# Patient Record
Sex: FEMALE | Race: WHITE | NOT HISPANIC OR LATINO | Employment: OTHER | URBAN - METROPOLITAN AREA
[De-identification: names, ages, dates, MRNs, and addresses within clinical notes are randomized per-mention and may not be internally consistent; named-entity substitution may affect disease eponyms.]

---

## 2023-08-07 ENCOUNTER — APPOINTMENT (OUTPATIENT)
Dept: GENERAL RADIOLOGY | Facility: CLINIC | Age: 83
DRG: 481 | End: 2023-08-07
Attending: EMERGENCY MEDICINE
Payer: MEDICARE

## 2023-08-07 ENCOUNTER — HOSPITAL ENCOUNTER (INPATIENT)
Facility: CLINIC | Age: 83
LOS: 9 days | Discharge: NURSING, REHAB OR LONG-TERM CARE | DRG: 481 | End: 2023-08-16
Attending: EMERGENCY MEDICINE | Admitting: INTERNAL MEDICINE
Payer: MEDICARE

## 2023-08-07 ENCOUNTER — APPOINTMENT (OUTPATIENT)
Dept: CT IMAGING | Facility: CLINIC | Age: 83
DRG: 481 | End: 2023-08-07
Attending: STUDENT IN AN ORGANIZED HEALTH CARE EDUCATION/TRAINING PROGRAM
Payer: MEDICARE

## 2023-08-07 ENCOUNTER — APPOINTMENT (OUTPATIENT)
Dept: CT IMAGING | Facility: CLINIC | Age: 83
DRG: 481 | End: 2023-08-07
Attending: EMERGENCY MEDICINE
Payer: MEDICARE

## 2023-08-07 DIAGNOSIS — G47.09 OTHER INSOMNIA: Primary | ICD-10-CM

## 2023-08-07 DIAGNOSIS — S72.001A CLOSED FRACTURE OF NECK OF RIGHT FEMUR, INITIAL ENCOUNTER (H): ICD-10-CM

## 2023-08-07 DIAGNOSIS — E87.1 HYPONATREMIA: ICD-10-CM

## 2023-08-07 LAB
ALBUMIN SERPL BCG-MCNC: 4.4 G/DL (ref 3.5–5.2)
ALP SERPL-CCNC: 101 U/L (ref 35–104)
ALT SERPL W P-5'-P-CCNC: 37 U/L (ref 0–50)
ANION GAP SERPL CALCULATED.3IONS-SCNC: 16 MMOL/L (ref 7–15)
AST SERPL W P-5'-P-CCNC: 33 U/L (ref 0–45)
BASOPHILS # BLD AUTO: 0 10E3/UL (ref 0–0.2)
BASOPHILS NFR BLD AUTO: 0 %
BILIRUB SERPL-MCNC: 0.9 MG/DL
BUN SERPL-MCNC: 9.8 MG/DL (ref 8–23)
CALCIUM SERPL-MCNC: 9.9 MG/DL (ref 8.8–10.2)
CHLORIDE SERPL-SCNC: 90 MMOL/L (ref 98–107)
CREAT SERPL-MCNC: 0.88 MG/DL (ref 0.51–0.95)
DEPRECATED HCO3 PLAS-SCNC: 21 MMOL/L (ref 22–29)
EOSINOPHIL # BLD AUTO: 0 10E3/UL (ref 0–0.7)
EOSINOPHIL NFR BLD AUTO: 0 %
ERYTHROCYTE [DISTWIDTH] IN BLOOD BY AUTOMATED COUNT: 12.7 % (ref 10–15)
GFR SERPL CREATININE-BSD FRML MDRD: 64 ML/MIN/1.73M2
GLUCOSE SERPL-MCNC: 159 MG/DL (ref 70–99)
HCT VFR BLD AUTO: 37.5 % (ref 35–47)
HGB BLD-MCNC: 12.9 G/DL (ref 11.7–15.7)
HOLD SPECIMEN: NORMAL
IMM GRANULOCYTES # BLD: 0 10E3/UL
IMM GRANULOCYTES NFR BLD: 0 %
LYMPHOCYTES # BLD AUTO: 0.6 10E3/UL (ref 0.8–5.3)
LYMPHOCYTES NFR BLD AUTO: 6 %
MCH RBC QN AUTO: 35.1 PG (ref 26.5–33)
MCHC RBC AUTO-ENTMCNC: 34.4 G/DL (ref 31.5–36.5)
MCV RBC AUTO: 102 FL (ref 78–100)
MONOCYTES # BLD AUTO: 0.6 10E3/UL (ref 0–1.3)
MONOCYTES NFR BLD AUTO: 6 %
NEUTROPHILS # BLD AUTO: 8.8 10E3/UL (ref 1.6–8.3)
NEUTROPHILS NFR BLD AUTO: 88 %
NRBC # BLD AUTO: 0 10E3/UL
NRBC BLD AUTO-RTO: 0 /100
PLATELET # BLD AUTO: 265 10E3/UL (ref 150–450)
POTASSIUM SERPL-SCNC: 4.1 MMOL/L (ref 3.4–5.3)
PROT SERPL-MCNC: 7.8 G/DL (ref 6.4–8.3)
RBC # BLD AUTO: 3.67 10E6/UL (ref 3.8–5.2)
SODIUM SERPL-SCNC: 127 MMOL/L (ref 136–145)
WBC # BLD AUTO: 10.1 10E3/UL (ref 4–11)

## 2023-08-07 PROCEDURE — G1010 CDSM STANSON: HCPCS

## 2023-08-07 PROCEDURE — 36415 COLL VENOUS BLD VENIPUNCTURE: CPT | Performed by: EMERGENCY MEDICINE

## 2023-08-07 PROCEDURE — 72125 CT NECK SPINE W/O DYE: CPT | Mod: MA

## 2023-08-07 PROCEDURE — 72170 X-RAY EXAM OF PELVIS: CPT

## 2023-08-07 PROCEDURE — 80053 COMPREHEN METABOLIC PANEL: CPT | Performed by: EMERGENCY MEDICINE

## 2023-08-07 PROCEDURE — 73552 X-RAY EXAM OF FEMUR 2/>: CPT | Mod: RT

## 2023-08-07 PROCEDURE — 258N000003 HC RX IP 258 OP 636: Performed by: EMERGENCY MEDICINE

## 2023-08-07 PROCEDURE — 250N000013 HC RX MED GY IP 250 OP 250 PS 637: Performed by: EMERGENCY MEDICINE

## 2023-08-07 PROCEDURE — 99222 1ST HOSP IP/OBS MODERATE 55: CPT | Performed by: INTERNAL MEDICINE

## 2023-08-07 PROCEDURE — 85025 COMPLETE CBC W/AUTO DIFF WBC: CPT | Performed by: EMERGENCY MEDICINE

## 2023-08-07 PROCEDURE — 120N000001 HC R&B MED SURG/OB

## 2023-08-07 PROCEDURE — 70450 CT HEAD/BRAIN W/O DYE: CPT | Mod: MA

## 2023-08-07 PROCEDURE — 99285 EMERGENCY DEPT VISIT HI MDM: CPT | Mod: 25

## 2023-08-07 RX ORDER — ACETAMINOPHEN 325 MG/1
650 TABLET ORAL ONCE
Status: COMPLETED | OUTPATIENT
Start: 2023-08-07 | End: 2023-08-07

## 2023-08-07 RX ORDER — LISINOPRIL 20 MG/1
20 TABLET ORAL EVERY EVENING
COMMUNITY
Start: 2023-04-14 | End: 2023-09-07

## 2023-08-07 RX ORDER — AMLODIPINE BESYLATE 5 MG/1
5 TABLET ORAL EVERY EVENING
COMMUNITY
Start: 2023-04-12 | End: 2023-09-07

## 2023-08-07 RX ADMIN — OXYCODONE HYDROCHLORIDE 2.5 MG: 5 TABLET ORAL at 22:55

## 2023-08-07 RX ADMIN — ACETAMINOPHEN 650 MG: 325 TABLET, FILM COATED ORAL at 22:55

## 2023-08-07 RX ADMIN — SODIUM CHLORIDE 1000 ML: 9 INJECTION, SOLUTION INTRAVENOUS at 22:58

## 2023-08-07 ASSESSMENT — ACTIVITIES OF DAILY LIVING (ADL)
ADLS_ACUITY_SCORE: 35
ADLS_ACUITY_SCORE: 35

## 2023-08-08 ENCOUNTER — APPOINTMENT (OUTPATIENT)
Dept: GENERAL RADIOLOGY | Facility: CLINIC | Age: 83
DRG: 481 | End: 2023-08-08
Payer: MEDICARE

## 2023-08-08 ENCOUNTER — ANESTHESIA (OUTPATIENT)
Dept: SURGERY | Facility: CLINIC | Age: 83
DRG: 481 | End: 2023-08-08
Payer: MEDICARE

## 2023-08-08 ENCOUNTER — APPOINTMENT (OUTPATIENT)
Dept: GENERAL RADIOLOGY | Facility: CLINIC | Age: 83
DRG: 481 | End: 2023-08-08
Attending: INTERNAL MEDICINE
Payer: MEDICARE

## 2023-08-08 ENCOUNTER — ANESTHESIA EVENT (OUTPATIENT)
Dept: SURGERY | Facility: CLINIC | Age: 83
DRG: 481 | End: 2023-08-08
Payer: MEDICARE

## 2023-08-08 LAB
ABO/RH(D): NORMAL
ALBUMIN UR-MCNC: NEGATIVE MG/DL
ANION GAP SERPL CALCULATED.3IONS-SCNC: 9 MMOL/L (ref 7–15)
ANTIBODY SCREEN: NEGATIVE
APPEARANCE UR: CLEAR
BACTERIA #/AREA URNS HPF: ABNORMAL /HPF
BILIRUB UR QL STRIP: NEGATIVE
BUN SERPL-MCNC: 8.6 MG/DL (ref 8–23)
CALCIUM SERPL-MCNC: 9.2 MG/DL (ref 8.8–10.2)
CHLORIDE SERPL-SCNC: 95 MMOL/L (ref 98–107)
CK SERPL-CCNC: 225 U/L (ref 26–192)
COLOR UR AUTO: ABNORMAL
CREAT SERPL-MCNC: 0.6 MG/DL (ref 0.51–0.95)
CREAT SERPL-MCNC: 0.7 MG/DL (ref 0.51–0.95)
DEPRECATED HCO3 PLAS-SCNC: 25 MMOL/L (ref 22–29)
GFR SERPL CREATININE-BSD FRML MDRD: 85 ML/MIN/1.73M2
GFR SERPL CREATININE-BSD FRML MDRD: 88 ML/MIN/1.73M2
GLUCOSE SERPL-MCNC: 140 MG/DL (ref 70–99)
GLUCOSE UR STRIP-MCNC: 50 MG/DL
HGB UR QL STRIP: NEGATIVE
KETONES UR STRIP-MCNC: NEGATIVE MG/DL
LEUKOCYTE ESTERASE UR QL STRIP: ABNORMAL
MAGNESIUM SERPL-MCNC: 1.6 MG/DL (ref 1.7–2.3)
NITRATE UR QL: NEGATIVE
PH UR STRIP: 7 [PH] (ref 5–7)
POTASSIUM SERPL-SCNC: 3.9 MMOL/L (ref 3.4–5.3)
RBC URINE: 0 /HPF
SODIUM SERPL-SCNC: 129 MMOL/L (ref 136–145)
SODIUM UR-SCNC: 55 MMOL/L
SP GR UR STRIP: 1.01 (ref 1–1.03)
SPECIMEN EXPIRATION DATE: NORMAL
SQUAMOUS EPITHELIAL: 3 /HPF
UROBILINOGEN UR STRIP-MCNC: NORMAL MG/DL
WBC URINE: 4 /HPF

## 2023-08-08 PROCEDURE — 81001 URINALYSIS AUTO W/SCOPE: CPT | Performed by: INTERNAL MEDICINE

## 2023-08-08 PROCEDURE — 82550 ASSAY OF CK (CPK): CPT | Performed by: PHYSICIAN ASSISTANT

## 2023-08-08 PROCEDURE — 999N000179 XR SURGERY CARM FLUORO LESS THAN 5 MIN W STILLS

## 2023-08-08 PROCEDURE — 250N000011 HC RX IP 250 OP 636: Performed by: STUDENT IN AN ORGANIZED HEALTH CARE EDUCATION/TRAINING PROGRAM

## 2023-08-08 PROCEDURE — 0QH634Z INSERTION OF INTERNAL FIXATION DEVICE INTO RIGHT UPPER FEMUR, PERCUTANEOUS APPROACH: ICD-10-PCS | Performed by: STUDENT IN AN ORGANIZED HEALTH CARE EDUCATION/TRAINING PROGRAM

## 2023-08-08 PROCEDURE — 258N000003 HC RX IP 258 OP 636: Performed by: INTERNAL MEDICINE

## 2023-08-08 PROCEDURE — 360N000084 HC SURGERY LEVEL 4 W/ FLUORO, PER MIN: Performed by: STUDENT IN AN ORGANIZED HEALTH CARE EDUCATION/TRAINING PROGRAM

## 2023-08-08 PROCEDURE — 99232 SBSQ HOSP IP/OBS MODERATE 35: CPT | Performed by: STUDENT IN AN ORGANIZED HEALTH CARE EDUCATION/TRAINING PROGRAM

## 2023-08-08 PROCEDURE — 250N000009 HC RX 250: Performed by: STUDENT IN AN ORGANIZED HEALTH CARE EDUCATION/TRAINING PROGRAM

## 2023-08-08 PROCEDURE — 999N000128 HC STATISTIC PERIPHERAL IV START W/O US GUIDANCE

## 2023-08-08 PROCEDURE — 250N000009 HC RX 250: Performed by: NURSE ANESTHETIST, CERTIFIED REGISTERED

## 2023-08-08 PROCEDURE — 710N000009 HC RECOVERY PHASE 1, LEVEL 1, PER MIN: Performed by: STUDENT IN AN ORGANIZED HEALTH CARE EDUCATION/TRAINING PROGRAM

## 2023-08-08 PROCEDURE — 83735 ASSAY OF MAGNESIUM: CPT | Performed by: STUDENT IN AN ORGANIZED HEALTH CARE EDUCATION/TRAINING PROGRAM

## 2023-08-08 PROCEDURE — 370N000017 HC ANESTHESIA TECHNICAL FEE, PER MIN: Performed by: STUDENT IN AN ORGANIZED HEALTH CARE EDUCATION/TRAINING PROGRAM

## 2023-08-08 PROCEDURE — 82306 VITAMIN D 25 HYDROXY: CPT | Performed by: PHYSICIAN ASSISTANT

## 2023-08-08 PROCEDURE — C1713 ANCHOR/SCREW BN/BN,TIS/BN: HCPCS | Performed by: STUDENT IN AN ORGANIZED HEALTH CARE EDUCATION/TRAINING PROGRAM

## 2023-08-08 PROCEDURE — 250N000011 HC RX IP 250 OP 636: Performed by: NURSE ANESTHETIST, CERTIFIED REGISTERED

## 2023-08-08 PROCEDURE — 250N000013 HC RX MED GY IP 250 OP 250 PS 637

## 2023-08-08 PROCEDURE — 36415 COLL VENOUS BLD VENIPUNCTURE: CPT | Performed by: INTERNAL MEDICINE

## 2023-08-08 PROCEDURE — 272N000001 HC OR GENERAL SUPPLY STERILE: Performed by: STUDENT IN AN ORGANIZED HEALTH CARE EDUCATION/TRAINING PROGRAM

## 2023-08-08 PROCEDURE — 82565 ASSAY OF CREATININE: CPT

## 2023-08-08 PROCEDURE — 258N000003 HC RX IP 258 OP 636: Performed by: NURSE ANESTHETIST, CERTIFIED REGISTERED

## 2023-08-08 PROCEDURE — 258N000003 HC RX IP 258 OP 636

## 2023-08-08 PROCEDURE — 250N000013 HC RX MED GY IP 250 OP 250 PS 637: Performed by: INTERNAL MEDICINE

## 2023-08-08 PROCEDURE — 84300 ASSAY OF URINE SODIUM: CPT | Performed by: INTERNAL MEDICINE

## 2023-08-08 PROCEDURE — 250N000009 HC RX 250: Performed by: ANESTHESIOLOGY

## 2023-08-08 PROCEDURE — 82310 ASSAY OF CALCIUM: CPT | Performed by: INTERNAL MEDICINE

## 2023-08-08 PROCEDURE — 999N000063 XR PELVIS AND HIP PORTABLE RIGHT 1 VIEW

## 2023-08-08 PROCEDURE — 36415 COLL VENOUS BLD VENIPUNCTURE: CPT

## 2023-08-08 PROCEDURE — 250N000011 HC RX IP 250 OP 636

## 2023-08-08 PROCEDURE — 999N000141 HC STATISTIC PRE-PROCEDURE NURSING ASSESSMENT: Performed by: STUDENT IN AN ORGANIZED HEALTH CARE EDUCATION/TRAINING PROGRAM

## 2023-08-08 PROCEDURE — 86901 BLOOD TYPING SEROLOGIC RH(D): CPT | Performed by: PHYSICIAN ASSISTANT

## 2023-08-08 PROCEDURE — 120N000001 HC R&B MED SURG/OB

## 2023-08-08 PROCEDURE — 36415 COLL VENOUS BLD VENIPUNCTURE: CPT | Performed by: PHYSICIAN ASSISTANT

## 2023-08-08 DEVICE — IMPLANTABLE DEVICE: Type: IMPLANTABLE DEVICE | Site: HIP | Status: FUNCTIONAL

## 2023-08-08 DEVICE — IMP WASHER SYN CAN 13MM 219.99: Type: IMPLANTABLE DEVICE | Site: HIP | Status: FUNCTIONAL

## 2023-08-08 RX ORDER — LIDOCAINE HYDROCHLORIDE 20 MG/ML
INJECTION, SOLUTION INFILTRATION; PERINEURAL PRN
Status: DISCONTINUED | OUTPATIENT
Start: 2023-08-08 | End: 2023-08-08

## 2023-08-08 RX ORDER — MAGNESIUM HYDROXIDE 1200 MG/15ML
LIQUID ORAL PRN
Status: DISCONTINUED | OUTPATIENT
Start: 2023-08-08 | End: 2023-08-08 | Stop reason: HOSPADM

## 2023-08-08 RX ORDER — NALOXONE HYDROCHLORIDE 0.4 MG/ML
0.4 INJECTION, SOLUTION INTRAMUSCULAR; INTRAVENOUS; SUBCUTANEOUS
Status: DISCONTINUED | OUTPATIENT
Start: 2023-08-08 | End: 2023-08-16 | Stop reason: HOSPADM

## 2023-08-08 RX ORDER — LISINOPRIL 20 MG/1
20 TABLET ORAL EVERY EVENING
Status: DISCONTINUED | OUTPATIENT
Start: 2023-08-08 | End: 2023-08-16 | Stop reason: HOSPADM

## 2023-08-08 RX ORDER — DEXAMETHASONE SODIUM PHOSPHATE 4 MG/ML
INJECTION, SOLUTION INTRA-ARTICULAR; INTRALESIONAL; INTRAMUSCULAR; INTRAVENOUS; SOFT TISSUE PRN
Status: DISCONTINUED | OUTPATIENT
Start: 2023-08-08 | End: 2023-08-08

## 2023-08-08 RX ORDER — ACETAMINOPHEN 325 MG/1
650 TABLET ORAL EVERY 4 HOURS PRN
Status: DISCONTINUED | OUTPATIENT
Start: 2023-08-11 | End: 2023-08-16 | Stop reason: HOSPADM

## 2023-08-08 RX ORDER — HYDROMORPHONE HCL IN WATER/PF 6 MG/30 ML
0.2 PATIENT CONTROLLED ANALGESIA SYRINGE INTRAVENOUS
Status: DISCONTINUED | OUTPATIENT
Start: 2023-08-08 | End: 2023-08-16 | Stop reason: HOSPADM

## 2023-08-08 RX ORDER — ACETAMINOPHEN 325 MG/1
325-650 TABLET ORAL EVERY 6 HOURS PRN
Status: DISCONTINUED | OUTPATIENT
Start: 2023-08-08 | End: 2023-08-08

## 2023-08-08 RX ORDER — PROCHLORPERAZINE MALEATE 5 MG
5 TABLET ORAL EVERY 6 HOURS PRN
Status: DISCONTINUED | OUTPATIENT
Start: 2023-08-08 | End: 2023-08-16 | Stop reason: HOSPADM

## 2023-08-08 RX ORDER — IBUPROFEN 600 MG/1
600 TABLET, FILM COATED ORAL EVERY 6 HOURS PRN
Status: DISCONTINUED | OUTPATIENT
Start: 2023-08-08 | End: 2023-08-16 | Stop reason: HOSPADM

## 2023-08-08 RX ORDER — PROPOFOL 10 MG/ML
INJECTION, EMULSION INTRAVENOUS CONTINUOUS PRN
Status: DISCONTINUED | OUTPATIENT
Start: 2023-08-08 | End: 2023-08-08

## 2023-08-08 RX ORDER — NALOXONE HYDROCHLORIDE 0.4 MG/ML
0.2 INJECTION, SOLUTION INTRAMUSCULAR; INTRAVENOUS; SUBCUTANEOUS
Status: DISCONTINUED | OUTPATIENT
Start: 2023-08-08 | End: 2023-08-16 | Stop reason: HOSPADM

## 2023-08-08 RX ORDER — OXYCODONE HYDROCHLORIDE 5 MG/1
5 TABLET ORAL EVERY 4 HOURS PRN
Status: DISCONTINUED | OUTPATIENT
Start: 2023-08-08 | End: 2023-08-16 | Stop reason: HOSPADM

## 2023-08-08 RX ORDER — ONDANSETRON 4 MG/1
4 TABLET, ORALLY DISINTEGRATING ORAL EVERY 6 HOURS PRN
Status: DISCONTINUED | OUTPATIENT
Start: 2023-08-08 | End: 2023-08-16 | Stop reason: HOSPADM

## 2023-08-08 RX ORDER — AMOXICILLIN 250 MG
1 CAPSULE ORAL 2 TIMES DAILY
Status: DISCONTINUED | OUTPATIENT
Start: 2023-08-08 | End: 2023-08-12

## 2023-08-08 RX ORDER — TRANEXAMIC ACID 10 MG/ML
1 INJECTION, SOLUTION INTRAVENOUS ONCE
Status: COMPLETED | OUTPATIENT
Start: 2023-08-08 | End: 2023-08-08

## 2023-08-08 RX ORDER — ONDANSETRON 2 MG/ML
INJECTION INTRAMUSCULAR; INTRAVENOUS PRN
Status: DISCONTINUED | OUTPATIENT
Start: 2023-08-08 | End: 2023-08-08

## 2023-08-08 RX ORDER — ACETAMINOPHEN 325 MG/1
975 TABLET ORAL EVERY 8 HOURS
Status: DISCONTINUED | OUTPATIENT
Start: 2023-08-08 | End: 2023-08-08

## 2023-08-08 RX ORDER — POLYETHYLENE GLYCOL 3350 17 G/17G
17 POWDER, FOR SOLUTION ORAL DAILY PRN
Status: DISCONTINUED | OUTPATIENT
Start: 2023-08-08 | End: 2023-08-16 | Stop reason: HOSPADM

## 2023-08-08 RX ORDER — ONDANSETRON 4 MG/1
4 TABLET, ORALLY DISINTEGRATING ORAL EVERY 6 HOURS PRN
Status: DISCONTINUED | OUTPATIENT
Start: 2023-08-08 | End: 2023-08-08

## 2023-08-08 RX ORDER — OXYCODONE HYDROCHLORIDE 5 MG/1
10 TABLET ORAL EVERY 4 HOURS PRN
Status: DISCONTINUED | OUTPATIENT
Start: 2023-08-08 | End: 2023-08-08

## 2023-08-08 RX ORDER — ONDANSETRON 2 MG/ML
4 INJECTION INTRAMUSCULAR; INTRAVENOUS EVERY 6 HOURS PRN
Status: DISCONTINUED | OUTPATIENT
Start: 2023-08-08 | End: 2023-08-08

## 2023-08-08 RX ORDER — ENOXAPARIN SODIUM 100 MG/ML
40 INJECTION SUBCUTANEOUS EVERY 24 HOURS
Status: DISCONTINUED | OUTPATIENT
Start: 2023-08-09 | End: 2023-08-16 | Stop reason: HOSPADM

## 2023-08-08 RX ORDER — BISACODYL 10 MG
10 SUPPOSITORY, RECTAL RECTAL DAILY PRN
Status: DISCONTINUED | OUTPATIENT
Start: 2023-08-08 | End: 2023-08-08

## 2023-08-08 RX ORDER — LIDOCAINE 40 MG/G
CREAM TOPICAL
Status: DISCONTINUED | OUTPATIENT
Start: 2023-08-08 | End: 2023-08-08

## 2023-08-08 RX ORDER — PROCHLORPERAZINE 25 MG
12.5 SUPPOSITORY, RECTAL RECTAL EVERY 12 HOURS PRN
Status: DISCONTINUED | OUTPATIENT
Start: 2023-08-08 | End: 2023-08-08

## 2023-08-08 RX ORDER — BISACODYL 10 MG
10 SUPPOSITORY, RECTAL RECTAL DAILY PRN
Status: DISCONTINUED | OUTPATIENT
Start: 2023-08-08 | End: 2023-08-16 | Stop reason: HOSPADM

## 2023-08-08 RX ORDER — HYDROXYZINE HYDROCHLORIDE 10 MG/1
10 TABLET, FILM COATED ORAL 3 TIMES DAILY PRN
Status: DISCONTINUED | OUTPATIENT
Start: 2023-08-08 | End: 2023-08-16 | Stop reason: HOSPADM

## 2023-08-08 RX ORDER — POLYETHYLENE GLYCOL 3350 17 G/17G
17 POWDER, FOR SOLUTION ORAL DAILY
Status: DISCONTINUED | OUTPATIENT
Start: 2023-08-09 | End: 2023-08-12

## 2023-08-08 RX ORDER — AMOXICILLIN 250 MG
1 CAPSULE ORAL 2 TIMES DAILY PRN
Status: DISCONTINUED | OUTPATIENT
Start: 2023-08-08 | End: 2023-08-08

## 2023-08-08 RX ORDER — CEFAZOLIN SODIUM 1 G/3ML
1 INJECTION, POWDER, FOR SOLUTION INTRAMUSCULAR; INTRAVENOUS EVERY 8 HOURS
Status: COMPLETED | OUTPATIENT
Start: 2023-08-08 | End: 2023-08-09

## 2023-08-08 RX ORDER — HYDROMORPHONE HCL IN WATER/PF 6 MG/30 ML
0.4 PATIENT CONTROLLED ANALGESIA SYRINGE INTRAVENOUS
Status: DISCONTINUED | OUTPATIENT
Start: 2023-08-08 | End: 2023-08-16 | Stop reason: HOSPADM

## 2023-08-08 RX ORDER — PROPOFOL 10 MG/ML
INJECTION, EMULSION INTRAVENOUS PRN
Status: DISCONTINUED | OUTPATIENT
Start: 2023-08-08 | End: 2023-08-08

## 2023-08-08 RX ORDER — AMOXICILLIN 250 MG
2 CAPSULE ORAL 2 TIMES DAILY PRN
Status: DISCONTINUED | OUTPATIENT
Start: 2023-08-08 | End: 2023-08-08

## 2023-08-08 RX ORDER — METHOCARBAMOL 500 MG/1
500 TABLET, FILM COATED ORAL EVERY 6 HOURS PRN
Status: DISCONTINUED | OUTPATIENT
Start: 2023-08-08 | End: 2023-08-16 | Stop reason: HOSPADM

## 2023-08-08 RX ORDER — HYDROMORPHONE HCL IN WATER/PF 6 MG/30 ML
0.4 PATIENT CONTROLLED ANALGESIA SYRINGE INTRAVENOUS
Status: DISCONTINUED | OUTPATIENT
Start: 2023-08-08 | End: 2023-08-08

## 2023-08-08 RX ORDER — SODIUM CHLORIDE, SODIUM LACTATE, POTASSIUM CHLORIDE, CALCIUM CHLORIDE 600; 310; 30; 20 MG/100ML; MG/100ML; MG/100ML; MG/100ML
INJECTION, SOLUTION INTRAVENOUS CONTINUOUS PRN
Status: DISCONTINUED | OUTPATIENT
Start: 2023-08-08 | End: 2023-08-08

## 2023-08-08 RX ORDER — ONDANSETRON 2 MG/ML
4 INJECTION INTRAMUSCULAR; INTRAVENOUS EVERY 6 HOURS PRN
Status: DISCONTINUED | OUTPATIENT
Start: 2023-08-08 | End: 2023-08-16 | Stop reason: HOSPADM

## 2023-08-08 RX ORDER — SODIUM CHLORIDE, SODIUM LACTATE, POTASSIUM CHLORIDE, CALCIUM CHLORIDE 600; 310; 30; 20 MG/100ML; MG/100ML; MG/100ML; MG/100ML
INJECTION, SOLUTION INTRAVENOUS CONTINUOUS
Status: DISCONTINUED | OUTPATIENT
Start: 2023-08-08 | End: 2023-08-11

## 2023-08-08 RX ORDER — CEFAZOLIN SODIUM/WATER 2 G/20 ML
2 SYRINGE (ML) INTRAVENOUS
Status: DISCONTINUED | OUTPATIENT
Start: 2023-08-08 | End: 2023-08-08 | Stop reason: HOSPADM

## 2023-08-08 RX ORDER — LIDOCAINE 40 MG/G
CREAM TOPICAL
Status: DISCONTINUED | OUTPATIENT
Start: 2023-08-08 | End: 2023-08-16 | Stop reason: HOSPADM

## 2023-08-08 RX ORDER — ACETAMINOPHEN 325 MG/1
975 TABLET ORAL EVERY 8 HOURS
Status: COMPLETED | OUTPATIENT
Start: 2023-08-08 | End: 2023-08-11

## 2023-08-08 RX ORDER — AMLODIPINE BESYLATE 5 MG/1
5 TABLET ORAL EVERY EVENING
Status: DISCONTINUED | OUTPATIENT
Start: 2023-08-08 | End: 2023-08-16 | Stop reason: HOSPADM

## 2023-08-08 RX ORDER — PROCHLORPERAZINE MALEATE 5 MG
5 TABLET ORAL EVERY 6 HOURS PRN
Status: DISCONTINUED | OUTPATIENT
Start: 2023-08-08 | End: 2023-08-08

## 2023-08-08 RX ORDER — CEFAZOLIN SODIUM/WATER 2 G/20 ML
2 SYRINGE (ML) INTRAVENOUS SEE ADMIN INSTRUCTIONS
Status: DISCONTINUED | OUTPATIENT
Start: 2023-08-08 | End: 2023-08-08 | Stop reason: HOSPADM

## 2023-08-08 RX ORDER — CHOLECALCIFEROL (VITAMIN D3) 50 MCG
50 TABLET ORAL DAILY
Status: DISCONTINUED | OUTPATIENT
Start: 2023-08-09 | End: 2023-08-16 | Stop reason: HOSPADM

## 2023-08-08 RX ORDER — HYDROMORPHONE HCL IN WATER/PF 6 MG/30 ML
0.2 PATIENT CONTROLLED ANALGESIA SYRINGE INTRAVENOUS
Status: DISCONTINUED | OUTPATIENT
Start: 2023-08-08 | End: 2023-08-08

## 2023-08-08 RX ORDER — FENTANYL CITRATE 50 UG/ML
INJECTION, SOLUTION INTRAMUSCULAR; INTRAVENOUS PRN
Status: DISCONTINUED | OUTPATIENT
Start: 2023-08-08 | End: 2023-08-08

## 2023-08-08 RX ORDER — OXYCODONE HYDROCHLORIDE 5 MG/1
5 TABLET ORAL EVERY 4 HOURS PRN
Status: DISCONTINUED | OUTPATIENT
Start: 2023-08-08 | End: 2023-08-08

## 2023-08-08 RX ORDER — TRANEXAMIC ACID 10 MG/ML
1 INJECTION, SOLUTION INTRAVENOUS ONCE
Status: DISCONTINUED | OUTPATIENT
Start: 2023-08-08 | End: 2023-08-08 | Stop reason: HOSPADM

## 2023-08-08 RX ADMIN — DEXAMETHASONE SODIUM PHOSPHATE 4 MG: 4 INJECTION, SOLUTION INTRA-ARTICULAR; INTRALESIONAL; INTRAMUSCULAR; INTRAVENOUS; SOFT TISSUE at 14:19

## 2023-08-08 RX ADMIN — TRANEXAMIC ACID 1 G: 10 INJECTION, SOLUTION INTRAVENOUS at 14:17

## 2023-08-08 RX ADMIN — PHENYLEPHRINE HYDROCHLORIDE 200 MCG: 10 INJECTION INTRAVENOUS at 14:44

## 2023-08-08 RX ADMIN — PROPOFOL 50 MG: 10 INJECTION, EMULSION INTRAVENOUS at 14:15

## 2023-08-08 RX ADMIN — AMLODIPINE BESYLATE 5 MG: 5 TABLET ORAL at 20:04

## 2023-08-08 RX ADMIN — DEXTROSE AND SODIUM CHLORIDE: 5; 900 INJECTION, SOLUTION INTRAVENOUS at 12:09

## 2023-08-08 RX ADMIN — SODIUM CHLORIDE, POTASSIUM CHLORIDE, SODIUM LACTATE AND CALCIUM CHLORIDE: 600; 310; 30; 20 INJECTION, SOLUTION INTRAVENOUS at 17:49

## 2023-08-08 RX ADMIN — PROPOFOL 100 MG: 10 INJECTION, EMULSION INTRAVENOUS at 14:13

## 2023-08-08 RX ADMIN — SODIUM CHLORIDE, POTASSIUM CHLORIDE, SODIUM LACTATE AND CALCIUM CHLORIDE: 600; 310; 30; 20 INJECTION, SOLUTION INTRAVENOUS at 14:09

## 2023-08-08 RX ADMIN — PHENYLEPHRINE HYDROCHLORIDE 200 MCG: 10 INJECTION INTRAVENOUS at 14:38

## 2023-08-08 RX ADMIN — FENTANYL CITRATE 50 MCG: 50 INJECTION, SOLUTION INTRAMUSCULAR; INTRAVENOUS at 14:13

## 2023-08-08 RX ADMIN — ACETAMINOPHEN 975 MG: 325 TABLET, FILM COATED ORAL at 06:22

## 2023-08-08 RX ADMIN — Medication 20 ML: at 14:00

## 2023-08-08 RX ADMIN — LIDOCAINE HYDROCHLORIDE 60 MG: 20 INJECTION, SOLUTION INFILTRATION; PERINEURAL at 14:13

## 2023-08-08 RX ADMIN — FENTANYL CITRATE 50 MCG: 50 INJECTION, SOLUTION INTRAMUSCULAR; INTRAVENOUS at 14:33

## 2023-08-08 RX ADMIN — PROPOFOL 150 MCG/KG/MIN: 10 INJECTION, EMULSION INTRAVENOUS at 14:17

## 2023-08-08 RX ADMIN — PROPOFOL 100 MG: 10 INJECTION, EMULSION INTRAVENOUS at 14:38

## 2023-08-08 RX ADMIN — PHENYLEPHRINE HYDROCHLORIDE 100 MCG: 10 INJECTION INTRAVENOUS at 14:13

## 2023-08-08 RX ADMIN — CEFAZOLIN 1 G: 1 INJECTION, POWDER, FOR SOLUTION INTRAMUSCULAR; INTRAVENOUS at 22:03

## 2023-08-08 RX ADMIN — PHENYLEPHRINE HYDROCHLORIDE 200 MCG: 10 INJECTION INTRAVENOUS at 14:55

## 2023-08-08 RX ADMIN — ONDANSETRON 4 MG: 2 INJECTION INTRAMUSCULAR; INTRAVENOUS at 14:19

## 2023-08-08 RX ADMIN — ACETAMINOPHEN 975 MG: 325 TABLET, FILM COATED ORAL at 22:02

## 2023-08-08 RX ADMIN — Medication 2 G: at 14:09

## 2023-08-08 RX ADMIN — PHENYLEPHRINE HYDROCHLORIDE 150 MCG: 10 INJECTION INTRAVENOUS at 14:25

## 2023-08-08 RX ADMIN — SENNOSIDES AND DOCUSATE SODIUM 1 TABLET: 50; 8.6 TABLET ORAL at 20:04

## 2023-08-08 RX ADMIN — DEXTROSE AND SODIUM CHLORIDE: 5; 900 INJECTION, SOLUTION INTRAVENOUS at 01:29

## 2023-08-08 ASSESSMENT — ACTIVITIES OF DAILY LIVING (ADL)
ADLS_ACUITY_SCORE: 18
ADLS_ACUITY_SCORE: 32
NUMBER_OF_TIMES_PATIENT_HAS_FALLEN_WITHIN_LAST_SIX_MONTHS: 0
DIFFICULTY_EATING/SWALLOWING: NO
WALKING_OR_CLIMBING_STAIRS_DIFFICULTY: NO
ADLS_ACUITY_SCORE: 24
ADLS_ACUITY_SCORE: 32
ADLS_ACUITY_SCORE: 32
ADLS_ACUITY_SCORE: 24
ADLS_ACUITY_SCORE: 35
ADLS_ACUITY_SCORE: 32
ADLS_ACUITY_SCORE: 32
CHANGE_IN_FUNCTIONAL_STATUS_SINCE_ONSET_OF_CURRENT_ILLNESS/INJURY: NO
ADLS_ACUITY_SCORE: 32
ADLS_ACUITY_SCORE: 32
TOILETING_ISSUES: NO
DRESSING/BATHING_DIFFICULTY: NO
FALL_HISTORY_WITHIN_LAST_SIX_MONTHS: YES
WEAR_GLASSES_OR_BLIND: NO
ADLS_ACUITY_SCORE: 18
DOING_ERRANDS_INDEPENDENTLY_DIFFICULTY: NO
CONCENTRATING,_REMEMBERING_OR_MAKING_DECISIONS_DIFFICULTY: NO

## 2023-08-08 NOTE — PLAN OF CARE
Goal Outcome Evaluation:  Pt has Right femur fracture after fall, Alert and oriented X4, VSS on RA. Strict bedrest. NPO after 2AM. Dextrose 5 with ns infusing 100 ml/hr. CHG baths given. Pain managed with scheduled tylenol.

## 2023-08-08 NOTE — OP NOTE
[facility]  Orthopedic Operative Note    Percutaneous Screw Fixation Femoral Neck Fracture    Anna Marie Babb MRN# [id number]   YOB: 1940  Procedure Date:  8/8/2023  Age: 83 year old     PREOPERATIVE DIAGNOSIS:  right valgus impacted femoral neck fracture.    POSTOPERATIVE DIAGNOSIS:  right valgus impacted femoral neck fracture.    PROCEDURE PERFORMED:  In-situ percutaneous screw fixation, right valgus impacted femoral neck fracture.    SURGEON:  OLEG RAMOS MD    FIRST ASSISTANT:  Korina Jenkins PA-C; A skilled first assistant was necessary for this procedure for assistance with patient positioning, prepping, draping, surgical visualization, wound closure, and application of the dressing.     ANESTHESIA:  General + FI block     EBL: 100cc    COMPLICATIONS: None    DISPOSITION: Post Anesthesia Care Unit     CONDITION: Stable     INDICATIONS:  Anna Marie Babb is a 83 year old female with a history of hypertension, celiac disease who presents with a right valgus impacted femoral neck fracture after a fall.  Patient reports she was walking on a wooden floor when her shoe, she fell onto her right hip.  She presented to [facility] emergency department where radiographs were obtained and revealed a femoral neck fracture.  CT scan was further obtained in the ED. She lives in her own home.  She does not ambulate with any assistive devices.  She takes aspirin 81 mg. She is neurointact on exam    We discussed potential treatment options including nonoperative and operative intervention along with the risks and benefits and expected recovery.  Radiographs and CT scan reveal a valgus impacted femoral neck fracture.  No significant displacement on CT scan.  I recommended proceeding with a right hip percutaneous pinning.  We discussed alternatives including nonoperative intervention along with arthroplasty but were not recommended at this time.  After thorough  discussion, they are in agreement elected proceed with a right hip percutaneous pinning.  All questions were answered.    Risks of surgery discussed included but not limited to bleeding, infection, damage to surrounding neurovascular structures, nonunion, malunion, avascular necrosis, leg length inequality, need for revision surgery including partial versus total hip arthroplasty, blood clots, pulmonary embolus, and the medical risks of anesthesia including MI, stroke, death.      We discussed the benefits of surgery including improved chance of fracture union and improve function as well as reduction in the medical risks of hip fracture.      We discussed the alternatives including nonoperative management, which I did not recommend.  The informed consent was signed and documented.  I met with the patient preoperatively to beltran the operative extremity.     IMPLANTS:  Implant Name Type Inv. Item Serial No.  Lot No. LRB No. Used Action   IMP WASHER SYN CAN 13MM 219.99 - [id number] Metallic Hardware/[employer]el[employer] IMP WASHER SYN CAN 13MM 219.99  SYNTHES-STRATEC 42 02 [id number] Right 3 Implanted   IMP SCR SYN CAN 6.5X32 FULL THRD 75MM SS - [id number] Metallic Hardware/[employer] IMP SCR SYN CAN 6.5X32 FULL THRD 75MM SS  [employer] 41 09 [id number] Right 1 Implanted   IMP SCR SYN CAN 6.5X32 FULL THRD 85MM SS - [id number] Metallic Hardware/[employer]el[employer] IMP SCR SYN CAN 6.5X32 FULL THRD 85MM SS  SY[employer]ATEC 41 09 [id number] Right 1 Implanted   IMP SCR SYN CAN 6.5X32 FULL THRD 90MM SS - [id number] Metallic Hardware/[employer]el[employer] IMP SCR SYN CAN 6.5X32 FULL THRD 90MM SS  SYN[employer]ATEC 41 09 [id number] Right 1 Implanted           PROCEDURE: The patient was brought in the operating room and placed supine on the fracture table.  After induction of general anesthesia their feet were placed in boots and the perineal post was placed.  All bony prominences were well-padded.  Preoperative fluoroscopic imaging demonstrated maintained  alignment of the fracture.  The hip was prepped and draped in normal sterile fashion with Chloraprep after being cleansed with a Hibclens scrub.  A procedural pause was conducted to verify correct patient, correct extremity, presence of the surgeon's initials on the operative extremity, and administration of antibiotics, in this case Ancef.       A direct lateral incision was made centered over the lesser trochanter. Fluoroscopic guidance was used to localize the small incision.   Percutaneous incisions were made through the IT band.  A guidewire was placed along the lateral femur just proximal to the lesser trochanter.  Fluoroscopic imaging was utilized to confirm the trajectory.  An inferior centrally placed guidewire was placed.  This was followed by an anterior superior and posterior superior guidewire.  This was done in an inverted triangle configuration.  Multiple fluoroscopic views were obtained including AP and lateral to ensure none of the guidewires crossed into the hip joint.  The wires were then measured.  Three 6.5 mm cannulated screws with washers were then placed.  The guidewires were then removed and again fluoroscopic guidance was utilized to confirm good trajectory of all screws and that they remained out of the hip joint on multiple images.       The wound was copiously irrigated with sterile saline and closed in layers with 0 Vicryl along the IT band, 2-0 Monocryl in the subcutaneous tissues and 3-0 monocryl with glue for the skin.       A xeroform, gauze, and Tegaderm dressing was applied.     PLAN:  1.  Weightbearing as tolerated with a walker.  2.  Physical therapy  3.  Ancef x24 hours  4.  DVT prophylaxis: Lovenox beginning postop day 1, may transition to aspirin 325 mg upon discharge.  5.  Keep dressings clean dry and intact, change if saturated.  6.  Case management for dispo planning  7.  Discussed following up with PCP regarding bone health and osteoporosis screening     FOLLOWUP:     1.   Plan 2-week wound check with x-rays (AP and Lateral Xrays).  This could also be done at patients rehab facility with x-rays sent to me at [facility].   2.  Eight-week followup with X-rays.     Dr. Duke's care coordinator is Lynette Garcia. Please contact her at [phone] to schedule an appointment.       Dr. Duke sees patient's at 2 clinic locations:  [facility] [address] [address]  [facility]   [address] , [address], [place] [address]       OLEG DUKE MD   [facility]

## 2023-08-08 NOTE — H&P
[facility]    History and Physical - Hospitalist Service       Date of Admission:  8/7/2023    Assessment & Plan      Anna Marie Babb is a 83 year old female admitted on 8/7/2023. She presents to the emergency department for evaluation of right hip pain after fall found to have right femur fracture.    Right femoral neck fracture: Following mechanical fall.  Reports no prior anesthesia reaction.  Last sedation would have been with 2019 colonoscopy.  -Orthopedic surgery consulted  -N.p.o. at 2 AM  -Physical therapy consulted tomorrow afternoon following surgical intervention  -Fall precautions    Hypertension:  -Holding prior to admission lisinopril perioperatively, resume following surgical intervention  -Continue prior to admission amlodipine    Hypochloremic hyponatremia: Suspect secondary to intravascular volume depletion.  Patient appears clinically dry with tacky oral mucosa, evidence of recent lower extremity edema contraction.  -Recheck BMP in a.m.  -Note urine sodium pending  -Received 1 L normal saline in the emergency department; will have on low rate maintenance fluids while n.p.o.    Anion gap metabolic acidosis: Awaiting urinalysis.  Anticipate ketosis as patient reports she has not eaten yet today  -UA pending  -Repeat BMP in a.m.    Collagenous colitis: Note history.  Monitor for symptoms  Celiac disease: Note history, gluten added to allergy list           Diet:  N.p.o. 2 AM per anesthesia guidelines  DVT Prophylaxis: Pneumatic Compression Devices  Lombardi Catheter: Not present  Lines: None     Cardiac Monitoring: None  Code Status:  Full code.  Confirmed with patient on admission    Clinically Significant Risk Factors Present on Admission         # Hyponatremia: Lowest Na = 127 mmol/L in last 2 days, will monitor as appropriate          # Hypertension: Noted on problem list               Disposition Plan           Trevor Damian MD  Hospitalist Service  [facility]  [facility]  Securely message with [employer] (more info)  Text page via [facility] Paging/Directory     ______________________________________________________________________    Chief Complaint   Right hip pain following fall    History is obtained from the patient, chart review, discussion with Dr. Martinez in the emergency department, review of outside records including prior follow-up through [facility] system, [facility] system noting patient's history of frequent falls including need for sutures in 2021.  I also see she had a colonoscopy in 2019 and she reports no difficulty with sedation at that time    History of Present Illness   Anna Marie Babb is a 83 year old female who presents to the emergency department after a fall at home.  She tells me that she lives in a single level home with her son, who is a nutritionist here at [employer].  Describes recently moving from [facility] to the local area.  Cannot tell me exactly when she moved, but tells me it was recent, she is still unpacking, believes it has been a few months.  She does appear to have some mild cognitive impairment.  Has difficulty describing her medications.  She is able to tell me that she has a history of collagenous colitis and takes a medication for this, but it appears that her only medications are lisinopril and amlodipine.  Even in review of outside records, I do not see a medication specifically for collagenous colitis, such as budesonide.    Patient tells me that she was walking on hardwood floors in her tennis shoes when her toe caught and she fell forward.  This occurred somewhere around 10 to 11 AM.  Son found her at home at 2:30 PM.  She does not believe she hit her head or lost consciousness.  Had right hip pain following this.  She cannot recall when her last fall was, believes it was several years ago.    Patient reports no other pain other than her lateral right hip.    No fevers or chills, no nausea or  vomiting.  Had been feeling in her usual state of health prior to fall        Past Medical History    Past Medical History:   Diagnosis Date    Atrophic vaginitis     Celiac disease     Cystocele     Disorder of bone and cartilage, unspecified     osteopenia    Endometriosis, site unspecified     Essential hypertension, benign     Gastro-oesophageal reflux disease     Hiatal hernia     3 cm sliding hiatal hernia    Hypercalcemia     Hyperlipidemia     HZV (herpes zoster virus) post herpetic neuralgia     Microscopic colitis     Nonspecific abnormal results of liver function study     Mildly elevated transaminases, liver biopsy WNL    Peripheral neuropathy     Pulmonary nodule     Zinc deficiency        Past Surgical History   Past Surgical History:   Procedure Laterality Date    C TOTAL ABDOM HYSTERECTOMY  1982    endometriosis with adhesions    COLONOSCOPY  2011    HC DILATION/CURETTAGE DIAG/THER NON OB      miscarriages , 6 x    HC REMOVAL OF OVARIAN CYST(S)  1977    partial oopherectomy    PHACOEMULSIFICATION CLEAR CORNEA WITH STANDARD INTRAOCULAR LENS IMPLANT Left 8/13/2015    Procedure: PHACOEMULSIFICATION CLEAR CORNEA WITH STANDARD INTRAOCULAR LENS IMPLANT;  Surgeon: Tyler Isaacs MD;  Location: [facility]    PHACOEMULSIFICATION CLEAR CORNEA WITH STANDARD INTRAOCULAR LENS IMPLANT Right 8/25/2015    Procedure: PHACOEMULSIFICATION CLEAR CORNEA WITH STANDARD INTRAOCULAR LENS IMPLANT;  Surgeon: Tyler Isaacs MD;  Location:  [facility]    ZZC NONSPECIFIC PROCEDURE       rectocoele and enterocoele repair    Z NONSPECIFIC PROCEDURE      cystocoele,repair       Prior to Admission Medications   Prior to Admission Medications   Prescriptions Last Dose Informant Patient Reported? Taking?   amLODIPine (NORVASC) 5 MG tablet 8/6/2023 at PM  Yes Yes   Sig: Take 5 mg by mouth every evening   lisinopril (ZESTRIL) 20 MG tablet 8/6/2023 at PM  Yes Yes   Sig: Take 20 mg by mouth every evening      Facility-Administered  Medications: None           Physical Exam   Vital Signs: Temp: 98.6  F (37  C) Temp src: Oral BP: (!) 154/86 Pulse: 82   Resp: 12 SpO2: 99 % O2 Device: None (Room air)      General Appearance: Somewhat frail-appearing 83-year-old female resting comfortably on [facility].  She does not appear in any acute distress  Eyes: No scleral icterus or injection  HEENT: Dry oral mucosa.  Atraumatic  Respiratory: Breath sounds are clear bilateral to auscultation with no wheezes or crackles.  Cardiovascular: Regular rate and rhythm with heart rate currently in the 80s range.  Occasional PVC noted.  No appreciable murmur  Lymph/Hematologic: 1-2+ pitting edema bilateral lower extremities.  Recent evidence of volume contraction  Musculoskeletal: Lateral right hip pain with slightly foreshortened right leg, no rotation identified.  Neurologic: Intention tremor noted involving bilateral upper extremities, right greater than left.  Suspect some mild cognitive impairment.  Has difficulty recalling her medications, how long she has been living in the [place] with her son.  Alert and conversant  Psychiatric: Very pleasant, normal affect    Medical Decision Making       55 MINUTES SPENT BY ME on the date of service doing chart review, history, exam, documentation & further activities per the note.      Data     I have personally reviewed the following data over the past 24 hrs:    10.1  \   12.9   / 265     127 (L) 90 (L) 9.8 /  159 (H)   4.1 21 (L) 0.88 \     ALT: 37 AST: 33 AP: 101 TBILI: 0.9   ALB: 4.4 TOT PROTEIN: 7.8 LIPASE: N/A       Imaging results reviewed over the past 24 hrs:   Recent Results (from the past 24 hour(s))   XR Pelvis 1/2 Views    Narrative    EXAM: XR PELVIS 1/2 VIEWS  LOCATION: [facility]  DATE: 8/7/2023    INDICATION: Fall, pain.  COMPARISON: None.      Impression    IMPRESSION: There is an acute fracture of the right femoral neck. The degree of displacement is difficult to  assess on this single AP view.     No hip joint space narrowing. There is are degenerative changes in the lower lumbar spine.   XR Femur Right 2 Views    Narrative    EXAM: XR FEMUR RIGHT 2 VIEWS  LOCATION: [facility]  DATE: 8/7/2023    INDICATION: Right proximal femur fracture.  COMPARISON: 08/07/2023 right hip.      Impression    IMPRESSION:   1.  Acute mildly impacted fracture of the right femoral neck.  2.  Normal right hip and knee alignment.  3.  Atherosclerotic calcification.    Head CT w/o contrast    Narrative    EXAM: CT HEAD W/O CONTRAST, CT CERVICAL SPINE W/O CONTRAST  LOCATION: [facility]  DATE: 8/7/2023    INDICATION: fall  COMPARISON: None.  TECHNIQUE:   1) Routine CT Head without IV contrast. Multiplanar reformats. Dose reduction techniques were used.  2) Routine CT Cervical Spine without IV contrast. Multiplanar reformats. Dose reduction techniques were used.    FINDINGS:   HEAD CT:   INTRACRANIAL CONTENTS: No intracranial hemorrhage, extraaxial collection, or mass effect.  No CT evidence of acute infarct. Mild to moderate presumed chronic small vessel ischemic changes. Mild to moderate generalized volume loss. No hydrocephalus.     VISUALIZED ORBITS/SINUSES/MASTOIDS: Prior bilateral cataract surgery. Visualized portions of the orbits are otherwise unremarkable. No paranasal sinus mucosal disease. No middle ear or mastoid effusion.    BONES/SOFT TISSUES: No acute abnormality. Osseous remodeling of the calvarium the vertex likely relates to underlying arachnoid granulation tissue.    CERVICAL SPINE CT:   VERTEBRA: Normal vertebral body heights and alignment. No fracture or posttraumatic subluxation.     CANAL/FORAMINA: Overall mild cervical disc degenerative change moderate at C5-C6. Advanced cervical facet arthrosis. No severe spinal canal stenosis.    PARASPINAL: No extraspinal abnormality. Visualized lung fields are clear.      Impression     IMPRESSION:  HEAD CT:  1.  No CT evidence for acute intracranial process.  2.  Brain atrophy and presumed chronic microvascular ischemic changes as above.    CERVICAL SPINE CT:  1.  No CT evidence for acute fracture or post traumatic subluxation.   Cervical spine CT w/o contrast    Narrative    EXAM: CT HEAD W/O CONTRAST, CT CERVICAL SPINE W/O CONTRAST  LOCATION: [facility]  DATE: 8/7/2023    INDICATION: fall  COMPARISON: None.  TECHNIQUE:   1) Routine CT Head without IV contrast. Multiplanar reformats. Dose reduction techniques were used.  2) Routine CT Cervical Spine without IV contrast. Multiplanar reformats. Dose reduction techniques were used.    FINDINGS:   HEAD CT:   INTRACRANIAL CONTENTS: No intracranial hemorrhage, extraaxial collection, or mass effect.  No CT evidence of acute infarct. Mild to moderate presumed chronic small vessel ischemic changes. Mild to moderate generalized volume loss. No hydrocephalus.     VISUALIZED ORBITS/SINUSES/MASTOIDS: Prior bilateral cataract surgery. Visualized portions of the orbits are otherwise unremarkable. No paranasal sinus mucosal disease. No middle ear or mastoid effusion.    BONES/SOFT TISSUES: No acute abnormality. Osseous remodeling of the calvarium the vertex likely relates to underlying arachnoid granulation tissue.    CERVICAL SPINE CT:   VERTEBRA: Normal vertebral body heights and alignment. No fracture or posttraumatic subluxation.     CANAL/FORAMINA: Overall mild cervical disc degenerative change moderate at C5-C6. Advanced cervical facet arthrosis. No severe spinal canal stenosis.    PARASPINAL: No extraspinal abnormality. Visualized lung fields are clear.      Impression    IMPRESSION:  HEAD CT:  1.  No CT evidence for acute intracranial process.  2.  Brain atrophy and presumed chronic microvascular ischemic changes as above.    CERVICAL SPINE CT:  1.  No CT evidence for acute fracture or post traumatic subluxation.

## 2023-08-08 NOTE — ED TRIAGE NOTES
Pt BIBA from home after an unwitnessed fall around 1030, son came home at 1430 and found her. CO right hip pain. Pt is a very poor historian, but currently no hx of memory problems.

## 2023-08-08 NOTE — OR NURSING
Paged Dr Duke regarding patient bladder scan status or 1450 ml. Dr Duke order do not keep reagan in placed.Nurse will removed reagan before patient transfer to her room

## 2023-08-08 NOTE — ANESTHESIA PROCEDURE NOTES
Fascia iliaca Procedure Note    Pre-Procedure   Staff -        Anesthesiologist:  Hillary De La Cruz MD       Performed By: anesthesiologist       Location: pre-op       Pre-Anesthestic Checklist: patient identified, IV checked, site marked, risks and benefits discussed, informed consent, monitors and equipment checked, pre-op evaluation, at physician/surgeon's request and post-op pain management  Timeout:       Correct Patient: Yes        Correct Procedure: Yes        Correct Site: Yes        Correct Position: Yes        Correct Laterality: Yes        Site Marked: Yes  Procedure Documentation  Procedure: Fascia iliaca       Laterality: right       Patient Position: supine       Skin prep: Chloraprep       Local skin infiltrated with 1 mL of 1% lidocaine.        Needle Type: insulated       Needle Gauge: 21.        Needle Length (millimeters): 100        Ultrasound guided       1. Ultrasound was used to identify targeted nerve, plexus, vascular marker, or fascial plane and place a needle adjacent to it in real-time.       2. Ultrasound was used to visualize the spread of anesthetic in close proximity to the above referenced structure.       3. A permanent image is entered into the patient's record.       4. The visualized anatomic structures appeared normal.       5. There were no apparent abnormal pathologic findings.    Assessment/Narrative         The placement was negative for: blood aspirated, painful injection and site bleeding       Paresthesias: No.       Bolus given via needle..        Secured via.        Insertion/Infusion Method: Single Shot       Complications: none    Medication(s) Administered   Bupivacaine 0.5% w/ 1:400K Epi (Injection) - Injection   20 mL - 8/8/2023 2:00:00 PM   Comments:  Bolus via needle, 20 ml of 0.5% bupivacaine with 1:400,000 epinephrine.  Patient tolerated well, was mildly sedated but communicative throughout the procedure.   The surgeon has given a verbal order transferring  "care of this patient to me for the performance of regional analgesia block for post op pain control. It is requested of me because I am uniquely trained and qualified to perform this block and the surgeon is neither trained nor qualified to perform this procedure.         FOR [facility] ([facility]) ONLY:   Pain Team Contact information: please page the Pain Team Via [employer]. Search \"Pain\". During daytime hours, please page the attending first. At night please page the resident first.      "

## 2023-08-08 NOTE — ANESTHESIA PROCEDURE NOTES
Airway       Patient location during procedure: OR  Staff -        Anesthesiologist:  Hillary De La Cruz MD       CRNA: Payton Ivory APRN CRNA       Performed By: CRNA  Consent for Airway        Urgency: elective  Indications and Patient Condition       Indications for airway management: janell-procedural       Induction type:intravenous       Mask difficulty assessment: 1 - vent by mask    Final Airway Details       Final airway type: supraglottic airway    Supraglottic Airway Details        Type: LMA       Brand: Ambu AuraGain       LMA size: 4    Post intubation assessment        Placement verified by: capnometry, equal breath sounds and chest rise        Number of attempts at approach: 1       Number of other approaches attempted: 0       Secured with: silk tape       Ease of procedure: easy       Dentition: Intact and Unchanged

## 2023-08-08 NOTE — PROGRESS NOTES
Patient taken off the floor for surgery, called Pre-op and spoke to Radha SEN and writer gave brief pre-op report. Patient voiding well with purewick but unable to complete ordered pre-op PVR before patient left floor. Chg bath x2 completed. Daughter Liv present at bedside this AM, writer able to update on current plan of care.VSS on RA.

## 2023-08-08 NOTE — ANESTHESIA CARE TRANSFER NOTE
Patient: Anna Marie Babb    Procedure: Procedure(s):  CLOSED REDUCTION, HIP, WITH PERCUTANEOUS PINNING       Diagnosis: Closed fracture of neck of right femur, initial encounter (H) [S72.001A]  Diagnosis Additional Information: No value filed.    Anesthesia Type:   General     Note:    Oropharynx: oropharynx clear of all foreign objects and spontaneously breathing  Level of Consciousness: awake and drowsy  Oxygen Supplementation: room air    Independent Airway: airway patency satisfactory and stable  Dentition: dentition unchanged  Vital Signs Stable: post-procedure vital signs reviewed and stable    Patient transferred to: PACU    Handoff Report: Identifed the Patient, Identified the Reponsible Provider, Reviewed the pertinent medical history, Discussed the surgical course, Reviewed Intra-OP anesthesia mangement and issues during anesthesia, Set expectations for post-procedure period and Allowed opportunity for questions and acknowledgement of understanding      Vitals:  Vitals Value Taken Time   /81 08/08/23 1518   Temp     Pulse 75 08/08/23 1520   Resp 15 08/08/23 1520   SpO2 99 % 08/08/23 1520   Vitals shown include unvalidated device data.    Electronically Signed By: CHINMAY Cordon CRNA  August 8, 2023  3:21 PM

## 2023-08-08 NOTE — ANESTHESIA PREPROCEDURE EVALUATION
Anesthesia Pre-Procedure Evaluation    Patient: Anna Marie Babb   MRN: [id number] : 1940        Procedure : Procedure(s):  CLOSED REDUCTION, HIP, WITH PERCUTANEOUS PINNING          Past Medical History:   Diagnosis Date    Atrophic vaginitis     Celiac disease     Cystocele     Disorder of bone and cartilage, unspecified     osteopenia    Endometriosis, site unspecified     Essential hypertension, benign     Gastro-oesophageal reflux disease     Hiatal hernia     3 cm sliding hiatal hernia    Hypercalcemia     Hyperlipidemia     HZV (herpes zoster virus) post herpetic neuralgia     Microscopic colitis     Nonspecific abnormal results of liver function study     Mildly elevated transaminases, liver biopsy WNL    Peripheral neuropathy     Pulmonary nodule     Zinc deficiency       Past Surgical History:   Procedure Laterality Date    C TOTAL ABDOM HYSTERECTOMY      endometriosis with adhesions    COLONOSCOPY      HC DILATION/CURETTAGE DIAG/THER NON OB      miscarriages , 6 x    HC REMOVAL OF OVARIAN CYST(S)      partial oopherectomy    PHACOEMULSIFICATION CLEAR CORNEA WITH STANDARD INTRAOCULAR LENS IMPLANT Left 2015    Procedure: PHACOEMULSIFICATION CLEAR CORNEA WITH STANDARD INTRAOCULAR LENS IMPLANT;  Surgeon: Tyler Isaacs MD;  Location:  [facility]    PHACOEMULSIFICATION CLEAR CORNEA WITH STANDARD INTRAOCULAR LENS IMPLANT Right 2015    Procedure: PHACOEMULSIFICATION CLEAR CORNEA WITH STANDARD INTRAOCULAR LENS IMPLANT;  Surgeon: Tyler Isaacs MD;  Location:  [facility]    ZZC NONSPECIFIC PROCEDURE       rectocoele and enterocoele repair    ZZC NONSPECIFIC PROCEDURE      cystocoele,repair      Allergies   Allergen Reactions    Amoxicillin-Pot Clavulanate     Ciprofloxacin     Gluten Meal      celiac    Lactose       Social History     Tobacco Use    Smoking status: Passive Smoke Exposure - Never Smoker    Smokeless tobacco: Not on file    Tobacco comments:      smokes    Substance Use Topics    Alcohol use: Yes     Comment: 3-4 glasses per week      Wt Readings from Last 1 Encounters:   08/08/23 56.4 kg (124 lb 5.4 oz)        Prior to Admission medications    Medication Sig Start Date End Date Taking? Authorizing Provider   amLODIPine (NORVASC) 5 MG tablet Take 5 mg by mouth every evening 4/12/23  Yes Unknown, Entered By History   lisinopril (ZESTRIL) 20 MG tablet Take 20 mg by mouth every evening 4/14/23  Yes Unknown, Entered By History     ECG 2019: Normal sinus rhythm   Normal ECG      Anesthesia Evaluation            ROS/MED HX  ENT/Pulmonary:       Neurologic:     (+)    peripheral neuropathy,                            Cardiovascular:     (+) Dyslipidemia hypertension- -   -  - -                                      METS/Exercise Tolerance:     Hematologic:       Musculoskeletal: Comment: osteopenia  (+)  arthritis,   fracture, Fracture location: RLE,         GI/Hepatic:     (+) GERD,     hiatal hernia,              Renal/Genitourinary:       Endo:       Psychiatric/Substance Use:       Infectious Disease:       Malignancy:       Other:               OUTSIDE LABS:  CBC:   Lab Results   Component Value Date    WBC 10.1 08/07/2023    WBC 6.5 08/25/2015    HGB 12.9 08/07/2023    HGB 13.0 08/25/2015    HCT 37.5 08/07/2023    HCT 37.6 08/25/2015     08/07/2023     08/25/2015     BMP:   Lab Results   Component Value Date     (L) 08/08/2023     (L) 08/07/2023    POTASSIUM 3.9 08/08/2023    POTASSIUM 4.1 08/07/2023    CHLORIDE 95 (L) 08/08/2023    CHLORIDE 90 (L) 08/07/2023    CO2 25 08/08/2023    CO2 21 (L) 08/07/2023    BUN 8.6 08/08/2023    BUN 9.8 08/07/2023    CR 0.70 08/08/2023    CR 0.88 08/07/2023     (H) 08/08/2023     (H) 08/07/2023     COAGS: No results found for: PTT, INR, FIBR  POC: No results found for: BGM, HCG, HCGS  HEPATIC:   Lab Results   Component Value Date    ALBUMIN 4.4 08/07/2023    PROTTOTAL 7.8 08/07/2023    ALT 37  08/07/2023    AST 33 08/07/2023     (H) 12/19/2003    ALKPHOS 101 08/07/2023    BILITOTAL 0.9 08/07/2023     OTHER:   Lab Results   Component Value Date    KARTHIKEYAN 9.2 08/08/2023    MAG 1.6 (L) 08/08/2023    TSH 1.86 11/02/2004    SED 39 (H) 12/19/2003       Anesthesia Plan    ASA Status:  2    NPO Status:  NPO Appropriate    Anesthesia Type: General.     - Airway: LMA   Induction: Propofol.   Maintenance: Balanced.        Consents    Anesthesia Plan(s) and associated risks, benefits, and realistic alternatives discussed. Questions answered and patient/representative(s) expressed understanding.     - Discussed:     - Discussed with:  Patient            Postoperative Care    Pain management: Multi-modal analgesia, Peripheral nerve block (Single Shot).   PONV prophylaxis: Ondansetron (or other 5HT-3), Dexamethasone or Solumedrol, Background Propofol Infusion     Comments:                Hillary De La Cruz MD

## 2023-08-08 NOTE — PHARMACY-ADMISSION MEDICATION HISTORY
"Pharmacy Intern Admission Medication History    Admission medication history is complete. The information provided in this note is only as accurate as the sources available at the time of the update.    Medication reconciliation/reorder completed by provider prior to medication history? No    Information Source(s): Patient, Family member, and CareEverywhere/SureScripts via in-person    Pertinent Information:   - Patient was confident about the 2 prescription medications she takes (which was corroborated by information from [facility] in Care Everywhere), but was unsure about the vitamins she takes. She says she doesn't have a list or a way to get the information. She confirmed she takes no over the counter or as needed medications  - When asked about her allergies, she was confused and stated \"no I don't take those\" and couldn't answer questions about her allergies or the reactions that occurred.     Changes made to PTA medication list:  Added: Amlodipine  Deleted: Aspirin, multivitamin, omega-3, omeprazole, spironolactone, terbinafine cream (List last reviewed in 2015, unclear how recently these were stopped)  Changed: None       Allergies reviewed with patient and updates made in EHR: unable to assess    Medication History Completed By: Megan Rosas RP 8/7/2023 10:14 PM    Prior to Admission medications    Medication Sig Last Dose Taking? Auth Provider Long Term End Date   amLODIPine (NORVASC) 5 MG tablet Take 5 mg by mouth every evening 8/6/2023 at PM Yes Unknown, Entered By History Yes    lisinopril (ZESTRIL) 20 MG tablet Take 20 mg by mouth every evening 8/6/2023 at PM Yes Unknown, Entered By History         "

## 2023-08-08 NOTE — ED PROVIDER NOTES
"  History     Chief Complaint:  Fall       The history is provided by the patient.      Anna Marie Babb is a 83 year old female who presents after a fall. The patient states that she was walking on her hard wood floor and her tennis shoes stuck and she fell down and hit her right side. She does note that she is unsure if she hit her head. Denies any chest pain and shortness of breath. She denies any use of blood thinners.     Independent Historian:   None - Patient Only    Review of External Notes:   None       Medications:    Aspirin   Lisinopril   Spironolactone   Terbinafine     Past Medical History:    Atrophic vaginitis   Celiac disease   Cystocele   Disorder of bone   Endometriosis   Hypertension   GERD  Hiatal hernia   Hyperclacemia   Hyperlipidemia   HZV  Microscopic colitis   Peripheral neuropathy   Pulmonary nodule   Zinc deficiency     Past Surgical History:    Hysterectomy x2  Repair rectocele   Salpingo oophorectomy   Colonoscopy  Cataracts x2   Cystocoele       Physical Exam   Patient Vitals for the past 24 hrs:   BP Temp Temp src Pulse Resp SpO2 Height   08/07/23 1947 (!) 154/86 98.6  F (37  C) Oral 82 12 99 % 1.676 m (5' 6\")        Physical Exam  Constitutional: Alert, attentive, GCS 15   HENT:    Head: no scalp lacerations or contusions, no periorbital or posterior auricular ecchymosis.    Nose: Nose normal.    Mouth/Throat: Oropharynx is clear, mucous membranes are moist, no blood in oral cavity, no dental subluxation  Neck: no midline tenderness, ROM full  Eyes: EOM are normal, conjugate gaze, pupils symmetric reactive.   CV: regular rate and rhythm; no murmurs  Chest: Effort normal and breath sounds normal, symmetric bilaterally. No crepitus  GI:  Non-tender without guarding or rebound  Back: No T or L spine tenderness, no step offs  MSK:  Right leg slightly flexed at the hip and right lateral mid upper leg tenderness.  No other tenderness  Neurological: Alert, attentive.  and plantar " strength 5/5.  Sensation intact to light touch in in distal BLE and BUE.    Skin: Skin is warm and dry.     Emergency Department Course       Imaging:  Cervical spine CT w/o contrast   Final Result   IMPRESSION:   HEAD CT:   1.  No CT evidence for acute intracranial process.   2.  Brain atrophy and presumed chronic microvascular ischemic changes as above.      CERVICAL SPINE CT:   1.  No CT evidence for acute fracture or post traumatic subluxation.      Head CT w/o contrast   Final Result   IMPRESSION:   HEAD CT:   1.  No CT evidence for acute intracranial process.   2.  Brain atrophy and presumed chronic microvascular ischemic changes as above.      CERVICAL SPINE CT:   1.  No CT evidence for acute fracture or post traumatic subluxation.      XR Femur Right 2 Views   Final Result   IMPRESSION:    1.  Acute mildly impacted fracture of the right femoral neck.   2.  Normal right hip and knee alignment.   3.  Atherosclerotic calcification.       XR Pelvis 1/2 Views   Final Result   IMPRESSION: There is an acute fracture of the right femoral neck. The degree of displacement is difficult to assess on this single AP view.       No hip joint space narrowing. There is are degenerative changes in the lower lumbar spine.      CT Hip Right w/o Contrast    (Results Pending)      Report per radiology    Laboratory:  Labs Ordered and Resulted from Time of ED Arrival to Time of ED Departure   COMPREHENSIVE METABOLIC PANEL - Abnormal       Result Value    Sodium 127 (*)     Potassium 4.1      Chloride 90 (*)     Carbon Dioxide (CO2) 21 (*)     Anion Gap 16 (*)     Urea Nitrogen 9.8      Creatinine 0.88      Calcium 9.9      Glucose 159 (*)     Alkaline Phosphatase 101      AST 33      ALT 37      Protein Total 7.8      Albumin 4.4      Bilirubin Total 0.9      GFR Estimate 64     CBC WITH PLATELETS AND DIFFERENTIAL - Abnormal    WBC Count 10.1      RBC Count 3.67 (*)     Hemoglobin 12.9      Hematocrit 37.5       (*)     MCH  35.1 (*)     MCHC 34.4      RDW 12.7      Platelet Count 265      % Neutrophils 88      % Lymphocytes 6      % Monocytes 6      % Eosinophils 0      % Basophils 0      % Immature Granulocytes 0      NRBCs per 100 WBC 0      Absolute Neutrophils 8.8 (*)     Absolute Lymphocytes 0.6 (*)     Absolute Monocytes 0.6      Absolute Eosinophils 0.0      Absolute Basophils 0.0      Absolute Immature Granulocytes 0.0      Absolute NRBCs 0.0     ROUTINE UA WITH MICROSCOPIC   SODIUM RANDOM URINE        Emergency Department Course & Assessments:       Interventions:  Medications   0.9% sodium chloride BOLUS (has no administration in time range)   acetaminophen (TYLENOL) tablet 650 mg (has no administration in time range)   oxyCODONE IR (ROXICODONE) half-tab 2.5 mg (has no administration in time range)      Assessments:   I obtained history and examined the patient as noted above.   I reevaluated and explained the findings.     Independent Interpretation (X-rays, CTs, rhythm strip):  I personally reviewed her head CT, seen with intracranial hemorrhage, I reviewed her hip x-ray, femoral neck fracture noted    Consultations/Discussion of Management or Tests:  2203 I spoke with Ortho, regarding the patient's history and presentation in the emergency department today.   2227 I spoke with Dr. Damian of the hospitalist team regarding the patient, who accepted the patient for admission.        Social Determinants of Health affecting care:   None    Disposition:  The patient was admitted to the hospital under the care of Dr. Damian.     Impression & Plan    CMS Diagnoses: None    Medical Decision Makin-year-old woman presents after she reports tripping on her wood floors in her home, denies chest pain, chest pressure or shortness of breath.  She is complaining of right lateral leg pain, is not sure if she hit her head as well as some mild neck pain.  CT imaging of her head and neck are negative.  X-ray of her right hip is  consistent with a femoral neck fracture.  I did touch base with orthopedics, they are planning for CT, hospitalist will plan for medicine admission.    Diagnosis:    ICD-10-CM    1. Closed fracture of neck of right femur, initial encounter (H)  S72.001A       2. Hyponatremia  E87.1          Fabiano Martinez MD  [employer]  10:39 PM 08/07/23     Scribe Disclosure:  I, Vahid Oneilnarinder, am serving as a scribe at 8:20 PM on 8/7/2023 to document services personally performed by Fabiano Martinez MD based on my observations and the provider's statements to me.   8/7/2023   Fabiano Martinez MD Dunbar, John Forrest, MD  08/07/23 2321

## 2023-08-08 NOTE — CONSULTS
[facility]    Orthopedic Consultation    Anna Marie Babb MRN# [id number]   Age: 83 year old YOB: 1940     Date of Admission: 8/7/2023    Reason for consult: Right femoral neck fracture       Requesting physician: Trevor Damian MD       Level of consult: Consult, follow and place orders           Assessment and Plan:   Assessment:   Acute, closed, impacted right femoral neck fracture      Plan:   The patient's history and clinical/diagnostic findings were reviewed with the on-call orthopedic trauma surgeon, Dr. Bo Duke. The patient sustained a mechanical slip and fall in her home resulting in a right femoral neck fracture. There is mild impaction and angulation of the fracture, but it is otherwise in reasonable alignment. Surgical intervention is recommended for the goals of fracture stabilization, pain control, and to maximize mobility/functionality postoperatively. Brief discussion held over potential risks and benefits of nonoperative and operative management. The patient/the patient's family wishes to proceed with surgery as recommended later today. The patient will be scheduled for a right femoral neck fracture in-situ screw fixation today (8/8/23) pending medical optimization by the hospital team. Dr. Duke will further discuss the risks, benefits, and outcomes of surgery while obtaining consent.     -Remain NPO until postop.  -NWB/bedrest until postop.  -Continue pain regimen.  -Hydroxyzine available for PRN use as a pain adjunct/muscle relaxant.  -Hold any PTA anticoagulation (none per patient and chart review).   -Vitamin D deficiency lab and supplementation ordered.  -Type and screen ordered.  -CK ordered.  -Maintenance IVF while NPO.    Please contact orthopedic trauma team if any questions or concerns arise.           Chief Complaint:   Right hip fracture         History of Present Illness:   Medical history obtained via chart review and discussion  "with the patient. Anna Marie Edelmira \"BJ\" Holley is a very pleasant 83 year old female with past medical history of HTN, Celiac disease, osteopenia, and GERD who was admitted on 8/7/23 for a right femoral neck fracture. On 8/7/23, the patient was wearing her tennis shoes while walking on the wooden floor in her home when her shoe caught and she fell onto her right hip. Denies head trauma or LOC. She was unable to get up under her own power and did not have a phone by her. She estimates that she laid on the floor for 4 hours until her son, Darius, got home from work. EMS was contacted and she was taken to [facility] ED. Here, she underwent x-rays and a CT scan that demonstrated a right femoral neck fracture. Currently, the patient states that her right hip pain is reasonably controlled. She reports mild to moderate pain of the right lateral hip at rest. Any movement increases her pain. Denies muscle spasms or cramping. Denies numbness and tingling. No prior injuries or surgeries to her right hip or right knee. The patient lives with her son in a duplex that has no stairs. Her son, Darius, works at [employer] in the nutrition department. Patient denies the use of a walker or cane at baseline. She typically ambulates well, but does not drive or shop for herself. Denies anticoagulant use, but does take ASA 81 mg PRN for pain. Denies known bleeding or clotting disorders. Denies ever having issues with anesthesia in the past. Patient has been NPO since at least midnight.          Past Medical History:     Past Medical History:   Diagnosis Date    Atrophic vaginitis     Celiac disease     Cystocele     Disorder of bone and cartilage, unspecified     osteopenia    Endometriosis, site unspecified     Essential hypertension, benign     Gastro-oesophageal reflux disease     Hiatal hernia     3 cm sliding hiatal hernia    Hypercalcemia     Hyperlipidemia     HZV (herpes zoster virus) post herpetic neuralgia     Microscopic colitis     Nonspecific " abnormal results of liver function study     Mildly elevated transaminases, liver biopsy WNL    Peripheral neuropathy     Pulmonary nodule     Zinc deficiency              Past Surgical History:     Past Surgical History:   Procedure Laterality Date    C TOTAL ABDOM HYSTERECTOMY      endometriosis with adhesions    COLONOSCOPY      HC DILATION/CURETTAGE DIAG/THER NON OB      miscarriages , 6 x    HC REMOVAL OF OVARIAN CYST(S)      partial oopherectomy    PHACOEMULSIFICATION CLEAR CORNEA WITH STANDARD INTRAOCULAR LENS IMPLANT Left 2015    Procedure: PHACOEMULSIFICATION CLEAR CORNEA WITH STANDARD INTRAOCULAR LENS IMPLANT;  Surgeon: Tyler Isaacs MD;  Location:  [facility]    PHACOEMULSIFICATION CLEAR CORNEA WITH STANDARD INTRAOCULAR LENS IMPLANT Right 2015    Procedure: PHACOEMULSIFICATION CLEAR CORNEA WITH STANDARD INTRAOCULAR LENS IMPLANT;  Surgeon: Tyler Isaacs MD;  Location:  [facility]    ZZC NONSPECIFIC PROCEDURE       rectocoele and enterocoele repair    ZZC NONSPECIFIC PROCEDURE      cystocoele,repair             Social History:     Social History     Tobacco Use    Smoking status: Passive Smoke Exposure - Never Smoker    Smokeless tobacco: Not on file    Tobacco comments:      smokes   Substance Use Topics    Alcohol use: Yes     Comment: 3-4 glasses per week             Family History:     Family History   Problem Relation Age of Onset    Cerebrovascular Disease Father          age 42    Cancer Mother         breast cancer,  age 63    Cancer Maternal Grandmother         breast cancer             Immunizations:     VACCINE/DOSE   Diptheria   DPT   DTAP   HBIG   Hepatitis A   Hepatitis B   HIB   Influenza   Measles   Meningococcal   MMR   Mumps   Pneumococcal   Polio   Rubella   Small Pox   TDAP   Varicella   Zoster             Allergies:     Allergies   Allergen Reactions    Amoxicillin-Pot Clavulanate     Ciprofloxacin     Gluten Meal      celiac     Lactose              Medications:     Current Facility-Administered Medications   Medication    acetaminophen (TYLENOL) tablet 325-650 mg    acetaminophen (TYLENOL) tablet 975 mg    amLODIPine (NORVASC) tablet 5 mg    bisacodyl (DULCOLAX) suppository 10 mg    ceFAZolin (ANCEF) 2 g in 100 mL D5W intermittent infusion    ceFAZolin (ANCEF) 2 g in 100 mL D5W intermittent infusion    dextrose 5% and 0.9% NaCl infusion    HYDROmorphone (DILAUDID) injection 0.2 mg    HYDROmorphone (DILAUDID) injection 0.4 mg    lidocaine (LMX4) cream    lidocaine 1 % 0.1-1 mL    [Held by provider] lisinopril (ZESTRIL) tablet 20 mg    melatonin tablet 1 mg    mineral oil-white petrolatum (EUCERIN/MINERIN) cream    naloxone (NARCAN) injection 0.2 mg    Or    naloxone (NARCAN) injection 0.4 mg    Or    naloxone (NARCAN) injection 0.2 mg    Or    naloxone (NARCAN) injection 0.4 mg    ondansetron (ZOFRAN ODT) ODT tab 4 mg    Or    ondansetron (ZOFRAN) injection 4 mg    oxyCODONE (ROXICODONE) tablet 5 mg    oxyCODONE IR (ROXICODONE) half-tab 2.5 mg    polyethylene glycol (MIRALAX) Packet 17 g    prochlorperazine (COMPAZINE) injection 5 mg    Or    prochlorperazine (COMPAZINE) tablet 5 mg    Or    prochlorperazine (COMPAZINE) suppository 12.5 mg    senna-docusate (SENOKOT-S/PERICOLACE) 8.6-50 MG per tablet 1 tablet    Or    senna-docusate (SENOKOT-S/PERICOLACE) 8.6-50 MG per tablet 2 tablet    sodium chloride (PF) 0.9% PF flush 3 mL    sodium chloride (PF) 0.9% PF flush 3 mL    tranexamic acid 1 g in 100 mL NS IV bag (premix)    tranexamic acid 1 g in 100 mL NS IV bag (premix)    [START ON 8/9/2023] vitamin D3 (CHOLECALCIFEROL) tablet 50 mcg             Review of Systems:   CV: NEGATIVE for chest pain, palpitations or peripheral edema  C: NEGATIVE for fever, chills, change in weight  E/M: NEGATIVE for ear, mouth and throat problems  R: NEGATIVE for significant cough or SOB          Physical Exam:   All vitals have been reviewed  Patient Vitals  "for the past 24 hrs:   BP Temp Temp src Pulse Resp SpO2 Height   08/08/23 0827 (!) 158/81 98  F (36.7  C) Oral 78 16 95 % --   08/08/23 0100 (!) 141/69 98.6  F (37  C) Oral 86 16 96 % --   08/07/23 1959 (!) 159/89 -- -- 78 17 100 % --   08/07/23 1947 (!) 154/86 98.6  F (37  C) Oral 82 12 99 % 1.676 m (5' 6\")     No intake or output data in the 24 hours ending 08/08/23 0953    Constitutional: Pleasant, alert, appropriate, following commands. NAD.  HEENT: Head atraumatic normocephalic. Pupils equal round and reactive.  Respiratory: Unlabored breathing no audible wheeze.  Cardiovascular: Regular rate and rhythm per pulses.  GI: Abdomen is non-distended.  Lymph/Hematologic: No lymphadenopathy in areas examined.  Genitourinary: Purewick in place.  Skin: No rashes, no cyanosis, no edema.  Musculoskeletal: Right lower extremity: Shortened and with minimal external rotation. Scattered excoriations/sores to the right anterior lower leg. Skin intact to the examined areas of the right hip and thigh. No significant swelling. No erythema or ecchymosis. No knee effusion. Nontender over the distal thigh, medial and lateral joint lines of the knee, patella, calf, and ankle/foot diffusely. Thigh and lower leg compartments are soft and compressible. No attempts made to range the hip or knee. 5/5 ankle DF and PF, EHL, and FHL. DP and PT pulses palpable. Capillary refill <2 seconds. SILT.  Neurologic: GCS 15, A&OX4          Data:   All laboratory data reviewed  Results for orders placed or performed during the hospital encounter of 08/07/23   Head CT w/o contrast     Status: None    Narrative    EXAM: CT HEAD W/O CONTRAST, CT CERVICAL SPINE W/O CONTRAST  LOCATION: [facility]  DATE: 8/7/2023    INDICATION: fall  COMPARISON: None.  TECHNIQUE:   1) Routine CT Head without IV contrast. Multiplanar reformats. Dose reduction techniques were used.  2) Routine CT Cervical Spine without IV contrast. Multiplanar " reformats. Dose reduction techniques were used.    FINDINGS:   HEAD CT:   INTRACRANIAL CONTENTS: No intracranial hemorrhage, extraaxial collection, or mass effect.  No CT evidence of acute infarct. Mild to moderate presumed chronic small vessel ischemic changes. Mild to moderate generalized volume loss. No hydrocephalus.     VISUALIZED ORBITS/SINUSES/MASTOIDS: Prior bilateral cataract surgery. Visualized portions of the orbits are otherwise unremarkable. No paranasal sinus mucosal disease. No middle ear or mastoid effusion.    BONES/SOFT TISSUES: No acute abnormality. Osseous remodeling of the calvarium the vertex likely relates to underlying arachnoid granulation tissue.    CERVICAL SPINE CT:   VERTEBRA: Normal vertebral body heights and alignment. No fracture or posttraumatic subluxation.     CANAL/FORAMINA: Overall mild cervical disc degenerative change moderate at C5-C6. Advanced cervical facet arthrosis. No severe spinal canal stenosis.    PARASPINAL: No extraspinal abnormality. Visualized lung fields are clear.      Impression    IMPRESSION:  HEAD CT:  1.  No CT evidence for acute intracranial process.  2.  Brain atrophy and presumed chronic microvascular ischemic changes as above.    CERVICAL SPINE CT:  1.  No CT evidence for acute fracture or post traumatic subluxation.   Cervical spine CT w/o contrast     Status: None    Narrative    EXAM: CT HEAD W/O CONTRAST, CT CERVICAL SPINE W/O CONTRAST  LOCATION: [facility]  DATE: 8/7/2023    INDICATION: fall  COMPARISON: None.  TECHNIQUE:   1) Routine CT Head without IV contrast. Multiplanar reformats. Dose reduction techniques were used.  2) Routine CT Cervical Spine without IV contrast. Multiplanar reformats. Dose reduction techniques were used.    FINDINGS:   HEAD CT:   INTRACRANIAL CONTENTS: No intracranial hemorrhage, extraaxial collection, or mass effect.  No CT evidence of acute infarct. Mild to moderate presumed chronic small vessel  ischemic changes. Mild to moderate generalized volume loss. No hydrocephalus.     VISUALIZED ORBITS/SINUSES/MASTOIDS: Prior bilateral cataract surgery. Visualized portions of the orbits are otherwise unremarkable. No paranasal sinus mucosal disease. No middle ear or mastoid effusion.    BONES/SOFT TISSUES: No acute abnormality. Osseous remodeling of the calvarium the vertex likely relates to underlying arachnoid granulation tissue.    CERVICAL SPINE CT:   VERTEBRA: Normal vertebral body heights and alignment. No fracture or posttraumatic subluxation.     CANAL/FORAMINA: Overall mild cervical disc degenerative change moderate at C5-C6. Advanced cervical facet arthrosis. No severe spinal canal stenosis.    PARASPINAL: No extraspinal abnormality. Visualized lung fields are clear.      Impression    IMPRESSION:  HEAD CT:  1.  No CT evidence for acute intracranial process.  2.  Brain atrophy and presumed chronic microvascular ischemic changes as above.    CERVICAL SPINE CT:  1.  No CT evidence for acute fracture or post traumatic subluxation.   XR Femur Right 2 Views     Status: None    Narrative    EXAM: XR FEMUR RIGHT 2 VIEWS  LOCATION: [facility]  DATE: 8/7/2023    INDICATION: Right proximal femur fracture.  COMPARISON: 08/07/2023 right hip.      Impression    IMPRESSION:   1.  Acute mildly impacted fracture of the right femoral neck.  2.  Normal right hip and knee alignment.  3.  Atherosclerotic calcification.    XR Pelvis 1/2 Views     Status: None    Narrative    EXAM: XR PELVIS 1/2 VIEWS  LOCATION: [facility]  DATE: 8/7/2023    INDICATION: Fall, pain.  COMPARISON: None.      Impression    IMPRESSION: There is an acute fracture of the right femoral neck. The degree of displacement is difficult to assess on this single AP view.     No hip joint space narrowing. There is are degenerative changes in the lower lumbar spine.   CT Hip Right w/o Contrast     Status: None     Narrative    EXAM: CT HIP RIGHT W/O CONTRAST  LOCATION: [facility]  DATE: 8/7/2023    INDICATION: Right femoral neck fracture. Preoperative planning.  COMPARISON: None.  TECHNIQUE: Noncontrast. Axial, sagittal and coronal thin-section reconstruction. Dose reduction techniques were used.     FINDINGS:     BONES:  -Acute impacted fracture of the right femoral neck. There is slight valgus angulation, with impaction greatest laterally. The fracture extends from the subcapital femoral neck at the lateral cortex to the transcervical femoral neck at the medial cortex.  - No evidence of additional acute fracture. No dislocation.  - Moderate degenerative changes of the right hip, with more pronounced joint space narrowing medially.  - Degenerative changes of the pubic symphysis and right sacroiliac joint.    SOFT TISSUES:  -Visualized musculature appears unremarkable.  - Minimal subcutaneous fat stranding lateral to the proximal right femur.    OTHER:  - Distended urinary bladder.  - Status post hysterectomy.  - Vascular calcifications.      Impression    IMPRESSION:  1.  Acute impacted right femoral neck fracture with valgus angulation.     Comprehensive metabolic panel     Status: Abnormal   Result Value Ref Range    Sodium 127 (L) 136 - 145 mmol/L    Potassium 4.1 3.4 - 5.3 mmol/L    Chloride 90 (L) 98 - 107 mmol/L    Carbon Dioxide (CO2) 21 (L) 22 - 29 mmol/L    Anion Gap 16 (H) 7 - 15 mmol/L    Urea Nitrogen 9.8 8.0 - 23.0 mg/dL    Creatinine 0.88 0.51 - 0.95 mg/dL    Calcium 9.9 8.8 - 10.2 mg/dL    Glucose 159 (H) 70 - 99 mg/dL    Alkaline Phosphatase 101 35 - 104 U/L    AST 33 0 - 45 U/L    ALT 37 0 - 50 U/L    Protein Total 7.8 6.4 - 8.3 g/dL    Albumin 4.4 3.5 - 5.2 g/dL    Bilirubin Total 0.9 <=1.2 mg/dL    GFR Estimate 64 >60 mL/min/1.73m2   CBC with platelets and differential     Status: Abnormal   Result Value Ref Range    WBC Count 10.1 4.0 - 11.0 10e3/uL    RBC Count 3.67 (L) 3.80  - 5.20 10e6/uL    Hemoglobin 12.9 11.7 - 15.7 g/dL    Hematocrit 37.5 35.0 - 47.0 %     (H) 78 - 100 fL    MCH 35.1 (H) 26.5 - 33.0 pg    MCHC 34.4 31.5 - 36.5 g/dL    RDW 12.7 10.0 - 15.0 %    Platelet Count 265 150 - 450 10e3/uL    % Neutrophils 88 %    % Lymphocytes 6 %    % Monocytes 6 %    % Eosinophils 0 %    % Basophils 0 %    % Immature Granulocytes 0 %    NRBCs per 100 WBC 0 <1 /100    Absolute Neutrophils 8.8 (H) 1.6 - 8.3 10e3/uL    Absolute Lymphocytes 0.6 (L) 0.8 - 5.3 10e3/uL    Absolute Monocytes 0.6 0.0 - 1.3 10e3/uL    Absolute Eosinophils 0.0 0.0 - 0.7 10e3/uL    Absolute Basophils 0.0 0.0 - 0.2 10e3/uL    Absolute Immature Granulocytes 0.0 <=0.4 10e3/uL    Absolute NRBCs 0.0 10e3/uL   [place] Draw     Status: None    Narrative    The following orders were created for panel order [place] Draw.  Procedure                               Abnormality         Status                     ---------                               -----------         ------                     Extra Blue Top Tube[[id number]]                              Final result               Extra Red Top Tube[[id number]]                               Final result               Extra Blood Bank Purple ...[[id number]]                      Final result                 Please view results for these tests on the individual orders.   Extra Blue Top Tube     Status: None   Result Value Ref Range    Hold Specimen [facility]    Extra Red Top Tube     Status: None   Result Value Ref Range    Hold Specimen [facility]    Extra Blood Bank Purple Top Tube     Status: None   Result Value Ref Range    Hold Specimen C    UA with Microscopic     Status: Abnormal   Result Value Ref Range    Color Urine Light Yellow Colorless, Straw, Light Yellow, Yellow    Appearance Urine Clear Clear    Glucose Urine 50 (A) Negative mg/dL    Bilirubin Urine Negative Negative    Ketones Urine Negative Negative mg/dL    Specific Gravity Urine 1.008 1.003 - 1.035    Blood Urine  Negative Negative    pH Urine 7.0 5.0 - 7.0    Protein Albumin Urine Negative Negative mg/dL    Urobilinogen Urine Normal Normal, 2.0 mg/dL    Nitrite Urine Negative Negative    Leukocyte Esterase Urine Moderate (A) Negative    Bacteria Urine Few (A) None Seen /HPF    RBC Urine 0 <=2 /HPF    WBC Urine 4 <=5 /HPF    Squamous Epithelials Urine 3 (H) <=1 /HPF   Basic metabolic panel     Status: Abnormal   Result Value Ref Range    Sodium 129 (L) 136 - 145 mmol/L    Potassium 3.9 3.4 - 5.3 mmol/L    Chloride 95 (L) 98 - 107 mmol/L    Carbon Dioxide (CO2) 25 22 - 29 mmol/L    Anion Gap 9 7 - 15 mmol/L    Urea Nitrogen 8.6 8.0 - 23.0 mg/dL    Creatinine 0.70 0.51 - 0.95 mg/dL    Calcium 9.2 8.8 - 10.2 mg/dL    Glucose 140 (H) 70 - 99 mg/dL    GFR Estimate 85 >60 mL/min/1.73m2   Magnesium     Status: Abnormal   Result Value Ref Range    Magnesium 1.6 (L) 1.7 - 2.3 mg/dL   Adult Type and Screen     Status: None   Result Value Ref Range    ABO/RH(D) B POS     Antibody Screen Negative Negative    SPECIMEN EXPIRATION DATE [id number]    CBC with platelets + differential     Status: Abnormal    Narrative    The following orders were created for panel order CBC with platelets + differential.  Procedure                               Abnormality         Status                     ---------                               -----------         ------                     CBC with platelets and d...[[id number]]  Abnormal            Final result                 Please view results for these tests on the individual orders.   ABO/Rh type and screen     Status: None    Narrative    The following orders were created for panel order ABO/Rh type and screen.  Procedure                               Abnormality         Status                     ---------                               -----------         ------                     Adult Type and Screen[[id number]]                            Final result                 Please view results for  these tests on the individual orders.          Attestation:  I have reviewed today's vital signs, notes, medications, labs and imaging with Dr. Bo Duke.  Amount of time performed on this consult: 50 minutes.    Negin Boudreaux PA-C  [facility]

## 2023-08-08 NOTE — PROGRESS NOTES
[facility]    Medicine Progress Note - Hospitalist Service    Date of Admission:  8/7/2023    Assessment & Plan   Expand All Collapse All    [facility]     History and Physical - Hospitalist Service       Date of Admission:  8/7/2023        Assessment & Plan  Anna Marie Babb is a 83 year old female admitted on 8/7/2023. She presents to the emergency department for evaluation of right hip pain after fall found to have right femur fracture.     Right femoral neck fracture: Following mechanical fall.  Reports no prior anesthesia reaction.  Last sedation would have been with 2019 colonoscopy.  -Orthopedic surgery consulted and underwent surgery closed hip reduction with pinning  --Physical therapy consulted tomorrow afternoon following surgical intervention  -Fall precautions  -DVT and pain management as per ortho     Hypertension:  -Holding prior to admission lisinopril perioperatively, resume following surgical intervention  -Continue prior to admission amlodipine     Hypochloremic hyponatremia: Suspect secondary to intravascular volume depletion.  Patient appears clinically dry with tacky oral mucosa, evidence of recent lower extremity edema contraction.  -Recheck BMP Na 129  -Received 1 L normal saline in the emergency department; will have on low rate maintenance fluids while n.p.o.     Anion gap metabolic acidosis: Resolved   Collagenous colitis: Note history.  Monitor for symptoms               Diet: Advance Diet as Tolerated: Regular Diet Adult    DVT Prophylaxis: Defer to primary service  Lombardi Catheter: Not present  Lines: None     Cardiac Monitoring: None  Code Status: Full Code      Clinically Significant Risk Factors Present on Admission         # Hyponatremia: Lowest Na = 127 mmol/L in last 2 days, will monitor as appropriate    # Hypomagnesemia: Lowest Mg = 1.6 mg/dL in last 2 days, will replace as needed       # Hypertension: Noted on problem list                Disposition Plan      Expected Discharge Date: 08/10/2023      Destination: inpatient rehabilitation facility            Kimberlee Corral MD  Hospitalist Service  [facility]  Securely message with [employer] (more info)  Text page via [employer] Paging/Directory   ______________________________________________________________________    Interval History   Patient seen and examined in the PACU.  She is not having any chest pain or shortness of breath.  She is having some pain at the hip site.   Physical Exam   Vital Signs: Temp: 97.8  F (36.6  C) Temp src: Oral BP: (!) 161/90 Pulse: 79   Resp: 13 SpO2: 97 % O2 Device: None (Room air)    Weight: 124 lbs 5.43 oz    Physical Exam  Pulmonary:      Effort: Pulmonary effort is normal. No respiratory distress.   Abdominal:      Palpations: Abdomen is soft.      Tenderness: There is no abdominal tenderness.   Neurological:      Mental Status: She is alert.          Medical Decision Making         Data     I have personally reviewed the following data over the past 24 hrs:    10.1  \   12.9   / 265     129 (L) 95 (L) 8.6 /  140 (H)   3.9 25 0.70 \     ALT: 37 AST: 33 AP: 101 TBILI: 0.9   ALB: 4.4 TOT PROTEIN: 7.8 LIPASE: N/A       Imaging results reviewed over the past 24 hrs:   Recent Results (from the past 24 hour(s))   XR Pelvis 1/2 Views    Narrative    EXAM: XR PELVIS 1/2 VIEWS  LOCATION: [facility]  DATE: 8/7/2023    INDICATION: Fall, pain.  COMPARISON: None.      Impression    IMPRESSION: There is an acute fracture of the right femoral neck. The degree of displacement is difficult to assess on this single AP view.     No hip joint space narrowing. There is are degenerative changes in the lower lumbar spine.   XR Femur Right 2 Views    Narrative    EXAM: XR FEMUR RIGHT 2 VIEWS  LOCATION: [facility]  DATE: 8/7/2023    INDICATION: Right proximal femur fracture.  COMPARISON:  08/07/2023 right hip.      Impression    IMPRESSION:   1.  Acute mildly impacted fracture of the right femoral neck.  2.  Normal right hip and knee alignment.  3.  Atherosclerotic calcification.    Head CT w/o contrast    Narrative    EXAM: CT HEAD W/O CONTRAST, CT CERVICAL SPINE W/O CONTRAST  LOCATION: [facility]  DATE: 8/7/2023    INDICATION: fall  COMPARISON: None.  TECHNIQUE:   1) Routine CT Head without IV contrast. Multiplanar reformats. Dose reduction techniques were used.  2) Routine CT Cervical Spine without IV contrast. Multiplanar reformats. Dose reduction techniques were used.    FINDINGS:   HEAD CT:   INTRACRANIAL CONTENTS: No intracranial hemorrhage, extraaxial collection, or mass effect.  No CT evidence of acute infarct. Mild to moderate presumed chronic small vessel ischemic changes. Mild to moderate generalized volume loss. No hydrocephalus.     VISUALIZED ORBITS/SINUSES/MASTOIDS: Prior bilateral cataract surgery. Visualized portions of the orbits are otherwise unremarkable. No paranasal sinus mucosal disease. No middle ear or mastoid effusion.    BONES/SOFT TISSUES: No acute abnormality. Osseous remodeling of the calvarium the vertex likely relates to underlying arachnoid granulation tissue.    CERVICAL SPINE CT:   VERTEBRA: Normal vertebral body heights and alignment. No fracture or posttraumatic subluxation.     CANAL/FORAMINA: Overall mild cervical disc degenerative change moderate at C5-C6. Advanced cervical facet arthrosis. No severe spinal canal stenosis.    PARASPINAL: No extraspinal abnormality. Visualized lung fields are clear.      Impression    IMPRESSION:  HEAD CT:  1.  No CT evidence for acute intracranial process.  2.  Brain atrophy and presumed chronic microvascular ischemic changes as above.    CERVICAL SPINE CT:  1.  No CT evidence for acute fracture or post traumatic subluxation.   Cervical spine CT w/o contrast    Narrative    EXAM: CT HEAD W/O CONTRAST,  CT CERVICAL SPINE W/O CONTRAST  LOCATION: [facility]  DATE: 8/7/2023    INDICATION: fall  COMPARISON: None.  TECHNIQUE:   1) Routine CT Head without IV contrast. Multiplanar reformats. Dose reduction techniques were used.  2) Routine CT Cervical Spine without IV contrast. Multiplanar reformats. Dose reduction techniques were used.    FINDINGS:   HEAD CT:   INTRACRANIAL CONTENTS: No intracranial hemorrhage, extraaxial collection, or mass effect.  No CT evidence of acute infarct. Mild to moderate presumed chronic small vessel ischemic changes. Mild to moderate generalized volume loss. No hydrocephalus.     VISUALIZED ORBITS/SINUSES/MASTOIDS: Prior bilateral cataract surgery. Visualized portions of the orbits are otherwise unremarkable. No paranasal sinus mucosal disease. No middle ear or mastoid effusion.    BONES/SOFT TISSUES: No acute abnormality. Osseous remodeling of the calvarium the vertex likely relates to underlying arachnoid granulation tissue.    CERVICAL SPINE CT:   VERTEBRA: Normal vertebral body heights and alignment. No fracture or posttraumatic subluxation.     CANAL/FORAMINA: Overall mild cervical disc degenerative change moderate at C5-C6. Advanced cervical facet arthrosis. No severe spinal canal stenosis.    PARASPINAL: No extraspinal abnormality. Visualized lung fields are clear.      Impression    IMPRESSION:  HEAD CT:  1.  No CT evidence for acute intracranial process.  2.  Brain atrophy and presumed chronic microvascular ischemic changes as above.    CERVICAL SPINE CT:  1.  No CT evidence for acute fracture or post traumatic subluxation.   CT Hip Right w/o Contrast    Narrative    EXAM: CT HIP RIGHT W/O CONTRAST  LOCATION: [facility]  DATE: 8/7/2023    INDICATION: Right femoral neck fracture. Preoperative planning.  COMPARISON: None.  TECHNIQUE: Noncontrast. Axial, sagittal and coronal thin-section reconstruction. Dose reduction techniques were  used.     FINDINGS:     BONES:  -Acute impacted fracture of the right femoral neck. There is slight valgus angulation, with impaction greatest laterally. The fracture extends from the subcapital femoral neck at the lateral cortex to the transcervical femoral neck at the medial cortex.  - No evidence of additional acute fracture. No dislocation.  - Moderate degenerative changes of the right hip, with more pronounced joint space narrowing medially.  - Degenerative changes of the pubic symphysis and right sacroiliac joint.    SOFT TISSUES:  -Visualized musculature appears unremarkable.  - Minimal subcutaneous fat stranding lateral to the proximal right femur.    OTHER:  - Distended urinary bladder.  - Status post hysterectomy.  - Vascular calcifications.      Impression    IMPRESSION:  1.  Acute impacted right femoral neck fracture with valgus angulation.     XR Pelvis w Hip Port Right 1 View    Narrative    XR PELVIS AND HIP PORTABLE RIGHT 1 VIEW 8/8/2023 4:15 PM    HISTORY: Status post Hip surgery    COMPARISON: None.      Impression    IMPRESSION: Cannulated screw fixation across a mildly displaced  fracture of the right femoral neck. Osteopenia. Mild degenerative  changes in the right hip.    KEN BEVERLY MD         SYSTEM ID:  [id number]

## 2023-08-08 NOTE — ED NOTES
Bed: ED11  Expected date:   Expected time:   Means of arrival:   Comments:  Sveta 2 83F R hip pain after a fall

## 2023-08-08 NOTE — ED NOTES
"[facility]  ED Nurse Handoff Report    ED Chief complaint: Fall      ED Diagnosis:   Final diagnoses:   Closed fracture of neck of right femur, initial encounter (H)   Hyponatremia       Code Status: MD to discuss    Allergies:   Allergies   Allergen Reactions    Amoxicillin-Pot Clavulanate     Ciprofloxacin     Gluten Meal      celiac    Lactose        Patient Story: Pt comes in for a mechanical fall at home. She was found to have a right femoral head fracture.   Focused Assessment: Pain on right hip, pain meds given. See MAR.    Treatments and/or interventions provided: Liter of NS, tylenol, oxycodone  Patient's response to treatments and/or interventions: Pt resting comfortably    To be done/followed up on inpatient unit:  Pain control, NPO @ midnight    Does this patient have any cognitive concerns?: Forgetful    Activity level - Baseline/Home:  Independent  Activity Level - Current:    Bedrest    Patient's Preferred language: English   Needed?: No    Isolation: None  Infection: Not Applicable  Patient tested for COVID 19 prior to admission: NO  Bariatric?: No    Vital Signs:   Vitals:    08/07/23 1947 08/07/23 1959   BP: (!) 154/86 (!) 159/89   Pulse: 82 78   Resp: 12 17   Temp: 98.6  F (37  C)    TempSrc: Oral    SpO2: 99% 100%   Height: 1.676 m (5' 6\")        Cardiac Rhythm:     Was the PSS-3 completed:   Yes  What interventions are required if any?               Family Comments: Son went home, works in dietary here at the hospital. Lives with patient.  OBS brochure/video discussed/provided to patient/family: N/A              Name of person given brochure if not patient:               Relationship to patient:     For the majority of the shift this patient's behavior was Green.   Behavioral interventions performed were None, slightly forgetful at times.    ED NURSE PHONE NUMBER: *[phone]         "

## 2023-08-08 NOTE — PROGRESS NOTES
RECEIVING UNIT ED HANDOFF REVIEW    ED Nurse Handoff Report was reviewed by: Aaliyah Bell RN on August 8, 2023 at 12:21 AM

## 2023-08-09 ENCOUNTER — APPOINTMENT (OUTPATIENT)
Dept: PHYSICAL THERAPY | Facility: CLINIC | Age: 83
DRG: 481 | End: 2023-08-09
Payer: MEDICARE

## 2023-08-09 LAB
ANION GAP SERPL CALCULATED.3IONS-SCNC: 14 MMOL/L (ref 7–15)
BUN SERPL-MCNC: 7.4 MG/DL (ref 8–23)
CALCIUM SERPL-MCNC: 9.2 MG/DL (ref 8.8–10.2)
CHLORIDE SERPL-SCNC: 96 MMOL/L (ref 98–107)
CREAT SERPL-MCNC: 0.53 MG/DL (ref 0.51–0.95)
DEPRECATED HCO3 PLAS-SCNC: 21 MMOL/L (ref 22–29)
GFR SERPL CREATININE-BSD FRML MDRD: >90 ML/MIN/1.73M2
GLUCOSE SERPL-MCNC: 126 MG/DL (ref 70–99)
GLUCOSE SERPL-MCNC: 126 MG/DL (ref 70–99)
HGB BLD-MCNC: 12.8 G/DL (ref 11.7–15.7)
MAGNESIUM SERPL-MCNC: 1.5 MG/DL (ref 1.7–2.3)
MAGNESIUM SERPL-MCNC: 2.6 MG/DL (ref 1.7–2.3)
POTASSIUM SERPL-SCNC: 3.8 MMOL/L (ref 3.4–5.3)
POTASSIUM SERPL-SCNC: 3.8 MMOL/L (ref 3.4–5.3)
SODIUM SERPL-SCNC: 131 MMOL/L (ref 136–145)

## 2023-08-09 PROCEDURE — 97116 GAIT TRAINING THERAPY: CPT | Mod: GP

## 2023-08-09 PROCEDURE — 250N000013 HC RX MED GY IP 250 OP 250 PS 637

## 2023-08-09 PROCEDURE — 80048 BASIC METABOLIC PNL TOTAL CA: CPT | Performed by: STUDENT IN AN ORGANIZED HEALTH CARE EDUCATION/TRAINING PROGRAM

## 2023-08-09 PROCEDURE — 83735 ASSAY OF MAGNESIUM: CPT | Performed by: STUDENT IN AN ORGANIZED HEALTH CARE EDUCATION/TRAINING PROGRAM

## 2023-08-09 PROCEDURE — 36415 COLL VENOUS BLD VENIPUNCTURE: CPT | Performed by: STUDENT IN AN ORGANIZED HEALTH CARE EDUCATION/TRAINING PROGRAM

## 2023-08-09 PROCEDURE — 250N000011 HC RX IP 250 OP 636: Mod: JZ | Performed by: STUDENT IN AN ORGANIZED HEALTH CARE EDUCATION/TRAINING PROGRAM

## 2023-08-09 PROCEDURE — 250N000013 HC RX MED GY IP 250 OP 250 PS 637: Performed by: PHYSICIAN ASSISTANT

## 2023-08-09 PROCEDURE — 85018 HEMOGLOBIN: CPT

## 2023-08-09 PROCEDURE — 250N000013 HC RX MED GY IP 250 OP 250 PS 637: Performed by: INTERNAL MEDICINE

## 2023-08-09 PROCEDURE — 120N000001 HC R&B MED SURG/OB

## 2023-08-09 PROCEDURE — 99232 SBSQ HOSP IP/OBS MODERATE 35: CPT | Performed by: STUDENT IN AN ORGANIZED HEALTH CARE EDUCATION/TRAINING PROGRAM

## 2023-08-09 PROCEDURE — 97530 THERAPEUTIC ACTIVITIES: CPT | Mod: GP

## 2023-08-09 PROCEDURE — 84132 ASSAY OF SERUM POTASSIUM: CPT | Performed by: STUDENT IN AN ORGANIZED HEALTH CARE EDUCATION/TRAINING PROGRAM

## 2023-08-09 PROCEDURE — 250N000011 HC RX IP 250 OP 636: Mod: JZ

## 2023-08-09 PROCEDURE — 97161 PT EVAL LOW COMPLEX 20 MIN: CPT | Mod: GP

## 2023-08-09 PROCEDURE — 82947 ASSAY GLUCOSE BLOOD QUANT: CPT | Performed by: STUDENT IN AN ORGANIZED HEALTH CARE EDUCATION/TRAINING PROGRAM

## 2023-08-09 RX ORDER — OXYCODONE HYDROCHLORIDE 5 MG/1
2.5-5 TABLET ORAL EVERY 4 HOURS PRN
Qty: 25 TABLET | Refills: 0 | Status: SHIPPED | OUTPATIENT
Start: 2023-08-09 | End: 2023-08-22

## 2023-08-09 RX ORDER — ASPIRIN 325 MG
325 TABLET, DELAYED RELEASE (ENTERIC COATED) ORAL DAILY
Qty: 30 TABLET | Refills: 0 | DISCHARGE
Start: 2023-08-09 | End: 2023-09-08

## 2023-08-09 RX ORDER — MAGNESIUM SULFATE HEPTAHYDRATE 40 MG/ML
4 INJECTION, SOLUTION INTRAVENOUS ONCE
Status: COMPLETED | OUTPATIENT
Start: 2023-08-09 | End: 2023-08-09

## 2023-08-09 RX ORDER — AMOXICILLIN 250 MG
1 CAPSULE ORAL 2 TIMES DAILY PRN
Qty: 20 TABLET | DISCHARGE
Start: 2023-08-09 | End: 2023-11-29

## 2023-08-09 RX ORDER — CHOLECALCIFEROL (VITAMIN D3) 50 MCG
50 TABLET ORAL DAILY
Qty: 30 TABLET | DISCHARGE
Start: 2023-08-10 | End: 2024-08-11

## 2023-08-09 RX ORDER — ACETAMINOPHEN 325 MG/1
650 TABLET ORAL EVERY 4 HOURS PRN
Qty: 30 TABLET | DISCHARGE
Start: 2023-08-11 | End: 2023-08-15

## 2023-08-09 RX ADMIN — LISINOPRIL 20 MG: 20 TABLET ORAL at 21:25

## 2023-08-09 RX ADMIN — ACETAMINOPHEN 975 MG: 325 TABLET, FILM COATED ORAL at 21:25

## 2023-08-09 RX ADMIN — AMLODIPINE BESYLATE 5 MG: 5 TABLET ORAL at 21:25

## 2023-08-09 RX ADMIN — ENOXAPARIN SODIUM 40 MG: 40 INJECTION SUBCUTANEOUS at 08:58

## 2023-08-09 RX ADMIN — MAGNESIUM SULFATE HEPTAHYDRATE 4 G: 40 INJECTION, SOLUTION INTRAVENOUS at 09:43

## 2023-08-09 RX ADMIN — Medication 50 MCG: at 08:57

## 2023-08-09 RX ADMIN — CEFAZOLIN 1 G: 1 INJECTION, POWDER, FOR SOLUTION INTRAMUSCULAR; INTRAVENOUS at 05:41

## 2023-08-09 RX ADMIN — ACETAMINOPHEN 975 MG: 325 TABLET, FILM COATED ORAL at 13:17

## 2023-08-09 RX ADMIN — POLYETHYLENE GLYCOL 3350 17 G: 17 POWDER, FOR SOLUTION ORAL at 08:57

## 2023-08-09 RX ADMIN — SENNOSIDES AND DOCUSATE SODIUM 1 TABLET: 50; 8.6 TABLET ORAL at 08:57

## 2023-08-09 RX ADMIN — ACETAMINOPHEN 975 MG: 325 TABLET, FILM COATED ORAL at 05:34

## 2023-08-09 ASSESSMENT — ACTIVITIES OF DAILY LIVING (ADL)
ADLS_ACUITY_SCORE: 32
ADLS_ACUITY_SCORE: 23
ADLS_ACUITY_SCORE: 32
ADLS_ACUITY_SCORE: 32
ADLS_ACUITY_SCORE: 31
ADLS_ACUITY_SCORE: 35
ADLS_ACUITY_SCORE: 32
ADLS_ACUITY_SCORE: 35
ADLS_ACUITY_SCORE: 23
ADLS_ACUITY_SCORE: 35
ADLS_ACUITY_SCORE: 31
ADLS_ACUITY_SCORE: 32

## 2023-08-09 NOTE — PLAN OF CARE
Goal Outcome Evaluation:      Pod1 CLOSED REDUCTION, HIP, WITH PERCUTANEOUS PINNING  Orientation: A&Ox4 but forgetful  Vitals/Tele: VSS on Ra- pt is hypertensive   IV Access/drains: PIV infusing LR @ 75 mL/hr  Diet: reg   Mobility: a2 gb/w   GI/: incontinent of bowel. Pt removed purewick after they urinated and refused a new one.   Wound/Skin: Rhip incision, R elbow/ arm bruises and scratches  Consults:  Discharge Plan: possible discharge 8/10/23 to inpatient rehab facility.     See Flow sheets for assessment

## 2023-08-09 NOTE — PROGRESS NOTES
Pt arrived on the floor from PACU around 1710. Pt was drowsy. VSS except HTN. On RA. Capno in place. PCDs on. R hip dressing CDI. Pt denies pain on arrival. LR infusing @ 75ml/hr. Bladder scan 154ml. Pure wick in place. Bruises, abrasion and scab on R elbow. +1 edema on bilateral ankle. Pain managed with schedule tylenol. Up with 2/wlk/gb to BSC. Unable to void. At 2137, Bladder scan 899ml. Straight cath for 725ml. Mepilex on coccyx.

## 2023-08-09 NOTE — PROGRESS NOTES
[facility]    Medicine Progress Note - Hospitalist Service    Date of Admission:  8/7/2023    Assessment & Plan   Expand All Collapse All    [facility]     History and Physical - Hospitalist Service       Date of Admission:  8/7/2023        Assessment & Plan  Anna Marie Babb is a 83 year old female admitted on 8/7/2023. She presents to the emergency department for evaluation of right hip pain after fall found to have right femur fracture.     Right femoral neck fracture: Post op Day 1 Following mechanical fall.  Reports no prior anesthesia reaction.  Last sedation would have been with 2019 colonoscopy.  -Orthopedic surgery consulted and underwent surgery closed hip reduction with pinning  --Physical therapy consulted tomorrow afternoon following surgical intervention  -Fall precautions  -DVT and pain management as per ortho  Hb stable at 12.8     Hypertension:  -Continue Lisinopril   -Continue prior to admission amlodipine     Hypochloremic hyponatremia: Suspect secondary to intravascular volume depletion.  Patient appears clinically dry with tacky oral mucosa, evidence of recent lower extremity edema contraction.  -Recheck BMP Na 131  -Received 1 L normal saline in the emergency department; will have on low rate maintenance fluids while n.p.o.     Anion gap metabolic acidosis: Resolved   Collagenous colitis: Note history.  Monitor for symptoms               Diet: Gluten Free Diet    DVT Prophylaxis: Defer to primary service  Lombardi Catheter: Not present  Lines: None     Cardiac Monitoring: None  Code Status: Full Code      Clinically Significant Risk Factors         # Hyponatremia: Lowest Na = 127 mmol/L in last 2 days, will monitor as appropriate    # Hypomagnesemia: Lowest Mg = 1.5 mg/dL in last 2 days, will replace as needed         # Hypertension: Noted on problem list                 Disposition Plan     Expected Discharge Date: 08/10/2023       Destination: inpatient rehabilitation facility            Kimberlee Corral MD  Hospitalist Service  [facility]  Securely message with [employer] (more info)  Text page via [employer] Paging/Directory   ______________________________________________________________________    Interval History   Patient seen and examined .  She is not having any chest pain or shortness of breath.    Physical Exam   Vital Signs: Temp: 98.7  F (37.1  C) Temp src: Oral BP: 138/78 Pulse: 71   Resp: 20 SpO2: 96 % O2 Device: None (Room air)    Weight: 124 lbs 5.43 oz    Physical Exam  Cardiovascular:      Rate and Rhythm: Normal rate and regular rhythm.      Heart sounds: Normal heart sounds.   Pulmonary:      Effort: Pulmonary effort is normal. No respiratory distress.   Abdominal:      Palpations: Abdomen is soft.      Tenderness: There is no abdominal tenderness.   Neurological:      Mental Status: She is alert.          Medical Decision Making         Data     I have personally reviewed the following data over the past 24 hrs:    N/A  \   12.8   / N/A     131 (L) 96 (L) 7.4 (L) /  126 (H); 126 (H)   3.8; 3.8 21 (L) 0.53 \       Imaging results reviewed over the past 24 hrs:   Recent Results (from the past 24 hour(s))   XR Surgery RAVINDRA Fluoro Less Than 5 Min w Stills    Narrative    EXAM: XR SURGERY RAVINDRA FLUORO LESS THAN 5 MIN W STILLS  LOCATION: [facility]  DATE: 8/8/2023    INDICATION: Right hip.  COMPARISON: None.    TECHNIQUE: Intraoperative fluoroscopy performed during the patient's procedure.    FLUOROSCOPIC TIME: 0.8  NUMBER OF IMAGES: 5    FINDINGS: 5 C-arm images performed intraoperatively for planning and assessment of cannulated screw fixation of right femoral neck fracture. Please see operative report. Moderate degenerative narrowing medially. Films otherwise negative for   postoperative/intraoperative purposes.   XR Pelvis w Hip Port Right 1 View    Narrative    XR  PELVIS AND HIP PORTABLE RIGHT 1 VIEW 8/8/2023 4:15 PM    HISTORY: Status post Hip surgery    COMPARISON: None.      Impression    IMPRESSION: Cannulated screw fixation across a mildly displaced  fracture of the right femoral neck. Osteopenia. Mild degenerative  changes in the right hip.    KEN BEVERLY MD         SYSTEM ID:  [id number]

## 2023-08-09 NOTE — PROGRESS NOTES
08/09/23 1410   Appointment Info   Signing Clinician's Name / Credentials (PT) Sugar Cerna, PT, DPT   Living Environment   People in Home child(domi), adult  (renaldo Mccoy)   Current Living Arrangements   (duplex)   Home Accessibility stairs to enter home;stairs within home   Number of Stairs, Main Entrance 3   Stair Railings, Main Entrance railings on both sides of stairs   Transportation Anticipated health plan transportation   Living Environment Comments all needs on the main level, laundry and cat litter box in basement   Self-Care   Usual Activity Tolerance moderate   Current Activity Tolerance poor   Equipment Currently Used at Home none   Fall history within last six months yes   Number of times patient has fallen within last six months 1   Activity/Exercise/Self-Care Comment pt reports IND with all mobility   General Information   Onset of Illness/Injury or Date of Surgery 08/07/23   Referring Physician Korina Jenkins, PAOmariC   Patient/Family Therapy Goals Statement (PT) wants to go home   Pertinent History of Current Problem (include personal factors and/or comorbidities that impact the POC) 84 y/o F s/p fall with right femur fx, POD #1 CLOSED REDUCTION, HIP, WITH PERCUTANEOUS PINNING. WBAT with FWW, no hip precautions. See chart for further PMHx.   Existing Precautions/Restrictions fall   Weight-Bearing Status - RLE weight-bearing as tolerated   General Observations resting in bed, Renaldo Mccoy present at bedside   Cognition   Affect/Mental Status (Cognition) WFL   Orientation Status (Cognition) oriented x 3   Pain Assessment   Patient Currently in Pain Yes, see Vital Sign flowsheet  (right hip consistent with surgery)   Integumentary/Edema   Integumentary/Edema Comments dressing over right hip   Range of Motion (ROM)   ROM Comment limited right hip AROM consistent with recent surgery   Strength (Manual Muscle Testing)   Strength Comments Right LE weakness consistent with recent surgery-pt was able to  perform partial AROM, no right knee buckling, able to perform SAQ, required assist with lifting for bed mobility (R LE)   Bed Mobility   Comment, (Bed Mobility) min A with right LE, elevated HOB and railing   Transfers   Comment, (Transfers) min A with sit to stand with FWW   Gait/Stairs (Locomotion)   Wallace Level (Gait) minimum assist (75% patient effort)   Assistive Device (Gait) walker, front-wheeled   Distance in Feet 2   Distance in Feet (Gait) 10ft   Comment, (Gait/Stairs) antalgic, step to   Balance   Balance Comments min A with standing balance with FWW   Sensory Examination   Sensory Perception patient reports no sensory changes   Clinical Impression   Criteria for Skilled Therapeutic Intervention Yes, treatment indicated   PT Diagnosis (PT) impaired gait   Influenced by the following impairments impaired activity tolerance, right hip pain, decreased right hip ROM and strength, impaired balance   Functional limitations due to impairments impaired IND with functional mobility   Clinical Presentation (PT Evaluation Complexity) Stable/Uncomplicated   Clinical Presentation Rationale clinical judgement   Clinical Decision Making (Complexity) low complexity   Planned Therapy Interventions (PT) balance training;bed mobility training;cryotherapy;gait training;home exercise program;home program guidelines;risk factor education;progressive activity/exercise;transfer training;strengthening;stair training;stretching;patient/family education   Anticipated Equipment Needs at Discharge (PT) walker, rolling   Risk & Benefits of therapy have been explained evaluation/treatment results reviewed;risks/benefits reviewed;care plan/treatment goals reviewed;current/potential barriers reviewed;participants voiced agreement with care plan;participants included;patient   PT Total Evaluation Time   PT Eval, Low Complexity Minutes (38437) 10   Plan of Care Review   Plan of Care Reviewed With patient;fernando   Physical Therapy Goals    PT Frequency Daily   PT Predicted Duration/Target Date for Goal Attainment 08/16/23   PT Goals Bed Mobility;Transfers;Gait;Stairs   PT: Bed Mobility Independent;Supine to/from sit   PT: Transfers Modified independent;Sit to/from stand;Bed to/from chair   PT: Gait Modified independent;Rolling walker;150 feet   PT: Stairs Supervision/stand-by assist;3 stairs;Rail on both sides   Interventions   Interventions Quick Adds Therapeutic Activity;Gait Training   Therapeutic Activity   Therapeutic Activities: dynamic activities to improve functional performance Minutes (64575) 9   Symptoms Noted During/After Treatment Fatigue;Increased pain   Treatment Detail/Skilled Intervention worked on safety with sit to stand transfers x 3 reps with min A and FWW, cues for safety with hand and foot placement. pt with c/o lightheadedness, VSS throughout. Educated pt on WBAT, no precautions orders in chart. discussed with pt and son current recs for TCU at d/c prior to return home, pt reports wants to take it day by day, hoping for direct return home.   Gait Training   Gait Training Minutes (98686) 10   Symptoms Noted During/After Treatment (Gait Training) fatigue;dizziness;increased pain   Treatment Detail/Skilled Intervention visual demo and cues with verbal instruction for step to pattern with FWW, amb 10ft with cues, min A, no knee buckling, close recliner follow due to c/o lightheadedness.   PT Discharge Planning   PT Plan progress safety with transfers and amb with FWW, WC follow as needed-did c/o dizziness today VSS, stairs as able.   PT Discharge Recommendation (DC Rec) Transitional Care Facility   PT Rationale for DC Rec Pt typicallly IND with all mobility, currently requires A x 1 for all mobility with FWW, some dizziness, VSS. Lives with son,  however he works during the day. Recommend TCU to maximize safety with IND mobility prior to return home. Pt hoping to d/c directly home-if pt declines TCU, recommend home PT/OT services  and assist with all mobility, issue FWW as well-updated care coordinator   PT Brief overview of current status recommend up to chair for all meals, min A x 1 with FWW   Total Session Time   Timed Code Treatment Minutes 19   Total Session Time (sum of timed and untimed services) 29

## 2023-08-09 NOTE — ANESTHESIA POSTPROCEDURE EVALUATION
Patient: Anna Marie Babb    Procedure: Procedure(s):  CLOSED REDUCTION, HIP, WITH PERCUTANEOUS PINNING       Anesthesia Type:  General    Note:  Disposition: Admission   Postop Pain Control: Uneventful            Sign Out: Well controlled pain   PONV: No   Neuro/Psych: Uneventful            Sign Out: Acceptable/Baseline neuro status   Airway/Respiratory: Uneventful            Sign Out: Acceptable/Baseline resp. status   CV/Hemodynamics: Uneventful            Sign Out: Acceptable CV status; No obvious hypovolemia; No obvious fluid overload   Other NRE: NONE   DID A NON-ROUTINE EVENT OCCUR? No           Last vitals:  Vitals Value Taken Time   /83 08/08/23 1645   Temp 36.1  C (97  F) 08/08/23 1518   Pulse 80 08/08/23 1656   Resp 37 08/08/23 1656   SpO2 96 % 08/08/23 1656   Vitals shown include unvalidated device data.    Electronically Signed By: Hillary De La Cruz MD  August 8, 2023  7:47 PM

## 2023-08-10 ENCOUNTER — APPOINTMENT (OUTPATIENT)
Dept: OCCUPATIONAL THERAPY | Facility: CLINIC | Age: 83
DRG: 481 | End: 2023-08-10
Payer: MEDICARE

## 2023-08-10 LAB
ANION GAP SERPL CALCULATED.3IONS-SCNC: 12 MMOL/L (ref 7–15)
BUN SERPL-MCNC: 10.1 MG/DL (ref 8–23)
CALCIUM SERPL-MCNC: 8.9 MG/DL (ref 8.8–10.2)
CHLORIDE SERPL-SCNC: 95 MMOL/L (ref 98–107)
CREAT SERPL-MCNC: 0.68 MG/DL (ref 0.51–0.95)
DEPRECATED CALCIDIOL+CALCIFEROL SERPL-MC: 55 UG/L (ref 20–75)
DEPRECATED HCO3 PLAS-SCNC: 22 MMOL/L (ref 22–29)
GFR SERPL CREATININE-BSD FRML MDRD: 85 ML/MIN/1.73M2
GLUCOSE SERPL-MCNC: 81 MG/DL (ref 70–99)
GLUCOSE SERPL-MCNC: 83 MG/DL (ref 70–99)
GLUCOSE SERPL-MCNC: 84 MG/DL (ref 70–99)
HGB BLD-MCNC: 11.7 G/DL (ref 11.7–15.7)
MAGNESIUM SERPL-MCNC: 1.9 MG/DL (ref 1.7–2.3)
POTASSIUM SERPL-SCNC: 3.5 MMOL/L (ref 3.4–5.3)
POTASSIUM SERPL-SCNC: 3.6 MMOL/L (ref 3.4–5.3)
SODIUM SERPL-SCNC: 129 MMOL/L (ref 136–145)

## 2023-08-10 PROCEDURE — 250N000013 HC RX MED GY IP 250 OP 250 PS 637: Performed by: INTERNAL MEDICINE

## 2023-08-10 PROCEDURE — 84132 ASSAY OF SERUM POTASSIUM: CPT | Performed by: STUDENT IN AN ORGANIZED HEALTH CARE EDUCATION/TRAINING PROGRAM

## 2023-08-10 PROCEDURE — 250N000013 HC RX MED GY IP 250 OP 250 PS 637: Performed by: PHYSICIAN ASSISTANT

## 2023-08-10 PROCEDURE — 250N000011 HC RX IP 250 OP 636: Mod: JZ

## 2023-08-10 PROCEDURE — 83735 ASSAY OF MAGNESIUM: CPT | Performed by: STUDENT IN AN ORGANIZED HEALTH CARE EDUCATION/TRAINING PROGRAM

## 2023-08-10 PROCEDURE — 82947 ASSAY GLUCOSE BLOOD QUANT: CPT | Performed by: STUDENT IN AN ORGANIZED HEALTH CARE EDUCATION/TRAINING PROGRAM

## 2023-08-10 PROCEDURE — 97165 OT EVAL LOW COMPLEX 30 MIN: CPT | Mod: GO

## 2023-08-10 PROCEDURE — 250N000013 HC RX MED GY IP 250 OP 250 PS 637

## 2023-08-10 PROCEDURE — 85018 HEMOGLOBIN: CPT

## 2023-08-10 PROCEDURE — 36415 COLL VENOUS BLD VENIPUNCTURE: CPT | Performed by: STUDENT IN AN ORGANIZED HEALTH CARE EDUCATION/TRAINING PROGRAM

## 2023-08-10 PROCEDURE — 36415 COLL VENOUS BLD VENIPUNCTURE: CPT

## 2023-08-10 PROCEDURE — 97530 THERAPEUTIC ACTIVITIES: CPT | Mod: GO

## 2023-08-10 PROCEDURE — 120N000001 HC R&B MED SURG/OB

## 2023-08-10 PROCEDURE — 99232 SBSQ HOSP IP/OBS MODERATE 35: CPT | Performed by: STUDENT IN AN ORGANIZED HEALTH CARE EDUCATION/TRAINING PROGRAM

## 2023-08-10 PROCEDURE — 250N000013 HC RX MED GY IP 250 OP 250 PS 637: Performed by: STUDENT IN AN ORGANIZED HEALTH CARE EDUCATION/TRAINING PROGRAM

## 2023-08-10 RX ADMIN — ENOXAPARIN SODIUM 40 MG: 40 INJECTION SUBCUTANEOUS at 08:37

## 2023-08-10 RX ADMIN — SENNOSIDES AND DOCUSATE SODIUM 1 TABLET: 50; 8.6 TABLET ORAL at 21:54

## 2023-08-10 RX ADMIN — ACETAMINOPHEN 975 MG: 325 TABLET, FILM COATED ORAL at 13:08

## 2023-08-10 RX ADMIN — LISINOPRIL 20 MG: 20 TABLET ORAL at 21:54

## 2023-08-10 RX ADMIN — METHOCARBAMOL 500 MG: 500 TABLET ORAL at 21:54

## 2023-08-10 RX ADMIN — MELATONIN 5 MG TABLET 5 MG: at 21:54

## 2023-08-10 RX ADMIN — AMLODIPINE BESYLATE 5 MG: 5 TABLET ORAL at 21:54

## 2023-08-10 RX ADMIN — ACETAMINOPHEN 975 MG: 325 TABLET, FILM COATED ORAL at 21:53

## 2023-08-10 RX ADMIN — Medication 50 MCG: at 08:37

## 2023-08-10 RX ADMIN — ACETAMINOPHEN 975 MG: 325 TABLET, FILM COATED ORAL at 06:14

## 2023-08-10 ASSESSMENT — ACTIVITIES OF DAILY LIVING (ADL)
ADLS_ACUITY_SCORE: 28
ADLS_ACUITY_SCORE: 32
ADLS_ACUITY_SCORE: 28
PREVIOUS_RESPONSIBILITIES: HOUSEKEEPING;LAUNDRY;MEDICATION MANAGEMENT
ADLS_ACUITY_SCORE: 37
ADLS_ACUITY_SCORE: 32
ADLS_ACUITY_SCORE: 37
ADLS_ACUITY_SCORE: 37
ADLS_ACUITY_SCORE: 35
ADLS_ACUITY_SCORE: 37
ADLS_ACUITY_SCORE: 32

## 2023-08-10 NOTE — PROGRESS NOTES
Orthopedic Surgery  Anna Marie Babb  08/10/2023     Admit Date:  8/7/2023    POD: 2 Days Post-Op   Procedure(s):  CLOSED REDUCTION, HIP, WITH PERCUTANEOUS PINNING     Patient resting comfortably in bed.    Pain reported in right hip this morning, increased compared to yesterday.   Tolerating oral intake.    Denies nausea or vomiting  Denies chest pain or shortness of breath    Temp:  [97.8  F (36.6  C)-98.2  F (36.8  C)] 98.2  F (36.8  C)  Pulse:  [72-79] 73  Resp:  [17-20] 17  BP: (110-146)/(67-79) 146/73  SpO2:  [96 %-99 %] 97 %    Alert and oriented  Dressing is clean, dry, and intact.   Minimal erythema of the surrounding skin.   Bilateral calves are soft, non-tender.  Right lower extremity is NVI.  Sensation intact bilateral lower extremities  Patient able to resist dorsi and plantar flexion bilaterally  +Dp pulse    Labs:  Recent Labs   Lab Test 08/10/23  0625 08/09/23  0627 08/07/23  2017 08/25/15  1850   WBC  --   --  10.1 6.5   HGB 11.7 12.8 12.9 13.0   PLT  --   --  265 265       No lab results found.  No lab results found.      1. PLAN:   Lovenox for DVT prophylaxis.  mg daily upon discharge    Mobilize with PT/OT    WBAT with walker.     Continue current pain regimen   Dressings: Keep intact.  Change if >60% saturated or peeling off.    Follow-up: 2 weeks post-op with Dr. ALFREDA [facility] team    2. Disposition   Anticipate d/c to TCU when medically cleared and progressing in PT.    Kinga Watkins PA-C

## 2023-08-10 NOTE — PROGRESS NOTES
Alert and oriented 3. VSS on RA. Not OOB this shift. Pain manage with scheduled tylenol. PIV SL. Incision site CDI. CMS intact. Patent reagan with good out. Pending TCU placement.

## 2023-08-10 NOTE — PROGRESS NOTES
Patient vital signs are at baseline: Yes  Patient able to ambulate as they were prior to admission or with assist devices provided by therapies during their stay:  Yes  Patient MUST void prior to discharge:  Yes  Patient able to tolerate oral intake:  Yes  Pain has adequate pain control using Oral analgesics:  Yes  Does patient have an identified :  Yes  Has goal D/C date and time been discussed with patient:  Not yet    Pt A&O x4, but also confused and forgetful Up x1 with walker and GB. VSS ex BP slightly high. Pain managed with Tyl. Lombardi removed, voided in BR x1, bladder scan 190 ml. R hip dressing intact, CMS intact. Pt frustrated about not going home today and couldn't sleep at night. Refused PT, but ambulated in the hallway with staff. Melatonin ordered.

## 2023-08-10 NOTE — PROGRESS NOTES
08/10/23 1000   Appointment Info   Signing Clinician's Name / Credentials (OT) Lillian Merino, OTR/L   Living Environment   People in Home child(domi), adult  (son Darius)   Current Living Arrangements other (see comments)  (duplex)   Home Accessibility stairs to enter home;stairs within home   Number of Stairs, Main Entrance 3   Stair Railings, Main Entrance railings on both sides of stairs   Transportation Anticipated health plan transportation   Living Environment Comments All needs on the main level, laundry and cat litter box in basement. Tub/shower combo, has shower chair, grab bars tub, standad height toilet. Reports son has R BKA at baseline - prosthetic.   Self-Care   Usual Activity Tolerance moderate   Current Activity Tolerance poor   Equipment Currently Used at Home shower chair   Fall history within last six months yes   Number of times patient has fallen within last six months 1   Activity/Exercise/Self-Care Comment IND ADLs baseline.   Instrumental Activities of Daily Living (IADL)   Previous Responsibilities housekeeping;laundry;medication management   IADL Comments Pt reports son does all the cooking/groceries,  daughter (Liv) A with finances. Pt does not drive.   General Information   Onset of Illness/Injury or Date of Surgery 08/07/23   Referring Physician Korina Jenkins PA-C   Patient/Family Therapy Goal Statement (OT) To go home   Additional Occupational Profile Info/Pertinent History of Current Problem Per chart: Anna Marie Babb is a 83 year old female admitted on 8/7/2023. She presents to the emergency department for evaluation of right hip pain after fall found to have right femur fracture. Pt is POD#2 CLOSED REDUCTION, HIP, WITH PERCUTANEOUS PINNING on 8/8/23.   Existing Precautions/Restrictions fall   Limitations/Impairments safety/cognitive   Cognitive Status Examination   Orientation Status orientation to person, place and time   Cognitive Status Comments Mild forgetfulness noted -  answers 1 of 3 higher level thinking questions appropriately.   Visual Perception   Visual Impairment/Limitations WFL   Sensory   Sensory Comments Pt does not report numbness/tingling in BUEs   Pain Assessment   Patient Currently in Pain   (10/10 with mobility in room in R hip)   Range of Motion Comprehensive   Comment, General Range of Motion BUEs WFL   Strength Comprehensive (MMT)   Comment, General Manual Muscle Testing (MMT) Assessment BUEs WFL   Coordination   Upper Extremity Coordination No deficits were identified   Bed Mobility   Comment (Bed Mobility) NT   Transfers   Transfers sit-stand transfer;toilet transfer;shower transfer   Sit-Stand Transfer   Sit/Stand Transfer Comments CGA   Shower Transfer   Shower Transfer Comments Min A per clinical judgement   Toilet Transfer   Toilet Transfer Comments CGA per clinical judgement   Balance   Balance Comments No overt LOB noted, mild unsteadiness with FWW   Activities of Daily Living   BADL Assessment/Intervention lower body dressing;toileting;grooming;upper body dressing   Upper Body Dressing Assessment/Training   Comment, (Upper Body Dressing) SBA per clinical judgement   Lower Body Dressing Assessment/Training   Comment, (Lower Body Dressing) Min A   Grooming Assessment/Training   Comment, (Grooming) CGA per clinical judgement   Toileting   Comment, (Toileting) SBA per clinical judgement   Clinical Impression   Criteria for Skilled Therapeutic Interventions Met (OT) Yes, treatment indicated   OT Diagnosis Decreased ind with I/ADLs   OT Problem List-Impairments impacting ADL problems related to;activity tolerance impaired;balance;mobility;strength;pain   Assessment of Occupational Performance 3-5 Performance Deficits   Identified Performance Deficits dressing, bathing/tub transfers, toileting/toilet transfers, heavy IADLs, higher level cog tasks   Planned Therapy Interventions (OT) ADL retraining;IADL retraining;transfer training;cognition   Clinical Decision  Making Complexity (OT) low complexity   Anticipated Equipment Needs Upon Discharge (OT) tub bench   Risk & Benefits of therapy have been explained patient   OT Total Evaluation Time   OT Eval, Low Complexity Minutes (70563) 10   OT Goals   Therapy Frequency (OT) Daily   OT Predicted Duration/Target Date for Goal Attainment 08/17/23   OT Goals Hygiene/Grooming;Upper Body Dressing;Lower Body Dressing;Transfers;Toilet Transfer/Toileting;Cognition;OT Goal 1   OT: Hygiene/Grooming modified independent;while standing  (FWW)   OT: Upper Body Dressing Modified independent   OT: Lower Body Dressing Modified independent;using adaptive equipment  (FWW, AE as needed)   OT: Transfer Modified independent  (FWW, tub transfer)   OT: Toilet Transfer/Toileting Modified independent;toilet transfer;cleaning and garment management  (FWW)   OT: Cognitive Patient/caregiver will verbalize understanding of cognitive assessment results/recommendations as needed for safe discharge planning   OT: Goal 1 Pt will independently sort 3 of 3 medications appropriately into pill organizer for increased ind and safety within I/ADLs.   Interventions   Interventions Quick Adds Therapeutic Activity   Therapeutic Activities   Therapeutic Activity Minutes (63471) 18   Symptoms noted during/after treatment fatigue;increased pain   Treatment Detail/Skilled Intervention Pt greeted in chair, agreeable to OT. Pt O x 3, mild forgetfullness noted. Pt reports mild pain in sitting, 10/10 pain in R hip with mobility. Pt educated on WBAT RLE, no hip precautions per chart. Pt completes sit > stand with CGA, FWW, slow transition into standing, VCs for technique. Pt completes room level mobility to toilet with CGA, FWW, VCs for walker, surgical leg, non-surgical leg for offloading, mild unsteadiness noted, pt reports increased pain - declines further mobility for toilet transfer. Pt mobilizes back to chair with CGA, FWW. Pt completes stand > sit in chair, CGA, FWW. Pt  up in chair, breakfast arriving, RN updated, alarm set, items in reach.   OT Discharge Planning   OT Plan cog screen, toilet transfer, tub transfer   OT Discharge Recommendation (DC Rec) home with assist;home with home care occupational therapy;Transitional Care Facility   OT Rationale for DC Rec Pt functioning below baseline level with decreased strength, impaired mobility, decreased activity tolerance and noted mild forgetfulness, impacting safety/ind within I/ADLs. Pt resides with son in a duplex, son works full-time at hospital in nutrition. Rec home with 24/7 supervision/assist with functional transfers and I/ADLs, and [facility] OT, likely needs a tub bench, once pt is medically stable. If unable to recieve this level of assist, rec skilled TCU for increased ind with I/ADLs, prior to return home.   Total Session Time   Timed Code Treatment Minutes 18   Total Session Time (sum of timed and untimed services) 28

## 2023-08-10 NOTE — PROGRESS NOTES
Patient vital signs are at baseline: Yes  Patient able to ambulate as they were prior to admission or with assist devices provided by therapies during their stay:  Yes  Patient MUST void prior to discharge:  no  Patient able to tolerate oral intake:  Yes  Pain has adequate pain control using Oral analgesics:  Yes  Does patient have an identified :  Yes  Has goal D/C date and time been discussed with patient:  Not yet    Pt A&O x4, forgetful. VSS.RA Up x1 with walker and GB. Pain managed with Tyl. R hip incision site dry and intact. Diarrhea x2. Pt voided in bedside commode x2 with small amount of urine, bladder scan 190 ml and 564 ml. Already straight cath x3 yesterday, Lombardi cath in with 525 ml output. Mg placement x1. Discharge pending.

## 2023-08-10 NOTE — PROGRESS NOTES
[facility]    Medicine Progress Note - Hospitalist Service    Date of Admission:  8/7/2023    Assessment & Plan   Expand All Collapse All    [facility]     History and Physical - Hospitalist Service       Date of Admission:  8/7/2023        Assessment & Plan  Anna Marie Babb is a 83 year old female admitted on 8/7/2023. She presents to the emergency department for evaluation of right hip pain after fall found to have right femur fracture.     Right femoral neck fracture: Post op Day 2 Following mechanical fall.  Reports no prior anesthesia reaction.  Last sedation would have been with 2019 colonoscopy.  -Orthopedic surgery consulted and underwent surgery closed hip reduction with pinning  --Physical therapy consulted tomorrow afternoon following surgical intervention  -Fall precautions  -DVT and pain management as per ortho  Hb stable at 11.7     Hypertension:  -Continue Lisinopril   -Continue prior to admission amlodipine      # Mild confusion  Risk for delirium  -Promote sleep hygiene  -Melatonin scheduled at night     Hypochloremic hyponatremia: Suspect secondary to intravascular volume depletion.  Patient appears clinically dry with tacky oral mucosa, evidence of recent lower extremity edema contraction.  -Recheck BMP Na 131 on 8/9/23  -Recheck Na ordered           Anion gap metabolic acidosis: Resolved   Collagenous colitis: Note history.  Monitor for symptoms               Diet: Gluten Free Diet    DVT Prophylaxis: Defer to primary service  Lombardi Catheter: Not present  Lines: None     Cardiac Monitoring: None  Code Status: Full Code      Clinically Significant Risk Factors            # Hypomagnesemia: Lowest Mg = 1.5 mg/dL in last 2 days, will replace as needed         # Hypertension: Noted on problem list                 Disposition Plan     Expected Discharge Date: 08/10/2023      Destination: inpatient rehabilitation facility          Patient has been  seen by physical therapy and Occupational Therapy recommending at the TCU or home with 24-hour supervision.   will reach out to the family if they can provide 24-hour supervision otherwise patient will discharge to TCU   Kimberlee Corral MD  Hospitalist Service  [facility]  Securely message with [employer] (more info)  Text page via [employer] Paging/Directory   ______________________________________________________________________    Interval History     Patient seen and examined at bedside.  She has some mild confusion.  She did not sleep well last night.  She is eager to go home.  Denies any chest pain or shortness of breath       Physical Exam   Vital Signs: Temp: 98.2  F (36.8  C) Temp src: Oral BP: (!) 146/73 Pulse: 73   Resp: 17 SpO2: 97 % O2 Device: None (Room air)    Weight: 124 lbs 5.43 oz    Physical Exam  Cardiovascular:      Rate and Rhythm: Normal rate and regular rhythm.      Heart sounds: Normal heart sounds.   Pulmonary:      Effort: Pulmonary effort is normal. No respiratory distress.   Abdominal:      Palpations: Abdomen is soft.      Tenderness: There is no abdominal tenderness.   Neurological:      Mental Status: She is alert.          Medical Decision Making         Data     I have personally reviewed the following data over the past 24 hrs:    N/A  \   11.7   / N/A     N/A N/A N/A /  83   3.5 N/A N/A \       Imaging results reviewed over the past 24 hrs:   No results found for this or any previous visit (from the past 24 hour(s)).

## 2023-08-11 ENCOUNTER — APPOINTMENT (OUTPATIENT)
Dept: OCCUPATIONAL THERAPY | Facility: CLINIC | Age: 83
DRG: 481 | End: 2023-08-11
Payer: MEDICARE

## 2023-08-11 ENCOUNTER — APPOINTMENT (OUTPATIENT)
Dept: PHYSICAL THERAPY | Facility: CLINIC | Age: 83
DRG: 481 | End: 2023-08-11
Payer: MEDICARE

## 2023-08-11 LAB
ANION GAP SERPL CALCULATED.3IONS-SCNC: 13 MMOL/L (ref 7–15)
BUN SERPL-MCNC: 10.6 MG/DL (ref 8–23)
CALCIUM SERPL-MCNC: 9.4 MG/DL (ref 8.8–10.2)
CHLORIDE SERPL-SCNC: 95 MMOL/L (ref 98–107)
CREAT SERPL-MCNC: 0.65 MG/DL (ref 0.51–0.95)
DEPRECATED HCO3 PLAS-SCNC: 22 MMOL/L (ref 22–29)
GFR SERPL CREATININE-BSD FRML MDRD: 86 ML/MIN/1.73M2
GLUCOSE SERPL-MCNC: 84 MG/DL (ref 70–99)
HGB BLD-MCNC: 12.6 G/DL (ref 11.7–15.7)
MAGNESIUM SERPL-MCNC: 1.7 MG/DL (ref 1.7–2.3)
PLATELET # BLD AUTO: 253 10E3/UL (ref 150–450)
POTASSIUM SERPL-SCNC: 3.9 MMOL/L (ref 3.4–5.3)
POTASSIUM SERPL-SCNC: 4 MMOL/L (ref 3.4–5.3)
SODIUM SERPL-SCNC: 130 MMOL/L (ref 136–145)

## 2023-08-11 PROCEDURE — 97116 GAIT TRAINING THERAPY: CPT | Mod: GP

## 2023-08-11 PROCEDURE — 99232 SBSQ HOSP IP/OBS MODERATE 35: CPT | Performed by: STUDENT IN AN ORGANIZED HEALTH CARE EDUCATION/TRAINING PROGRAM

## 2023-08-11 PROCEDURE — 83735 ASSAY OF MAGNESIUM: CPT | Performed by: STUDENT IN AN ORGANIZED HEALTH CARE EDUCATION/TRAINING PROGRAM

## 2023-08-11 PROCEDURE — 120N000001 HC R&B MED SURG/OB

## 2023-08-11 PROCEDURE — 97535 SELF CARE MNGMENT TRAINING: CPT | Mod: GO

## 2023-08-11 PROCEDURE — 85018 HEMOGLOBIN: CPT | Performed by: STUDENT IN AN ORGANIZED HEALTH CARE EDUCATION/TRAINING PROGRAM

## 2023-08-11 PROCEDURE — 84132 ASSAY OF SERUM POTASSIUM: CPT | Performed by: STUDENT IN AN ORGANIZED HEALTH CARE EDUCATION/TRAINING PROGRAM

## 2023-08-11 PROCEDURE — 250N000011 HC RX IP 250 OP 636: Mod: JZ

## 2023-08-11 PROCEDURE — 80048 BASIC METABOLIC PNL TOTAL CA: CPT | Performed by: STUDENT IN AN ORGANIZED HEALTH CARE EDUCATION/TRAINING PROGRAM

## 2023-08-11 PROCEDURE — 250N000013 HC RX MED GY IP 250 OP 250 PS 637: Performed by: STUDENT IN AN ORGANIZED HEALTH CARE EDUCATION/TRAINING PROGRAM

## 2023-08-11 PROCEDURE — 85049 AUTOMATED PLATELET COUNT: CPT

## 2023-08-11 PROCEDURE — 250N000013 HC RX MED GY IP 250 OP 250 PS 637

## 2023-08-11 PROCEDURE — 250N000013 HC RX MED GY IP 250 OP 250 PS 637: Performed by: PHYSICIAN ASSISTANT

## 2023-08-11 PROCEDURE — 36415 COLL VENOUS BLD VENIPUNCTURE: CPT

## 2023-08-11 PROCEDURE — 97530 THERAPEUTIC ACTIVITIES: CPT | Mod: GP

## 2023-08-11 RX ADMIN — ACETAMINOPHEN 975 MG: 325 TABLET, FILM COATED ORAL at 15:23

## 2023-08-11 RX ADMIN — MELATONIN 5 MG TABLET 5 MG: at 22:33

## 2023-08-11 RX ADMIN — ACETAMINOPHEN 650 MG: 325 TABLET, FILM COATED ORAL at 22:30

## 2023-08-11 RX ADMIN — ENOXAPARIN SODIUM 40 MG: 40 INJECTION SUBCUTANEOUS at 09:01

## 2023-08-11 RX ADMIN — Medication 50 MCG: at 09:01

## 2023-08-11 RX ADMIN — ACETAMINOPHEN 975 MG: 325 TABLET, FILM COATED ORAL at 06:39

## 2023-08-11 ASSESSMENT — ACTIVITIES OF DAILY LIVING (ADL)
ADLS_ACUITY_SCORE: 28
DEPENDENT_IADLS:: INDEPENDENT
ADLS_ACUITY_SCORE: 28

## 2023-08-11 NOTE — PROGRESS NOTES
Orthopedic Surgery  Anna Marie Babb  08/11/2023     Admit Date:  8/7/2023    POD: 3 Days Post-Op   Procedure(s):  CLOSED REDUCTION, HIP, WITH PERCUTANEOUS PINNING     Patient up in chair  Pain controlled at rest   Tolerating oral intake.    Denies nausea or vomiting  Denies chest pain or shortness of breath  No acute events or new ortho complaints     Temp:  [97.9  F (36.6  C)-98.4  F (36.9  C)] 97.9  F (36.6  C)  Pulse:  [70-85] 70  Resp:  [18] 18  BP: (136-172)/(78-91) 136/78  SpO2:  [97 %-98 %] 97 %    Alert and oriented  Dressing is clean, dry, and intact.   Minimal erythema of the surrounding skin.   Bilateral calves are soft, non-tender.  Right lower extremity is NVI.  Sensation intact bilateral lower extremities  Patient able to resist dorsi and plantar flexion bilaterally  +Dp pulse    Labs:  Recent Labs   Lab Test 08/11/23  0702 08/10/23  0625 08/09/23  0627 08/07/23  2017 08/07/23  2017 08/25/15  1850   WBC  --   --   --   --  10.1 6.5   HGB 12.6 11.7 12.8   < > 12.9 13.0     --   --   --  265 265    < > = values in this interval not displayed.       No lab results found.  No lab results found.      1. PLAN:   Lovenox for DVT prophylaxis.  mg daily upon discharge    Mobilize with PT/OT    WBAT with walker.     Continue current pain regimen   Dressings: Keep intact.  Change if >60% saturated or peeling off.    Follow-up: 2 weeks post-op with Dr. ALFREDA [facility] team    2. Disposition   Anticipate d/c to TCU when medically cleared and progressing in PT.    Darlene Horne PA-C

## 2023-08-11 NOTE — PLAN OF CARE
Goal Outcome Evaluation:       .Patient vital signs are at baseline: Yes  Patient able to ambulate as they were prior to admission or with assist devices provided by therapies during their stay:  Yes  Patient MUST void prior to discharge:  Yes  Patient able to tolerate oral intake:  Yes  Pain has adequate pain control using Oral analgesics:  Yes  Does patient have an identified :  Yes  Has goal D/C date and time been discussed with patient:  No,  Reason:  TBD pending TCU placement.     A&Ox4, some forgetfulness noted. Up 1gb and walker. Pain managed with scheduled tylenol. Dressing CDI. Patient needed to be encouraged to use bathroom to void. Bladder scanned around 1600, 222ml.

## 2023-08-11 NOTE — PROGRESS NOTES
[facility]    Medicine Progress Note - Hospitalist Service    Date of Admission:  8/7/2023    Assessment & Plan   Expand All Collapse All    [facility]     History and Physical - Hospitalist Service       Date of Admission:  8/7/2023        Assessment & Plan  Anna Marie Babb is a 83 year old female admitted on 8/7/2023. She presents to the emergency department for evaluation of right hip pain after fall found to have right femur fracture.     Right femoral neck fracture: Post op Day 3 Following mechanical fall.  Reports no prior anesthesia reaction.  Last sedation would have been with 2019 colonoscopy.  -Orthopedic surgery consulted and underwent surgery closed hip reduction with pinning  --Physical therapy consulted tomorrow afternoon following surgical intervention  -Fall precautions  -DVT and pain management as per ortho  Hb stable at 11.7  -Repeat hb ordered     Hypertension:  -Continue Lisinopril   -Continue prior to admission amlodipine      # Mild confusion Improving   Risk for delirium  -Promote sleep hygiene  -Melatonin scheduled at night     Hypochloremic hyponatremia: Suspect secondary to intravascular volume depletion.  Patient appears clinically dry with tacky oral mucosa, evidence of recent lower extremity edema contraction.  -Recheck BMP Na 131 on 8/9/23  -Recheck Na 130            Anion gap metabolic acidosis: Resolved   Collagenous colitis: Note history.  Monitor for symptoms               Diet: Gluten Free Diet    DVT Prophylaxis: Defer to primary service  Lombardi Catheter: Not present  Lines: None     Cardiac Monitoring: None  Code Status: Full Code      Clinically Significant Risk Factors         # Hyponatremia: Lowest Na = 129 mmol/L in last 2 days, will monitor as appropriate            # Hypertension: Noted on problem list                 Disposition Plan      Expected Discharge Date: 08/11/2023      Destination: nursing home        Medically stable to be discharged  Kimberlee Corral MD  Hospitalist Service  [facility]  Securely message with [facility] (more info)  Text page via [employer] Paging/Directory   ______________________________________________________________________    Interval History     Patient seen and examined at bedside. Slept well last night  No chest pain  No shortness of breath   Pain well controlled    Physical Exam   Vital Signs: Temp: 97.9  F (36.6  C) Temp src: Oral BP: 136/78 Pulse: 70   Resp: 18 SpO2: 97 % O2 Device: None (Room air)    Weight: 124 lbs 5.43 oz    Physical Exam  Cardiovascular:      Rate and Rhythm: Normal rate and regular rhythm.      Heart sounds: Normal heart sounds.   Pulmonary:      Effort: Pulmonary effort is normal. No respiratory distress.   Abdominal:      Palpations: Abdomen is soft.      Tenderness: There is no abdominal tenderness.   Neurological:      Mental Status: She is alert.          Medical Decision Making         Data     I have personally reviewed the following data over the past 24 hrs:    N/A  \   N/A   / 253     130 (L) 95 (L) 10.6 /  84   3.9; 4.0 22 0.65 \       Imaging results reviewed over the past 24 hrs:   No results found for this or any previous visit (from the past 24 hour(s)).

## 2023-08-11 NOTE — CONSULTS
Care Management Initial Consult    General Information  Assessment completed with: Patient, PAtient  Type of CM/SW Visit: Initial Assessment    Primary Care Provider verified and updated as needed: Yes   Readmission within the last 30 days: no previous admission in last 30 days      Reason for Consult: discharge planning  Advance Care Planning:            Communication Assessment  Patient's communication style: spoken language (English or Bilingual)    Hearing Difficulty or Deaf: no   Wear Glasses or Blind: no    Cognitive  Cognitive/Neuro/Behavioral: WDL  Level of Consciousness: alert  Arousal Level: opens eyes spontaneously  Orientation: oriented x 4  Mood/Behavior: calm, cooperative  Best Language: 0 - No aphasia  Speech: clear    Living Environment:   People in home: child(domi), adult  Darius  Current living Arrangements: house      Able to return to prior arrangements:  ([facility] rec)       Family/Social Support:  Care provided by: self  Provides care for: no one  Marital Status:   Children          Description of Support System: Supportive, Involved    Support Assessment: Adequate family and caregiver support    Current Resources:   Patient receiving home care services: No     Community Resources: None  Equipment currently used at home: none  Supplies currently used at home: None    Employment/Financial:  Employment Status: retired        Financial Concerns:  (Unable to assess daughter is manages finances)           Does the patient's insurance plan have a 3 day qualifying hospital stay waiver?  No    Lifestyle & Psychosocial Needs:  Social Determinants of Health     Tobacco Use: Medium Risk (8/9/2023)    Patient History     Smoking Tobacco Use: Passive Smoke Exposure - Never Smoker     Smokeless Tobacco Use: Unknown     Passive Exposure: Yes   Alcohol Use: Not on file   Financial Resource Strain: Not on file   Food Insecurity: Not on file   Transportation Needs: Not on file   Physical Activity: Not on file  "  Stress: Not on file   Social Connections: Not on file   Intimate Partner Violence: Not on file   Depression: Not on file   Housing Stability: Not on file       Functional Status:  Prior to admission patient needed assistance:   Dependent ADLs:: Independent  Dependent IADLs:: Independent       Mental Health Status:  Mental Health Status: No Current Concerns       Chemical Dependency Status:  Chemical Dependency Status: No Current Concerns             Values/Beliefs:  Spiritual, Cultural Beliefs, Tenriism Practices, Values that affect care: yes       Cultural/Tenriism Practices Patient Routinely Participates In: ceremony, prayer       Additional Information:  CM initial Assessment    Anna Marie Babb is a 83 year old female admitted on 8/7/2023. She presents to the emergency department for evaluation of right hip pain after fall found to have right femur fracture.     Writer met with the patient to complete discharge planning consult. Patient stated consent to discuss.    Patient confirmed PCP listed in the chart, however, patient recently moved in the past couple of months. Patient stated that their PCP is located in [place] and they used to live in [place]. Patient stated since they moved to live with their son, Darius, they were needing a new PCP.    Patient confirmed their new home address that is located in the chart. Patient stated they live their with their son, Darius, who works with nutrition here at the hospital. Patient stated they \"absolutely\" did not use any assistive equipment at home. Patient stated she was independent. Patient stated she was able to do everything on her own, except cook, she stated her son was the cook. Patient stated her daughter, Liv, was the one that managed her finances.     Patient denied receiving any [facility] or Community resources.     Patient stated she is retired and is currently making income from social security. Patient asked writer to call daughter for financial questions " and for TCU choices after 1000.    Patient stated agreement for TCU. Writer informed her on general information for TCU and insurance coverage.     Writer will call daughter, Liv, after 1000    Addednum 1055:  Writer called the patient's daughter Liv [phone]. Liv stated that she does manage the patient's finances but that the son, and the patient share expense. Liv stated there are no financial concerns.     Liv stated for the writer to send referrals to locations in [place] and specifically asked to [facility]. Writer stated he will send referral to [facility] and [place]. Liv agreed to it.    Writer will send referrals    RAYSA Almendarez  [facility]  Social Work

## 2023-08-11 NOTE — PROGRESS NOTES
Alert and oriented X4, can have some confusion other times. VSS on RA. Up A1 gb/w. Voiding well per bathroom, no BM. Incision site dressings CDI, CMS intact. Pain managed with scheduled tylenol. Awaiting TCU placement.

## 2023-08-12 LAB
ANION GAP SERPL CALCULATED.3IONS-SCNC: 12 MMOL/L (ref 7–15)
BUN SERPL-MCNC: 10.7 MG/DL (ref 8–23)
CALCIUM SERPL-MCNC: 9.1 MG/DL (ref 8.8–10.2)
CHLORIDE SERPL-SCNC: 92 MMOL/L (ref 98–107)
CREAT SERPL-MCNC: 0.61 MG/DL (ref 0.51–0.95)
DEPRECATED HCO3 PLAS-SCNC: 22 MMOL/L (ref 22–29)
GFR SERPL CREATININE-BSD FRML MDRD: 88 ML/MIN/1.73M2
GLUCOSE SERPL-MCNC: 90 MG/DL (ref 70–99)
MAGNESIUM SERPL-MCNC: 1.7 MG/DL (ref 1.7–2.3)
OSMOLALITY UR: 150 MMOL/KG (ref 100–1200)
POTASSIUM SERPL-SCNC: 3.6 MMOL/L (ref 3.4–5.3)
POTASSIUM SERPL-SCNC: 3.6 MMOL/L (ref 3.4–5.3)
SODIUM SERPL-SCNC: 123 MMOL/L (ref 136–145)
SODIUM SERPL-SCNC: 125 MMOL/L (ref 136–145)
SODIUM SERPL-SCNC: 126 MMOL/L (ref 136–145)
SODIUM UR-SCNC: <20 MMOL/L
TSH SERPL DL<=0.005 MIU/L-ACNC: 2.83 UIU/ML (ref 0.3–4.2)

## 2023-08-12 PROCEDURE — 250N000013 HC RX MED GY IP 250 OP 250 PS 637: Performed by: INTERNAL MEDICINE

## 2023-08-12 PROCEDURE — 36415 COLL VENOUS BLD VENIPUNCTURE: CPT | Performed by: STUDENT IN AN ORGANIZED HEALTH CARE EDUCATION/TRAINING PROGRAM

## 2023-08-12 PROCEDURE — 83735 ASSAY OF MAGNESIUM: CPT | Performed by: STUDENT IN AN ORGANIZED HEALTH CARE EDUCATION/TRAINING PROGRAM

## 2023-08-12 PROCEDURE — 99232 SBSQ HOSP IP/OBS MODERATE 35: CPT | Performed by: STUDENT IN AN ORGANIZED HEALTH CARE EDUCATION/TRAINING PROGRAM

## 2023-08-12 PROCEDURE — 80048 BASIC METABOLIC PNL TOTAL CA: CPT | Performed by: STUDENT IN AN ORGANIZED HEALTH CARE EDUCATION/TRAINING PROGRAM

## 2023-08-12 PROCEDURE — 258N000003 HC RX IP 258 OP 636: Performed by: STUDENT IN AN ORGANIZED HEALTH CARE EDUCATION/TRAINING PROGRAM

## 2023-08-12 PROCEDURE — 84300 ASSAY OF URINE SODIUM: CPT | Performed by: STUDENT IN AN ORGANIZED HEALTH CARE EDUCATION/TRAINING PROGRAM

## 2023-08-12 PROCEDURE — 84443 ASSAY THYROID STIM HORMONE: CPT | Performed by: STUDENT IN AN ORGANIZED HEALTH CARE EDUCATION/TRAINING PROGRAM

## 2023-08-12 PROCEDURE — 250N000013 HC RX MED GY IP 250 OP 250 PS 637: Performed by: STUDENT IN AN ORGANIZED HEALTH CARE EDUCATION/TRAINING PROGRAM

## 2023-08-12 PROCEDURE — 84132 ASSAY OF SERUM POTASSIUM: CPT | Performed by: STUDENT IN AN ORGANIZED HEALTH CARE EDUCATION/TRAINING PROGRAM

## 2023-08-12 PROCEDURE — 250N000013 HC RX MED GY IP 250 OP 250 PS 637: Performed by: PHYSICIAN ASSISTANT

## 2023-08-12 PROCEDURE — 84295 ASSAY OF SERUM SODIUM: CPT | Performed by: STUDENT IN AN ORGANIZED HEALTH CARE EDUCATION/TRAINING PROGRAM

## 2023-08-12 PROCEDURE — 250N000011 HC RX IP 250 OP 636: Mod: JZ

## 2023-08-12 PROCEDURE — 83935 ASSAY OF URINE OSMOLALITY: CPT | Performed by: STUDENT IN AN ORGANIZED HEALTH CARE EDUCATION/TRAINING PROGRAM

## 2023-08-12 PROCEDURE — 250N000013 HC RX MED GY IP 250 OP 250 PS 637

## 2023-08-12 PROCEDURE — 120N000001 HC R&B MED SURG/OB

## 2023-08-12 RX ORDER — SODIUM CHLORIDE 9 MG/ML
INJECTION, SOLUTION INTRAVENOUS CONTINUOUS
Status: DISCONTINUED | OUTPATIENT
Start: 2023-08-12 | End: 2023-08-13

## 2023-08-12 RX ADMIN — MELATONIN 5 MG TABLET 5 MG: at 21:58

## 2023-08-12 RX ADMIN — Medication 50 MCG: at 10:09

## 2023-08-12 RX ADMIN — ENOXAPARIN SODIUM 40 MG: 40 INJECTION SUBCUTANEOUS at 10:09

## 2023-08-12 RX ADMIN — ACETAMINOPHEN 650 MG: 325 TABLET, FILM COATED ORAL at 21:58

## 2023-08-12 RX ADMIN — AMLODIPINE BESYLATE 5 MG: 5 TABLET ORAL at 19:49

## 2023-08-12 RX ADMIN — LISINOPRIL 20 MG: 20 TABLET ORAL at 19:49

## 2023-08-12 RX ADMIN — ACETAMINOPHEN 650 MG: 325 TABLET, FILM COATED ORAL at 04:42

## 2023-08-12 RX ADMIN — OXYCODONE HYDROCHLORIDE 2.5 MG: 5 TABLET ORAL at 04:55

## 2023-08-12 RX ADMIN — ACETAMINOPHEN 650 MG: 325 TABLET, FILM COATED ORAL at 15:28

## 2023-08-12 RX ADMIN — IBUPROFEN 600 MG: 600 TABLET ORAL at 23:59

## 2023-08-12 RX ADMIN — IBUPROFEN 600 MG: 600 TABLET ORAL at 17:25

## 2023-08-12 RX ADMIN — SODIUM CHLORIDE: 9 INJECTION, SOLUTION INTRAVENOUS at 23:58

## 2023-08-12 ASSESSMENT — ACTIVITIES OF DAILY LIVING (ADL)
ADLS_ACUITY_SCORE: 28
ADLS_ACUITY_SCORE: 28
ADLS_ACUITY_SCORE: 30
ADLS_ACUITY_SCORE: 28
ADLS_ACUITY_SCORE: 28
ADLS_ACUITY_SCORE: 30
ADLS_ACUITY_SCORE: 30
ADLS_ACUITY_SCORE: 28
ADLS_ACUITY_SCORE: 30
ADLS_ACUITY_SCORE: 30
ADLS_ACUITY_SCORE: 28
ADLS_ACUITY_SCORE: 28

## 2023-08-12 NOTE — PROGRESS NOTES
Care Management Follow Up    Length of Stay (days): 5    Expected Discharge Date: 08/12/2023     Concerns to be Addressed: Discharge planning     Patient plan of care discussed at interdisciplinary rounds: Yes    Anticipated Discharge Disposition: Transitional Care     Anticipated Discharge Services: Therapy  Anticipated Discharge DME: None    Patient/family educated on Medicare website which has current facility and service quality ratings: yes  Education Provided on the Discharge Plan: Yes  Patient/Family in Agreement with the Plan: yes    Referrals Placed by CM/SW: Senior Linkage Line, Post Acute Facilities  Private pay costs discussed: Not applicable    Additional Information:  Spoke with patient's nurse who states that patient's labs came back and patient's MD is canceling the discharge.  Call placed and message left for [facility] to update them.      Will continue to follow.      VERONICA Serrano, [facility]    [phone]  [facility]

## 2023-08-12 NOTE — PROGRESS NOTES
BMP results Verbal orders received from Dr. Corral to draw stat Sodium,as patients sodium has been trending down. Writer called lab now regarding collection, they will send tech now.    1315- Writer called daughter Liv to update on current status. Will continue to follow and update accordingly.    Writer LM for Janette Roy to update on current plan.

## 2023-08-12 NOTE — PROGRESS NOTES
[facility]    Medicine Progress Note - Hospitalist Service    Date of Admission:  8/7/2023    Assessment & Plan   Expand All Collapse All    [facility]     History and Physical - Hospitalist Service       Date of Admission:  8/7/2023        Assessment & Plan  Anna Marie Babb is a 83 year old female admitted on 8/7/2023. She presents to the emergency department for evaluation of right hip pain after fall found to have right femur fracture.     Right femoral neck fracture: Post op Day 4 Following mechanical fall.  Reports no prior anesthesia reaction.  Last sedation would have been with 2019 colonoscopy.  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  Her sodium dropped to 126 this morning.  She is asymptomatic.  Repeat sodium was down to 125.  Will repeat urine studies.  We will start her on fluid restriction of 1500 mL.  Sodium checks every 8 hours.  Low risk of overcorrection.          Anion gap metabolic acidosis: Resolved   Collagenous colitis: Note history.  Monitor for symptoms               Diet: Gluten Free Diet  Diet  Fluid restriction 1500 ML FLUID    DVT Prophylaxis: Enoxaparin (Lovenox) SQ  Lombardi Catheter: Not present  Lines: None     Cardiac Monitoring: None  Code Status: Full Code      Clinically Significant Risk Factors         # Hyponatremia: Lowest Na = 125 mmol/L in last 2 days, will monitor as appropriate          # Hypertension: Noted on problem list                 Disposition Plan       Expected Discharge Date: 08/13/2023,  3:00 PM    Destination: nursing home  Discharge Comments: Referrals pending,          Kimberlee Corral MD  Hospitalist Service  [facility]  Securely message with [employer] (more info)  Text page via [employer] Paging/Directory   _____________________________________________________________________    Interval History   Patient seen and examined at bedside.  She feels well.  Pain is well controlled.  Denies any chest pain or shortness of breath.  Patient's discharge had to be canceled because of sodium dropped to 126 this morning.  And her repeat sodium was 125.   Physical Exam   Vital Signs: Temp: 97.8  F (36.6  C) Temp src: Oral BP: 125/75 Pulse: 68   Resp: 16 SpO2: 96 % O2 Device: None (Room air)    Weight: 124 lbs 5.43 oz         Medical Decision Making             Data     I have personally reviewed the following data over the past 24 hrs:    N/A  \   N/A   / N/A     125 (L) 92 (L) 10.7 /  90   3.6; 3.6 22 0.61 \       Imaging results reviewed over the past 24 hrs:   No results found for this or any previous visit (from the past 24 hour(s)).

## 2023-08-12 NOTE — PROGRESS NOTES
Ortho Daily Progress Note  Anna Marie Babb is doing well postoperatively.  She has been out of bed.  Working with physical therapy.  She reports continued pain with weightbearing but overall improved.  No numbness.    Temp:  [97.5  F (36.4  C)-98  F (36.7  C)] 97.8  F (36.6  C)  Pulse:  [67-94] 68  Resp:  [16-18] 16  BP: ()/(63-85) 125/75  SpO2:  [96 %-100 %] 96 %  Hemoglobin   Date Value Ref Range Status   08/11/2023 12.6 11.7 - 15.7 g/dL Final   08/25/2015 13.0 11.7 - 15.7 g/dL Final   ]    Alert, appropriate, and following commands  Breathing easily without wheeze    Dressing/wound c/d/i, 5/5 df/pf/ehl, SILT, palpable dp pulse    A/P - 83 year old female who is postoperative day 2 status post a right hip percutaneous pinning.  She is doing well postoperatively.  Radiographs were reviewed and reveal good alignment of her femoral neck fracture.  Hardware is intact.    1.  Weightbearing as tolerated with a walker.  2.  Physical therapy  3.  Ancef x24 hours  4.  DVT prophylaxis: Lovenox beginning postop day 1, may transition to aspirin 325 mg upon discharge.  5.  Keep dressings clean dry and intact, change if saturated.  6.  Case management for dispo planning  7.  Discussed following up with PCP regarding bone health and osteoporosis screening       Bo Duke MD

## 2023-08-12 NOTE — PROGRESS NOTES
Orthopedic Surgery  Anna Marie Babb  08/12/2023     Admit Date:  8/7/2023    POD: 4 Days Post-Op   Procedure(s):  CLOSED REDUCTION, HIP, WITH PERCUTANEOUS PINNING     Patient resting comfortably in bed  Pain controlled at rest   Tolerating oral intake.    Denies nausea or vomiting  Denies chest pain or shortness of breath  No acute events or new ortho complaints. Anticipating discharge soon     Temp:  [97.5  F (36.4  C)-98  F (36.7  C)] 97.8  F (36.6  C)  Pulse:  [67-94] 68  Resp:  [16-18] 16  BP: ()/(63-85) 125/75  SpO2:  [96 %-100 %] 96 %    Alert and oriented  Dressing is clean, dry, and intact.   Minimal erythema of the surrounding skin.   Bilateral calves are soft, non-tender.  Right lower extremity is NVI.  Sensation intact bilateral lower extremities  Patient able to resist dorsi and plantar flexion bilaterally  +Dp pulse    Labs:  Recent Labs   Lab Test 08/11/23  0702 08/10/23  0625 08/09/23  0627 08/07/23  2017 08/07/23  2017 08/25/15  1850   WBC  --   --   --   --  10.1 6.5   HGB 12.6 11.7 12.8   < > 12.9 13.0     --   --   --  265 265    < > = values in this interval not displayed.       No lab results found.  No lab results found.      1. PLAN:   Lovenox for DVT prophylaxis.  mg daily upon discharge    Mobilize with PT/OT    WBAT with walker.     Continue current pain regimen   Dressings: Keep intact.  Change if >60% saturated or peeling off.    Follow-up: 2 weeks post-op with Dr. ALFREDA Duke team    2. Disposition   Anticipate d/c to TCU when medically cleared and progressing in PT.    Ortho stable and OK to discharge from our standpoint.     Darlene Horne PA-C

## 2023-08-12 NOTE — PROGRESS NOTES
Patient MUST void prior to discharge:  Yes,Bladder scanned 194 ml noted; Pt voided in bathroom (had hesitancy).  Patient able to tolerate oral intake:  Yes, Pt on Gluten free diet.  Pain has adequate pain control using Oral analgesics:  Yes, pain controlled w/ PRN tylenol  Does patient have an identified :  Yes  Has goal D/C date and time been discussed with patient:  No TBD..    Pt A&Ox4 W/ intermittent forgetfulness.CMS Inatct.PIV SL. Dressing CDI.

## 2023-08-12 NOTE — PROVIDER NOTIFICATION
Writer maged messaged Dr. Corral with update on repeat Na of 125. We will hold off on discharge to TCU today. Start on FR 1.5L and Sodium checks. Writer updated Casie HAWKINS and Daughter Liv who was planning to transport patient.

## 2023-08-12 NOTE — PLAN OF CARE
.Patient vital signs are at baseline: Yes  Patient able to ambulate as they were prior to admission or with assist devices provided by therapies during their stay:  Yes, up with 1 gb and walker.   Patient MUST void prior to discharge:  Yes  Patient able to tolerate oral intake:  Yes  Pain has adequate pain control using Oral analgesics:  Yes, managed tylenol & ibuprofen.   Does patient have an identified :  Yes, [facility] accepted.   Has goal D/C date and time been discussed with patient:  Yes- pending medical clearance.      Discharge to TCU post-poned as sodium trending down. 1.5L FL added, urine sodium osmolality collected. Dressing CDI.

## 2023-08-12 NOTE — PROGRESS NOTES
Care Management Discharge Note    Discharge Date: 08/12/2023       Discharge Disposition: Transitional Care, Skilled Nursing Facility    Discharge Services: None    Discharge DME: None    Discharge Transportation:     Private pay costs discussed: Not applicable    Does the patient's insurance plan have a 3 day qualifying hospital stay waiver?  No    PAS Confirmation Code: [id number]  Patient/family educated on Medicare website which has current facility and service quality ratings: yes    Education Provided on the Discharge Plan: Yes  Persons Notified of Discharge Plans: Pt, dtr, RN, MD, facility  Patient/Family in Agreement with the Plan: yes    Handoff Referral Completed: No    Additional Information:  [facility]. Orders faxed and facility updated. Pt updated and in agreement. Daughter, Liv, updated and in agreement. Nursing aware.     VERONICA Colón, [facility]  [phone] Desk phone  [phone] Cell/text (Preferred)  [facility]    PAS-RR    D: Per DHS regulation, SW completed and submitted PAS-RR to [place] [employer] Direct Connect via the Senior LinkAge Line.  PAS-RR confirmation # is : [id number]    P: Further questions may be directed to [facility] LinkAge Line at #[phone], option #4 for PAS-RR staff.

## 2023-08-13 LAB
MAGNESIUM SERPL-MCNC: 1.8 MG/DL (ref 1.7–2.3)
OSMOLALITY SERPL: 263 MMOL/KG (ref 280–301)
POTASSIUM SERPL-SCNC: 3.6 MMOL/L (ref 3.4–5.3)
SODIUM SERPL-SCNC: 126 MMOL/L (ref 136–145)
SODIUM SERPL-SCNC: 126 MMOL/L (ref 136–145)
SODIUM SERPL-SCNC: 129 MMOL/L (ref 136–145)

## 2023-08-13 PROCEDURE — 36415 COLL VENOUS BLD VENIPUNCTURE: CPT | Performed by: STUDENT IN AN ORGANIZED HEALTH CARE EDUCATION/TRAINING PROGRAM

## 2023-08-13 PROCEDURE — 84132 ASSAY OF SERUM POTASSIUM: CPT | Performed by: STUDENT IN AN ORGANIZED HEALTH CARE EDUCATION/TRAINING PROGRAM

## 2023-08-13 PROCEDURE — 250N000013 HC RX MED GY IP 250 OP 250 PS 637: Performed by: INTERNAL MEDICINE

## 2023-08-13 PROCEDURE — 84295 ASSAY OF SERUM SODIUM: CPT | Performed by: STUDENT IN AN ORGANIZED HEALTH CARE EDUCATION/TRAINING PROGRAM

## 2023-08-13 PROCEDURE — 120N000001 HC R&B MED SURG/OB

## 2023-08-13 PROCEDURE — 99232 SBSQ HOSP IP/OBS MODERATE 35: CPT | Performed by: STUDENT IN AN ORGANIZED HEALTH CARE EDUCATION/TRAINING PROGRAM

## 2023-08-13 PROCEDURE — 250N000013 HC RX MED GY IP 250 OP 250 PS 637: Performed by: PHYSICIAN ASSISTANT

## 2023-08-13 PROCEDURE — 83930 ASSAY OF BLOOD OSMOLALITY: CPT | Performed by: STUDENT IN AN ORGANIZED HEALTH CARE EDUCATION/TRAINING PROGRAM

## 2023-08-13 PROCEDURE — 250N000013 HC RX MED GY IP 250 OP 250 PS 637: Performed by: STUDENT IN AN ORGANIZED HEALTH CARE EDUCATION/TRAINING PROGRAM

## 2023-08-13 PROCEDURE — 258N000003 HC RX IP 258 OP 636: Performed by: STUDENT IN AN ORGANIZED HEALTH CARE EDUCATION/TRAINING PROGRAM

## 2023-08-13 PROCEDURE — 83735 ASSAY OF MAGNESIUM: CPT | Performed by: STUDENT IN AN ORGANIZED HEALTH CARE EDUCATION/TRAINING PROGRAM

## 2023-08-13 RX ORDER — SODIUM CHLORIDE 9 MG/ML
INJECTION, SOLUTION INTRAVENOUS CONTINUOUS
Status: ACTIVE | OUTPATIENT
Start: 2023-08-13 | End: 2023-08-14

## 2023-08-13 RX ADMIN — SODIUM CHLORIDE: 9 INJECTION, SOLUTION INTRAVENOUS at 19:52

## 2023-08-13 RX ADMIN — Medication 1 MG: at 00:49

## 2023-08-13 RX ADMIN — LISINOPRIL 20 MG: 20 TABLET ORAL at 19:52

## 2023-08-13 RX ADMIN — Medication 50 MCG: at 09:35

## 2023-08-13 RX ADMIN — AMLODIPINE BESYLATE 5 MG: 5 TABLET ORAL at 19:52

## 2023-08-13 RX ADMIN — MELATONIN 5 MG TABLET 5 MG: at 22:13

## 2023-08-13 ASSESSMENT — ACTIVITIES OF DAILY LIVING (ADL)
ADLS_ACUITY_SCORE: 28

## 2023-08-13 NOTE — PROGRESS NOTES
Patient vital signs are at baseline: Yes  Patient able to ambulate as they were prior to admission or with assist devices provided by therapies during their stay:  Yes  Patient MUST void prior to discharge:  Yes  Patient able to tolerate oral intake:  Yes  Pain has adequate pain control using Oral analgesics:  Yes  Does patient have an identified :  Yes  Has goal D/C date and time been discussed with patient:  Yes     A&Ox4. VSS/RA. Fluid restriction- 1500ml. 60mls this shift. Pain managed with tylenol and ibuprofen, pt refuses other pain meds. A of 1 gb/w. Sodium checks Q6hrs, Sodium @0400- 126. Voiding adequately.

## 2023-08-13 NOTE — PROGRESS NOTES
Pt is Aox4, Up SBA, Regular diet. Denies pain, VSS on RA. Pt is on K+ and Mg protocols both wnl labs scheduled for am labs. Sodium checks most recent was down to 126 from 129 provider aware. Discharge pending.

## 2023-08-13 NOTE — PROGRESS NOTES
[facility]    Medicine Progress Note - Hospitalist Service    Date of Admission:  8/7/2023    Assessment & Plan   Expand All Collapse All    [facility]     History and Physical - Hospitalist Service       Date of Admission:  8/7/2023        Assessment & Plan  Anna Marie Babb is a 83 year old female admitted on 8/7/2023. She presents to the emergency department for evaluation of right hip pain after fall found to have right femur fracture.     Right femoral neck fracture: Post op Day 5 Following mechanical fall.  Reports no prior anesthesia reaction.  Last sedation would have been with 2019 colonoscopy.  -Orthopedic surgery consulted and underwent surgery closed hip reduction with pinning  --Physical therapy consulted tomorrow afternoon following surgical intervention  -Fall precautions  -DVT and pain management as per ortho  Hb stable at 12.6 on 8/11/23  -Repeat hb ordered     Hypertension:  -Continue Lisinopril   -Continue prior to admission amlodipine      # Mild confusion Resolved  Risk for delirium  -Promote sleep hygiene  -Melatonin scheduled at night     hyponatremia:   Patient's repeat urine sodium is less than 20 this is likely hypovolemic hyponatremia.  Patient was started on 50 mL normal saline.  After which her sodium improved   Goal correction 6 to 8 mEq per day.  Haslow risk of overcorrection   Sodium 129 at 11 AM today and I stopped the fluids  -Continue to monitor sodium levels.        Anion gap metabolic acidosis: Resolved   Collagenous colitis: Note history.  Monitor for symptoms               Diet: Gluten Free Diet  Diet    DVT Prophylaxis: Enoxaparin (Lovenox) SQ  Lombardi Catheter: Not present  Lines: None     Cardiac Monitoring: None  Code Status: Full Code      Clinically Significant Risk Factors         # Hyponatremia: Lowest Na = 123 mmol/L in last 2 days, will monitor as appropriate            # Hypertension: Noted on problem list                  Disposition Plan      Expected Discharge Date: 08/13/2023      Destination: nursing home  Discharge Comments: hyponatremic, [facility] pending medical stability          Kimberlee Corral MD  Hospitalist Service  [facility]  Securely message with [employer] (more info)  Text page via [employer] Paging/Directory   _____________________________________________________________________    Interval History   Patient seen and examined at bedside.  She feels well.    No chest pain  No shortness of breath      Physical Exam   Vital Signs: Temp: 98.3  F (36.8  C) Temp src: Oral BP: 130/71 Pulse: 66   Resp: 17 SpO2: 98 % O2 Device: None (Room air)    Weight: 124 lbs 5.43 oz     Physical Exam  Cardiovascular:      Rate and Rhythm: Normal rate and regular rhythm.   Pulmonary:      Effort: Pulmonary effort is normal. No respiratory distress.   Abdominal:      General: There is no distension.      Palpations: Abdomen is soft.      Tenderness: There is no abdominal tenderness.          Medical Decision Making             Data     I have personally reviewed the following data over the past 24 hrs:    N/A  \   N/A   / N/A     129 (L) N/A N/A /  N/A   3.6 N/A N/A \     TSH: N/A T4: N/A A1C: N/A     Imaging results reviewed over the past 24 hrs:   No results found for this or any previous visit (from the past 24 hour(s)).

## 2023-08-14 ENCOUNTER — APPOINTMENT (OUTPATIENT)
Dept: PHYSICAL THERAPY | Facility: CLINIC | Age: 83
DRG: 481 | End: 2023-08-14
Payer: MEDICARE

## 2023-08-14 ENCOUNTER — APPOINTMENT (OUTPATIENT)
Dept: OCCUPATIONAL THERAPY | Facility: CLINIC | Age: 83
DRG: 481 | End: 2023-08-14
Payer: MEDICARE

## 2023-08-14 LAB
ANION GAP SERPL CALCULATED.3IONS-SCNC: 12 MMOL/L (ref 7–15)
BUN SERPL-MCNC: 7.7 MG/DL (ref 8–23)
CALCIUM SERPL-MCNC: 9.1 MG/DL (ref 8.8–10.2)
CHLORIDE SERPL-SCNC: 99 MMOL/L (ref 98–107)
CREAT SERPL-MCNC: 0.57 MG/DL (ref 0.51–0.95)
DEPRECATED HCO3 PLAS-SCNC: 20 MMOL/L (ref 22–29)
GFR SERPL CREATININE-BSD FRML MDRD: 89 ML/MIN/1.73M2
GLUCOSE SERPL-MCNC: 98 MG/DL (ref 70–99)
HGB BLD-MCNC: 11.4 G/DL (ref 11.7–15.7)
MAGNESIUM SERPL-MCNC: 1.7 MG/DL (ref 1.7–2.3)
PLATELET # BLD AUTO: 283 10E3/UL (ref 150–450)
POTASSIUM SERPL-SCNC: 3.5 MMOL/L (ref 3.4–5.3)
POTASSIUM SERPL-SCNC: 3.5 MMOL/L (ref 3.4–5.3)
SODIUM SERPL-SCNC: 127 MMOL/L (ref 136–145)
SODIUM SERPL-SCNC: 127 MMOL/L (ref 136–145)
SODIUM SERPL-SCNC: 129 MMOL/L (ref 136–145)
SODIUM SERPL-SCNC: 131 MMOL/L (ref 136–145)

## 2023-08-14 PROCEDURE — 120N000001 HC R&B MED SURG/OB

## 2023-08-14 PROCEDURE — 250N000013 HC RX MED GY IP 250 OP 250 PS 637

## 2023-08-14 PROCEDURE — 99232 SBSQ HOSP IP/OBS MODERATE 35: CPT | Performed by: INTERNAL MEDICINE

## 2023-08-14 PROCEDURE — 83735 ASSAY OF MAGNESIUM: CPT | Performed by: STUDENT IN AN ORGANIZED HEALTH CARE EDUCATION/TRAINING PROGRAM

## 2023-08-14 PROCEDURE — 250N000013 HC RX MED GY IP 250 OP 250 PS 637: Performed by: STUDENT IN AN ORGANIZED HEALTH CARE EDUCATION/TRAINING PROGRAM

## 2023-08-14 PROCEDURE — 250N000011 HC RX IP 250 OP 636: Mod: JZ

## 2023-08-14 PROCEDURE — 85049 AUTOMATED PLATELET COUNT: CPT

## 2023-08-14 PROCEDURE — 80048 BASIC METABOLIC PNL TOTAL CA: CPT | Performed by: INTERNAL MEDICINE

## 2023-08-14 PROCEDURE — 250N000013 HC RX MED GY IP 250 OP 250 PS 637: Performed by: INTERNAL MEDICINE

## 2023-08-14 PROCEDURE — 36415 COLL VENOUS BLD VENIPUNCTURE: CPT | Performed by: STUDENT IN AN ORGANIZED HEALTH CARE EDUCATION/TRAINING PROGRAM

## 2023-08-14 PROCEDURE — 85018 HEMOGLOBIN: CPT | Performed by: INTERNAL MEDICINE

## 2023-08-14 PROCEDURE — 36415 COLL VENOUS BLD VENIPUNCTURE: CPT | Performed by: INTERNAL MEDICINE

## 2023-08-14 PROCEDURE — 97530 THERAPEUTIC ACTIVITIES: CPT | Mod: GP

## 2023-08-14 PROCEDURE — 84132 ASSAY OF SERUM POTASSIUM: CPT | Performed by: STUDENT IN AN ORGANIZED HEALTH CARE EDUCATION/TRAINING PROGRAM

## 2023-08-14 PROCEDURE — 258N000003 HC RX IP 258 OP 636: Performed by: INTERNAL MEDICINE

## 2023-08-14 PROCEDURE — 250N000013 HC RX MED GY IP 250 OP 250 PS 637: Performed by: PHYSICIAN ASSISTANT

## 2023-08-14 PROCEDURE — 84295 ASSAY OF SERUM SODIUM: CPT | Performed by: INTERNAL MEDICINE

## 2023-08-14 PROCEDURE — 97535 SELF CARE MNGMENT TRAINING: CPT | Mod: GO | Performed by: OCCUPATIONAL THERAPIST

## 2023-08-14 PROCEDURE — 84295 ASSAY OF SERUM SODIUM: CPT | Performed by: STUDENT IN AN ORGANIZED HEALTH CARE EDUCATION/TRAINING PROGRAM

## 2023-08-14 PROCEDURE — 97116 GAIT TRAINING THERAPY: CPT | Mod: GP

## 2023-08-14 RX ORDER — SODIUM CHLORIDE 9 MG/ML
INJECTION, SOLUTION INTRAVENOUS CONTINUOUS
Status: DISCONTINUED | OUTPATIENT
Start: 2023-08-14 | End: 2023-08-14

## 2023-08-14 RX ADMIN — LISINOPRIL 20 MG: 20 TABLET ORAL at 20:39

## 2023-08-14 RX ADMIN — ENOXAPARIN SODIUM 40 MG: 40 INJECTION SUBCUTANEOUS at 08:48

## 2023-08-14 RX ADMIN — ACETAMINOPHEN 650 MG: 325 TABLET, FILM COATED ORAL at 04:38

## 2023-08-14 RX ADMIN — SODIUM CHLORIDE: 9 INJECTION, SOLUTION INTRAVENOUS at 12:39

## 2023-08-14 RX ADMIN — ACETAMINOPHEN 650 MG: 325 TABLET, FILM COATED ORAL at 08:48

## 2023-08-14 RX ADMIN — Medication 50 MCG: at 08:49

## 2023-08-14 RX ADMIN — MELATONIN 5 MG TABLET 5 MG: at 22:05

## 2023-08-14 RX ADMIN — AMLODIPINE BESYLATE 5 MG: 5 TABLET ORAL at 20:39

## 2023-08-14 ASSESSMENT — ACTIVITIES OF DAILY LIVING (ADL)
ADLS_ACUITY_SCORE: 28
ADLS_ACUITY_SCORE: 29
ADLS_ACUITY_SCORE: 28
ADLS_ACUITY_SCORE: 29
ADLS_ACUITY_SCORE: 28
ADLS_ACUITY_SCORE: 29

## 2023-08-14 NOTE — PROGRESS NOTES
[facility]    Medicine Progress Note - Hospitalist Service    Interval History   Patient is comfortable in the chair. Likely able to go tomorrow if sodium relatively stable         Date of Admission:  8/7/2023  Interval History   Patient has no acute complaints.  She is sitting up in the chair.  Wondering which care facility she will be discharged to after discharge  Awaiting sodium changes and if remains stable she will likely leave tomorrow on 8/15.  Patient has no acute complaints or self    Assessment & Plan   Anna Marie Babb is a 83 year old female admitted on 8/7/2023. She presents to the emergency department for evaluation of right hip pain after fall found to have right femur fracture.     Right femoral neck fracture: Post op Day 7   Following mechanical fall.  Reports no prior anesthesia reaction.  Last sedation would have been with 2019 colonoscopy.  Orthopedic surgery consulted and 8/8 underwent surgery closed hip reduction with pinning  Fall precautions  DVT and pain management as per ortho  8/9 patient on day lovenox 40 mg subcutaneous daily   Repeat hgb level 11.4      Hypertension:  Continue Lisinopril 20 mg daily  Continue prior to admission amlodipine 5 mg at evening     Mild confusion Resolved  Risk for delirium  -Promote sleep hygiene  -Melatonin scheduled at night     Hyponatremia:   Patient's repeat urine sodium is less than 20 this is likely hypovolemic hyponatremia.  Patient was started on 50 mL normal saline.  After which her sodium improved  -Goal correction 6 to 8 mEq per day.  Has low risk of overcorrection  -Sodium 129 at 11 AM yesterday   Received IVF at 50/hour.   8/14 sodium level of 129 and 131  Given 4 hours of NS with sodium 129 and repeat of 127.   Hold any further fluid  Recheck sodium urine and osmolality        Anion gap metabolic acidosis: Resolved   Collagenous colitis: Note history.  Monitor for symptoms            Diet: Gluten Free Diet  Diet     DVT Prophylaxis: Enoxaparin (Lovenox) SQ  Lombardi Catheter: Not present  Lines: None     Cardiac Monitoring: None  Code Status: Full Code          Clinically Significant Risk Factors          # Hyponatremia: Lowest Na = 123 mmol/L in last 2 days, will monitor as appropriate             # Hypertension: Noted on problem list                        Disposition Plan     Expected Discharge Date: 08/13/2023      Destination: nursing home  Discharge Comments: hyponatreEunice mayorga pending medical stability                Diet: Gluten Free Diet  Diet    DVT Prophylaxis: lovenox daily   Lombardi Catheter: Not present  Lines: None     Cardiac Monitoring: None  Code Status: Full Code      Clinically Significant Risk Factors         # Hyponatremia: Lowest Na = 123 mmol/L in last 2 days, will monitor as appropriate          # Hypertension: Noted on problem list                   Disposition Plan      Expected Discharge Date: 08/15/2023      Destination: nursing home  Discharge Comments: hyponatremicEuince pending medical stability          MIRACLE CARDENAS MD  Hospitalist Service  [facility]  Securely message with [employer] (more info)  Text page via [employer] Paging/Directory   ______________________________________________________________________  Physical Exam   Vital Signs: Temp: (P) 97.5  F (36.4  C) Temp src: (P) Oral BP: (P) 139/75 Pulse: (P) 77   Resp: (P) 18 SpO2: (P) 98 % O2 Device: (P) None (Room air)    Weight: 124 lbs 5.43 oz    General Appearance: Awake and alert   Respiratory: Clear to auscultation bilaterally  Cardiovascular: Regular rate and rhythm without gallop rub  GI: Positive bowel sounds soft  Skin: No edema  O    Medical Decision Making       30 MINUTES SPENT BY ME on the date of service doing chart review, history, exam, documentation & further activities per the note.      Data     I have personally reviewed the following data over the past 24 hrs:    N/A  \   11.4 (L)   / 283     127 (L)  99 7.7 (L) /  98   3.5; 3.5 20 (L) 0.57 \       Imaging results reviewed over the past 24 hrs:   No results found for this or any previous visit (from the past 24 hour(s)).

## 2023-08-14 NOTE — PROGRESS NOTES
Patient vital signs are at baseline: Yes  Patient able to ambulate as they were prior to admission or with assist devices provided by therapies during their stay:  Yes  Patient MUST void prior to discharge:  Yes  Patient able to tolerate oral intake:  Yes  Pain has adequate pain control using Oral analgesics:  Yes  Does patient have an identified :  Yes  Has goal D/C date and time been discussed with patient:  Yes    A&Ox4. VSS/RA. A of 1 gb/w. Sodium checks, @0000 127. Pain managed with tylenol x1. A of 1 gb/w. Discharge to TCU pending.

## 2023-08-14 NOTE — PLAN OF CARE
Goal Outcome Evaluation:      Plan of Care Reviewed With: patient           Pt alert and oriented X4 but can be forgetful at times. POD 6 for R hip fracture. Denied SOB, CP, N/V. Up with A1 GB and walker. No need to replace magnesium or potassium this morning and labs ordered for tomorrow. Dressing to R hip CDI. Pt ambulated in [place]. Encouraged to order food with increased salt like soup. Or use table salt. Educated on high volume of fluids can dilute her sodium.   MD gave verbal order to stop continuous fluids. Per MD will discharge tomorrow if electrolytes are stable.

## 2023-08-15 ENCOUNTER — APPOINTMENT (OUTPATIENT)
Dept: PHYSICAL THERAPY | Facility: CLINIC | Age: 83
DRG: 481 | End: 2023-08-15
Payer: MEDICARE

## 2023-08-15 LAB
ANION GAP SERPL CALCULATED.3IONS-SCNC: 10 MMOL/L (ref 7–15)
BUN SERPL-MCNC: 8.2 MG/DL (ref 8–23)
CALCIUM SERPL-MCNC: 9.2 MG/DL (ref 8.8–10.2)
CHLORIDE SERPL-SCNC: 99 MMOL/L (ref 98–107)
CREAT SERPL-MCNC: 0.57 MG/DL (ref 0.51–0.95)
DEPRECATED HCO3 PLAS-SCNC: 22 MMOL/L (ref 22–29)
GFR SERPL CREATININE-BSD FRML MDRD: 89 ML/MIN/1.73M2
GLUCOSE SERPL-MCNC: 96 MG/DL (ref 70–99)
MAGNESIUM SERPL-MCNC: 1.8 MG/DL (ref 1.7–2.3)
POTASSIUM SERPL-SCNC: 3.7 MMOL/L (ref 3.4–5.3)
SODIUM SERPL-SCNC: 131 MMOL/L (ref 136–145)
SODIUM SERPL-SCNC: 131 MMOL/L (ref 136–145)

## 2023-08-15 PROCEDURE — 84295 ASSAY OF SERUM SODIUM: CPT | Performed by: INTERNAL MEDICINE

## 2023-08-15 PROCEDURE — 97530 THERAPEUTIC ACTIVITIES: CPT | Mod: GP

## 2023-08-15 PROCEDURE — 250N000011 HC RX IP 250 OP 636: Mod: JZ

## 2023-08-15 PROCEDURE — 120N000001 HC R&B MED SURG/OB

## 2023-08-15 PROCEDURE — 99232 SBSQ HOSP IP/OBS MODERATE 35: CPT | Performed by: INTERNAL MEDICINE

## 2023-08-15 PROCEDURE — 83735 ASSAY OF MAGNESIUM: CPT | Performed by: INTERNAL MEDICINE

## 2023-08-15 PROCEDURE — 250N000013 HC RX MED GY IP 250 OP 250 PS 637: Performed by: PHYSICIAN ASSISTANT

## 2023-08-15 PROCEDURE — 97116 GAIT TRAINING THERAPY: CPT | Mod: GP

## 2023-08-15 PROCEDURE — 36415 COLL VENOUS BLD VENIPUNCTURE: CPT | Performed by: INTERNAL MEDICINE

## 2023-08-15 PROCEDURE — 250N000013 HC RX MED GY IP 250 OP 250 PS 637: Performed by: STUDENT IN AN ORGANIZED HEALTH CARE EDUCATION/TRAINING PROGRAM

## 2023-08-15 PROCEDURE — 250N000013 HC RX MED GY IP 250 OP 250 PS 637

## 2023-08-15 PROCEDURE — 250N000013 HC RX MED GY IP 250 OP 250 PS 637: Performed by: INTERNAL MEDICINE

## 2023-08-15 RX ORDER — ACETAMINOPHEN 325 MG/1
650 TABLET ORAL EVERY 6 HOURS PRN
Qty: 30 TABLET
Start: 2023-08-15 | End: 2023-08-17

## 2023-08-15 RX ADMIN — LISINOPRIL 20 MG: 20 TABLET ORAL at 19:34

## 2023-08-15 RX ADMIN — AMLODIPINE BESYLATE 5 MG: 5 TABLET ORAL at 19:33

## 2023-08-15 RX ADMIN — MELATONIN 5 MG TABLET 5 MG: at 21:15

## 2023-08-15 RX ADMIN — ENOXAPARIN SODIUM 40 MG: 40 INJECTION SUBCUTANEOUS at 08:53

## 2023-08-15 RX ADMIN — Medication 50 MCG: at 08:53

## 2023-08-15 RX ADMIN — ACETAMINOPHEN 650 MG: 325 TABLET, FILM COATED ORAL at 21:15

## 2023-08-15 ASSESSMENT — ACTIVITIES OF DAILY LIVING (ADL)
ADLS_ACUITY_SCORE: 29

## 2023-08-15 NOTE — PLAN OF CARE
Goal Outcome Evaluation:      Plan of Care Reviewed With: patient       Pt alert and oriented X4 but can be forgetful at times. POD 7 for R hip fracture. Denied SOB, CP, N/V. Up with A1 GB and walker. No need to replace magnesium or potassium this morning and labs ordered for tomorrow. Dressing to R hip CDI. Encouraged to order food with increased salt like soup, or use table salt. Pt aware and repeated fluid restrictions. Discharge order placed but no bed available at [facility] until tomorrow. Son in law [place] updated [phone]

## 2023-08-15 NOTE — PROGRESS NOTES
Care Management Follow Up    Length of Stay (days): 8    Expected Discharge Date: 08/16/2023     Concerns to be Addressed: all concerns addressed in this encounter     Patient plan of care discussed at interdisciplinary rounds: Yes    Anticipated Discharge Disposition: Transitional Care, Skilled Nursing Facility     Anticipated Discharge Services: None  Anticipated Discharge DME: None    Patient/family educated on Medicare website which has current facility and service quality ratings: yes  Education Provided on the Discharge Plan: Yes  Patient/Family in Agreement with the Plan: yes    Referrals Placed by CM/SW: Senior Linkage Line, Post Acute Facilities  Private pay costs discussed: Not applicable    Additional Information:  Writer was updated by bedside nurse who spoke with Dr. Berg that the patient was medically ready and if [facility] had a bed the patient.     Writer called [facility] [phone] and was informed they can take the patient tomorrow into a private room.     Writer updated both bedside nurses due to being shift change and new shift rn stated they will update the son.     Writer will follow up tomorrow.     RAYSA Almendarez  [facility]  Social Work

## 2023-08-15 NOTE — PLAN OF CARE
Goal Outcome Evaluation:       Summary:    Patient vital signs are at baseline: Yes  Patient able to ambulate as they were prior to admission or with assist devices provided by therapies during their stay:  Yes  Patient MUST void prior to discharge:  Yes  Patient able to tolerate oral intake:  Yes  Pain has adequate pain control using Oral analgesics:  Yes  Does patient have an identified :  Yes  Has goal D/C date and time been discussed with patient:  Yes  Patient is alert and oriented X4 with intermittent forgetful.  Up with assist of 1 with gait belt and walker. On gluten free diet.  On fluids restriction 1500ml. PIV saline locked. Patient denies pain. Sodium random urine and Osmolality urine lab order incoming RN updated to obtain urine  sample.CMS intact. Dressing dry and intact.  Continent of B&B, void in bathroom. Possible discharge to TCU  tomorrow if electrolyte stable.

## 2023-08-15 NOTE — PROGRESS NOTES
Alert and oriented X4, some confusion. VSS on RA. Up A/1 gaitbelt walker. Voiding well per bathroom, no BM. Incision site dressings CDI. CMS intact. Denies pain. 1500 mL fluid restriction, no fluids taken this shift. PIV SL. Will cont to monitor

## 2023-08-16 ENCOUNTER — LAB REQUISITION (OUTPATIENT)
Dept: LAB | Facility: CLINIC | Age: 83
End: 2023-08-16

## 2023-08-16 VITALS
HEART RATE: 69 BPM | DIASTOLIC BLOOD PRESSURE: 63 MMHG | RESPIRATION RATE: 18 BRPM | SYSTOLIC BLOOD PRESSURE: 103 MMHG | TEMPERATURE: 97 F | OXYGEN SATURATION: 97 % | BODY MASS INDEX: 19.05 KG/M2 | HEIGHT: 66 IN

## 2023-08-16 VITALS
SYSTOLIC BLOOD PRESSURE: 145 MMHG | HEART RATE: 63 BPM | RESPIRATION RATE: 16 BRPM | BODY MASS INDEX: 18.96 KG/M2 | WEIGHT: 118 LBS | HEIGHT: 66 IN | DIASTOLIC BLOOD PRESSURE: 72 MMHG | TEMPERATURE: 97.6 F | OXYGEN SATURATION: 96 %

## 2023-08-16 DIAGNOSIS — Z00.01 ENCOUNTER FOR GENERAL ADULT MEDICAL EXAMINATION WITH ABNORMAL FINDINGS: ICD-10-CM

## 2023-08-16 LAB
ANION GAP SERPL CALCULATED.3IONS-SCNC: 10 MMOL/L (ref 7–15)
BUN SERPL-MCNC: 9.6 MG/DL (ref 8–23)
CALCIUM SERPL-MCNC: 9.4 MG/DL (ref 8.8–10.2)
CHLORIDE SERPL-SCNC: 97 MMOL/L (ref 98–107)
CREAT SERPL-MCNC: 0.62 MG/DL (ref 0.51–0.95)
DEPRECATED HCO3 PLAS-SCNC: 24 MMOL/L (ref 22–29)
GFR SERPL CREATININE-BSD FRML MDRD: 87 ML/MIN/1.73M2
GLUCOSE SERPL-MCNC: 94 MG/DL (ref 70–99)
MAGNESIUM SERPL-MCNC: 1.8 MG/DL (ref 1.7–2.3)
OSMOLALITY UR: 178 MMOL/KG (ref 100–1200)
POTASSIUM SERPL-SCNC: 3.8 MMOL/L (ref 3.4–5.3)
SODIUM SERPL-SCNC: 131 MMOL/L (ref 136–145)
SODIUM UR-SCNC: 22 MMOL/L

## 2023-08-16 PROCEDURE — 250N000011 HC RX IP 250 OP 636: Mod: JZ

## 2023-08-16 PROCEDURE — 250N000013 HC RX MED GY IP 250 OP 250 PS 637: Performed by: PHYSICIAN ASSISTANT

## 2023-08-16 PROCEDURE — 83935 ASSAY OF URINE OSMOLALITY: CPT | Performed by: INTERNAL MEDICINE

## 2023-08-16 PROCEDURE — 80048 BASIC METABOLIC PNL TOTAL CA: CPT | Performed by: INTERNAL MEDICINE

## 2023-08-16 PROCEDURE — 250N000013 HC RX MED GY IP 250 OP 250 PS 637: Performed by: INTERNAL MEDICINE

## 2023-08-16 PROCEDURE — 84300 ASSAY OF URINE SODIUM: CPT | Performed by: INTERNAL MEDICINE

## 2023-08-16 PROCEDURE — 36415 COLL VENOUS BLD VENIPUNCTURE: CPT | Performed by: INTERNAL MEDICINE

## 2023-08-16 PROCEDURE — 99239 HOSP IP/OBS DSCHRG MGMT >30: CPT | Performed by: INTERNAL MEDICINE

## 2023-08-16 PROCEDURE — 83735 ASSAY OF MAGNESIUM: CPT | Performed by: INTERNAL MEDICINE

## 2023-08-16 RX ADMIN — Medication 50 MCG: at 09:36

## 2023-08-16 RX ADMIN — ENOXAPARIN SODIUM 40 MG: 40 INJECTION SUBCUTANEOUS at 09:36

## 2023-08-16 RX ADMIN — Medication 1 MG: at 00:07

## 2023-08-16 ASSESSMENT — ACTIVITIES OF DAILY LIVING (ADL)
ADLS_ACUITY_SCORE: 29

## 2023-08-16 NOTE — PROGRESS NOTES
Patient vital signs are at baseline: Yes  Patient able to ambulate as they were prior to admission or with assist devices provided by therapies during their stay:  No,  Reason:  going to TCU  Patient MUST void prior to discharge:  Yes  Patient able to tolerate oral intake:  Yes  Pain has adequate pain control using Oral analgesics:  Yes  Does patient have an identified :  Yes  Has goal D/C date and time been discussed with patient:  Yes     Pt alert and oriented X4 forgetful at times. Denied SOB, CP, N/V. Had BM this morning. Sent with all paperwork and personal belongings. Son in law was transport.

## 2023-08-16 NOTE — PLAN OF CARE
Goal Outcome Evaluation:         Summary:    Patient vital signs are at baseline: Yes  Patient able to ambulate as they were prior to admission or with assist devices provided by therapies during their stay:  Yes  Patient MUST void prior to discharge:  Yes  Patient able to tolerate oral intake:  Yes  Pain has adequate pain control using Oral analgesics:  Yes  Does patient have an identified :  Yes  Has goal D/C date and time been discussed with patient:  Yes  Patient is alert and oriented X4 with intermittent forgetful.  Up with assist of 1 with gait belt and walker. On gluten free diet.  On fluids restriction 1500ml. PIV saline locked. Pain managed well with prn acetaminophen .CMS intact. Dressing dry and intact.Sodium random urine and Osmolality urine  needs to be collected, incoming RN updated to obtain sample, previous sample contaminated with stool. Continent of B&B, void in bathroom. Discharge to  TCU tomorrow . Patients daughter updated regarding the discharge plan for tomorrow.

## 2023-08-16 NOTE — PLAN OF CARE
Goal Outcome Evaluation:      Plan of Care Reviewed With: patient             [facility]

## 2023-08-16 NOTE — PLAN OF CARE
Goal Outcome Evaluation:       Patient vital signs are at baseline: Yes AOX4, VSS on RA, Denies pain Denies SOB.  Patient able to ambulate as they were prior to admission or with assist devices provided by therapies during their stay:  Yes  Patient MUST void prior to discharge:  Yes  Patient able to tolerate oral intake:  Yes  Pain has adequate pain control using Oral analgesics:  Yes  Does patient have an identified :  Yes  Has goal D/C date and time been discussed with patient:  Yes 8/16/23 awaiting TCU

## 2023-08-16 NOTE — PROGRESS NOTES
Discharge to TCU. Can follow sodium at facilty   Guy Berg will see this am soon.   Notify dr. Berg on the time please .  guy

## 2023-08-16 NOTE — PROGRESS NOTES
Care Management Discharge Note    Discharge Date: 08/16/2023       Discharge Disposition: Transitional Care, Skilled Nursing Facility    Discharge Services: None    Discharge DME: None    Discharge Transportation: health plan transportation    Private pay costs discussed: Not applicable    Does the patient's insurance plan have a 3 day qualifying hospital stay waiver?  No    PAS Confirmation Code: [id number]  Patient/family educated on Medicare website which has current facility and service quality ratings: yes    Education Provided on the Discharge Plan: Yes  Persons Notified of Discharge Plans: Patient, Family, nursing staff, and TCU  Patient/Family in Agreement with the Plan: yes    Handoff Referral Completed: Yes    Additional Information:  Patient will be discharging with family to [facility] at 1230. Please refer to SW progress note of this day for further detail.    RAYSA Almendarez  [facility]  Social Work

## 2023-08-16 NOTE — PROGRESS NOTES
[facility]    Medicine Progress Note - Hospitalist Service    Interval History   Patient able to discharge when bed available which is tomorrow.     Date of Admission:  8/7/2023  Interval History   Patient has no acute complaints.  She is sitting up in the chair.  Wondering which care facility she will be discharged to after discharge  Awaiting sodium changes and if remains stable she will likely leave tomorrow on 8/15.  Patient has no acute complaints or self    Assessment & Plan   Anna Marie Babb is a 83 year old female admitted on 8/7/2023. She presents to the emergency department for evaluation of right hip pain after fall found to have right femur fracture.     Right femoral neck fracture: Post op Day 7   Following mechanical fall.  Reports no prior anesthesia reaction.  Last sedation would have been with 2019 colonoscopy.  Orthopedic surgery consulted and 8/8 underwent surgery closed hip reduction with pinning  Fall precautions  DVT and pain management as per ortho  8/9 patient on day lovenox 40 mg subcutaneous daily   Repeat hgb level 11.4      Hypertension:  Continue Lisinopril 20 mg daily  Continue prior to admission amlodipine 5 mg at evening     Mild confusion Resolved  Risk for delirium  -Promote sleep hygiene  -Melatonin scheduled at night     Hyponatremia:   Patient's repeat urine sodium is less than 20 this is likely hypovolemic hyponatremia.  Patient was started on 50 mL normal saline.  After which her sodium improved  -Goal correction 6 to 8 mEq per day.  Has low risk of overcorrection  -Sodium 129 at 11 AM yesterday   Received IVF at 50/hour.   8/14 sodium level of 129 and 131  Given 4 hours of NS with sodium 129 and repeat of 127.   Hold any further fluid  Recheck sodium urine and osmolality ordered. Not repeated   Sodium this am 131.   Discharge planned but need to go in am        Anion gap metabolic acidosis: Resolved   Collagenous colitis: Note history.  Monitor  for symptoms            Diet: Gluten Free Diet  Diet    DVT Prophylaxis: Enoxaparin (Lovenox) SQ  Lombardi Catheter: Not present  Lines: None     Cardiac Monitoring: None  Code Status: Full Code          Clinically Significant Risk Factors          # Hyponatremia: Lowest Na = 123 mmol/L in last 2 days, will monitor as appropriate             # Hypertension: Noted on problem list                        Disposition Plan     Expected Discharge Date: 08/13/2023      Destination: nursing home  Discharge Comments: hyponatreEunice mayorga pending medical stability                Diet: Gluten Free Diet  Fluid restriction 1500 ML FLUID  Diet    DVT Prophylaxis: lovenox daily   Lombardi Catheter: Not present  Lines: None     Cardiac Monitoring: None  Code Status: Full Code      Clinically Significant Risk Factors         # Hyponatremia: Lowest Na = 127 mmol/L in last 2 days, will monitor as appropriate            # Hypertension: Noted on problem list                   Disposition Plan      Expected Discharge Date: 08/16/2023,  3:00 PM    Destination: nursing home  Discharge Comments: hyponatremicEunice pending medical stability          MIRACLE CARDENAS MD  Hospitalist Service  [facility]  Securely message with [employer] (more info)  Text page via [employer] Paging/Directory   ______________________________________________________________________  Physical Exam   Vital Signs: Temp: 98.1  F (36.7  C) Temp src: Oral BP: 122/69 Pulse: 77   Resp: 18 SpO2: 96 % O2 Device: None (Room air)    Weight: 118 lbs 0 oz    General Appearance: Awake and alert   Respiratory: Clear to auscultation bilaterally  Cardiovascular: Regular rate and rhythm without gallop rub  GI: Positive bowel sounds soft  Skin: No edema      Medical Decision Making       20 MINUTES SPENT BY ME on the date of service doing chart review, history, exam, documentation & further activities per the note.      Data     I have personally reviewed the following  data over the past 24 hrs:    N/A  \   N/A   / N/A     131 (L); 131 (L) 99 8.2 /  96   3.7 22 0.57 \       Imaging results reviewed over the past 24 hrs:   No results found for this or any previous visit (from the past 24 hour(s)).

## 2023-08-16 NOTE — PROGRESS NOTES
Care Management Follow Up    Length of Stay (days): 9    Expected Discharge Date: 08/16/2023     Concerns to be Addressed: all concerns addressed in this encounter     Patient plan of care discussed at interdisciplinary rounds: Yes    Anticipated Discharge Disposition: Transitional Care, Skilled Nursing Facility     Anticipated Discharge Services: None  Anticipated Discharge DME: None    Patient/family educated on Medicare website which has current facility and service quality ratings: yes  Education Provided on the Discharge Plan: Yes  Patient/Family in Agreement with the Plan: yes    Referrals Placed by CM/SW: [facility], Post Acute Facilities  Private pay costs discussed: transportation costs    Additional Information:  Writer messaged Dr. Roman through [employer] to see what time works for dischage.    Writer called the patient's daughter, Liv [phone], to figure out transportation. Liv stated that her , Derian, would be able to transport. Writer called Derian, [phone], and updated that he will call back once a time was set to coordinate with him.    Writer will update the patient.    Addendum 7520:  Writer updated the patient    Writer got a call from Celia zamora at [facility] who stated they can admit the patient around 1200.     Writer called Derian, who asked for 1230.     Writer updated nursing staff.     PAS was completed by ROSS Savage on the 08/12.     Writer faxed over Scripts to [facility] [phone]    RAYSA Almendarez  [facility]  Social Work

## 2023-08-17 ENCOUNTER — TRANSITIONAL CARE UNIT VISIT (OUTPATIENT)
Dept: GERIATRICS | Facility: CLINIC | Age: 83
End: 2023-08-17
Payer: COMMERCIAL

## 2023-08-17 ENCOUNTER — PATIENT OUTREACH (OUTPATIENT)
Dept: CARE COORDINATION | Facility: CLINIC | Age: 83
End: 2023-08-17

## 2023-08-17 DIAGNOSIS — D62 ABLA (ACUTE BLOOD LOSS ANEMIA): ICD-10-CM

## 2023-08-17 DIAGNOSIS — S72.001A CLOSED FRACTURE OF NECK OF RIGHT FEMUR, INITIAL ENCOUNTER (H): ICD-10-CM

## 2023-08-17 DIAGNOSIS — I10 ESSENTIAL HYPERTENSION, BENIGN: ICD-10-CM

## 2023-08-17 DIAGNOSIS — Z79.01 ANTICOAGULATED: ICD-10-CM

## 2023-08-17 DIAGNOSIS — K90.0 CELIAC DISEASE: ICD-10-CM

## 2023-08-17 DIAGNOSIS — E87.1 HYPONATREMIA: Primary | ICD-10-CM

## 2023-08-17 DIAGNOSIS — R52 PAIN: ICD-10-CM

## 2023-08-17 PROCEDURE — 36415 COLL VENOUS BLD VENIPUNCTURE: CPT | Performed by: NURSE PRACTITIONER

## 2023-08-17 PROCEDURE — P9603 ONE-WAY ALLOW PRORATED MILES: HCPCS | Performed by: NURSE PRACTITIONER

## 2023-08-17 PROCEDURE — 99309 SBSQ NF CARE MODERATE MDM 30: CPT | Performed by: NURSE PRACTITIONER

## 2023-08-17 PROCEDURE — 86481 TB AG RESPONSE T-CELL SUSP: CPT | Performed by: NURSE PRACTITIONER

## 2023-08-17 RX ORDER — ACETAMINOPHEN 500 MG
1000 TABLET ORAL 3 TIMES DAILY
COMMUNITY
End: 2023-11-29

## 2023-08-17 RX ORDER — HYDROXYZINE PAMOATE 25 MG/1
25 CAPSULE ORAL 3 TIMES DAILY
COMMUNITY
End: 2023-08-30

## 2023-08-17 RX ORDER — LIDOCAINE 4 G/G
1 PATCH TOPICAL EVERY 24 HOURS
COMMUNITY
End: 2023-11-29

## 2023-08-17 NOTE — DISCHARGE SUMMARY
[facility]  Hospitalist Discharge Summary      Date of Admission:  8/7/2023  Date of Discharge:  8/16/2023 12:40 PM  Discharging Provider: MIRACLE CARDENAS MD  Discharge Service: Hospitalist Service    Discharge Diagnoses   Right hip fracture status post fall  Surgery 8/8 with  In-situ percutaneous screw fixation, right valgus impacted femoral neck fracture. (Right hip percutaneous pinning)   Hyponatremia  Hypertension  Mild confusion resolved  History of collagenous colitis    Clinically Significant Risk Factors          Follow-ups Needed After Discharge   Follow-up Appointments     Follow Up and recommended labs and tests      Follow up with Dr. ALFREDA Duke at [facility] 2 weeks after   surgery.  If still in TCU, can be seen by the orthopedic provider at the   care facility (if this is an option).    Call the care coordinator at [phone] to arrange the appt.   [facility]: [phone]  [facility]: [phone]  [facility]:[phone]  Walk-in clinic 8am-8pm        Follow Up and recommended labs and tests      Follow up with Nursing home physician.  Cbc and bmp in 2 days on Friday 8/18/2023        Follow up Basic metabolic profile and sodium at care facility     Unresulted Labs Ordered in the Past 30 Days of this Admission       No orders found from 7/8/2023 to 8/8/2023.        These results will be followed up by rehab facility provider     Discharge Disposition   Discharged to nursing home  Condition at discharge: Stable    Hospital Course   nAna Marie Babb is a 83 year old female admitted on 8/7/2023. She presents to the emergency department for evaluation of right hip pain after fall found to have right femur fracture.  Patient was at home when she experienced a fall.  She lived in a single level home with her son.  Recently had moved into the area.  Per reports she was walking on the hardwood floors in her home in her tennis shoes when her toe caught and she fell  forward.  Patient fell.  Did not think she hit her head or lost consciousness.  Had right hip pain.     Right femoral neck fracture:  Patient mated to the hospital with a right femoral neck fracture.  X-ray of her femur showing acute mildly impacted fracture of the right femoral neck.  X-ray of pelvis confirming again acute fracture of the right femoral neck.  Patient was seen by orthopedic surgery. On  8/8 underwent surgery closed hip reduction with pinning.  Patient was placed on fall precautions.  She was initiated on Lovenox 40 mg subcu daily while in the hospital.  At discharge aspirin 325 daily for DVT prevention.  Hemoglobin remained stable.  On discharge hemoglobin 11.4     Hypertension:  Continued on prior to admission Norvasc 5 mg at at bedtime and lisinopril 20 mg    Mild confusion Resolved  Patient had mild confusion on admission.  This appeared resolved.  She is alert and orientated at time of discharge      Hyponatremia:   Patient had hyponatremia on admission with a sodium of 127.  Slightly low post operative with lowest to 123 and on average sodium of 129-131  Patient's urine sodium is less than 20 this is likely hypovolemic hyponatremia.  Patient was started on 50 mL normal saline.  After which her sodium improved.  At the time of discharge noted to have a sodium level 131.  Patient was eating and drinking.  Follow-up at care facility after discharge    Diet:   Gluten free diet.      Collagenous colitis: Note history.  Monitor for symptoms               Clinically Significant Risk Factors          # Hyponatremia: Lowest Na = 127 mmol/L in last 2 days, will monitor as appropriate             # Hypertension: Noted on problem list                          Disposition Plan      Consultations This Hospital Stay   ORTHOPEDIC SURGERY IP CONSULT  PHYSICAL THERAPY ADULT IP CONSULT  CARE MANAGEMENT / SOCIAL WORK IP CONSULT  PHYSICAL THERAPY ADULT IP CONSULT  OCCUPATIONAL THERAPY ADULT IP CONSULT  VASCULAR  ACCESS ADULT IP CONSULT  PHYSICAL THERAPY ADULT IP CONSULT  OCCUPATIONAL THERAPY ADULT IP CONSULT    Code Status   Prior    Time Spent on this Encounter   I, MIRACLE CARDENAS MD, personally saw the patient today and spent greater than 30 minutes discharging this patient.       MIRACLE CARDENAS MD  [facility]  [address] [facility]  [place] [address]  Phone: [phone]  Fax: [phone]  ______________________________________________________________________    Physical Exam   Vital Signs:                   Weight: 118 lbs 0 oz  General Appearance: Patient is awake and alert  Respiratory: Clear to auscultation bilaterally without wheezes or rhonchi  Cardiovascular: Regular rate and rhythm without gallop rub normal S1-S2  GI: Positive bowel sounds soft rebound guarding tenderness  Skin: No edema         Primary Care Physician   Saman Meade    Discharge Orders      Reason for your hospital stay    Right hip fracture: hip pinning     Wound care (specify)    Site:   Right hip   Instructions:  Keep dressings clean, dry and intact.  Change if peeling off or >60% saturated.  Do not immerse.  Ok to shower over tegaderm and aquacel dressings.     Follow Up and recommended labs and tests    Follow up with Dr. ALFREDA Duke at [facility] 2 weeks after surgery.  If still in TCU, can be seen by the orthopedic provider at the care facility (if this is an option).    Call the care coordinator at [phone] to arrange the appt.   [facility]: [phone]  [facility]: [phone]  [facility]:[phone]  Walk-in clinic 8am-8pm     Activity - Up with assistive device    Up with walker/assist     Weight bearing status    WBAT Right LE with walker     General info for SNF    Length of Stay Estimate: Short Term Care: Estimated # of Days <30  Condition at Discharge: Stable  Level of care:skilled   Rehabilitation Potential: Fair  Admission H&P remains valid and up-to-date:  Yes  Recent Chemotherapy: N/A  Use Nursing Home Standing Orders: Yes     Mantoux instructions    Give two-step Mantoux (PPD) Per Facility Policy Yes     Follow Up and recommended labs and tests    Follow up with Nursing home physician.  Cbc and bmp in 2 days on Friday 8/18/2023     Physical Therapy Adult Consult    Evaluate and treat as clinically indicated.    Reason:  Right hip fracture: hip pinning     Occupational Therapy Adult Consult    Evaluate and treat as clinically indicated.    Reason:  Right hip fracture: hip pinning     Fall precautions     Fall precautions     Walker DME    : DME Documentation: Describe the reason for need to support medical necessity: Impaired gait status post hip surgery. I, the undersigned, certify that the above prescribed supplies are medically necessary for this patient and is both reasonable and necessary in reference to accepted standards of medical practice in the treatment of this patient's condition and is not prescribed as a convenience.     Diet    Follow this diet upon discharge: Orders Placed This Encounter      Gluten Free Diet  fluid restriction of 1500 ml       Significant Results and Procedures   Most Recent 3 CBC's:  Recent Labs   Lab Test 08/14/23 1902 08/14/23  0649 08/11/23  0702 08/10/23  0625 08/09/23  0627 08/07/23  2017 08/25/15  1850   WBC  --   --   --   --   --  10.1 6.5   HGB 11.4*  --  12.6 11.7   < > 12.9 13.0   MCV  --   --   --   --   --  102* 98   PLT  --  283 253  --   --  265 265    < > = values in this interval not displayed.     Most Recent 3 BMP's:  Recent Labs   Lab Test 08/16/23  0643 08/15/23  0641 08/14/23 1902 08/14/23  0649   * 131*  131* 127* 131*  129*   POTASSIUM 3.8 3.7  --  3.5  3.5   CHLORIDE 97* 99  --  99   CO2 24 22  --  20*   BUN 9.6 8.2  --  7.7*   CR 0.62 0.57  --  0.57   ANIONGAP 10 10  --  12   KARTHIKEYAN 9.4 9.2  --  9.1   GLC 94 96  --  98     Most Recent 3 INR's:No lab results found.  Most Recent 3 Creatinines:  Recent  "Labs   Lab Test 08/16/23  0643 08/15/23  0641 08/14/23  0649   CR 0.62 0.57 0.57     Most Recent 3 Troponin's:  Recent Labs   Lab Test 08/25/15  1850   TROPI <0.015  The 99th percentile for upper reference range is 0.045 ug/L.  Troponin values in   the range of 0.045 - 0.120 ug/L may be associated with risks of adverse   clinical events.       Most Recent Cholesterol Panel:No lab results found.  Most Recent TSH and T4:  Recent Labs   Lab Test 08/12/23  1415   TSH 2.83     Most Recent Hemoglobin A1c:No lab results found.  Most Recent 6 glucoses:  Recent Labs   Lab Test 08/16/23  0643 08/15/23  0641 08/14/23  0649 08/12/23  0709 08/11/23  0702 08/10/23  0925   GLC 94 96 98 90 84 81  83     Most Recent Urinalysis:  Recent Labs   Lab Test 08/08/23  0902   COLOR Light Yellow   APPEARANCE Clear   URINEGLC 50*   URINEBILI Negative   URINEKETONE Negative   SG 1.008   UBLD Negative   URINEPH 7.0   PROTEIN Negative   NITRITE Negative   LEUKEST Moderate*   RBCU 0   WBCU 4     Most Recent ABG:No lab results found.  Most Recent ESR & CRP:No lab results found.  Most Recent Anemia Panel:  Recent Labs   Lab Test 08/14/23  1902 08/14/23  0649 08/09/23  0627 08/07/23 2017   WBC  --   --   --  10.1   HGB 11.4*  --    < > 12.9   HCT  --   --   --  37.5   MCV  --   --   --  102*   PLT  --  283   < > 265    < > = values in this interval not displayed.       Discharge Medications   Discharge Medication List as of 8/16/2023 12:11 PM        START taking these medications    Details   aspirin (ASA) 325 MG EC tablet Take 1 tablet (325 mg) by mouth daily for 30 days, Disp-30 tablet, R-0, Transitional      oxyCODONE (ROXICODONE) 5 MG tablet Take 0.5-1 tablets (2.5-5 mg) by mouth every 4 hours as needed for moderate to severe pain 1/2 tab po q 4 hrs prn pain scale \"3-5\"  1 tab po q 4 hrs prn pain scale \"6-10\", Disp-25 tablet, R-0, Local Print      senna-docusate (SENOKOT-S/PERICOLACE) 8.6-50 MG tablet Take 1 tablet by mouth 2 times daily as " needed for constipation, Disp-20 tablet, Transitional      vitamin D3 (CHOLECALCIFEROL) 50 mcg (2000 units) tablet Take 1 tablet (50 mcg) by mouth daily, Disp-30 tablet, Transitional           CONTINUE these medications which have CHANGED    Details   acetaminophen (TYLENOL) 325 MG tablet Take 2 tablets (650 mg) by mouth every 6 hours as needed for other (For optimal non-opioid multimodal pain management to improve pain control.), Disp-30 tablet, No Print Out      melatonin 5 MG tablet Take 1 tablet (5 mg) by mouth nightly as needed for sleep, R-0, No Print Out           CONTINUE these medications which have NOT CHANGED    Details   amLODIPine (NORVASC) 5 MG tablet Take 5 mg by mouth every evening, Historical      lisinopril (ZESTRIL) 20 MG tablet Take 20 mg by mouth every evening, Historical           Allergies   Allergies   Allergen Reactions    Amoxicillin-Pot Clavulanate     Ciprofloxacin     Gluten Meal      celiac

## 2023-08-17 NOTE — PROGRESS NOTES
[facility] [place]    Background: Transitional Care Management program identified per system criteria and reviewed by [facility] team for possible outreach.    Assessment: Upon chart review, CCRC Team member will not proceed with patient outreach related to this episode of Transitional Care Management program due to reason below:    Non-[facility] TCU: CCRC team member noted patient discharged to TCU/ARU/LTACH. Patient is not established with a [facility] currently supported by Primary Care-Care Coordination therefore handoff to Primary Care-Care Coordination is not appropriate at this time.    Plan: Transitional Care Management episode addressed appropriately per reason noted above.      Kasey Viera  Community Health Worker  [facility]  [phone]      *Connected Care Resource Team does NOT follow patient ongoing. Referrals are identified based on internal discharge reports and the outreach is to ensure patient has an understanding of their discharge instructions.

## 2023-08-17 NOTE — PROGRESS NOTES
[facility] GERIATRICS    PRIMARY CARE PROVIDER AND CLINIC:  Saman Meade, [address] / [facility] [address]  Chief Complaint   Patient presents with    Hospital F/U      [facility] Medical Record Number:  [id number]  Place of Service where encounter took place:  [facility] ([facility]) [[id number]]    Anna Marie Babb  is a 83 year old  (1940), admitted to the above facility from  [facility]. Hospital stay 8/7/23 through 8/16/23..     HPI:    This is a [age]-year-old female with a past medical history of hypertension who presented after mechanical fall, found to have a right femoral neck fracture underwent surgical repair.  Given weightbearing as tolerated, Tylenol and oxycodone for pain control, low-dose aspirin x30 days for DVT prophylaxis and sustained mild ABLA with discharge hemoglobin 11.4.  Additionally found to have hyponatremia, improved with IVF, discharge .  No other changes noted, discharged to [facility] for rehab.    CODE STATUS/ADVANCE DIRECTIVES DISCUSSION:  Prior  CPR/Full code   ALLERGIES:   Allergies   Allergen Reactions    Amoxicillin-Pot Clavulanate     Ciprofloxacin     Gluten Meal      celiac      PAST MEDICAL HISTORY:   Past Medical History:   Diagnosis Date    Atrophic vaginitis     Celiac disease     Cystocele     Disorder of bone and cartilage, unspecified     osteopenia    Endometriosis, site unspecified     Essential hypertension, benign     Gastro-oesophageal reflux disease     Hiatal hernia     3 cm sliding hiatal hernia    Hypercalcemia     Hyperlipidemia     HZV (herpes zoster virus) post herpetic neuralgia     Microscopic colitis     Nonspecific abnormal results of liver function study     Mildly elevated transaminases, liver biopsy WNL    Peripheral neuropathy     Pulmonary nodule     Zinc deficiency       PAST SURGICAL HISTORY:   has a past surgical history that includes TOTAL ABDOM HYSTERECTOMY (1982); REMOVAL OF  "OVARIAN CYST(S) (1977); DILATION/CURETTAGE DIAG/THER NON OB; NONSPECIFIC PROCEDURE; NONSPECIFIC PROCEDURE; Phacoemulsification clear cornea with standard intraocular lens implant (Left, 8/13/2015); colonoscopy (2011); Phacoemulsification clear cornea with standard intraocular lens implant (Right, 8/25/2015); and Closed reduction, percutaneous pinning hip (Right, 8/8/2023).  FAMILY HISTORY: family history includes Cancer in her maternal grandmother and mother; Cerebrovascular Disease in her father.  SOCIAL HISTORY:   reports that she is a non-smoker but has been exposed to tobacco smoke. She does not have any smokeless tobacco history on file. She reports current alcohol use.  Patient's living condition: lives with family, Son     Post Discharge Medication Reconciliation Status:   MED REC REQUIRED  Post Medication Reconciliation Status:  Discharge medications reconciled and changed, see notes/orders       Current Outpatient Medications   Medication Sig    acetaminophen (TYLENOL) 500 MG tablet Take 1,000 mg by mouth 3 times daily    hydrOXYzine (VISTARIL) 25 MG capsule Take 25 mg by mouth 3 times daily    Lidocaine (LIDOCARE) 4 % Patch Place 1 patch onto the skin every 24 hours To prevent lidocaine toxicity, patient should be patch free for 12 hrs daily. Apply on R hip    amLODIPine (NORVASC) 5 MG tablet Take 5 mg by mouth every evening    aspirin (ASA) 325 MG EC tablet Take 1 tablet (325 mg) by mouth daily for 30 days (Patient taking differently: Take 325 mg by mouth daily Complete on 9/16)    lisinopril (ZESTRIL) 20 MG tablet Take 20 mg by mouth every evening    melatonin 5 MG tablet Take 1 tablet (5 mg) by mouth nightly as needed for sleep    oxyCODONE (ROXICODONE) 5 MG tablet Take 0.5-1 tablets (2.5-5 mg) by mouth every 4 hours as needed for moderate to severe pain 1/2 tab po q 4 hrs prn pain scale \"3-5\"  1 tab po q 4 hrs prn pain scale \"6-10\"    senna-docusate (SENOKOT-S/PERICOLACE) 8.6-50 MG tablet Take 1 tablet " "by mouth 2 times daily as needed for constipation    vitamin D3 (CHOLECALCIFEROL) 50 mcg (2000 units) tablet Take 1 tablet (50 mcg) by mouth daily     No current facility-administered medications for this visit.       ROS:  10 point ROS of systems including Constitutional, Eyes, Respiratory, Cardiovascular, Gastroenterology, Genitourinary, Integumentary, Musculoskeletal, Psychiatric were all negative except for pertinent positives noted in my HPI.    Vitals:  /63   Pulse 69   Temp 97  F (36.1  C)   Resp 18   Ht 1.676 m (5' 6\")   SpO2 97%   BMI 19.05 kg/m    Exam:  GENERAL APPEARANCE:  Alert, in no distress, oriented, cooperative, R leg pain  ENT:  Mouth and posterior oropharynx normal, moist mucous membranes, normal hearing acuity  NECK:  No adenopathy,masses or thyromegaly  RESP:  lungs clear to auscultation , no respiratory distress  CV:  regular rate and rhythm, no murmur, rub, or gallop, no edema  ABDOMEN:  normal bowel sounds, soft, nontender, no hepatosplenomegaly or other masses  M/S:   WBAT, able to move all extremities  SKIN:  R hip drsg intact  NEURO:   No focal deficits  PSYCH:  oriented X 3    Lab/Diagnostic data:  Most Recent 3 CBC's:  Recent Labs   Lab Test 08/14/23 1902 08/14/23  0649 08/11/23  0702 08/10/23  0625 08/09/23  0627 08/07/23  2017 08/25/15  1850   WBC  --   --   --   --   --  10.1 6.5   HGB 11.4*  --  12.6 11.7   < > 12.9 13.0   MCV  --   --   --   --   --  102* 98   PLT  --  283 253  --   --  265 265    < > = values in this interval not displayed.     Most Recent 3 BMP's:  Recent Labs   Lab Test 08/16/23  0643 08/15/23  0641 08/14/23 1902 08/14/23  0649   * 131*  131* 127* 131*  129*   POTASSIUM 3.8 3.7  --  3.5  3.5   CHLORIDE 97* 99  --  99   CO2 24 22  --  20*   BUN 9.6 8.2  --  7.7*   CR 0.62 0.57  --  0.57   ANIONGAP 10 10  --  12   KARTHIKEYAN 9.4 9.2  --  9.1   GLC 94 96  --  98       ASSESSMENT/PLAN:    (S72.001A) Closed fracture of neck of right femur  WBAT, " continue vit D supplementation, follow-up with Ortho (TBD)    (R52) Pain  Increase Tylenol to 1000 mg 3 times daily, start Vistaril 25 mg 3 times daily, start lidocaine patch, continue oxycodone 2.5-5 mg every 4 hours as needed and encourage icing    (Z79.01) Anticoagulated  Continue  mg daily x30 days, will complete course on 9/16    (I10) Essential hypertension, benign  Continue lisinopril 20 mg daily and amlodipine 5 mg every evening, monitor blood pressure twice daily    Renal function  Last CR 0.62 with GFR >60, recheck BMP on 8/21    (E87.1) Hyponatremia    Initial  so received IVF, last , recheck BMP on 8/21    (D62) ABLA (acute blood loss anemia)  Last Hgb 11.4, recheck CBC on 8/21    Constipation  Continue senna S 1 tab twice daily as needed    (K90.0) Celiac disease  Continue gluten-free diet    Insomnia  Continue melatonin 5 mg at bedtime as needed    Orders:  Monitor blood pressure twice daily increase Tylenol, start Vistaril, start lidocaine patch, encourage icing, BMP/CBC recheck    Electronically signed by:  Dank Garcia NP

## 2023-08-17 NOTE — PLAN OF CARE
Occupational Therapy Discharge Summary    Reason for therapy discharge:    Discharged to transitional care facility.    Progress towards therapy goal(s). See goals on Care Plan in [facility] electronic health record for goal details.  Goals partially met.  Barriers to achieving goals:   discharge from facility.    Therapy recommendation(s):    Continued therapy is recommended.  Rationale/Recommendations:  to progress I/ADL independence prior to return home.

## 2023-08-19 LAB
GAMMA INTERFERON BACKGROUND BLD IA-ACNC: 0.05 IU/ML
M TB IFN-G BLD-IMP: NEGATIVE
M TB IFN-G CD4+ BCKGRND COR BLD-ACNC: 2.15 IU/ML
MITOGEN IGNF BCKGRD COR BLD-ACNC: -0.01 IU/ML
MITOGEN IGNF BCKGRD COR BLD-ACNC: 0 IU/ML
QUANTIFERON MITOGEN: 2.2 IU/ML
QUANTIFERON NIL TUBE: 0.05 IU/ML
QUANTIFERON TB1 TUBE: 0.04 IU/ML
QUANTIFERON TB2 TUBE: 0.05

## 2023-08-20 ENCOUNTER — LAB REQUISITION (OUTPATIENT)
Dept: LAB | Facility: CLINIC | Age: 83
End: 2023-08-20

## 2023-08-20 DIAGNOSIS — E87.1 HYPO-OSMOLALITY AND HYPONATREMIA: ICD-10-CM

## 2023-08-21 VITALS
DIASTOLIC BLOOD PRESSURE: 72 MMHG | BODY MASS INDEX: 18.8 KG/M2 | HEIGHT: 66 IN | RESPIRATION RATE: 18 BRPM | TEMPERATURE: 98.1 F | SYSTOLIC BLOOD PRESSURE: 137 MMHG | OXYGEN SATURATION: 97 % | HEART RATE: 68 BPM | WEIGHT: 117 LBS

## 2023-08-21 LAB
ANION GAP SERPL CALCULATED.3IONS-SCNC: 11 MMOL/L (ref 7–15)
BUN SERPL-MCNC: 10.4 MG/DL (ref 8–23)
CALCIUM SERPL-MCNC: 9.6 MG/DL (ref 8.8–10.2)
CHLORIDE SERPL-SCNC: 97 MMOL/L (ref 98–107)
CREAT SERPL-MCNC: 0.67 MG/DL (ref 0.51–0.95)
DEPRECATED HCO3 PLAS-SCNC: 23 MMOL/L (ref 22–29)
ERYTHROCYTE [DISTWIDTH] IN BLOOD BY AUTOMATED COUNT: 12.4 % (ref 10–15)
GFR SERPL CREATININE-BSD FRML MDRD: 86 ML/MIN/1.73M2
GLUCOSE SERPL-MCNC: 75 MG/DL (ref 70–99)
HCT VFR BLD AUTO: 34.1 % (ref 35–47)
HGB BLD-MCNC: 11.4 G/DL (ref 11.7–15.7)
MCH RBC QN AUTO: 34.3 PG (ref 26.5–33)
MCHC RBC AUTO-ENTMCNC: 33.4 G/DL (ref 31.5–36.5)
MCV RBC AUTO: 103 FL (ref 78–100)
PLATELET # BLD AUTO: 441 10E3/UL (ref 150–450)
POTASSIUM SERPL-SCNC: 4.1 MMOL/L (ref 3.4–5.3)
RBC # BLD AUTO: 3.32 10E6/UL (ref 3.8–5.2)
SODIUM SERPL-SCNC: 131 MMOL/L (ref 136–145)
WBC # BLD AUTO: 5.7 10E3/UL (ref 4–11)

## 2023-08-21 PROCEDURE — 36415 COLL VENOUS BLD VENIPUNCTURE: CPT | Performed by: NURSE PRACTITIONER

## 2023-08-21 PROCEDURE — 85027 COMPLETE CBC AUTOMATED: CPT | Performed by: NURSE PRACTITIONER

## 2023-08-21 PROCEDURE — 80048 BASIC METABOLIC PNL TOTAL CA: CPT | Performed by: NURSE PRACTITIONER

## 2023-08-21 PROCEDURE — P9604 ONE-WAY ALLOW PRORATED TRIP: HCPCS | Performed by: NURSE PRACTITIONER

## 2023-08-22 ENCOUNTER — TRANSITIONAL CARE UNIT VISIT (OUTPATIENT)
Dept: GERIATRICS | Facility: CLINIC | Age: 83
End: 2023-08-22
Payer: COMMERCIAL

## 2023-08-22 ENCOUNTER — LAB REQUISITION (OUTPATIENT)
Dept: LAB | Facility: CLINIC | Age: 83
End: 2023-08-22

## 2023-08-22 DIAGNOSIS — R52 PAIN: Primary | ICD-10-CM

## 2023-08-22 DIAGNOSIS — I10 ESSENTIAL HYPERTENSION, BENIGN: ICD-10-CM

## 2023-08-22 DIAGNOSIS — E87.1 HYPO-OSMOLALITY AND HYPONATREMIA: ICD-10-CM

## 2023-08-22 DIAGNOSIS — D62 ABLA (ACUTE BLOOD LOSS ANEMIA): ICD-10-CM

## 2023-08-22 DIAGNOSIS — Z79.01 ANTICOAGULATED: ICD-10-CM

## 2023-08-22 DIAGNOSIS — E87.1 HYPONATREMIA: ICD-10-CM

## 2023-08-22 DIAGNOSIS — S72.001A CLOSED FRACTURE OF NECK OF RIGHT FEMUR, INITIAL ENCOUNTER (H): ICD-10-CM

## 2023-08-22 PROCEDURE — 99309 SBSQ NF CARE MODERATE MDM 30: CPT | Performed by: NURSE PRACTITIONER

## 2023-08-22 NOTE — PROGRESS NOTES
[facility]  INITIAL VISIT NOTE  August 23, 2023    PRIMARY CARE PROVIDER AND CLINIC:  aSman Meade [address] / [facility] [address]    CHIEF COMPLAINT:  Hospital follow-up/Initial visit    HPI:    Anna Marie Babb is a 83 year old  (1940) female who was seen at [facility] T[facility] on August 23, 2023 for an initial visit.     Medical history is notable for hypertension, dyslipidemia, GERD, hiatal hernia, collagenous colitis, celiac disease, fatty liver, zinc deficiency, endometriosis, shingles, postherpetic neuralgia, atrophic vaginitis, cystocele, and osteoporosis.    Summary of hospital course:  Patient was hospitalized at [facility] from August 7 through August 16, 2023 for right hip fracture sustained after mechanical fall.  Initial blood work was significant for sodium 127 and total .  CT head and CT cervical spine were unremarkable for acute traumatic injury.  CT right hip showed acute impacted right femoral neck fracture with valgus angulation.  Patient was evaluated by orthopedic service and underwent in situ percutaneous screw fixation of right femoral neck fracture on August 8, 2023.  EBL was 100 mL.  Postop hemoglobin gradually trended down to 11.4 on August 15.  Work-up for hyponatremia revealed normal TSH of 2.83, urine sodium of 22, plasma osmolarity of 263, and urine osmolarity of 178.  She was initially hydrated with IV fluids for hypovolemic hyponatremia and then placed on fluid restriction of 1500 mL.  TCU was recommended per therapies.  Sodium at discharge was 131.    Patient is admitted to this facility for medical management, nursing care, and rehab.     Of note, history was obtained from patient, facility RN, and extensive review of the chart.    Today's visit:  Patient was seen in her room, while lying in bed.  She appears frail but comfortable.  She reports no surgical pain this morning.  She had a bowel  movement yesterday.  She denies fever, chills, chest pain, palpitation, dyspnea, nausea, vomiting, abdominal pain, or urinary symptoms.  She received 1 L of IV normal saline yesterday for dehydration.      CODE STATUS:   CPR/Full code     PAST MEDICAL HISTORY:   Fall resulting in right valgus impacted femoral neck fracture, s/p in situ percutaneous screw fixation on August 8, 2023  Hyponatremia  Hypertension  Dyslipidemia  GERD  Hiatal hernia  Collagenous colitis  Celiac disease  Fatty liver  Zinc deficiency  Endometriosis  Shingles with postherpetic neuralgia  Pulmonary nodule  Atrophic vaginitis  Cystocele  Osteoporosis    Past Medical History:   Diagnosis Date     Atrophic vaginitis      Celiac disease      Cystocele      Disorder of bone and cartilage, unspecified     osteopenia     Endometriosis, site unspecified      Essential hypertension, benign      Gastro-oesophageal reflux disease      Hiatal hernia     3 cm sliding hiatal hernia     Hypercalcemia      Hyperlipidemia      HZV (herpes zoster virus) post herpetic neuralgia      Microscopic colitis      Nonspecific abnormal results of liver function study     Mildly elevated transaminases, liver biopsy WNL     Peripheral neuropathy      Pulmonary nodule      Zinc deficiency        PAST SURGICAL HISTORY:   Past Surgical History:   Procedure Laterality Date     CLOSED REDUCTION, PERCUTANEOUS PINNING HIP Right 8/8/2023    Procedure: CLOSED REDUCTION, HIP, WITH PERCUTANEOUS PINNING;  Surgeon: Bo Duke MD;  Location:  OR     COLONOSCOPY  2011     HC DILATION/CURETTAGE DIAG/THER NON OB      miscarriages , 6 x     HC REMOVAL OF OVARIAN CYST(S)  1977    partial oopherectomy     PHACOEMULSIFICATION CLEAR CORNEA WITH STANDARD INTRAOCULAR LENS IMPLANT Left 8/13/2015    Procedure: PHACOEMULSIFICATION CLEAR CORNEA WITH STANDARD INTRAOCULAR LENS IMPLANT;  Surgeon: Tyler Isaacs MD;  Location:  [facility]     PHACOEMULSIFICATION CLEAR CORNEA WITH STANDARD  "INTRAOCULAR LENS IMPLANT Right 2015    Procedure: PHACOEMULSIFICATION CLEAR CORNEA WITH STANDARD INTRAOCULAR LENS IMPLANT;  Surgeon: Tyler Isaacs MD;  Location:  [facility]     ZZC NONSPECIFIC PROCEDURE       rectocoele and enterocoele repair     ZZC NONSPECIFIC PROCEDURE      cystocoele,repair     ZZC TOTAL ABDOM HYSTERECTOMY  1982    endometriosis with adhesions       FAMILY HISTORY:   Family History   Problem Relation Age of Onset     Cerebrovascular Disease Father          age 42     Cancer Mother         breast cancer,  age 63     Cancer Maternal Grandmother         breast cancer       SOCIAL HISTORY:  Social History     Tobacco Use     Smoking status: Passive Smoke Exposure - Never Smoker     Smokeless tobacco: Not on file     Tobacco comments:      smokes   Substance Use Topics     Alcohol use: Yes     Comment: 3-4 glasses per week       MEDICATIONS:  Current Outpatient Medications   Medication Sig Dispense Refill     acetaminophen (TYLENOL) 500 MG tablet Take 1,000 mg by mouth 3 times daily       amLODIPine (NORVASC) 5 MG tablet Take 5 mg by mouth every evening       aspirin (ASA) 325 MG EC tablet Take 1 tablet (325 mg) by mouth daily for 30 days (Patient taking differently: Take 325 mg by mouth daily Complete on ) 30 tablet 0     hydrOXYzine (VISTARIL) 25 MG capsule Take 25 mg by mouth 3 times daily       Lidocaine (LIDOCARE) 4 % Patch Place 1 patch onto the skin every 24 hours To prevent lidocaine toxicity, patient should be patch free for 12 hrs daily. Apply on R hip       lisinopril (ZESTRIL) 20 MG tablet Take 20 mg by mouth every evening       melatonin 5 MG tablet Take 1 tablet (5 mg) by mouth nightly as needed for sleep  0     oxyCODONE (ROXICODONE) 5 MG tablet Take 0.5-1 tablets (2.5-5 mg) by mouth every 4 hours as needed for moderate to severe pain 1/2 tab po q 4 hrs prn pain scale \"3-5\"  1 tab po q 4 hrs prn pain scale \"6-10\" 25 tablet 0     senna-docusate " "(SENOKOT-S/PERICOLACE) 8.6-50 MG tablet Take 1 tablet by mouth 2 times daily as needed for constipation 20 tablet      vitamin D3 (CHOLECALCIFEROL) 50 mcg (2000 units) tablet Take 1 tablet (50 mcg) by mouth daily 30 tablet        MED REC REQUIRED  Post Medication Reconciliation Status: medication reconcilation previously completed during another office visit       ALLERGIES:  Allergies   Allergen Reactions     Amoxicillin-Pot Clavulanate      Ciprofloxacin      Gluten Meal      celiac       ROS:  10 point ROS were negative other than the symptoms noted above in the HPI.    PHYSICAL EXAM:  Vital signs were reviewed in the chart.  Vital Signs: BP (!) 125/93   Pulse 69   Temp 98.1  F (36.7  C)   Resp 18   Ht 1.676 m (5' 6\")   Wt 53.1 kg (117 lb)   SpO2 99%   BMI 18.88 kg/m    General: Comfortable and in no acute distress  HEENT: Conjunctival pallor, no scleral icterus or injection, dry oral mucosa  Cardiovascular: Normal S1, S2, RRR  Respiratory: Lungs clear to auscultation bilaterally  GI: Abdomen soft, non-tender, non-distended, +BS  Extremities: No LE edema  Neuro: CX II-XII grossly intact; ROM in all four extremities grossly intact  Psych: Alert and oriented x3; normal affect  Skin: Clean right hip surgical wound    LABORATORY/IMAGING DATA:  All relevant labs and imaging data in [facility] and/or Care Everywhere were personally reviewed today.      Most Recent 3 CBC's:Recent Labs   Lab Test 08/21/23  0842 08/14/23  1902 08/14/23  0649 08/11/23  0702 08/09/23  0627 08/07/23  2017 08/25/15  1850   0000   WBC 5.7  --   --   --   --  10.1 6.5  --    HGB 11.4* 11.4*  --  12.6   < > 12.9 13.0  --    *  --   --   --   --  102* 98  --      --  283 253  --  265 265   < >    < > = values in this interval not displayed.     Most Recent 3 BMP's:Recent Labs   Lab Test 08/21/23  0842 08/16/23  0643 08/15/23  0641   * 131* 131*  131*   POTASSIUM 4.1 3.8 3.7   CHLORIDE 97* 97* 99   CO2 23 24 22   BUN 10.4 9.6 " 8.2   CR 0.67 0.62 0.57   ANIONGAP 11 10 10   KARTHIKEYAN 9.6 9.4 9.2   GLC 75 94 96     Most Recent 2 LFT's:Recent Labs   Lab Test 08/07/23  2017 08/25/15  1850   AST 33 35   ALT 37 49   ALKPHOS 101 149   BILITOTAL 0.9 0.6         ASSESSMENT/PLAN:  Mechanical fall, subsequent encounter,  Right valgus impacted femoral neck fracture, s/p in situ percutaneous screw fixation on August 8, 2023,  Age-related osteoporosis with current pathologic fracture,  Physical deconditioning.  Patient is hemodynamically stable.  Surgical pain is fairly controlled.  Plan:  Fall precautions  Continue pain management with lidocaine patch, acetaminophen, hydroxyzine, and oxycodone as ordered  Continue DVT prophylaxis with aspirin 325 mg daily through September 16, 2023  Continue calcium and vitamin D supplements  WBAT RLE with walker  Continue PT/OT evaluation and therapy  Follow-up with Ortho as directed    Acute blood loss anemia.   mL.  Last hemoglobin 11.4 on August 21.  Plan:  Monitor hemoglobin periodically    Hyponatremia.  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  Last sodium level 131 on August 21.   Plan:  Continue fluid restriction of 1500 mL  Continue to monitor sodium level    Essential hypertension.  Blood pressure is fairly controlled.  Plan:  Continue lisinopril 20 mg daily  Continue amlodipine 5 mg daily  Monitor blood pressure    Dyslipidemia.  Not on medical therapy  Plan:  Follow-up as outpatient    GERD,  Hiatal hernia.  Not on medical therapy.  Plan:  Monitor symptoms    Collagenous colitis,  Celiac disease.  Plan:  Continue gluten restricted diet  Follow-up as outpatient          Orders written by provider at facility:  None.      Recommendation by provider at facility:  Follow-up BMP from today, August 23          Disclaimer: This note may contain text created using speech-recognition software and may contain unintended word substitutions.      Electronically signed by:  Yolette Mcclain,  MD

## 2023-08-22 NOTE — PROGRESS NOTES
"[facility] GERIATRICS    Chief Complaint   Patient presents with    RECHECK     HPI:  Anna Marie Babb is a 83 year old  (1940), who is being seen today for an episodic care visit at: [facility] ([facility]) [[id number]].     This is a [age]-year-old female with a past medical history of hypertension who presented after mechanical fall, found to have a right femoral neck fracture underwent surgical repair.  Given weightbearing as tolerated, Tylenol and oxycodone for pain control, low-dose aspirin x30 days for DVT prophylaxis and sustained mild ABLA with discharge hemoglobin 11.4.  Additionally found to have hyponatremia, improved with IVF, discharge .  No other changes noted, discharged to [facility] for rehab.     Today's concern is:   Hyponatremia, pain control, therapy progress    Allergies, and PMH/PSH reviewed in [facility] today.  REVIEW OF SYSTEMS:  4 point ROS including Respiratory, CV, GI and , other than that noted in the HPI,  is negative    Objective:   /72   Pulse 68   Temp 98.1  F (36.7  C)   Resp 18   Ht 1.676 m (5' 6\")   Wt 53.1 kg (117 lb)   SpO2 97%   BMI 18.88 kg/m    GENERAL APPEARANCE:  Alert, in no distress, oriented, cooperative, R leg pain controlled  RESP:  lungs clear to auscultation , no respiratory distress, RA  CV:  regular rate and rhythm, no murmur, rub, or gallop, no edema  PSYCH:  oriented X 3, SLUMS 15/30    Most Recent 3 CBC's:  Recent Labs   Lab Test 08/21/23  0842 08/14/23  1902 08/14/23  0649 08/11/23  0702 08/09/23  0627 08/07/23  2017 08/25/15  1850   0000   WBC 5.7  --   --   --   --  10.1 6.5  --    HGB 11.4* 11.4*  --  12.6   < > 12.9 13.0  --    *  --   --   --   --  102* 98  --      --  283 253  --  265 265   < >    < > = values in this interval not displayed.     Most Recent 3 BMP's:  Recent Labs   Lab Test 08/21/23  0842 08/16/23  0643 08/15/23  0641   * 131* 131*  131*   POTASSIUM 4.1 3.8 3.7   CHLORIDE 97* 97* 99   CO2 23 24 " 22   BUN 10.4 9.6 8.2   CR 0.67 0.62 0.57   ANIONGAP 11 10 10   KARTHIKEYAN 9.6 9.4 9.2   GLC 75 94 96       Assessment/Plan:    (S72.001A) Closed fracture of neck of right femur  WBAT, continue vit D supplementation, follow-up with Ortho (TBD)     (R52) Pain  Using Tylenol 1000 mg 3 times daily, Vistaril 25 mg 3 times daily, lidocaine patch and encourage icing.  Today discontinue oxycodone per request     (Z79.01) Anticoagulated  Continue  mg daily x30 days, will complete course on 9/16     (I10) Essential hypertension, benign  Continue lisinopril 20 mg daily and amlodipine 5 mg every evening, -130, HR <80s     Renal function  Last CR 0.67 with GFR >60 on 8/21     (E87.1) Hyponatremia    Initial  so received IVF, last  on 8/21.  Today we will give 1 L normal saline.  Recheck BMP on 8/23     (D62) ABLA (acute blood loss anemia)  Last Hgb 11.4 on 8/21     Constipation  Continue senna S 1 tab twice daily as needed     (K90.0) Celiac disease  Continue gluten-free diet     Insomnia  Continue melatonin 5 mg at bedtime as needed    Therapy  Working on strengthening    Orders:  1 L normal saline, discontinue oxycodone, BMP recheck    Electronically signed by: Dank Garcia NP

## 2023-08-23 ENCOUNTER — TRANSITIONAL CARE UNIT VISIT (OUTPATIENT)
Dept: GERIATRICS | Facility: CLINIC | Age: 83
End: 2023-08-23

## 2023-08-23 ENCOUNTER — TRANSITIONAL CARE UNIT VISIT (OUTPATIENT)
Dept: GERIATRICS | Facility: CLINIC | Age: 83
End: 2023-08-23
Payer: COMMERCIAL

## 2023-08-23 VITALS
WEIGHT: 117 LBS | TEMPERATURE: 98.1 F | RESPIRATION RATE: 18 BRPM | OXYGEN SATURATION: 99 % | HEART RATE: 69 BPM | DIASTOLIC BLOOD PRESSURE: 93 MMHG | SYSTOLIC BLOOD PRESSURE: 125 MMHG | BODY MASS INDEX: 18.8 KG/M2 | HEIGHT: 66 IN

## 2023-08-23 VITALS
DIASTOLIC BLOOD PRESSURE: 76 MMHG | TEMPERATURE: 98.1 F | HEIGHT: 66 IN | SYSTOLIC BLOOD PRESSURE: 122 MMHG | OXYGEN SATURATION: 95 % | WEIGHT: 117 LBS | BODY MASS INDEX: 18.8 KG/M2 | RESPIRATION RATE: 17 BRPM | HEART RATE: 88 BPM
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
## 2023-08-23 DIAGNOSIS — I10 ESSENTIAL HYPERTENSION: ICD-10-CM

## 2023-08-23 DIAGNOSIS — K21.9 GASTROESOPHAGEAL REFLUX DISEASE, UNSPECIFIED WHETHER ESOPHAGITIS PRESENT: ICD-10-CM

## 2023-08-23 DIAGNOSIS — E87.1 HYPONATREMIA: ICD-10-CM

## 2023-08-23 DIAGNOSIS — K52.831 COLLAGENOUS COLITIS: ICD-10-CM

## 2023-08-23 DIAGNOSIS — K90.0 CELIAC DISEASE: ICD-10-CM

## 2023-08-23 DIAGNOSIS — M80.00XD AGE-RELATED OSTEOPOROSIS WITH CURRENT PATHOLOGICAL FRACTURE WITH ROUTINE HEALING, SUBSEQUENT ENCOUNTER: ICD-10-CM

## 2023-08-23 DIAGNOSIS — S72.001D CLOSED FRACTURE OF NECK OF RIGHT FEMUR WITH ROUTINE HEALING, SUBSEQUENT ENCOUNTER: ICD-10-CM

## 2023-08-23 DIAGNOSIS — E22.2 SIADH (SYNDROME OF INAPPROPRIATE ADH PRODUCTION) (H): ICD-10-CM

## 2023-08-23 DIAGNOSIS — Z87.81 S/P ORIF (OPEN REDUCTION INTERNAL FIXATION) FRACTURE: ICD-10-CM

## 2023-08-23 DIAGNOSIS — S72.001D CLOSED FRACTURE OF NECK OF RIGHT FEMUR WITH ROUTINE HEALING, SUBSEQUENT ENCOUNTER: Primary | ICD-10-CM

## 2023-08-23 DIAGNOSIS — D62 ACUTE BLOOD LOSS ANEMIA: ICD-10-CM

## 2023-08-23 DIAGNOSIS — R53.81 PHYSICAL DECONDITIONING: ICD-10-CM

## 2023-08-23 DIAGNOSIS — Z98.890 S/P ORIF (OPEN REDUCTION INTERNAL FIXATION) FRACTURE: ICD-10-CM

## 2023-08-23 DIAGNOSIS — E78.5 DYSLIPIDEMIA: ICD-10-CM

## 2023-08-23 LAB
ANION GAP SERPL CALCULATED.3IONS-SCNC: 10 MMOL/L (ref 7–15)
BUN SERPL-MCNC: 13.2 MG/DL (ref 8–23)
CALCIUM SERPL-MCNC: 9.5 MG/DL (ref 8.8–10.2)
CHLORIDE SERPL-SCNC: 100 MMOL/L (ref 98–107)
CREAT SERPL-MCNC: 0.71 MG/DL (ref 0.51–0.95)
DEPRECATED HCO3 PLAS-SCNC: 24 MMOL/L (ref 22–29)
GFR SERPL CREATININE-BSD FRML MDRD: 83 ML/MIN/1.73M2
GLUCOSE SERPL-MCNC: 68 MG/DL (ref 70–99)
POTASSIUM SERPL-SCNC: 4.2 MMOL/L (ref 3.4–5.3)
SODIUM SERPL-SCNC: 134 MMOL/L (ref 136–145)

## 2023-08-23 PROCEDURE — 36416 COLLJ CAPILLARY BLOOD SPEC: CPT | Performed by: NURSE PRACTITIONER

## 2023-08-23 PROCEDURE — 99305 1ST NF CARE MODERATE MDM 35: CPT | Performed by: INTERNAL MEDICINE

## 2023-08-23 PROCEDURE — 99207 PR NO CHARGE LOS: CPT | Performed by: PHYSICIAN ASSISTANT

## 2023-08-23 PROCEDURE — 80048 BASIC METABOLIC PNL TOTAL CA: CPT | Performed by: NURSE PRACTITIONER

## 2023-08-23 NOTE — PROGRESS NOTES
Ortho Nursing home visit    Anna Marie Babb is a 83 year old female who resides at [facility]    Patient is seen today for post-op  hip visit now 2 weeks, S/P fall and Perc pinning, hip .      Past Medical History:   Diagnosis Date    Atrophic vaginitis     Celiac disease     Cystocele     Disorder of bone and cartilage, unspecified     osteopenia    Endometriosis, site unspecified     Essential hypertension, benign     Gastro-oesophageal reflux disease     Hiatal hernia     3 cm sliding hiatal hernia    Hypercalcemia     Hyperlipidemia     HZV (herpes zoster virus) post herpetic neuralgia     Microscopic colitis     Nonspecific abnormal results of liver function study     Mildly elevated transaminases, liver biopsy WNL    Peripheral neuropathy     Pulmonary nodule     Zinc deficiency       Past Surgical History:   Procedure Laterality Date    CLOSED REDUCTION, PERCUTANEOUS PINNING HIP Right 8/8/2023    Procedure: CLOSED REDUCTION, HIP, WITH PERCUTANEOUS PINNING;  Surgeon: Bo Duke MD;  Location:  OR    COLONOSCOPY  2011    HC DILATION/CURETTAGE DIAG/THER NON OB      miscarriages , 6 x    HC REMOVAL OF OVARIAN CYST(S)  1977    partial oopherectomy    PHACOEMULSIFICATION CLEAR CORNEA WITH STANDARD INTRAOCULAR LENS IMPLANT Left 8/13/2015    Procedure: PHACOEMULSIFICATION CLEAR CORNEA WITH STANDARD INTRAOCULAR LENS IMPLANT;  Surgeon: Tyler Isaacs MD;  Location: [facility]    PHACOEMULSIFICATION CLEAR CORNEA WITH STANDARD INTRAOCULAR LENS IMPLANT Right 8/25/2015    Procedure: PHACOEMULSIFICATION CLEAR CORNEA WITH STANDARD INTRAOCULAR LENS IMPLANT;  Surgeon: Tyler Isaacs MD;  Location:  [facility]    ZZC NONSPECIFIC PROCEDURE       rectocoele and enterocoele repair    ZZC NONSPECIFIC PROCEDURE      cystocoele,repair    ZZC TOTAL ABDOM HYSTERECTOMY  1982    endometriosis with adhesions        Allergies   Allergen Reactions    Amoxicillin-Pot Clavulanate     Ciprofloxacin     Gluten Meal       celiac      There were no vitals taken for this visit.     Exam:      X-rays show : 3 perc pins with washers, in good position and alignment,     ASSESSMENT / PLAN:    Patient had obsorbable sutures in incision. No staples to remove, will continue to follow while in TCU for issues around hip fracture,     F/U with Dr. Clark in 1 month    [id number]           [facility]  [phone] Cell

## 2023-08-23 NOTE — LETTER
8/23/2023        RE: Anna Marie Babb  [address]  [place] [address]        [facility]  INITIAL VISIT NOTE  August 23, 2023    PRIMARY CARE PROVIDER AND CLINIC:  [address] / [place] [id number]    CHIEF COMPLAINT:  Hospital follow-up/Initial visit    HPI:    Anna Marie Babb is a 83 year old  (1940) female who was seen at [place] [facility] on August 23, 2023 for an initial visit.     Medical history is notable for hypertension, dyslipidemia, GERD, hiatal hernia, collagenous colitis, celiac disease, fatty liver, zinc deficiency, endometriosis, shingles, postherpetic neuralgia, atrophic vaginitis, cystocele, and osteoporosis.    Summary of hospital course:  Patient was hospitalized at [facility] from August 7 through August 16, 2023 for right hip fracture sustained after mechanical fall.  Initial blood work was significant for sodium 127 and total .  CT head and CT cervical spine were unremarkable for acute traumatic injury.  CT right hip showed acute impacted right femoral neck fracture with valgus angulation.  Patient was evaluated by orthopedic service and underwent in situ percutaneous screw fixation of right femoral neck fracture on August 8, 2023.  EBL was 100 mL.  Postop hemoglobin gradually trended down to 11.4 on August 15.  Work-up for hyponatremia revealed normal TSH of 2.83, urine sodium of 22, plasma osmolarity of 263, and urine osmolarity of 178.  She was initially hydrated with IV fluids for hypovolemic hyponatremia and then placed on fluid restriction of 1500 mL.  TCU was recommended per therapies.  Sodium at discharge was 131.    Patient is admitted to this facility for medical management, nursing care, and rehab.     Of note, history was obtained from patient, facility RN, and extensive review of the chart.    Today's visit:  Patient was seen in her room, while lying in bed.  She appears  frail but comfortable.  She reports no surgical pain this morning.  She had a bowel movement yesterday.  She denies fever, chills, chest pain, palpitation, dyspnea, nausea, vomiting, abdominal pain, or urinary symptoms.  She received 1 L of IV normal saline yesterday for dehydration.      CODE STATUS:   CPR/Full code     PAST MEDICAL HISTORY:   Fall resulting in right valgus impacted femoral neck fracture, s/p in situ percutaneous screw fixation on August 8, 2023  Hyponatremia  Hypertension  Dyslipidemia  GERD  Hiatal hernia  Collagenous colitis  Celiac disease  Fatty liver  Zinc deficiency  Endometriosis  Shingles with postherpetic neuralgia  Pulmonary nodule  Atrophic vaginitis  Cystocele  Osteoporosis    Past Medical History:   Diagnosis Date     Atrophic vaginitis      Celiac disease      Cystocele      Disorder of bone and cartilage, unspecified     osteopenia     Endometriosis, site unspecified      Essential hypertension, benign      Gastro-oesophageal reflux disease      Hiatal hernia     3 cm sliding hiatal hernia     Hypercalcemia      Hyperlipidemia      HZV (herpes zoster virus) post herpetic neuralgia      Microscopic colitis      Nonspecific abnormal results of liver function study     Mildly elevated transaminases, liver biopsy WNL     Peripheral neuropathy      Pulmonary nodule      Zinc deficiency        PAST SURGICAL HISTORY:   Past Surgical History:   Procedure Laterality Date     CLOSED REDUCTION, PERCUTANEOUS PINNING HIP Right 8/8/2023    Procedure: CLOSED REDUCTION, HIP, WITH PERCUTANEOUS PINNING;  Surgeon: Bo Duke MD;  Location: [facility] OR     COLONOSCOPY  2011     HC DILATION/CURETTAGE DIAG/THER NON OB      miscarriages , 6 x     HC REMOVAL OF OVARIAN CYST(S)  1977    partial oopherectomy     PHACOEMULSIFICATION CLEAR CORNEA WITH STANDARD INTRAOCULAR LENS IMPLANT Left 8/13/2015    Procedure: PHACOEMULSIFICATION CLEAR CORNEA WITH STANDARD INTRAOCULAR LENS IMPLANT;  Surgeon: Ferny  Tyler Bailon MD;  Location:  [facility]     PHACOEMULSIFICATION CLEAR CORNEA WITH STANDARD INTRAOCULAR LENS IMPLANT Right 2015    Procedure: PHACOEMULSIFICATION CLEAR CORNEA WITH STANDARD INTRAOCULAR LENS IMPLANT;  Surgeon: Tyler Isaacs MD;  Location:  [facility]     ZC NONSPECIFIC PROCEDURE       rectocoele and enterocoele repair     ZZC NONSPECIFIC PROCEDURE      cystocoele,repair     ZZC TOTAL ABDOM HYSTERECTOMY  1982    endometriosis with adhesions       FAMILY HISTORY:   Family History   Problem Relation Age of Onset     Cerebrovascular Disease Father          age 42     Cancer Mother         breast cancer,  age 63     Cancer Maternal Grandmother         breast cancer       SOCIAL HISTORY:  Social History     Tobacco Use     Smoking status: Passive Smoke Exposure - Never Smoker     Smokeless tobacco: Not on file     Tobacco comments:      smokes   Substance Use Topics     Alcohol use: Yes     Comment: 3-4 glasses per week       MEDICATIONS:  Current Outpatient Medications   Medication Sig Dispense Refill     acetaminophen (TYLENOL) 500 MG tablet Take 1,000 mg by mouth 3 times daily       amLODIPine (NORVASC) 5 MG tablet Take 5 mg by mouth every evening       aspirin (ASA) 325 MG EC tablet Take 1 tablet (325 mg) by mouth daily for 30 days (Patient taking differently: Take 325 mg by mouth daily Complete on ) 30 tablet 0     hydrOXYzine (VISTARIL) 25 MG capsule Take 25 mg by mouth 3 times daily       Lidocaine (LIDOCARE) 4 % Patch Place 1 patch onto the skin every 24 hours To prevent lidocaine toxicity, patient should be patch free for 12 hrs daily. Apply on R hip       lisinopril (ZESTRIL) 20 MG tablet Take 20 mg by mouth every evening       melatonin 5 MG tablet Take 1 tablet (5 mg) by mouth nightly as needed for sleep  0     oxyCODONE (ROXICODONE) 5 MG tablet Take 0.5-1 tablets (2.5-5 mg) by mouth every 4 hours as needed for moderate to severe pain 1/2 tab po q 4 hrs prn pain  "scale \"3-5\"  1 tab po q 4 hrs prn pain scale \"6-10\" 25 tablet 0     senna-docusate (SENOKOT-S/PERICOLACE) 8.6-50 MG tablet Take 1 tablet by mouth 2 times daily as needed for constipation 20 tablet      vitamin D3 (CHOLECALCIFEROL) 50 mcg (2000 units) tablet Take 1 tablet (50 mcg) by mouth daily 30 tablet        MED REC REQUIRED  Post Medication Reconciliation Status: medication reconcilation previously completed during another office visit       ALLERGIES:  Allergies   Allergen Reactions     Amoxicillin-Pot Clavulanate      Ciprofloxacin      Gluten Meal      celiac       ROS:  10 point ROS were negative other than the symptoms noted above in the HPI.    PHYSICAL EXAM:  Vital signs were reviewed in the chart.  Vital Signs: BP (!) 125/93   Pulse 69   Temp 98.1  F (36.7  C)   Resp 18   Ht 1.676 m (5' 6\")   Wt 53.1 kg (117 lb)   SpO2 99%   BMI 18.88 kg/m    General: Comfortable and in no acute distress  HEENT: Conjunctival pallor, no scleral icterus or injection, dry oral mucosa  Cardiovascular: Normal S1, S2, RRR  Respiratory: Lungs clear to auscultation bilaterally  GI: Abdomen soft, non-tender, non-distended, +BS  Extremities: No LE edema  Neuro: CX II-XII grossly intact; ROM in all four extremities grossly intact  Psych: Alert and oriented x3; normal affect  Skin: Clean right hip surgical wound    LABORATORY/IMAGING DATA:  All relevant labs and imaging data in [facility] and/or Care Everywhere were personally reviewed today.      Most Recent 3 CBC's:Recent Labs   Lab Test 08/21/23  0842 08/14/23  1902 08/14/23  0649 08/11/23  0702 08/09/23  0627 08/07/23  2017 08/25/15  1850   0000   WBC 5.7  --   --   --   --  10.1 6.5  --    HGB 11.4* 11.4*  --  12.6   < > 12.9 13.0  --    *  --   --   --   --  102* 98  --      --  283 253  --  265 265   < >    < > = values in this interval not displayed.     Most Recent 3 BMP's:Recent Labs   Lab Test 08/21/23  0842 08/16/23  0643 08/15/23  0641   * 131* 131* "  131*   POTASSIUM 4.1 3.8 3.7   CHLORIDE 97* 97* 99   CO2 23 24 22   BUN 10.4 9.6 8.2   CR 0.67 0.62 0.57   ANIONGAP 11 10 10   KARTHIKEYAN 9.6 9.4 9.2   GLC 75 94 96     Most Recent 2 LFT's:Recent Labs   Lab Test 08/07/23  2017 08/25/15  1850   AST 33 35   ALT 37 49   ALKPHOS 101 149   BILITOTAL 0.9 0.6         ASSESSMENT/PLAN:  Mechanical fall, subsequent encounter,  Right valgus impacted femoral neck fracture, s/p in situ percutaneous screw fixation on August 8, 2023,  Age-related osteoporosis with current pathologic fracture,  Physical deconditioning.  Patient is hemodynamically stable.  Surgical pain is fairly controlled.  Plan:  Fall precautions  Continue pain management with lidocaine patch, acetaminophen, hydroxyzine, and oxycodone as ordered  Continue DVT prophylaxis with aspirin 325 mg daily through September 16, 2023  Continue calcium and vitamin D supplements  WBAT RLE with walker  Continue PT/OT evaluation and therapy  Follow-up with Ortho as directed    Acute blood loss anemia.   mL.  Last hemoglobin 11.4 on August 21.  Plan:  Monitor hemoglobin periodically    Hyponatremia.  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  Last sodium level 131 on August 21.   Plan:  Continue fluid restriction of 1500 mL  Continue to monitor sodium level    Essential hypertension.  Blood pressure is fairly controlled.  Plan:  Continue lisinopril 20 mg daily  Continue amlodipine 5 mg daily  Monitor blood pressure    Dyslipidemia.  Not on medical therapy  Plan:  Follow-up as outpatient    GERD,  Hiatal hernia.  Not on medical therapy.  Plan:  Monitor symptoms    Collagenous colitis,  Celiac disease.  Plan:  Continue gluten restricted diet  Follow-up as outpatient          Orders written by provider at facility:  None.      Recommendation by provider at facility:  Follow-up BMP from today, August 23          Disclaimer: This note may contain text created using speech-recognition software and may contain unintended word  substitutions.      Electronically signed by:  Yolette Mcclain MD                          Sincerely,        Yolette Mcclain MD

## 2023-08-24 ENCOUNTER — TRANSITIONAL CARE UNIT VISIT (OUTPATIENT)
Dept: GERIATRICS | Facility: CLINIC | Age: 83
End: 2023-08-24
Payer: COMMERCIAL

## 2023-08-24 DIAGNOSIS — Z79.01 ANTICOAGULATED: ICD-10-CM

## 2023-08-24 DIAGNOSIS — I10 ESSENTIAL HYPERTENSION, BENIGN: ICD-10-CM

## 2023-08-24 DIAGNOSIS — D62 ABLA (ACUTE BLOOD LOSS ANEMIA): ICD-10-CM

## 2023-08-24 DIAGNOSIS — S72.001A CLOSED FRACTURE OF NECK OF RIGHT FEMUR, INITIAL ENCOUNTER (H): ICD-10-CM

## 2023-08-24 DIAGNOSIS — R52 PAIN: Primary | ICD-10-CM

## 2023-08-24 DIAGNOSIS — E87.1 HYPONATREMIA: ICD-10-CM

## 2023-08-24 PROCEDURE — 99309 SBSQ NF CARE MODERATE MDM 30: CPT | Performed by: NURSE PRACTITIONER

## 2023-08-24 NOTE — PROGRESS NOTES
"[facility]    Chief Complaint   Patient presents with    RECHECK     HPI:  Anna Marie Babb is a 83 year old  (1940), who is being seen today for an episodic care visit at: [facility] ([facility]) [[id number]].     This is a [age]-year-old female with a past medical history of hypertension who presented after mechanical fall, found to have a right femoral neck fracture underwent surgical repair.  Given weightbearing as tolerated, Tylenol and oxycodone for pain control, low-dose aspirin x30 days for DVT prophylaxis and sustained mild ABLA with discharge hemoglobin 11.4.  Additionally found to have hyponatremia, improved with IVF, discharge .  No other changes noted, discharged to [facility] for rehab.      Today's concern is:   Hyponatremia, therapy progress    Allergies, and PMH/PSH reviewed in [facility] today.  REVIEW OF SYSTEMS:  4 point ROS including Respiratory, CV, GI and , other than that noted in the HPI,  is negative    Objective:   /76   Pulse 88   Temp 98.1  F (36.7  C)   Resp 17   Ht 1.676 m (5' 6\")   Wt 53.1 kg (117 lb)   SpO2 95%   BMI 18.88 kg/m    GENERAL APPEARANCE:  Alert, in no distress, oriented, cooperative, denies pain  RESP:  lungs clear to auscultation , no respiratory distress, RA  CV:  regular rate and rhythm, no murmur, rub, or gallop, no edema  PSYCH:  oriented X 3, SLUMS 15/30    Most Recent 3 CBC's:  Recent Labs   Lab Test 08/21/23  0842 08/14/23  1902 08/14/23  0649 08/11/23  0702 08/09/23  0627 08/07/23  2017 08/25/15  1850   0000   WBC 5.7  --   --   --   --  10.1 6.5  --    HGB 11.4* 11.4*  --  12.6   < > 12.9 13.0  --    *  --   --   --   --  102* 98  --      --  283 253  --  265 265   < >    < > = values in this interval not displayed.     Most Recent 3 BMP's:  Recent Labs   Lab Test 08/23/23  0750 08/21/23  0842 08/16/23  0643   * 131* 131*   POTASSIUM 4.2 4.1 3.8   CHLORIDE 100 97* 97*   CO2 24 23 24   BUN 13.2 10.4 9.6   CR " 0.71 0.67 0.62   ANIONGAP 10 11 10   KARTHIKEYAN 9.5 9.6 9.4   GLC 68* 75 94       Assessment/Plan:    (S72.001A) Closed fracture of neck of right femur  WBAT, continue vit D supplementation, follow-up with Ortho (TBD). XRs     (R52) Pain  Using Tylenol 1000 mg 3 times daily, Vistaril 25 mg 3 times daily, lidocaine patch and encourage icing. Denies pain     (Z79.01) Anticoagulated  Continue  mg daily x30 days, will complete course on 9/16     (I10) Essential hypertension, benign  Continue lisinopril 20 mg daily and amlodipine 5 mg every evening, -130, HR <80s. Stable     Renal function  Last CR 0.71 with GFR >60 on 8/23     (E87.1) Hyponatremia    Last   on 8/23 s/p 1L NS, recheck BMP on 8/29     (D62) ABLA (acute blood loss anemia)  Last Hgb 11.4 on 8/21     Constipation  Continue senna S 1 tab twice daily as needed, adequate     (K90.0) Celiac disease  Continue gluten-free diet     Insomnia  Continue melatonin 5 mg at bedtime as needed     Therapy  Working on strengthening    Orders:  BMP    Electronically signed by: Dank Garcia NP

## 2023-08-27 ENCOUNTER — HEALTH MAINTENANCE LETTER (OUTPATIENT)
Age: 83
End: 2023-08-27

## 2023-08-28 ENCOUNTER — LAB REQUISITION (OUTPATIENT)
Dept: LAB | Facility: CLINIC | Age: 83
End: 2023-08-28

## 2023-08-28 DIAGNOSIS — E87.1 HYPO-OSMOLALITY AND HYPONATREMIA: ICD-10-CM

## 2023-08-29 VITALS
TEMPERATURE: 97.9 F | SYSTOLIC BLOOD PRESSURE: 96 MMHG | DIASTOLIC BLOOD PRESSURE: 72 MMHG | OXYGEN SATURATION: 96 % | HEIGHT: 66 IN | RESPIRATION RATE: 16 BRPM | BODY MASS INDEX: 19.06 KG/M2 | WEIGHT: 118.6 LBS | HEART RATE: 76 BPM

## 2023-08-29 LAB
ANION GAP SERPL CALCULATED.3IONS-SCNC: 10 MMOL/L (ref 7–15)
BUN SERPL-MCNC: 11.4 MG/DL (ref 8–23)
CALCIUM SERPL-MCNC: 9.7 MG/DL (ref 8.8–10.2)
CHLORIDE SERPL-SCNC: 100 MMOL/L (ref 98–107)
CREAT SERPL-MCNC: 0.69 MG/DL (ref 0.51–0.95)
DEPRECATED HCO3 PLAS-SCNC: 24 MMOL/L (ref 22–29)
GFR SERPL CREATININE-BSD FRML MDRD: 85 ML/MIN/1.73M2
GLUCOSE SERPL-MCNC: 73 MG/DL (ref 70–99)
POTASSIUM SERPL-SCNC: 4.5 MMOL/L (ref 3.4–5.3)
SODIUM SERPL-SCNC: 134 MMOL/L (ref 136–145)

## 2023-08-29 PROCEDURE — 80048 BASIC METABOLIC PNL TOTAL CA: CPT | Performed by: NURSE PRACTITIONER

## 2023-08-29 PROCEDURE — 36415 COLL VENOUS BLD VENIPUNCTURE: CPT | Performed by: NURSE PRACTITIONER

## 2023-08-29 PROCEDURE — P9604 ONE-WAY ALLOW PRORATED TRIP: HCPCS | Performed by: NURSE PRACTITIONER

## 2023-08-30 ENCOUNTER — TRANSITIONAL CARE UNIT VISIT (OUTPATIENT)
Dept: GERIATRICS | Facility: CLINIC | Age: 83
End: 2023-08-30
Payer: COMMERCIAL

## 2023-08-30 DIAGNOSIS — R52 PAIN: Primary | ICD-10-CM

## 2023-08-30 DIAGNOSIS — I10 ESSENTIAL HYPERTENSION, BENIGN: ICD-10-CM

## 2023-08-30 DIAGNOSIS — Z79.01 ANTICOAGULATED: ICD-10-CM

## 2023-08-30 DIAGNOSIS — E87.1 HYPONATREMIA: ICD-10-CM

## 2023-08-30 DIAGNOSIS — S72.001A CLOSED FRACTURE OF NECK OF RIGHT FEMUR, INITIAL ENCOUNTER (H): ICD-10-CM

## 2023-08-30 PROCEDURE — 99309 SBSQ NF CARE MODERATE MDM 30: CPT | Performed by: NURSE PRACTITIONER

## 2023-08-30 NOTE — PROGRESS NOTES
"[facility]    Chief Complaint   Patient presents with    RECHECK     HPI:  Anna Marie Babb is a 83 year old  (1940), who is being seen today for an episodic care visit at: [facility] ([facility]) [[id number]].     This is a [age]-year-old female with a past medical history of hypertension who presented after mechanical fall, found to have a right femoral neck fracture underwent surgical repair.  Given weightbearing as tolerated, Tylenol and oxycodone for pain control, low-dose aspirin x30 days for DVT prophylaxis and sustained mild ABLA with discharge hemoglobin 11.4.  Additionally found to have hyponatremia, improved with IVF, discharge .  No other changes noted, discharged to [facility] for rehab.       Today's concern is:   Hyponatremia, pain, therapy progress    Allergies, and PMH/PSH reviewed in [facility] today.  REVIEW OF SYSTEMS:  4 point ROS including Respiratory, CV, GI and , other than that noted in the HPI,  is negative    Objective:   BP 96/72   Pulse 76   Temp 97.9  F (36.6  C)   Resp 16   Ht 1.676 m (5' 6\")   Wt 53.8 kg (118 lb 9.6 oz)   SpO2 96%   BMI 19.14 kg/m    GENERAL APPEARANCE:  Alert, in no distress, oriented, cooperative, denies pain  RESP:  lungs clear to auscultation , RA  CV:  regular rate and rhythm, no murmur, rub, or gallop, no edema  PSYCH:  oriented X 3, SLUMS 15/30    Most Recent 3 CBC's:  Recent Labs   Lab Test 08/21/23  0842 08/14/23  1902 08/14/23  0649 08/11/23  0702 08/09/23  0627 08/07/23  2017 08/25/15  1850   0000   WBC 5.7  --   --   --   --  10.1 6.5  --    HGB 11.4* 11.4*  --  12.6   < > 12.9 13.0  --    *  --   --   --   --  102* 98  --      --  283 253  --  265 265   < >    < > = values in this interval not displayed.     Most Recent 3 BMP's:  Recent Labs   Lab Test 08/29/23  0840 08/23/23  0750 08/21/23  0842   * 134* 131*   POTASSIUM 4.5 4.2 4.1   CHLORIDE 100 100 97*   CO2 24 24 23   BUN 11.4 13.2 10.4   CR 0.69 0.71 " 0.67   ANIONGAP 10 10 11   KARTHIKEYAN 9.7 9.5 9.6   GLC 73 68* 75       Assessment/Plan:    (S72.001A) Closed fracture of neck of right femur  WBAT, continue vit D supplementation, follow-up with Ortho (TBD). XRs     (R52) Pain  Using Tylenol 1000 mg 3 times daily, lidocaine patch and encourage icing. Denies . Today discontinue vistaril     (Z79.01) Anticoagulated  Continue  mg daily x30 days, will complete course on 9/16. No change     (I10) Essential hypertension, benign  Continue lisinopril 20 mg daily and amlodipine 5 mg every evening, -130, HR <80s. No change needed     Renal function  Last CR 0.71 with GFR >60 on 8/29     (E87.1) Hyponatremia    Last  on 8/29     (D62) ABLA (acute blood loss anemia)  Last Hgb 11.4 on 8/21     Constipation  Continue senna S 1 tab twice daily as needed, no change     (K90.0) Celiac disease  Continue gluten-free diet     Insomnia  Continue melatonin 5 mg at bedtime as needed     Therapy  Ambulating 300 feet with FWW, SBA    Orders:  Discontinue Vistaril    Electronically signed by: Dank Garcia NP

## 2023-08-31 VITALS
WEIGHT: 118.6 LBS | DIASTOLIC BLOOD PRESSURE: 59 MMHG | SYSTOLIC BLOOD PRESSURE: 110 MMHG | HEIGHT: 66 IN | OXYGEN SATURATION: 98 % | TEMPERATURE: 97.3 F | BODY MASS INDEX: 19.06 KG/M2 | RESPIRATION RATE: 16 BRPM | HEART RATE: 68 BPM

## 2023-09-01 ENCOUNTER — DISCHARGE SUMMARY NURSING HOME (OUTPATIENT)
Dept: GERIATRICS | Facility: CLINIC | Age: 83
End: 2023-09-01
Payer: COMMERCIAL

## 2023-09-01 DIAGNOSIS — S72.001A CLOSED FRACTURE OF NECK OF RIGHT FEMUR, INITIAL ENCOUNTER (H): ICD-10-CM

## 2023-09-01 DIAGNOSIS — Z79.01 ANTICOAGULATED: ICD-10-CM

## 2023-09-01 DIAGNOSIS — E87.1 HYPONATREMIA: ICD-10-CM

## 2023-09-01 DIAGNOSIS — R52 PAIN: Primary | ICD-10-CM

## 2023-09-01 DIAGNOSIS — I10 ESSENTIAL HYPERTENSION, BENIGN: ICD-10-CM

## 2023-09-01 PROCEDURE — 99315 NF DSCHRG MGMT 30 MIN/LESS: CPT | Performed by: NURSE PRACTITIONER

## 2023-09-07 ENCOUNTER — OFFICE VISIT (OUTPATIENT)
Dept: FAMILY MEDICINE | Facility: CLINIC | Age: 83
End: 2023-09-07
Payer: COMMERCIAL

## 2023-09-07 VITALS
TEMPERATURE: 98 F | HEIGHT: 66 IN | DIASTOLIC BLOOD PRESSURE: 74 MMHG | SYSTOLIC BLOOD PRESSURE: 114 MMHG | WEIGHT: 118 LBS | RESPIRATION RATE: 16 BRPM | HEART RATE: 91 BPM | BODY MASS INDEX: 18.96 KG/M2 | OXYGEN SATURATION: 98 %

## 2023-09-07 DIAGNOSIS — Z23 FLU VACCINE NEED: ICD-10-CM

## 2023-09-07 DIAGNOSIS — E78.5 HYPERLIPIDEMIA, UNSPECIFIED HYPERLIPIDEMIA TYPE: ICD-10-CM

## 2023-09-07 DIAGNOSIS — Z76.0 ENCOUNTER FOR MEDICATION REFILL: Primary | ICD-10-CM

## 2023-09-07 DIAGNOSIS — I10 ESSENTIAL HYPERTENSION: ICD-10-CM

## 2023-09-07 DIAGNOSIS — S72.011S: ICD-10-CM

## 2023-09-07 DIAGNOSIS — M80.00XD AGE-RELATED OSTEOPOROSIS WITH CURRENT PATHOLOGICAL FRACTURE WITH ROUTINE HEALING, SUBSEQUENT ENCOUNTER: ICD-10-CM

## 2023-09-07 PROBLEM — Z91.81 HISTORY OF FALLING: Status: ACTIVE | Noted: 2019-05-23

## 2023-09-07 PROBLEM — K55.9 ISCHEMIC COLITIS (H): Status: ACTIVE | Noted: 2019-10-09

## 2023-09-07 PROBLEM — M81.0 OSTEOPOROSIS: Status: ACTIVE | Noted: 2019-09-15

## 2023-09-07 PROBLEM — K52.831 COLLAGENOUS COLITIS: Status: ACTIVE | Noted: 2019-10-09

## 2023-09-07 PROBLEM — K76.0 FATTY LIVER: Status: ACTIVE | Noted: 2019-09-15

## 2023-09-07 PROCEDURE — G0008 ADMIN INFLUENZA VIRUS VAC: HCPCS | Performed by: INTERNAL MEDICINE

## 2023-09-07 PROCEDURE — 90662 IIV NO PRSV INCREASED AG IM: CPT | Performed by: INTERNAL MEDICINE

## 2023-09-07 PROCEDURE — 99214 OFFICE O/P EST MOD 30 MIN: CPT | Mod: 25 | Performed by: INTERNAL MEDICINE

## 2023-09-07 RX ORDER — AMLODIPINE BESYLATE 5 MG/1
5 TABLET ORAL EVERY EVENING
Qty: 90 TABLET | Refills: 3 | Status: SHIPPED | OUTPATIENT
Start: 2023-09-07

## 2023-09-07 RX ORDER — LISINOPRIL 20 MG/1
20 TABLET ORAL EVERY EVENING
Qty: 90 TABLET | Refills: 3 | Status: ON HOLD | OUTPATIENT
Start: 2023-09-07 | End: 2023-12-06

## 2023-09-07 RX ORDER — MULTIPLE VITAMINS W/ MINERALS TAB 9MG-400MCG
1 TAB ORAL DAILY
COMMUNITY
End: 2023-11-29

## 2023-09-07 ASSESSMENT — PATIENT HEALTH QUESTIONNAIRE - PHQ9
SUM OF ALL RESPONSES TO PHQ QUESTIONS 1-9: 0
10. IF YOU CHECKED OFF ANY PROBLEMS, HOW DIFFICULT HAVE THESE PROBLEMS MADE IT FOR YOU TO DO YOUR WORK, TAKE CARE OF THINGS AT HOME, OR GET ALONG WITH OTHER PEOPLE: NOT DIFFICULT AT ALL
SUM OF ALL RESPONSES TO PHQ QUESTIONS 1-9: 0

## 2023-09-07 ASSESSMENT — PAIN SCALES - GENERAL: PAINLEVEL: NO PAIN (0)

## 2023-09-07 NOTE — PROGRESS NOTES
Tiffanie Escobar is a 83 year old, presenting for the following health issues:  Establish Care, Hypertension, Recheck Medication, and Refill Request      History of Present Illness       Reason for visit:  Meds    She eats 2-3 servings of fruits and vegetables daily.She consumes 0 sweetened beverage(s) daily.She exercises with enough effort to increase her heart rate 9 or less minutes per day.  She exercises with enough effort to increase her heart rate 3 or less days per week.   She is taking medications regularly.         Current Medications:     Current Outpatient Medications   Medication Sig Dispense Refill    acetaminophen (TYLENOL) 500 MG tablet Take 1,000 mg by mouth 3 times daily      amLODIPine (NORVASC) 5 MG tablet Take 1 tablet (5 mg) by mouth every evening 90 tablet 3    aspirin (ASA) 325 MG EC tablet Take 1 tablet (325 mg) by mouth daily for 30 days (Patient taking differently: Take 325 mg by mouth daily Complete on 9/16) 30 tablet 0    Lidocaine (LIDOCARE) 4 % Patch Place 1 patch onto the skin every 24 hours To prevent lidocaine toxicity, patient should be patch free for 12 hrs daily. Apply on R hip      lisinopril (ZESTRIL) 20 MG tablet Take 1 tablet (20 mg) by mouth every evening 90 tablet 3    senna-docusate (SENOKOT-S/PERICOLACE) 8.6-50 MG tablet Take 1 tablet by mouth 2 times daily as needed for constipation 20 tablet     vitamin D3 (CHOLECALCIFEROL) 50 mcg (2000 units) tablet Take 1 tablet (50 mcg) by mouth daily 30 tablet     multivitamin w/minerals (MULTIVITAMIN, THERAPEUTIC WITH MINERALS) tablet Take 1 tablet by mouth daily           Allergies:      Allergies   Allergen Reactions    Amoxicillin Diarrhea    Amoxicillin-Pot Clavulanate     Ciprofloxacin     Gluten Meal      celiac    Lactose Intolerance (Gi) Diarrhea    Milk Protein             Past Medical History:     Past Medical History:   Diagnosis Date    Atrophic vaginitis     Celiac disease     Cystocele     Disorder of bone and  cartilage, unspecified     osteopenia    Endometriosis, site unspecified     Essential hypertension, benign     Gastro-oesophageal reflux disease     Hiatal hernia     3 cm sliding hiatal hernia    Hypercalcemia     Hyperlipidemia     HZV (herpes zoster virus) post herpetic neuralgia     Microscopic colitis     Nonspecific abnormal results of liver function study     Mildly elevated transaminases, liver biopsy WNL    Peripheral neuropathy     Pulmonary nodule     Zinc deficiency          Past Surgical History:     Past Surgical History:   Procedure Laterality Date    CLOSED REDUCTION, PERCUTANEOUS PINNING HIP Right 2023    Procedure: CLOSED REDUCTION, HIP, WITH PERCUTANEOUS PINNING;  Surgeon: Bo Duke MD;  Location:  OR    COLONOSCOPY      HC DILATION/CURETTAGE DIAG/THER NON OB      miscarriages , 6 x    HC REMOVAL OF OVARIAN CYST(S)      partial oopherectomy    PHACOEMULSIFICATION CLEAR CORNEA WITH STANDARD INTRAOCULAR LENS IMPLANT Left 2015    Procedure: PHACOEMULSIFICATION CLEAR CORNEA WITH STANDARD INTRAOCULAR LENS IMPLANT;  Surgeon: Tyler Isaacs MD;  Location: [facility]    PHACOEMULSIFICATION CLEAR CORNEA WITH STANDARD INTRAOCULAR LENS IMPLANT Right 2015    Procedure: PHACOEMULSIFICATION CLEAR CORNEA WITH STANDARD INTRAOCULAR LENS IMPLANT;  Surgeon: Tyler Isaacs MD;  Location:  [facility]    ZZC NONSPECIFIC PROCEDURE       rectocoele and enterocoele repair    ZZC NONSPECIFIC PROCEDURE      cystocoele,repair    ZZC TOTAL ABDOM HYSTERECTOMY  1982    endometriosis with adhesions         Family Medical History:     Family History   Problem Relation Age of Onset    Cerebrovascular Disease Father          age 42    Cancer Mother         breast cancer,  age 63    Cancer Maternal Grandmother         breast cancer         Social History:     Social History     Socioeconomic History    Marital status:      Spouse name: Not on file    Number of  "children: 2    Years of education: Not on file    Highest education level: Not on file   Occupational History     Employer: HOMEMAKER   Tobacco Use    Smoking status: Passive Smoke Exposure - Never Smoker    Smokeless tobacco: Not on file    Tobacco comments:      smokes   Substance and Sexual Activity    Alcohol use: Yes     Comment: 3-4 glasses per week    Drug use: Not on file    Sexual activity: Not on file   Other Topics Concern    Not on file   Social History Narrative    Not on file     Social Determinants of Health     Financial Resource Strain: Not on file   Food Insecurity: Not on file   Transportation Needs: Not on file   Physical Activity: Not on file   Stress: Not on file   Social Connections: Not on file   Intimate Partner Violence: Not on file   Housing Stability: Not on file           Review of System:     Constitutional: Negative for fever or chills  Skin: Negative for rashes  Ears/Nose/Throat: Negative for nasal congestion, sore throat  Respiratory: No shortness of breath, dyspnea on exertion, cough, or hemoptysis  Cardiovascular: Negative for chest pain  Gastrointestinal: Negative for nausea, vomiting  Genitourinary: Negative for dysuria, hematuria  Musculoskeletal: positive for recent right femur neck fracture due to fall  Neurologic: Negative for headaches  Psychiatric: Negative for depression, anxiety  Hematologic/Lymphatic/Immunologic: Negative  Endocrine: positive for osteoporosis  Behavioral: Negative for tobacco use       Physical Exam:   /74 (BP Location: Right arm, Patient Position: Sitting, Cuff Size: Adult Regular)   Pulse 91   Temp 98  F (36.7  C) (Oral)   Resp 16   Ht 1.676 m (5' 6\")   Wt 53.5 kg (118 lb)   LMP  (LMP Unknown)   SpO2 98%   Breastfeeding No   BMI 19.05 kg/m      GENERAL: alert and no distress  EYES: eyes grossly normal to inspection, and conjunctivae and sclerae normal  HENT: Normocephalic atraumatic. Nose and mouth without ulcers or lesions  NECK: " supple  RESP: no cough present   CV: hemodynamically stable  LYMPH: no peripheral edema   ABDOMEN: nondistended  MS: positive for recent right femur neck fracture due to fall  SKIN: no suspicious lesions or rashes  NEURO: Alert & Oriented x 3.   PSYCH: mentation appears normal, affect normal        Diagnostic Test Results:     Diagnostic Test Results:  Labs reviewed in Epic    ASSESSMENT/PLAN:       Anna Marie was seen today for establish care, hypertension, recheck medication and refill request.    Diagnoses and all orders for this visit:    Encounter for medication refill  Essential hypertension  -     lisinopril (ZESTRIL) 20 MG tablet; Take 1 tablet (20 mg) by mouth every evening  -     amLODIPine (NORVASC) 5 MG tablet; Take 1 tablet (5 mg) by mouth every evening    Age-related osteoporosis with current pathological fracture with routine healing, subsequent encounter  Closed subcapital fracture of neck of right femur, sequela  - continue daily vitamin D  - continue ortho follow up going forward    Flu vaccine need  -     INFLUENZA VACCINE 65+ (FLUZONE HD)    Hyperlipidemia, unspecified hyperlipidemia type  - continue current therapy                Follow Up Plan:     Patient is instructed to return to Internal Medicine clinic for follow-up visit in 1 year.        Melissa Fairbanks MD  Internal Medicine  [facility]

## 2023-11-29 ENCOUNTER — APPOINTMENT (OUTPATIENT)
Dept: GENERAL RADIOLOGY | Facility: CLINIC | Age: 83
DRG: 184 | End: 2023-11-29
Attending: STUDENT IN AN ORGANIZED HEALTH CARE EDUCATION/TRAINING PROGRAM
Payer: MEDICARE

## 2023-11-29 ENCOUNTER — HOSPITAL ENCOUNTER (INPATIENT)
Facility: CLINIC | Age: 83
LOS: 4 days | Discharge: NURSING, REHAB OR LONG-TERM CARE | DRG: 184 | End: 2023-12-06
Attending: EMERGENCY MEDICINE | Admitting: HOSPITALIST
Payer: MEDICARE

## 2023-11-29 ENCOUNTER — APPOINTMENT (OUTPATIENT)
Dept: CT IMAGING | Facility: CLINIC | Age: 83
DRG: 184 | End: 2023-11-29
Attending: EMERGENCY MEDICINE
Payer: MEDICARE

## 2023-11-29 DIAGNOSIS — K29.70 GASTRITIS WITHOUT BLEEDING, UNSPECIFIED CHRONICITY, UNSPECIFIED GASTRITIS TYPE: ICD-10-CM

## 2023-11-29 DIAGNOSIS — E87.1 HYPONATREMIA: ICD-10-CM

## 2023-11-29 DIAGNOSIS — S32.020A COMPRESSION FRACTURE OF L2 VERTEBRA, INITIAL ENCOUNTER (H): Primary | ICD-10-CM

## 2023-11-29 DIAGNOSIS — S22.42XA CLOSED FRACTURE OF MULTIPLE RIBS OF LEFT SIDE, INITIAL ENCOUNTER: ICD-10-CM

## 2023-11-29 DIAGNOSIS — N39.0 URINARY TRACT INFECTION WITHOUT HEMATURIA, SITE UNSPECIFIED: ICD-10-CM

## 2023-11-29 DIAGNOSIS — K86.89 PANCREATIC DUCT DILATED: ICD-10-CM

## 2023-11-29 DIAGNOSIS — R10.12 LUQ ABDOMINAL PAIN: ICD-10-CM

## 2023-11-29 LAB
ALBUMIN SERPL BCG-MCNC: 3.9 G/DL (ref 3.5–5.2)
ALBUMIN UR-MCNC: 10 MG/DL
ALP SERPL-CCNC: 182 U/L (ref 40–150)
ALT SERPL W P-5'-P-CCNC: 21 U/L (ref 0–50)
ANION GAP SERPL CALCULATED.3IONS-SCNC: 14 MMOL/L (ref 7–15)
APPEARANCE UR: ABNORMAL
AST SERPL W P-5'-P-CCNC: 33 U/L (ref 0–45)
ATRIAL RATE - MUSE: 88 BPM
BACTERIA #/AREA URNS HPF: ABNORMAL /HPF
BASOPHILS # BLD AUTO: 0.1 10E3/UL (ref 0–0.2)
BASOPHILS NFR BLD AUTO: 1 %
BILIRUB DIRECT SERPL-MCNC: 0.23 MG/DL (ref 0–0.3)
BILIRUB SERPL-MCNC: 0.8 MG/DL
BILIRUB UR QL STRIP: NEGATIVE
BUN SERPL-MCNC: 11.3 MG/DL (ref 8–23)
CALCIUM SERPL-MCNC: 10 MG/DL (ref 8.8–10.2)
CHLORIDE SERPL-SCNC: 92 MMOL/L (ref 98–107)
COLOR UR AUTO: ABNORMAL
CREAT SERPL-MCNC: 0.9 MG/DL (ref 0.51–0.95)
DEPRECATED HCO3 PLAS-SCNC: 22 MMOL/L (ref 22–29)
DIASTOLIC BLOOD PRESSURE - MUSE: NORMAL MMHG
EGFRCR SERPLBLD CKD-EPI 2021: 63 ML/MIN/1.73M2
EOSINOPHIL # BLD AUTO: 0.1 10E3/UL (ref 0–0.7)
EOSINOPHIL NFR BLD AUTO: 1 %
ERYTHROCYTE [DISTWIDTH] IN BLOOD BY AUTOMATED COUNT: 13 % (ref 10–15)
GLUCOSE SERPL-MCNC: 94 MG/DL (ref 70–99)
GLUCOSE UR STRIP-MCNC: NEGATIVE MG/DL
HCT VFR BLD AUTO: 38.7 % (ref 35–47)
HGB BLD-MCNC: 13.4 G/DL (ref 11.7–15.7)
HGB UR QL STRIP: ABNORMAL
IMM GRANULOCYTES # BLD: 0 10E3/UL
IMM GRANULOCYTES NFR BLD: 0 %
INTERPRETATION ECG - MUSE: NORMAL
KETONES UR STRIP-MCNC: NEGATIVE MG/DL
LEUKOCYTE ESTERASE UR QL STRIP: ABNORMAL
LIPASE SERPL-CCNC: 22 U/L (ref 13–60)
LYMPHOCYTES # BLD AUTO: 1.9 10E3/UL (ref 0.8–5.3)
LYMPHOCYTES NFR BLD AUTO: 19 %
MCH RBC QN AUTO: 35.8 PG (ref 26.5–33)
MCHC RBC AUTO-ENTMCNC: 34.6 G/DL (ref 31.5–36.5)
MCV RBC AUTO: 104 FL (ref 78–100)
MONOCYTES # BLD AUTO: 0.9 10E3/UL (ref 0–1.3)
MONOCYTES NFR BLD AUTO: 10 %
NEUTROPHILS # BLD AUTO: 6.9 10E3/UL (ref 1.6–8.3)
NEUTROPHILS NFR BLD AUTO: 69 %
NITRATE UR QL: NEGATIVE
NRBC # BLD AUTO: 0 10E3/UL
NRBC BLD AUTO-RTO: 0 /100
P AXIS - MUSE: 69 DEGREES
PH UR STRIP: 6 [PH] (ref 5–7)
PLATELET # BLD AUTO: 309 10E3/UL (ref 150–450)
POTASSIUM SERPL-SCNC: 4.5 MMOL/L (ref 3.4–5.3)
PR INTERVAL - MUSE: 158 MS
PROT SERPL-MCNC: 6.5 G/DL (ref 6.4–8.3)
QRS DURATION - MUSE: 86 MS
QT - MUSE: 350 MS
QTC - MUSE: 423 MS
R AXIS - MUSE: 5 DEGREES
RBC # BLD AUTO: 3.74 10E6/UL (ref 3.8–5.2)
RBC URINE: 5 /HPF
SODIUM SERPL-SCNC: 128 MMOL/L (ref 135–145)
SP GR UR STRIP: 1.02 (ref 1–1.03)
SQUAMOUS EPITHELIAL: 5 /HPF
SYSTOLIC BLOOD PRESSURE - MUSE: NORMAL MMHG
T AXIS - MUSE: 41 DEGREES
TRANSITIONAL EPI: 1 /HPF
TROPONIN T SERPL HS-MCNC: 25 NG/L
TROPONIN T SERPL HS-MCNC: 32 NG/L
UROBILINOGEN UR STRIP-MCNC: NORMAL MG/DL
VENTRICULAR RATE- MUSE: 88 BPM
WBC # BLD AUTO: 9.9 10E3/UL (ref 4–11)
WBC CLUMPS #/AREA URNS HPF: PRESENT /HPF
WBC URINE: >182 /HPF
YEAST #/AREA URNS HPF: ABNORMAL /HPF

## 2023-11-29 PROCEDURE — 81001 URINALYSIS AUTO W/SCOPE: CPT | Performed by: STUDENT IN AN ORGANIZED HEALTH CARE EDUCATION/TRAINING PROGRAM

## 2023-11-29 PROCEDURE — 96366 THER/PROPH/DIAG IV INF ADDON: CPT

## 2023-11-29 PROCEDURE — 250N000011 HC RX IP 250 OP 636: Performed by: EMERGENCY MEDICINE

## 2023-11-29 PROCEDURE — 83690 ASSAY OF LIPASE: CPT | Performed by: EMERGENCY MEDICINE

## 2023-11-29 PROCEDURE — G0378 HOSPITAL OBSERVATION PER HR: HCPCS

## 2023-11-29 PROCEDURE — 87086 URINE CULTURE/COLONY COUNT: CPT | Performed by: STUDENT IN AN ORGANIZED HEALTH CARE EDUCATION/TRAINING PROGRAM

## 2023-11-29 PROCEDURE — 85025 COMPLETE CBC W/AUTO DIFF WBC: CPT | Performed by: STUDENT IN AN ORGANIZED HEALTH CARE EDUCATION/TRAINING PROGRAM

## 2023-11-29 PROCEDURE — 87106 FUNGI IDENTIFICATION YEAST: CPT | Performed by: STUDENT IN AN ORGANIZED HEALTH CARE EDUCATION/TRAINING PROGRAM

## 2023-11-29 PROCEDURE — 93005 ELECTROCARDIOGRAM TRACING: CPT

## 2023-11-29 PROCEDURE — 250N000011 HC RX IP 250 OP 636: Mod: JZ | Performed by: EMERGENCY MEDICINE

## 2023-11-29 PROCEDURE — 99223 1ST HOSP IP/OBS HIGH 75: CPT | Mod: AI | Performed by: HOSPITALIST

## 2023-11-29 PROCEDURE — 96365 THER/PROPH/DIAG IV INF INIT: CPT

## 2023-11-29 PROCEDURE — 36415 COLL VENOUS BLD VENIPUNCTURE: CPT | Performed by: EMERGENCY MEDICINE

## 2023-11-29 PROCEDURE — 74177 CT ABD & PELVIS W/CONTRAST: CPT | Mod: MG

## 2023-11-29 PROCEDURE — 82248 BILIRUBIN DIRECT: CPT | Performed by: EMERGENCY MEDICINE

## 2023-11-29 PROCEDURE — 84484 ASSAY OF TROPONIN QUANT: CPT | Performed by: EMERGENCY MEDICINE

## 2023-11-29 PROCEDURE — 84484 ASSAY OF TROPONIN QUANT: CPT | Performed by: STUDENT IN AN ORGANIZED HEALTH CARE EDUCATION/TRAINING PROGRAM

## 2023-11-29 PROCEDURE — 71046 X-RAY EXAM CHEST 2 VIEWS: CPT

## 2023-11-29 PROCEDURE — 250N000009 HC RX 250: Performed by: EMERGENCY MEDICINE

## 2023-11-29 PROCEDURE — 36415 COLL VENOUS BLD VENIPUNCTURE: CPT | Performed by: STUDENT IN AN ORGANIZED HEALTH CARE EDUCATION/TRAINING PROGRAM

## 2023-11-29 PROCEDURE — 99285 EMERGENCY DEPT VISIT HI MDM: CPT | Mod: 25

## 2023-11-29 PROCEDURE — G1010 CDSM STANSON: HCPCS

## 2023-11-29 PROCEDURE — 250N000013 HC RX MED GY IP 250 OP 250 PS 637: Performed by: HOSPITALIST

## 2023-11-29 PROCEDURE — 250N000013 HC RX MED GY IP 250 OP 250 PS 637: Performed by: EMERGENCY MEDICINE

## 2023-11-29 PROCEDURE — 80053 COMPREHEN METABOLIC PANEL: CPT | Performed by: STUDENT IN AN ORGANIZED HEALTH CARE EDUCATION/TRAINING PROGRAM

## 2023-11-29 RX ORDER — IOPAMIDOL 755 MG/ML
60 INJECTION, SOLUTION INTRAVASCULAR ONCE
Status: COMPLETED | OUTPATIENT
Start: 2023-11-29 | End: 2023-11-29

## 2023-11-29 RX ORDER — ACETAMINOPHEN 500 MG
1000 TABLET ORAL ONCE
Status: COMPLETED | OUTPATIENT
Start: 2023-11-29 | End: 2023-11-29

## 2023-11-29 RX ORDER — SODIUM CHLORIDE 9 MG/ML
INJECTION, SOLUTION INTRAVENOUS CONTINUOUS
Status: DISCONTINUED | OUTPATIENT
Start: 2023-11-29 | End: 2023-12-01

## 2023-11-29 RX ORDER — ACETAMINOPHEN 650 MG/1
650 SUPPOSITORY RECTAL EVERY 4 HOURS PRN
Status: DISCONTINUED | OUTPATIENT
Start: 2023-11-29 | End: 2023-12-06 | Stop reason: HOSPADM

## 2023-11-29 RX ORDER — CEFTRIAXONE 1 G/1
1 INJECTION, POWDER, FOR SOLUTION INTRAMUSCULAR; INTRAVENOUS ONCE
Status: COMPLETED | OUTPATIENT
Start: 2023-11-29 | End: 2023-11-29

## 2023-11-29 RX ORDER — ACETAMINOPHEN 325 MG/1
650 TABLET ORAL EVERY 4 HOURS PRN
Status: DISCONTINUED | OUTPATIENT
Start: 2023-11-29 | End: 2023-12-06 | Stop reason: HOSPADM

## 2023-11-29 RX ADMIN — ACETAMINOPHEN 1000 MG: 500 TABLET, FILM COATED ORAL at 18:53

## 2023-11-29 RX ADMIN — ACETAMINOPHEN 650 MG: 325 TABLET, FILM COATED ORAL at 23:13

## 2023-11-29 RX ADMIN — SODIUM CHLORIDE 60 ML: 9 INJECTION, SOLUTION INTRAVENOUS at 16:28

## 2023-11-29 RX ADMIN — IOPAMIDOL 60 ML: 755 INJECTION, SOLUTION INTRAVENOUS at 16:28

## 2023-11-29 RX ADMIN — CEFTRIAXONE SODIUM 1 G: 1 INJECTION, POWDER, FOR SOLUTION INTRAMUSCULAR; INTRAVENOUS at 21:30

## 2023-11-29 ASSESSMENT — ACTIVITIES OF DAILY LIVING (ADL)
ADLS_ACUITY_SCORE: 35

## 2023-11-29 NOTE — ED NOTES
"PIT/Triage Evaluation    Patient presented with L sided rib pain that began at 400 this morning. Patient woke up from the pain. She describes the pain as \"hard\". She reports being unable to lay on her side. No recent falls. She reports falling on R side in the past, but not L. Denies fever, shortness of breath, cough, nausea, vomiting, or diarrhea. No pain with urination. Last bowel movement was yesterday.     Exam is notable for:  Elderly female sitting up in wheelchair no acute distress. Tenderness to palpation over left chest wall, specifically ribs.  Do not appreciate significant abdominal tenderness to palpation.  Easy work of breathing, lungs clear to auscultation bilaterally.  Regular rate and rhythm. No rash.    Appropriate interventions for symptom management were initiated if applicable.  Appropriate diagnostic tests were initiated if indicated.    Important information for subsequent clinician:  Labs and x-ray ordered    I briefly evaluated the patient and developed an initial plan of care. I discussed this plan and explained that this brief interaction does not constitute a full evaluation. Patient/family understands that they should wait to be fully evaluated and discuss any test results with another clinician prior to leaving the hospital.       Lukas Sherwood MD  11/29/23 1315       Lukas Sherwood MD  11/29/23 1327    "

## 2023-11-29 NOTE — ED TRIAGE NOTES
Patient started to have some deep, sharp pain under her left ribs this morning. Patient denies any other symptoms.

## 2023-11-29 NOTE — ED PROVIDER NOTES
History     Chief Complaint:  Chest Pain     The history is provided by the patient.      Anna Marie Babb is a 83 year old female with a history of hypertension, hyperlipidemia, and GERD who presents to the emergency department for chest pain. The patient states that this morning at 0400, she was woke up from sleep due to left-sided chest pain. She reports that this chest pain is exacerbated my movement and breathing. She adds that she has experienced this pain for the past several days. Denies shortness of breath. She notes that she takes 2 ibuprofen per day for her chest pain. Denies taking blood thinners. Denies hx of DVT or PE. Denies leg pain or leg swelling. Denies any urinary symptoms or changes to bowel movements. Denies recent surgery but she reports that she had hip surgery after a fall a few years ago. She notes that she is on amlodipine and lisinopril.    Independent Historian:   None - Patient Only    Review of External Notes:       Medications:    Amlodipine  Lisinopril    Past Medical History:    Atrophic vaginitis  Celiac disease  Cystocele  Endometriosis  Hypertension  GERD  Hiatal hernia  Hypercalcemia  Hyperlipidemia  HZV  Microscopic colitis  Peripheral neuropathy  Pulmonary nodule  Zinc deficiency  Osteoporosis  Hyponatremia  Depressive disorder    Past Surgical History:    Closed reduction, percutaneous R hip pinning  D&C  Partial salpingo-oophorectomy  Phacoemulsification with IOL, bilateral  Rectocele and enterocele repair  Cystocele repair    Physical Exam   Patient Vitals for the past 24 hrs:   BP Temp Pulse Resp SpO2   11/29/23 1611 -- -- 63 12 98 %   11/29/23 1312 -- 97  F (36.1  C) -- -- --   11/29/23 1309 (!) 145/90 -- 90 16 98 %      Physical Exam  Vitals reviewed.   Cardiovascular:      Rate and Rhythm: Normal rate and regular rhythm.      Heart sounds: Normal heart sounds.   Pulmonary:      Effort: Pulmonary effort is normal.   Musculoskeletal:         General: Normal range of  motion.      Cervical back: Normal range of motion.   Skin:     General: Skin is warm.      Capillary Refill: Capillary refill takes less than 2 seconds.   Neurological:      General: No focal deficit present.      Mental Status: She is alert.   Psychiatric:         Mood and Affect: Mood normal.         Emergency Department Course   ECG  ECG results from 11/29/23   EKG 12-lead, tracing only     Value    Systolic Blood Pressure     Diastolic Blood Pressure     Ventricular Rate 88    Atrial Rate 88    TN Interval 158    QRS Duration 86        QTc 423    P Axis 69    R AXIS 5    T Axis 41    Interpretation ECG      Sinus rhythm with Premature atrial complexes with Aberrant conduction  Otherwise normal ECG  When compared with ECG of 25-AUG-2015 18:42,  Aberrant conduction is now Present  Confirmed by GENERATED REPORT, COMPUTER ([id number]),  Jamaal Cantu ([id number]) on 11/29/2023 1:19:42 PM       Imaging:  XR Chest 2 Views   Final Result   IMPRESSION: Stable size of cardiomediastinal silhouette. No focal   airspace consolidation, pleural effusion or pneumothorax.   Thoracolumbar scoliosis. No acute bony abnormality.      SYDNI LEE MD            SYSTEM ID:  [id number]      CT Chest (PE) Abdomen Pelvis w Contrast    (Results Pending)      Read by radiologist.    Laboratory:  Labs Ordered and Resulted from Time of ED Arrival to Time of ED Departure   BASIC METABOLIC PANEL - Abnormal       Result Value    Sodium 128 (*)     Potassium 4.5      Chloride 92 (*)     Carbon Dioxide (CO2) 22      Anion Gap 14      Urea Nitrogen 11.3      Creatinine 0.90      GFR Estimate 63      Calcium 10.0      Glucose 94     TROPONIN T, HIGH SENSITIVITY - Abnormal    Troponin T, High Sensitivity 32 (*)    CBC WITH PLATELETS AND DIFFERENTIAL - Abnormal    WBC Count 9.9      RBC Count 3.74 (*)     Hemoglobin 13.4      Hematocrit 38.7       (*)     MCH 35.8 (*)     MCHC 34.6      RDW 13.0      Platelet Count 309      % Neutrophils 69       % Lymphocytes 19      % Monocytes 10      % Eosinophils 1      % Basophils 1      % Immature Granulocytes 0      NRBCs per 100 WBC 0      Absolute Neutrophils 6.9      Absolute Lymphocytes 1.9      Absolute Monocytes 0.9      Absolute Eosinophils 0.1      Absolute Basophils 0.1      Absolute Immature Granulocytes 0.0      Absolute NRBCs 0.0     TROPONIN T, HIGH SENSITIVITY - Abnormal    Troponin T, High Sensitivity 25 (*)    ROUTINE UA WITH MICROSCOPIC REFLEX TO CULTURE      Emergency Department Course & Assessments:    Interventions:  Medications   iopamidol (ISOVUE-370) solution 60 mL (60 mLs Intravenous $Given 11/29/23 1628)   sodium chloride 0.9 % bag 500 mL for CT scan flush use (60 mLs Intravenous $Given 11/29/23 1628)      Assessments:  1457 I obtained history and examined the patient as noted above.     Independent Interpretation (X-rays, CTs, rhythm strip):      Consultations/Discussion of Management or Tests:      Social Determinants of Health affecting care:   None    Disposition:  The patient was signed out to  under the care of Dr. Crowe pending ct result        Impression & Plan      Medical Decision Making:  Patient presents with ongoing left-sided abdominal pain.  Cause which is unclear due to age CT imaging recommended labwork unremarkable.  Due to delays of care and delayed CT scan patient required signout and was handed off to the oncoming physician pending CT results patient may require admission due to severe pain pending results of imaging      Diagnosis:    ICD-10-CM    1. Compression fracture of L2 vertebra, initial encounter (H)  S32.020A X-ray lumbar spine 2-3 views*      2. LUQ abdominal pain  R10.12       3. Urinary tract infection without hematuria, site unspecified  N39.0       4. Pancreatic duct dilated  K86.89       5. Hyponatremia  E87.1       6. Closed fracture of multiple ribs of left side, initial encounter  S22.42XA gabapentin (NEURONTIN) 100 MG capsule     acetaminophen  (TYLENOL) 325 MG tablet     DISCONTINUED: Lidocaine (LIDOCARE) 4 % Patch     DISCONTINUED: oxyCODONE (ROXICODONE) 5 MG tablet      7. Gastritis without bleeding, unspecified chronicity, unspecified gastritis type  K29.70 DISCONTINUED: pantoprazole (PROTONIX) 40 MG EC tablet           Discharge Medications:  New Prescriptions    No medications on file      Scribe Disclosure:  I, Trevor Ferris, am serving as a scribe at 2:54 PM on 11/29/2023 to document services personally performed by Herrera Mcclelland MD based on my observations and the provider's statements to me.     11/29/2023   Herrera Mcclelland MD Goodman, Brian Samuel, MD  12/20/23 [id number]

## 2023-11-30 ENCOUNTER — APPOINTMENT (OUTPATIENT)
Dept: MRI IMAGING | Facility: CLINIC | Age: 83
DRG: 184 | End: 2023-11-30
Attending: PHYSICIAN ASSISTANT
Payer: MEDICARE

## 2023-11-30 LAB
ALBUMIN SERPL BCG-MCNC: 3.7 G/DL (ref 3.5–5.2)
ALP SERPL-CCNC: 173 U/L (ref 40–150)
ALT SERPL W P-5'-P-CCNC: 17 U/L (ref 0–50)
ANION GAP SERPL CALCULATED.3IONS-SCNC: 10 MMOL/L (ref 7–15)
AST SERPL W P-5'-P-CCNC: 23 U/L (ref 0–45)
BACTERIA UR CULT: ABNORMAL
BILIRUB SERPL-MCNC: 0.7 MG/DL
BUN SERPL-MCNC: 9.8 MG/DL (ref 8–23)
CALCIUM SERPL-MCNC: 9.2 MG/DL (ref 8.8–10.2)
CHLORIDE SERPL-SCNC: 94 MMOL/L (ref 98–107)
CREAT SERPL-MCNC: 0.63 MG/DL (ref 0.51–0.95)
DEPRECATED HCO3 PLAS-SCNC: 24 MMOL/L (ref 22–29)
EGFRCR SERPLBLD CKD-EPI 2021: 87 ML/MIN/1.73M2
ERYTHROCYTE [DISTWIDTH] IN BLOOD BY AUTOMATED COUNT: 13.1 % (ref 10–15)
GLUCOSE SERPL-MCNC: 82 MG/DL (ref 70–99)
HCT VFR BLD AUTO: 37 % (ref 35–47)
HGB BLD-MCNC: 12.6 G/DL (ref 11.7–15.7)
MCH RBC QN AUTO: 35.9 PG (ref 26.5–33)
MCHC RBC AUTO-ENTMCNC: 34.1 G/DL (ref 31.5–36.5)
MCV RBC AUTO: 105 FL (ref 78–100)
PLATELET # BLD AUTO: 272 10E3/UL (ref 150–450)
POTASSIUM SERPL-SCNC: 3.8 MMOL/L (ref 3.4–5.3)
PROT SERPL-MCNC: 6.3 G/DL (ref 6.4–8.3)
RBC # BLD AUTO: 3.51 10E6/UL (ref 3.8–5.2)
SODIUM SERPL-SCNC: 128 MMOL/L (ref 135–145)
WBC # BLD AUTO: 6.5 10E3/UL (ref 4–11)

## 2023-11-30 PROCEDURE — G0378 HOSPITAL OBSERVATION PER HR: HCPCS

## 2023-11-30 PROCEDURE — 96376 TX/PRO/DX INJ SAME DRUG ADON: CPT

## 2023-11-30 PROCEDURE — 99233 SBSQ HOSP IP/OBS HIGH 50: CPT

## 2023-11-30 PROCEDURE — 36415 COLL VENOUS BLD VENIPUNCTURE: CPT | Performed by: HOSPITALIST

## 2023-11-30 PROCEDURE — 255N000002 HC RX 255 OP 636: Performed by: HOSPITALIST

## 2023-11-30 PROCEDURE — 250N000013 HC RX MED GY IP 250 OP 250 PS 637

## 2023-11-30 PROCEDURE — 258N000003 HC RX IP 258 OP 636: Performed by: HOSPITALIST

## 2023-11-30 PROCEDURE — 250N000011 HC RX IP 250 OP 636: Mod: JZ | Performed by: HOSPITALIST

## 2023-11-30 PROCEDURE — 74183 MRI ABD W/O CNTR FLWD CNTR: CPT | Mod: MG

## 2023-11-30 PROCEDURE — A9585 GADOBUTROL INJECTION: HCPCS | Performed by: HOSPITALIST

## 2023-11-30 PROCEDURE — 82374 ASSAY BLOOD CARBON DIOXIDE: CPT | Performed by: HOSPITALIST

## 2023-11-30 PROCEDURE — 85027 COMPLETE CBC AUTOMATED: CPT | Performed by: HOSPITALIST

## 2023-11-30 RX ORDER — GADOBUTROL 604.72 MG/ML
5 INJECTION INTRAVENOUS ONCE
Status: COMPLETED | OUTPATIENT
Start: 2023-11-30 | End: 2023-11-30

## 2023-11-30 RX ORDER — NALOXONE HYDROCHLORIDE 0.4 MG/ML
0.2 INJECTION, SOLUTION INTRAMUSCULAR; INTRAVENOUS; SUBCUTANEOUS
Status: DISCONTINUED | OUTPATIENT
Start: 2023-11-30 | End: 2023-12-06 | Stop reason: HOSPADM

## 2023-11-30 RX ORDER — ONDANSETRON 4 MG/1
4 TABLET, ORALLY DISINTEGRATING ORAL EVERY 6 HOURS PRN
Status: DISCONTINUED | OUTPATIENT
Start: 2023-11-30 | End: 2023-12-06 | Stop reason: HOSPADM

## 2023-11-30 RX ORDER — AMOXICILLIN 250 MG
2 CAPSULE ORAL 2 TIMES DAILY PRN
Status: DISCONTINUED | OUTPATIENT
Start: 2023-11-30 | End: 2023-12-06 | Stop reason: HOSPADM

## 2023-11-30 RX ORDER — CEFTRIAXONE 1 G/1
1 INJECTION, POWDER, FOR SOLUTION INTRAMUSCULAR; INTRAVENOUS EVERY 24 HOURS
Status: DISCONTINUED | OUTPATIENT
Start: 2023-11-30 | End: 2023-12-01

## 2023-11-30 RX ORDER — NALOXONE HYDROCHLORIDE 0.4 MG/ML
0.4 INJECTION, SOLUTION INTRAMUSCULAR; INTRAVENOUS; SUBCUTANEOUS
Status: DISCONTINUED | OUTPATIENT
Start: 2023-11-30 | End: 2023-12-06 | Stop reason: HOSPADM

## 2023-11-30 RX ORDER — LIDOCAINE 40 MG/G
CREAM TOPICAL
Status: DISCONTINUED | OUTPATIENT
Start: 2023-11-30 | End: 2023-12-06 | Stop reason: HOSPADM

## 2023-11-30 RX ORDER — PROCHLORPERAZINE 25 MG
12.5 SUPPOSITORY, RECTAL RECTAL EVERY 12 HOURS PRN
Status: DISCONTINUED | OUTPATIENT
Start: 2023-11-30 | End: 2023-12-06 | Stop reason: HOSPADM

## 2023-11-30 RX ORDER — GADOBUTROL 604.72 MG/ML
5 INJECTION INTRAVENOUS ONCE
Status: DISCONTINUED | OUTPATIENT
Start: 2023-11-30 | End: 2023-11-30

## 2023-11-30 RX ORDER — PROCHLORPERAZINE MALEATE 5 MG
5 TABLET ORAL EVERY 6 HOURS PRN
Status: DISCONTINUED | OUTPATIENT
Start: 2023-11-30 | End: 2023-12-06 | Stop reason: HOSPADM

## 2023-11-30 RX ORDER — AMOXICILLIN 250 MG
1 CAPSULE ORAL 2 TIMES DAILY PRN
Status: DISCONTINUED | OUTPATIENT
Start: 2023-11-30 | End: 2023-12-06 | Stop reason: HOSPADM

## 2023-11-30 RX ORDER — HYDROMORPHONE HCL IN WATER/PF 6 MG/30 ML
.2-.3 PATIENT CONTROLLED ANALGESIA SYRINGE INTRAVENOUS
Status: DISCONTINUED | OUTPATIENT
Start: 2023-11-30 | End: 2023-12-06 | Stop reason: HOSPADM

## 2023-11-30 RX ORDER — AMLODIPINE BESYLATE 5 MG/1
5 TABLET ORAL EVERY EVENING
Status: DISCONTINUED | OUTPATIENT
Start: 2023-11-30 | End: 2023-12-06 | Stop reason: HOSPADM

## 2023-11-30 RX ORDER — ONDANSETRON 2 MG/ML
4 INJECTION INTRAMUSCULAR; INTRAVENOUS EVERY 6 HOURS PRN
Status: DISCONTINUED | OUTPATIENT
Start: 2023-11-30 | End: 2023-12-06 | Stop reason: HOSPADM

## 2023-11-30 RX ADMIN — OXYCODONE HYDROCHLORIDE 2.5 MG: 5 TABLET ORAL at 17:54

## 2023-11-30 RX ADMIN — GADOBUTROL 5 ML: 604.72 INJECTION INTRAVENOUS at 15:11

## 2023-11-30 RX ADMIN — CEFTRIAXONE SODIUM 1 G: 1 INJECTION, POWDER, FOR SOLUTION INTRAMUSCULAR; INTRAVENOUS at 20:57

## 2023-11-30 RX ADMIN — SODIUM CHLORIDE: 9 INJECTION, SOLUTION INTRAVENOUS at 00:20

## 2023-11-30 ASSESSMENT — ACTIVITIES OF DAILY LIVING (ADL)
ADLS_ACUITY_SCORE: 43
ADLS_ACUITY_SCORE: 39
ADLS_ACUITY_SCORE: 39
ADLS_ACUITY_SCORE: 43
ADLS_ACUITY_SCORE: 35
ADLS_ACUITY_SCORE: 39
ADLS_ACUITY_SCORE: 39
ADLS_ACUITY_SCORE: 35
ADLS_ACUITY_SCORE: 43
ADLS_ACUITY_SCORE: 39

## 2023-11-30 NOTE — PROGRESS NOTES
Orientation: A&O x4; forgetful  Aggression Stop Light: Green  Mobility: Assit of 1 with GB/W  Pain Management: PRN oxycodone   Diet: Clear liquid  Bowel/Bladder: Continent   Abnormal Lab/Assessments: Sodium 128  Drain/Device/Wound: N/A  Consults: GI  D/C Day/Goals/Place: TBD   MRCP done today.

## 2023-11-30 NOTE — ED NOTES
[facility]    ED Nurse Handoff Report    ED Chief complaint: Chest Pain      ED Diagnosis:   Final diagnoses:   LUQ abdominal pain   Urinary tract infection without hematuria, site unspecified   Pancreatic duct dilated       Code Status: Full Code    Allergies:   Allergies   Allergen Reactions    Amoxicillin Diarrhea    Amoxicillin-Pot Clavulanate     Ciprofloxacin     Gluten Meal      celiac    Lactose Intolerance (Gi) Diarrhea    Milk Protein        Patient Story:  Pt comes to ER with left sided upper abdomen/lower rib pain. Denies SOB or chest pain.     Focused Assessment:    Pt is A&Ox4 however a little forgetful, repeats herself. Pt found to have UTI, given ABX, denies UTI symptoms. Pt walks with a walker, denies dizziness. Pt is gluten free and dairy free.     Labs Ordered and Resulted from Time of ED Arrival to Time of ED Departure   BASIC METABOLIC PANEL - Abnormal       Result Value    Sodium 128 (*)     Potassium 4.5      Chloride 92 (*)     Carbon Dioxide (CO2) 22      Anion Gap 14      Urea Nitrogen 11.3      Creatinine 0.90      GFR Estimate 63      Calcium 10.0      Glucose 94     TROPONIN T, HIGH SENSITIVITY - Abnormal    Troponin T, High Sensitivity 32 (*)    ROUTINE UA WITH MICROSCOPIC REFLEX TO CULTURE - Abnormal    Color Urine Straw      Appearance Urine Cloudy (*)     Glucose Urine Negative      Bilirubin Urine Negative      Ketones Urine Negative      Specific Gravity Urine 1.016      Blood Urine Small (*)     pH Urine 6.0      Protein Albumin Urine 10 (*)     Urobilinogen Urine Normal      Nitrite Urine Negative      Leukocyte Esterase Urine Large (*)     Bacteria Urine Few (*)     WBC Clumps Urine Present (*)     Budding Yeast Urine Few (*)     RBC Urine 5 (*)     WBC Urine >182 (*)     Squamous Epithelials Urine 5 (*)     Transitional Epithelials Urine 1     CBC WITH PLATELETS AND DIFFERENTIAL - Abnormal    WBC Count 9.9      RBC Count 3.74 (*)     Hemoglobin 13.4       Hematocrit 38.7       (*)     MCH 35.8 (*)     MCHC 34.6      RDW 13.0      Platelet Count 309      % Neutrophils 69      % Lymphocytes 19      % Monocytes 10      % Eosinophils 1      % Basophils 1      % Immature Granulocytes 0      NRBCs per 100 WBC 0      Absolute Neutrophils 6.9      Absolute Lymphocytes 1.9      Absolute Monocytes 0.9      Absolute Eosinophils 0.1      Absolute Basophils 0.1      Absolute Immature Granulocytes 0.0      Absolute NRBCs 0.0     TROPONIN T, HIGH SENSITIVITY - Abnormal    Troponin T, High Sensitivity 25 (*)    HEPATIC FUNCTION PANEL - Abnormal    Protein Total 6.5      Albumin 3.9      Bilirubin Total 0.8      Alkaline Phosphatase 182 (*)     AST 33      ALT 21      Bilirubin Direct 0.23     LIPASE - Normal    Lipase 22     URINE CULTURE       CT Chest (PE) Abdomen Pelvis w Contrast   Final Result   IMPRESSION:   1.  No evidence of pulmonary embolus.   2.  New pancreatic ductal dilatation. Pancreatic MRI with and without IV contrast and/or GI consultation recommended to exclude ampullary lesion/stricture.   3.  Thick-walled bladder may be secondary to neurogenic bladder or cystitis. Clinical correlation advised.   4.  Moderate hiatal hernia.      XR Chest 2 Views   Final Result   IMPRESSION: Stable size of cardiomediastinal silhouette. No focal   airspace consolidation, pleural effusion or pneumothorax.   Thoracolumbar scoliosis. No acute bony abnormality.      SYDNI LEE MD            SYSTEM ID:  [id number]            Treatments and/or interventions provided:      Medications   iopamidol (ISOVUE-370) solution 60 mL (60 mLs Intravenous $Given 11/29/23 1628)   sodium chloride 0.9 % bag 500 mL for CT scan flush use (60 mLs Intravenous $Given 11/29/23 1628)   acetaminophen (TYLENOL) tablet 1,000 mg (1,000 mg Oral $Given 11/29/23 4583)   cefTRIAXone (ROCEPHIN) 1 g vial to attach to  mL bag for ADULTS or NS 50 mL bag for PEDS (0 g Intravenous Stopped 11/29/23 2205)        Patient's response to treatments and/or interventions:    Pt continues to have pain on left side    To be done/followed up on inpatient unit:   See any in-patient orders    Does this patient have any cognitive concerns?: Forgetful    Activity level - Baseline/Home:    Walker    Activity Level - Current:    Walker    Patient's Preferred language: English     Needed?: No    Isolation: None  Infection: Not Applicable  Patient tested for COVID 19 prior to admission: NO    Bariatric?: No    Vital Signs:   Vitals:    11/29/23 1718 11/29/23 1723 11/29/23 2100 11/29/23 2133   BP:   (!) 154/107 123/72   Pulse: 73  72 74   Resp: 25 11     Temp:       SpO2: 99% 97% 99%        Cardiac Rhythm:     Was the PSS-3 completed:   Yes  What interventions are required if any?               Family Comments: none at [facility]. Son works here at [facility].   OBS brochure/video discussed/provided to patient/family: N/A              Name of person given brochure if not patient:               Relationship to patient:     For the majority of the shift this patient's behavior was Green.  Behavioral interventions performed were .    ED NURSE PHONE NUMBER: *[phone]

## 2023-11-30 NOTE — ED PROVIDER NOTES
Patient signed out to me by Dr. Mcclelland pending CT and urinalysis.  In short, patient presented with left-sided lower chest pain.  On reexamination, there is no evidence of shingles.  She seems to have focal left upper quadrant abdominal tenderness.  The CT suggests pancreatic ductal dilatation which could certainly explain her pain.  LFTs and lipase are negative ruling out more distal obstruction and pancreatitis.  Patient also does have evidence of UTI, though she denies any symptoms.  This could be colonization, but we will treat with ceftriaxone.  With ongoing pain and possible pancreatic cause, we will plan on admission for GI consultation and consideration of MRCP/ERCP. Case discussed with Dr. Morales who accepts pt for admission.     CT Chest (PE) Abdomen Pelvis w Contrast   Final Result   IMPRESSION:   1.  No evidence of pulmonary embolus.   2.  New pancreatic ductal dilatation. Pancreatic MRI with and without IV contrast and/or GI consultation recommended to exclude ampullary lesion/stricture.   3.  Thick-walled bladder may be secondary to neurogenic bladder or cystitis. Clinical correlation advised.   4.  Moderate hiatal hernia.      XR Chest 2 Views   Final Result   IMPRESSION: Stable size of cardiomediastinal silhouette. No focal   airspace consolidation, pleural effusion or pneumothorax.   Thoracolumbar scoliosis. No acute bony abnormality.      SYDNI LEE MD            SYSTEM ID:  [id number]             Maurice Rivera MD  11/30/23 0119

## 2023-11-30 NOTE — PROGRESS NOTES
RECEIVING UNIT ED HANDOFF REVIEW    ED Nurse Handoff Report was reviewed by: Meagan Cespedes RN on November 30, 2023 at 12:34 AM

## 2023-11-30 NOTE — PHARMACY-ADMISSION MEDICATION HISTORY
Pharmacist Admission Medication History    Admission medication history is complete. The information provided in this note is only as accurate as the sources available at the time of the update.    Information Source(s): Patient and CareEverywhere/SureScripts via in-person    Pertinent Information:     Changes made to PTA medication list:  Added: anti-diarrheal  Deleted: tylenol, lidocaine patch, multivitamin, senokot-s  Changed: None    Allergies reviewed with patient and updates made in EHR: no    Medication History Completed By: Akil Aliheyder, RPH 11/29/2023 9:10 PM    PTA Med List   Medication Sig Last Dose    amLODIPine (NORVASC) 5 MG tablet Take 1 tablet (5 mg) by mouth every evening 11/28/2023    lisinopril (ZESTRIL) 20 MG tablet Take 1 tablet (20 mg) by mouth every evening 11/28/2023    Loperamide HCl (ANTI-DIARRHEAL PO) Take 1 tablet by mouth daily Takes anti-diarrheal daily, pt not sure its exact name but probably loperamide

## 2023-11-30 NOTE — UTILIZATION REVIEW
Concurrent stay review; Secondary Review Determination     [facility]        Under the authority of the Utilization Management Committee, the utilization review process indicated a secondary review on the above patient.  The review outcome is based on review of the medical records, discussions with staff, and applying clinical experience noted on the date of the review.          (x) Observation Status Appropriate - Concurrent stay review    RATIONALE FOR DETERMINATION       The patient is a 83-year-old woman with past medical history significant for HTN, HLip, GERD, endometriosis, collagenous colitis and recent right hip fracture presents to emergency room complaining of left upper quadrant abdominal pain for the last few days.    In emergency room, UA shows signs of UTI and the patient was started on IV Rocephin daily.   The CT showed: New pancreatic ductal dilatation. Pancreatic MRI with and without IV contrast and/or GI consultation recommended to exclude ampullary lesion/stricture. Thick-walled bladder may be secondary to neurogenic bladder or cystitis.  Moderate hiatal hernia.  Gastrointestinal consult thought that the presenting pain is a musculoskeletal pain related with prior falls and L2 compression fracture. GI recommended MRCP for the pancreatic ductal dilatation    83-year-old woman admitted for left upper quadrant pain which is interpreted as musculoskeletal by gastroenterology was found with urinary tract infection and new pancreatic ductal dilatation with mild elevated alkaline phosphatase.  The patient does not have nausea, vomiting, diarrhea, mucus, decreased appetite or blood in stools.   The patient does not have fever, does not have leukocytosis.  Vital signs have been acceptable range.  The patient has chronic stable hyponatremia at 128.    Patient is clinically improving and there is no clear indication to change patient's status to inpatient. The severity of illness,  intensity of service provided, expected LOS and risk for adverse outcome make the care appropriate for observation.      This document was produced using voice recognition software       The information on this document is developed by the utilization review team in order for the business office to ensure compliance.  This only denotes the appropriateness of proper admission status and does not reflect the quality of care rendered.         The definitions of Inpatient Status and Observation Status used in making the determination above are those provided in the CMS Coverage Manual, Chapter 1 and Chapter 6, section 70.4.      Sincerely,     DANIEL RUSSELL MD   Utilization Review  Physician Advisor  [facility]

## 2023-11-30 NOTE — H&P
[facility]    History and Physical - Hospitalist Service       Date of Admission:  11/29/2023    Assessment & Plan      Anna Marie Babb is a 83 year old female with a history of hypertension, hyperlipidemia, GERD, endometriosis, collagenous colitis, and recent right hip fracture who presented to the ER with left upper quadrant abdominal pain which started within the past few days. Evaluation in the ER was concerning for a UTI and dilated pancreatic ducts. She was started on rocephin and the hosptialist service was contacted to bring her into the hospital for further evaluation and management.    Left upper quadrant abdominal pain  Pancreatic ductal dilatation  New onset left upper quadrant abdominal pain, although history somewhat vague. CT abdomen/pelvis showed new pancreatic ductal dilatation, this was not seen on prior CT in 2019. Alk phos mildly elevated, other LFTs within normal limits. Lipase normal.  [place] to observation.  Etiology of pain not entirely clear.  Symptom management.  Recheck labs in AM.  Consult GI, appreciate their assistance.  Consider ERCP vs MRCP vs other pending GI evaluation.    Suspected UTI  CT showed thickened bladder wall, possibly secondary to neurogenic bladder or cystitis. She denies any urinary symptoms. UA abnormal. Urine culture pending.  Started on empiric rocephin in the ER, will continue the same for now while awaiting urine culture results.    Elevated troponin  Troponin 32 initially in the ER, then 25 on recheck. EKG did not have any obvious acute ischemic changes. CT chest negative for PE, no other obvious acute cardiopulmonary abnormality noted, she does have severe coronary artery calcification.  Suspicion for cardiac etiology of her symptoms is low.  No further work-up planned at this time, can reassess pending clinical course.    Hyponatremia  Suspect hypovolemic.  IV fluids.  Recheck in AM.    Hypertension  Blood pressure initially  elevated in the ER, improved now.  Continue PTA amlodipine with hold parameters.  Hold PTA lisinopril for now, reassess in AM.    Recent right hip fracture  S/p percutaneous screw fixation on 8/8/23 by Dr. Duke.  Continue outpatient follow-up with Orthopedic Surgery as previously directed.    L2 compression fracture  CT chest/abdomen/pelvis showed likely acute/subacute L2 compression fracture as well as likely chronic T9 compression fracture. Current symptoms do not appear to correlate with these findings.  Symptomatic management if needed.    Pulmonary nodule  CT chest in the ER showed a pulmonary nodule in the left lower lobe. This has been noted on prior imaging going back to at least 2012.  No further evaluation needed.          Diet: NPO for Medical/Clinical Reasons Except for: Meds    DVT Prophylaxis: Pneumatic Compression Devices  Lombardi Catheter: Not present  Lines: None     Cardiac Monitoring: None  Code Status:  FULL CODE    Clinically Significant Risk Factors Present on Admission         # Hyponatremia: Lowest Na = 128 mmol/L in last 2 days, will monitor as appropriate          # Hypertension: Noted on problem list                 Disposition Plan      Expected Discharge Date: 11/30/2023                  Sanchez Morales MD  Hospitalist Service  [facility]  Securely message with [employer] (more info)  Text page via [employer] Paging/Directory     ______________________________________________________________________    Chief Complaint   Left upper quadrant abdominal pain    History is obtained from the patient    History of Present Illness   Anna Marie Babb is a 83 year old female with a history of hypertension, hyperlipidemia, GERD, endometriosis, collagenous colitis, and recent right hip fracture who presented to the ER with left upper quadrant abdominal pain which started within the past few days.  She developed some pain in her left upper abdomen/under her left lower ribs  over the past few days.  States she has been taking some ibuprofen without any relief.  No associated nausea or vomiting.  Had a bowel movement yesterday, denies any diarrhea.  No fevers, chills, sweats, chest pain, shortness of breath.  Due to the ongoing pain she presented to the ER for further evaluation and management.    In the ER she was evaluated by Dr. Mcclelland and then Dr. Rivera. She was mildly hypertensive, other vitals OK.  Labs showed mild hyponatremia, slightly elevated alk phos, slightly elevated troponin.  EKG showed sinus rhythm with no acute ischemic changes.  Chest x-ray showed no obvious acute cardiopulmonary disease.  CT chest/abdomen/pelvis was negative for PE, did show new pancreatic ductal dilatation and thick-walled bladder.  UA was obtained and was abnormal.  She was started on IV Rocephin.  The hospitalist service was contacted to bring her into the hospital for further evaluation and management.    Past Medical History    Past Medical History:   Diagnosis Date    Atrophic vaginitis     Celiac disease     Cystocele     Disorder of bone and cartilage, unspecified     osteopenia    Endometriosis, site unspecified     Essential hypertension, benign     Gastro-oesophageal reflux disease     Hiatal hernia     3 cm sliding hiatal hernia    Hypercalcemia     Hyperlipidemia     HZV (herpes zoster virus) post herpetic neuralgia     Microscopic colitis     Nonspecific abnormal results of liver function study     Mildly elevated transaminases, liver biopsy WNL    Peripheral neuropathy     Pulmonary nodule     Zinc deficiency      Past Surgical History   Past Surgical History:   Procedure Laterality Date    CLOSED REDUCTION, PERCUTANEOUS PINNING HIP Right 8/8/2023    Procedure: CLOSED REDUCTION, HIP, WITH PERCUTANEOUS PINNING;  Surgeon: Bo Duke MD;  Location:  OR    COLONOSCOPY  2011    HC DILATION/CURETTAGE DIAG/THER NON OB      miscarriages , 6 x    HC REMOVAL OF OVARIAN CYST(S)   1977    partial oopherectomy    PHACOEMULSIFICATION CLEAR CORNEA WITH STANDARD INTRAOCULAR LENS IMPLANT Left 8/13/2015    Procedure: PHACOEMULSIFICATION CLEAR CORNEA WITH STANDARD INTRAOCULAR LENS IMPLANT;  Surgeon: Tyler Isaacs MD;  [facility]    PHACOEMULSIFICATION CLEAR CORNEA WITH STANDARD INTRAOCULAR LENS IMPLANT Right 8/25/2015    Procedure: PHACOEMULSIFICATION CLEAR CORNEA WITH STANDARD INTRAOCULAR LENS IMPLANT;  Surgeon: Tyler Isaacs MD;  Location:  [facility]    ZZC NONSPECIFIC PROCEDURE       rectocoele and enterocoele repair    ZZC NONSPECIFIC PROCEDURE      cystocoele,repair    ZZC TOTAL ABDOM HYSTERECTOMY  1982    endometriosis with adhesions     Prior to Admission Medications   Prior to Admission Medications   Prescriptions Last Dose Informant Patient Reported? Taking?   Loperamide HCl (ANTI-DIARRHEAL PO)   Yes Yes   Sig: Take 1 tablet by mouth daily Takes anti-diarrheal daily, pt not sure its exact name but probably loperamide   amLODIPine (NORVASC) 5 MG tablet 11/28/2023  No Yes   Sig: Take 1 tablet (5 mg) by mouth every evening   lisinopril (ZESTRIL) 20 MG tablet 11/28/2023  No Yes   Sig: Take 1 tablet (20 mg) by mouth every evening   vitamin D3 (CHOLECALCIFEROL) 50 mcg (2000 units) tablet Not Taking  No No   Sig: Take 1 tablet (50 mcg) by mouth daily   Patient not taking: Reported on 11/29/2023      Facility-Administered Medications: None        Review of Systems    The 10 point Review of Systems is negative other than noted in the HPI or here.    Social History   I have reviewed this patient's social history and updated it with pertinent information if needed.  Social History     Tobacco Use    Smoking status: Never     Passive exposure: Yes    Tobacco comments:      smokes   Vaping Use    Vaping Use: Never used   Substance Use Topics    Alcohol use: Yes     Comment: 3-4 glasses per week    Drug use: Never     Family History   I have reviewed this patient's family  history and updated it with pertinent information if needed.  Family History   Problem Relation Age of Onset    Cerebrovascular Disease Father          age 42    Cancer Mother         breast cancer,  age 63    Cancer Maternal Grandmother         breast cancer     Allergies   Allergies   Allergen Reactions    Amoxicillin Diarrhea    Amoxicillin-Pot Clavulanate     Ciprofloxacin     Gluten Meal      celiac    Lactose Intolerance (Gi) Diarrhea    Milk Protein         Physical Exam   Vital Signs: Temp: 97  F (36.1  C)   BP: (!) 154/107 Pulse: 72   Resp: 11 SpO2: 99 % O2 Device: None (Room air)    Weight: 0 lbs 0 oz    Constitutional: awake, alert, cooperative, no apparent distress, laying in the ER bed  Respiratory: no increased work of breathing, clear to auscultation bilaterally, no crackles or wheezing  Cardiovascular: regular rate and rhythm, normal S1 and S2, no murmur noted  GI: normal bowel sounds, soft, non-distended, mild LUQ tenderness  Skin: warm, dry  Musculoskeletal: no lower extremity pitting edema present  Neurologic: awake, alert, answers questions appropriately but occasionally repeats things seemingly forgetting that we had just discussed minutes earlier, moves all extremities    Medical Decision Making       80 MINUTES SPENT BY ME on the date of service doing chart review, history, exam, documentation & further activities per the note.      Data     I have personally reviewed the following data over the past 24 hrs:    9.9  \   13.4   / 309     128 (L) 92 (L) 11.3 /  94   4.5 22 0.90 \     ALT: 21 AST: 33 AP: 182 (H) TBILI: 0.8   ALB: 3.9 TOT PROTEIN: 6.5 LIPASE: 22     Trop: 25 (H) BNP: N/A       Imaging results reviewed over the past 24 hrs:   Recent Results (from the past 24 hour(s))   XR Chest 2 Views    Narrative    XR CHEST 2 VIEWS   2023 1:42 PM     HISTORY: left sided chest pain    COMPARISON: Chest radiograph 2015.      Impression    IMPRESSION: Stable size of  cardiomediastinal silhouette. No focal  airspace consolidation, pleural effusion or pneumothorax.  Thoracolumbar scoliosis. No acute bony abnormality.    SYDNI LEE MD         SYSTEM ID:  [id number]   CT Chest (PE) Abdomen Pelvis w Contrast    Narrative    EXAM: CT CHEST PE ABDOMEN PELVIS W CONTRAST  LOCATION: [facility]  DATE: 11/29/2023    INDICATION: LEFT FLANK PAIN, EVAL FOR PE, EVAL FOR SPLENIC INFARCT, COLITIS COMPLICATIONS  COMPARISON: 02/11/2004 abdominal and pelvic CT  TECHNIQUE: CT chest pulmonary angiogram and routine CT abdomen pelvis with IV contrast. Arterial phase through the chest and venous phase through the abdomen and pelvis. Multiplanar reformats and MIP reconstructions were performed. Dose reduction   techniques were used.   CONTRAST: 60mL Isovue 370    FINDINGS:  ANGIOGRAM CHEST: Pulmonary arteries are normal caliber and negative for pulmonary emboli. Thoracic aorta is negative for dissection. No CT evidence of right heart strain.     LUNGS AND PLEURA: 8 mm ill-defined nodule in the left lower lobe image 57, not imaged previously. This appears centrally calcified on delayed images, however, likely benign. No lobar consolidation, effusion, or pneumothorax.    MEDIASTINUM/AXILLAE: Moderately large hiatal hernia. No significant mediastinal adenopathy. Moderate cardiomegaly.    CORONARY ARTERY CALCIFICATION: Severe.    HEPATOBILIARY: Mild hepatic steatosis. No biliary dilatation.    PANCREAS: Generalized mild pancreatic ductal dilatation reaching 6 mm, new in the interval.    SPLEEN: Unremarkable    ADRENAL GLANDS: Unremarkable    KIDNEYS/BLADDER: No hydronephrosis. Thick-walled bladder despite significant distention. Moderate cystocele.    BOWEL: No bowel obstruction.    LYMPH NODES: No significant retroperitoneal adenopathy    VASCULATURE: Significant atherosclerotic calcification. No abdominal aortic aneurysm.    PELVIC ORGANS: Hysterectomy. No free  fluid.    MUSCULOSKELETAL: L2 compression fracture is favored acute or subacute. Correlate for site tenderness. T9 greater than 50% anterior compression fracture is favored chronic.       Impression    IMPRESSION:  1.  No evidence of pulmonary embolus.  2.  New pancreatic ductal dilatation. Pancreatic MRI with and without IV contrast and/or GI consultation recommended to exclude ampullary lesion/stricture.  3.  Thick-walled bladder may be secondary to neurogenic bladder or cystitis. Clinical correlation advised.  4.  Moderate hiatal hernia.

## 2023-11-30 NOTE — PROGRESS NOTES
Pt A&Ox4 (intermittent forgetfulness observed). Pt denies pain during shift. CMS intact. VSS on RA. NPO for consult. PIV infusing NS @75 Ml/hr. Pt up w/ 1 walker and gait belt to void in bathroom.

## 2023-11-30 NOTE — CONSULTS
[facility]  Gastroenterology Consultation         Anna Marie Babb  [address]  [place] [address]  83 year old female    Admission Date/Time: 11/29/2023  Primary Care Provider: Chema Ramos  Referring / Attending Physician:  Dr. Morales    We were asked to see the patient in consultation by Dr. ALFREDA Morales for evaluation of dilated pancreatic duct.      CC: LUQ pain    HPI:  Anna Marie Babb is a 83 year old female who a past medical history of hypertension, hyperlipidemia, GERD, endometriosis, collagenous colitis, and recent right hip fracture who presented to the ER with left upper quadrant abdominal pain that began 2 days prior to presentation to ED. She has had no nausea, vomiting, diarrhea, pain with eating, decreased appetite, or blood in stool. Has had a fall 3 months ago but nothing recently. Pain is worse with movement. Has mild heartburn but no other upper GI symptoms.    In ED underwent CT noting thickened bladder wall and being treated for UTI. ALso noted to have dilated pancreatic duct new since prior imaging. Also has midlly elevated ALP.    ROS: A comprehensive ten point review of systems was negative aside from those in mentioned in the HPI.      PAST MED HX:  I have reviewed this patient's medical history and updated it with pertinent information if needed.   Past Medical History:   Diagnosis Date    Atrophic vaginitis     Celiac disease     Cystocele     Disorder of bone and cartilage, unspecified     osteopenia    Endometriosis, site unspecified     Essential hypertension, benign     Gastro-oesophageal reflux disease     Hiatal hernia     3 cm sliding hiatal hernia    Hypercalcemia     Hyperlipidemia     HZV (herpes zoster virus) post herpetic neuralgia     Microscopic colitis     Nonspecific abnormal results of liver function study     Mildly elevated transaminases, liver biopsy WNL    Peripheral neuropathy     Pulmonary nodule     Zinc deficiency        MEDICATIONS:    Prior to Admission Medications   Prescriptions Last Dose Informant Patient Reported? Taking?   Loperamide HCl (ANTI-DIARRHEAL PO)   Yes Yes   Sig: Take 1 tablet by mouth daily Takes anti-diarrheal daily, pt not sure its exact name but probably loperamide   amLODIPine (NORVASC) 5 MG tablet 2023  No Yes   Sig: Take 1 tablet (5 mg) by mouth every evening   lisinopril (ZESTRIL) 20 MG tablet 2023  No Yes   Sig: Take 1 tablet (20 mg) by mouth every evening   vitamin D3 (CHOLECALCIFEROL) 50 mcg (2000 units) tablet Not Taking  No No   Sig: Take 1 tablet (50 mcg) by mouth daily   Patient not taking: Reported on 2023      Facility-Administered Medications: None       ALLERGIES:   Allergies   Allergen Reactions    Amoxicillin Diarrhea    Amoxicillin-Pot Clavulanate     Ciprofloxacin     Gluten Meal      celiac    Lactose Intolerance (Gi) Diarrhea    Milk Protein        SOCIAL HISTORY:  Social History     Tobacco Use    Smoking status: Never     Passive exposure: Yes    Tobacco comments:      smokes   Vaping Use    Vaping Use: Never used   Substance Use Topics    Alcohol use: Yes     Comment: 3-4 glasses per week    Drug use: Never       FAMILY HISTORY:  Family History   Problem Relation Age of Onset    Cerebrovascular Disease Father          age 42    Cancer Mother         breast cancer,  age 63    Cancer Maternal Grandmother         breast cancer       PHYSICAL EXAM:   General  alert, oriented and comfortable  Vital Signs with Ranges  Temp: 97.9  F (36.6  C) Temp src: Oral BP: 106/65 Pulse: 60   Resp: 16 SpO2: 96 % O2 Device: None (Room air)    No intake/output data recorded.    Constitutional: healthy, alert, and no distress   Cardiovascular: negative, PMI normal. No lifts, heaves, or thrills. RRR. No murmurs, clicks gallops or rub  Respiratory: negative, Percussion normal. Good diaphragmatic excursion. Lungs clear  Abdomen: Abdomen soft, non-tender. BS normal. No masses,  organomegaly. She has no pain in soft tissue of abdomen- over left 9-12 rib and radiates to side but not back.          ADDITIONAL COMMENTS:   I reviewed the patient's new clinical lab test results.   Recent Labs   Lab Test 11/29/23  1317 08/21/23  0842 08/14/23  1902 08/14/23  0649 08/09/23  0627 08/07/23 2017   WBC 9.9 5.7  --   --   --  10.1   HGB 13.4 11.4* 11.4*  --    < > 12.9   * 103*  --   --   --  102*    441  --  283   < > 265    < > = values in this interval not displayed.     Recent Labs   Lab Test 11/29/23  1317 08/29/23  0840 08/23/23  0750   POTASSIUM 4.5 4.5 4.2   CHLORIDE 92* 100 100   CO2 22 24 24   BUN 11.3 11.4 13.2   ANIONGAP 14 10 10     Recent Labs   Lab Test 11/29/23  1732 11/29/23  1527 08/08/23  0902 08/07/23 2017   ALBUMIN  --  3.9  --  4.4   BILITOTAL  --  0.8  --  0.9   ALT  --  21  --  37   AST  --  33  --  33   PROTEIN 10*  --  Negative  --    LIPASE  --  22  --   --        I reviewed the patient's new imaging results.        CONSULTATION ASSESSMENT AND PLAN:    Luz Babb is a 83 year old female with PMHx of  d hypertension, hyperlipidemia, GERD, endometriosis, collagenous colitis, and recent right hip fracture and presented with LUQ pain.    Pancreatic ductal dilation  LUQ abdominal pain  Elevated Alkaline phosphatase  Ct notes interval dilation of pancreatic duct that correlates with elevation in ALP  DIscussed could consider MRCP vs EUS to determine cause of dilation whether anatomical due to age vs neoplastic vs sludge or stone  Patient prefers to avoid any procedures but has agreed to an MRCP  Patient has pain over left ribcage- given prior fall and L2 compression- I suspect pain is related to fracture vs musculoskeletal or neurologic pain    - MRCP  - needs to remain NPO for MRCP  - daily labs          Helen Pantoja, [facility].  Office: [phone]  Cell : [phone] (Dr. [facility])  Cell: [phone] Connor Pantoja  [facility])

## 2023-11-30 NOTE — PROGRESS NOTES
[facility]    Medicine Progress Note - Hospitalist Service    Date of Admission:  11/29/2023    Assessment & Plan   Anna Marie Babb is a 83 year old female with a history of hypertension, hyperlipidemia, GERD, endometriosis, collagenous colitis, and recent right hip fracture who presented to the ER with left upper quadrant abdominal pain which started within the past few days. Evaluation in the ER was concerning for a UTI and dilated pancreatic ducts. She was started on rocephin and the hosptialist service was contacted to bring her into the hospital for further evaluation and management.     Left upper quadrant abdominal pain  Pancreatic ductal dilatation  New onset left upper quadrant abdominal pain, although history somewhat vague. CT abdomen/pelvis showed new pancreatic ductal dilatation, this was not seen on prior CT in 2019.  Admission labs notable for Alk phos mildly elevated, other LFTs within normal limits. Admission lipase normal. Concern for age vs neoplastic vs sludge or stone.   [place] to observation.  Consult GI, appreciate their assistance.  MRCP today   CMP, CBC in AM per GI  Pain management with PRN acetaminophen, oxy, dilaudid   Monitor     Suspected UTI  UCx with Candida albicans, suspect contaminant   CT showed thickened bladder wall, possibly secondary to neurogenic bladder or cystitis. UA abnormal, see below. Urine culture with 10,000-50,000, suspect contaminant. Continues to deny s/s. Started on empiric rocephin in the ER.   Continue rocephin, if no growth within 24 hrs of bacterial source, will consider contamination.   Trend for ID s/s     UA RESULTS:  Recent Labs   Lab Test 11/29/23  1732   COLOR Straw   APPEARANCE Cloudy*   URINEGLC Negative   URINEBILI Negative   URINEKETONE Negative   SG 1.016   UBLD Small*   URINEPH 6.0   PROTEIN 10*   NITRITE Negative   LEUKEST Large*   RBCU 5*   WBCU >182*         Elevated troponin  Troponin 32 initially in the ER, then  25 on recheck. EKG did not have any obvious acute ischemic changes. CT chest negative for PE, no other obvious acute cardiopulmonary abnormality noted, she does have severe coronary artery calcification.  Suspicion for cardiac etiology of her symptoms is low.  No further work-up planned at this time, can reassess pending clinical course.     Hyponatremia, chronic  Most recent work-up in 8/2023 revealing hypovolemic hyponatremia that improved with  IVF. No change with mIVF NS overnight. TSH WNL in 8/2023. Given poor PO intake, concern for hypovolemic hyponatremia.   Continue mIV fluids while NPO  Recheck in AM, Na/Urine studies with no improvement of if becomes symptomatic.      Hypertension  Blood pressure initially elevated in the ER, improved now.  Hold PTA amlodipine for now, reassess in AM.   Hold PTA lisinopril for now, reassess in AM.     Recent right hip fracture  S/p percutaneous screw fixation on 8/8/23 by Dr. Duke.  Continue outpatient follow-up with Orthopedic Surgery as previously directed.     L2 compression fracture  CT chest/abdomen/pelvis showed likely acute/subacute L2 compression fracture as well as likely chronic T9 compression fracture. Current symptoms do not appear to correlate with these findings.  Symptomatic management if needed.  Icy hot patches      Pulmonary nodule  CT chest in the ER showed a pulmonary nodule in the left lower lobe. This has been noted on prior imaging going back to at least 2012.  No further evaluation needed.     Observation Goals: -diagnostic tests and consults completed and resulted, -vital signs normal or at patient baseline, -tolerating oral intake to maintain hydration, -adequate pain control on oral analgesics, Nurse to notify provider when observation goals have been met and patient is ready for discharge.  Diet: NPO per Anesthesia Guidelines for Procedure/Surgery Except for: Meds    DVT Prophylaxis: Pneumatic Compression Devices and Ambulate every  shift  Lombardi Catheter: Not present  Lines: None     Cardiac Monitoring: None  Code Status: Full Code      Clinically Significant Risk Factors Present on Admission         # Hyponatremia: Lowest Na = 128 mmol/L in last 2 days, will monitor as appropriate          # Hypertension: Noted on problem list                 Disposition Plan      Expected Discharge Date: 12/1/2023                  The patient's care was discussed with the Attending Physician, Dr. Gipson, Bedside Nurse, Patient, and Patient's Family.    CHINMAY Becerril [facility]  Hospitalist Service  [facility]  Securely message with Vocera (more info)  Text page via [facility] Paging/Directory     This chart documentation was completed with Dragon voice-recognition software. Even though reviewed, this chart may still contain some grammatical, spelling, and word errors. Please contact the author for any questions or clarifications.   _________________________________________________________________    Interval History   Nursing/SW/consult/interdisciplinary healthcare worker's notes reviewed.     I reviewed all medications, new labs and imaging results over the last 24 hours. Please see discussion of these results in the A/P.    No acute events overnight. VSS. Upon exam, noting LUQ pain. Noting she feels hungry. Notes some nausea, but no emesis overnight. Endorses poor appetite over the past 3 days since abdominal pain began. Na mildly low, but unchanged from start of NaCL mIVF. Denies any fevers, SOB, SAXENA, increasing abdominal pain, emesis,     ROS: 12 point ROS negative other than the symptoms noted here or above in the assessment and plan.       Physical Exam   Vital Signs: Temp: 97.9  F (36.6  C) Temp src: Oral BP: 106/65 Pulse: 60   Resp: 16 SpO2: 96 % O2 Device: None (Room air)    Weight: 114 lbs 13.75 oz    General Appearance: In NAD, laying in bed  Respiratory: LS clear b/l, normal RR  Cardiovascular: S1, S2, no m/r/g, no peripheral  edema  GI: BS+, all 4 quadrants, no masses, TTP in LUQ  Skin: Intact on face, arms, legs. Pale and fragile skin throughout. No wounds, bruising, or lesions noted.  Other: A&Ox4, moving all extremities      Medical Decision Making       50 MINUTES SPENT BY ME on the date of service doing chart review, history, exam, documentation & further activities per the note.      Data     I have personally reviewed the following data over the past 24 hrs:    6.5  \   12.6   / 272     128 (L) 94 (L) 9.8 /  82   3.8 24 0.63 \     ALT: 17 AST: 23 AP: 173 (H) TBILI: 0.7   ALB: 3.7 TOT PROTEIN: 6.3 (L) LIPASE: 22     Trop: 25 (H) BNP: N/A       Imaging results reviewed over the past 24 hrs:   Recent Results (from the past 24 hour(s))   CT Chest (PE) Abdomen Pelvis w Contrast    Narrative    EXAM: CT CHEST PE ABDOMEN PELVIS W CONTRAST  LOCATION: [facility]  DATE: 11/29/2023    INDICATION: LEFT FLANK PAIN, EVAL FOR PE, EVAL FOR SPLENIC INFARCT, COLITIS COMPLICATIONS  COMPARISON: 02/11/2004 abdominal and pelvic CT  TECHNIQUE: CT chest pulmonary angiogram and routine CT abdomen pelvis with IV contrast. Arterial phase through the chest and venous phase through the abdomen and pelvis. Multiplanar reformats and MIP reconstructions were performed. Dose reduction   techniques were used.   CONTRAST: 60mL Isovue 370    FINDINGS:  ANGIOGRAM CHEST: Pulmonary arteries are normal caliber and negative for pulmonary emboli. Thoracic aorta is negative for dissection. No CT evidence of right heart strain.     LUNGS AND PLEURA: 8 mm ill-defined nodule in the left lower lobe image 57, not imaged previously. This appears centrally calcified on delayed images, however, likely benign. No lobar consolidation, effusion, or pneumothorax.    MEDIASTINUM/AXILLAE: Moderately large hiatal hernia. No significant mediastinal adenopathy. Moderate cardiomegaly.    CORONARY ARTERY CALCIFICATION: Severe.    HEPATOBILIARY: Mild hepatic  steatosis. No biliary dilatation.    PANCREAS: Generalized mild pancreatic ductal dilatation reaching 6 mm, new in the interval.    SPLEEN: Unremarkable    ADRENAL GLANDS: Unremarkable    KIDNEYS/BLADDER: No hydronephrosis. Thick-walled bladder despite significant distention. Moderate cystocele.    BOWEL: No bowel obstruction.    LYMPH NODES: No significant retroperitoneal adenopathy    VASCULATURE: Significant atherosclerotic calcification. No abdominal aortic aneurysm.    PELVIC ORGANS: Hysterectomy. No free fluid.    MUSCULOSKELETAL: L2 compression fracture is favored acute or subacute. Correlate for site tenderness. T9 greater than 50% anterior compression fracture is favored chronic.       Impression    IMPRESSION:  1.  No evidence of pulmonary embolus.  2.  New pancreatic ductal dilatation. Pancreatic MRI with and without IV contrast and/or GI consultation recommended to exclude ampullary lesion/stricture.  3.  Thick-walled bladder may be secondary to neurogenic bladder or cystitis. Clinical correlation advised.  4.  Moderate hiatal hernia.

## 2023-12-01 LAB
ALBUMIN SERPL BCG-MCNC: 3.8 G/DL (ref 3.5–5.2)
ALP SERPL-CCNC: 168 U/L (ref 40–150)
ALT SERPL W P-5'-P-CCNC: 17 U/L (ref 0–50)
ANION GAP SERPL CALCULATED.3IONS-SCNC: 11 MMOL/L (ref 7–15)
AST SERPL W P-5'-P-CCNC: 26 U/L (ref 0–45)
BILIRUB SERPL-MCNC: 0.6 MG/DL
BUN SERPL-MCNC: 7.4 MG/DL (ref 8–23)
CALCIUM SERPL-MCNC: 9.2 MG/DL (ref 8.8–10.2)
CHLORIDE SERPL-SCNC: 96 MMOL/L (ref 98–107)
CREAT SERPL-MCNC: 0.62 MG/DL (ref 0.51–0.95)
DEPRECATED HCO3 PLAS-SCNC: 23 MMOL/L (ref 22–29)
EGFRCR SERPLBLD CKD-EPI 2021: 87 ML/MIN/1.73M2
GLUCOSE SERPL-MCNC: 116 MG/DL (ref 70–99)
POTASSIUM SERPL-SCNC: 3.5 MMOL/L (ref 3.4–5.3)
PROT SERPL-MCNC: 6.4 G/DL (ref 6.4–8.3)
SODIUM SERPL-SCNC: 130 MMOL/L (ref 135–145)

## 2023-12-01 PROCEDURE — 80053 COMPREHEN METABOLIC PANEL: CPT

## 2023-12-01 PROCEDURE — G0378 HOSPITAL OBSERVATION PER HR: HCPCS

## 2023-12-01 PROCEDURE — 258N000003 HC RX IP 258 OP 636: Performed by: HOSPITALIST

## 2023-12-01 PROCEDURE — 36415 COLL VENOUS BLD VENIPUNCTURE: CPT

## 2023-12-01 PROCEDURE — 250N000013 HC RX MED GY IP 250 OP 250 PS 637

## 2023-12-01 RX ORDER — GABAPENTIN 100 MG/1
200 CAPSULE ORAL DAILY
Status: DISCONTINUED | OUTPATIENT
Start: 2023-12-01 | End: 2023-12-06 | Stop reason: HOSPADM

## 2023-12-01 RX ORDER — CALCIUM CARBONATE 500 MG/1
500 TABLET, CHEWABLE ORAL DAILY PRN
Status: DISCONTINUED | OUTPATIENT
Start: 2023-12-01 | End: 2023-12-06 | Stop reason: HOSPADM

## 2023-12-01 RX ORDER — CHOLECALCIFEROL (VITAMIN D3) 125 MCG
6000 CAPSULE ORAL 3 TIMES DAILY PRN
Status: DISCONTINUED | OUTPATIENT
Start: 2023-12-01 | End: 2023-12-06 | Stop reason: HOSPADM

## 2023-12-01 RX ORDER — PANTOPRAZOLE SODIUM 40 MG/1
40 TABLET, DELAYED RELEASE ORAL
Status: DISCONTINUED | OUTPATIENT
Start: 2023-12-01 | End: 2023-12-06 | Stop reason: HOSPADM

## 2023-12-01 RX ORDER — LIDOCAINE 4 G/G
1 PATCH TOPICAL
Status: DISCONTINUED | OUTPATIENT
Start: 2023-12-01 | End: 2023-12-06 | Stop reason: HOSPADM

## 2023-12-01 RX ADMIN — GABAPENTIN 200 MG: 100 CAPSULE ORAL at 18:00

## 2023-12-01 RX ADMIN — AMLODIPINE BESYLATE 5 MG: 5 TABLET ORAL at 20:48

## 2023-12-01 RX ADMIN — OXYCODONE HYDROCHLORIDE 2.5 MG: 5 TABLET ORAL at 20:48

## 2023-12-01 RX ADMIN — LIDOCAINE 1 PATCH: 4 PATCH TOPICAL at 20:48

## 2023-12-01 RX ADMIN — SODIUM CHLORIDE: 9 INJECTION, SOLUTION INTRAVENOUS at 04:18

## 2023-12-01 RX ADMIN — PANTOPRAZOLE SODIUM 40 MG: 40 TABLET, DELAYED RELEASE ORAL at 13:16

## 2023-12-01 ASSESSMENT — ACTIVITIES OF DAILY LIVING (ADL)
ADLS_ACUITY_SCORE: 26
ADLS_ACUITY_SCORE: 26
ADLS_ACUITY_SCORE: 23
ADLS_ACUITY_SCORE: 26
ADLS_ACUITY_SCORE: 23
ADLS_ACUITY_SCORE: 26
ADLS_ACUITY_SCORE: 26
ADLS_ACUITY_SCORE: 27
ADLS_ACUITY_SCORE: 43
ADLS_ACUITY_SCORE: 43
ADLS_ACUITY_SCORE: 26
ADLS_ACUITY_SCORE: 26

## 2023-12-01 ASSESSMENT — COLUMBIA-SUICIDE SEVERITY RATING SCALE - C-SSRS
1. IN THE PAST MONTH, HAVE YOU WISHED YOU WERE DEAD OR WISHED YOU COULD GO TO SLEEP AND NOT WAKE UP?: NO
2. HAVE YOU ACTUALLY HAD ANY THOUGHTS OF KILLING YOURSELF IN THE PAST MONTH?: NO
6. HAVE YOU EVER DONE ANYTHING, STARTED TO DO ANYTHING, OR PREPARED TO DO ANYTHING TO END YOUR LIFE?: NO

## 2023-12-01 NOTE — PROGRESS NOTES
"[facility]    Medicine Progress Note - Hospitalist Service    Date of Admission:  11/29/2023    Assessment & Plan   Anna Marie Babb is a 83 year old female with a history of hypertension, hyperlipidemia, GERD, endometriosis, collagenous colitis, and recent right hip fracture who presented to the ER with left upper quadrant abdominal pain which started within the past few days. Evaluation in the ER was concerning for a UTI and dilated pancreatic ducts. She was started on rocephin and the hosptialist service was contacted to bring her into the hospital for further evaluation and management.     Left upper quadrant abdominal pain  Pancreatic ductal dilatation  Chronic pancreatitis, likely asymptomatic   New onset left upper quadrant abdominal pain mainly over lower ribs on 11/29. Pt awake with this pain and notes it as \"hard\" and unable to lay on her sides. Hx of falls, but notes none recently and usually they are on the right side.  No N/V, constipation, diarrhea, SOB, heartburn s/s. No evidence of shingles noted in ED or per exam. CT abdomen/pelvis showed new pancreatic ductal dilatation, this was not seen on prior CT in 2019.  Cardiac work-up unremarkable. CXR without any bony abnormalities/rib fractures. CT C/A/P notable for L2 compression fracture is favored acute or subacute. T9 greater than 50% anterior compression fracture is favored chronic. Admission labs notable for Alk phos mildly elevated, other LFTs within normal limits. Admission lipase normal. Concern for age vs neoplastic vs sludge or stone. MRCP noting pancreatic atrophy with moderate pancreatic, consistent with chronic pancreatitis, with no obstructing stone seen in CBD. Pt without any epigastric pain, pain with eating, or diarrhea. Pt also noting she has been taking ibuprofen nightly \"for a while\" on 12/1. Etiology remains unclear, but suspect multifactorial. Chronic pancreatitis could be playing a role, but also would " not rule out ulcers given reported NSAID usage. Also given falls history (although none lately) and findings on CT C/A/P findings with T9 compression fracture, likely chronic, more likely playing a role in this area as opposed to a L2 fracture.  [place] to observation.  Consult GI, appreciate their assistance, no signed off. Consider re consulting if ongoing pain for EGD  Trial PPI daily for 8 weeks   Pain management with PRN acetaminophen, oxy, dilaudid, icy hot patches, lidocaine patches, add-on gabapentin 200 mg for neuropathic pain   Monitor  Follow-up OP with Neurosurgery       Suspected UTI  UCx with Candida albicans, suspect contaminant   No s/s on admission. CT showed thickened bladder wall, possibly secondary to neurogenic bladder or cystitis. UA abnormal, see below with squamous cells noted.  Urine culture with 10,000-50,000 of Candida albicans, suspect contaminant. Started on empiric rocephin in the ER. Continues to deny s/s.   Stop rocephin given no growth on Cx and no s/s   No indication for antifungals  Trend for ID s/s     UA RESULTS:  Recent Labs   Lab Test 11/29/23  1732   COLOR Straw   APPEARANCE Cloudy*   URINEGLC Negative   URINEBILI Negative   URINEKETONE Negative   SG 1.016   UBLD Small*   URINEPH 6.0   PROTEIN 10*   NITRITE Negative   LEUKEST Large*   RBCU 5*   WBCU >182*     Elevated troponin  Troponin 32 initially in the ER, then 25 on recheck. EKG did not have any obvious acute ischemic changes. CT chest negative for PE, no other obvious acute cardiopulmonary abnormality noted, she does have severe coronary artery calcification.  Suspicion for cardiac etiology of her symptoms is low.  No further work-up planned at this time, can reassess pending clinical course.     Hyponatremia, chronic  See trend below. Most recent work-up in 8/2023 revealing hypovolemic hyponatremia that improved with  IVF. Mild increase with mIVF NS continuous infusion. TSH WNL in 8/2023. Given poor PO intake and  improvement with mIVF, concern for hypovolemic hyponatremia.   Trend in AM   Recheck in AM, Na/Urine studies with no improvement of if becomes symptomatic    Sodium   Date Value Ref Range Status   12/01/2023 130 (L) 135 - 145 mmol/L Final     Comment:     Reference intervals for this test were updated on 09/26/2023 to more accurately reflect our healthy population. There may be differences in the flagging of prior results with similar values performed with this method. Interpretation of those prior results can be made in the context of the updated reference intervals.    11/30/2023 128 (L) 135 - 145 mmol/L Final     Comment:     Reference intervals for this test were updated on 09/26/2023 to more accurately reflect our healthy population. There may be differences in the flagging of prior results with similar values performed with this method. Interpretation of those prior results can be made in the context of the updated reference intervals.    11/29/2023 128 (L) 135 - 145 mmol/L Final     Comment:     Reference intervals for this test were updated on 09/26/2023 to more accurately reflect our healthy population. There may be differences in the flagging of prior results with similar values performed with this method. Interpretation of those prior results can be made in the context of the updated reference intervals.    08/25/2015 135 133 - 144 mmol/L Final   12/12/2005 134 133 - 144 mmol/L Final   11/02/2004 135 133 - 144 mmol/L Final       Hypertension  Blood pressure initially elevated in the ER, improved now.  Resume PTA amlodipine for now  Hold PTA lisinopril for now, reassess in AM.     Recent right hip fracture  S/p percutaneous screw fixation on 8/8/23 by Dr. Duke.  Continue outpatient follow-up with Orthopedic Surgery as previously directed.     L2 compression fracture  CT chest/abdomen/pelvis showed likely acute/subacute L2 compression fracture as well as likely chronic T9 compression fracture. Current  symptoms do not appear to correlate with these findings.  Symptomatic management if needed.  Icy hot patches      Pulmonary nodule  CT chest in the ER showed a pulmonary nodule in the left lower lobe. This has been noted on prior imaging going back to at least 2012.  No further evaluation needed.     Observation Goals: -diagnostic tests and consults completed and resulted, -vital signs normal or at patient baseline, -tolerating oral intake to maintain hydration, -adequate pain control on oral analgesics, Nurse to notify provider when observation goals have been met and patient is ready for discharge.  Diet: Combination Diet Gluten Free Diet; No Lactose Diet; Low Fat Diet (up to 50g)    DVT Prophylaxis: Pneumatic Compression Devices and Ambulate every shift  Lombardi Catheter: Not present  Lines: None     Cardiac Monitoring: None  Code Status: Full Code      Clinically Significant Risk Factors Present on Admission         # Hyponatremia: Lowest Na = 128 mmol/L in last 2 days, will monitor as appropriate          # Hypertension: Noted on problem list                 Disposition Plan      Expected Discharge Date:   12/2/2023     PT consult  Decrease in LUQ pain  Stable Na               The patient's care was discussed with the Attending Physician, Dr. Gipson, Bedside Nurse, Patient, and Patient's Family.    CHINMAY Becerril [facility]  Hospitalist Service  [facility]  Securely message with [employer] (more info)  Text page via [employer] Paging/Directory     This chart documentation was completed with Dragon voice-recognition software. Even though reviewed, this chart may still contain some grammatical, spelling, and word errors. Please contact the author for any questions or clarifications.   _________________________________________________________________    Interval History   Nursing/SW/consult/interdisciplinary healthcare worker's notes reviewed.     I reviewed all medications, new labs and imaging  "results over the last 24 hours. Please see discussion of these results in the A/P.    No acute events overnight. VSS. Upon exam, noting LUQ pain with movement. Used oxycodone 2.5 mg x1 last night. Tolerating clear liquid diet with no pain, N/V, excited to start PO Denies any fevers, SOB, SAXENA, increasing abdominal pain, emesis, constipation, or diarrhea. Per discussion today, notes she has been taking ibuprofen at night for \"quite some time.\"      ROS: 12 point ROS negative other than the symptoms noted here or above in the assessment and plan.       Physical Exam   Vital Signs: Temp: 97.7  F (36.5  C) Temp src: Oral BP: 107/68 Pulse: 75   Resp: 16 SpO2: 97 % O2 Device: None (Room air)    Weight: 114 lbs 13.75 oz    General Appearance: In NAD, laying in bed  Respiratory: LS clear b/l, normal RR  Cardiovascular: S1, S2, no m/r/g, no peripheral edema  GI: BS+, all 4 quadrants, no masses, TTP in LUQ, no bruising noted.   Skin: Intact on face, arms, legs. Pale and fragile skin throughout. No wounds, bruising, or lesions noted.  Other: A&Ox4, moving all extremities      Medical Decision Making       50 MINUTES SPENT BY ME on the date of service doing chart review, history, exam, documentation & further activities per the note.      Data         Imaging results reviewed over the past 24 hrs:   No results found for this or any previous visit (from the past 24 hour(s)).    "

## 2023-12-01 NOTE — PROGRESS NOTES
Orientation: Axo4  Vitals/Tele: VSS ex. Elevated BP, on RA  Pain management: No c/o pain this shift  IV Access/drains: R. PIV infusing NS @ 75 ml/hr,  Diet: NPO  Mobility: Ax1 GB/walker  GI/: Continent, incontinent occasionally.   Wound/Skin: Intact, red blanchable coccyx  Consults: GI following  Discharge Plan: Pending      See Flow sheets for assessment

## 2023-12-01 NOTE — PROGRESS NOTES
[facility]  Gastroenterology Progress Note     Anna Marie Babb MRN# [id number]   YOB: 1940 Age: 83 year old          Assessment and Plan:     Luz Babb is a 83 year old female with PMHx of hypertension, hyperlipidemia, GERD, endometriosis, collagenous colitis, and recent right hip fracture and presented with LUQ pain.     Pancreatic ductal dilation  LUQ abdominal pain  Elevated Alkaline phosphatase  Chronic pancreatitis  Ct notes interval dilation of pancreatic duct that correlates with elevation in ALP  DIscussed could consider MRCP vs EUS to determine cause of dilation whether anatomical due to age vs neoplastic vs sludge or stone  Patient prefers to avoid any procedures but has agreed to an MRCP  Patient has pain over left ribcage- given prior fall and L2 compression- I suspect pain is related to fracture vs musculoskeletal or neurologic pain  MRCP noted pancreatic atrophy with moderate pancreatic ductal dilation. Findings consistent with chronic pancreatitis. No obstructing stone seen in CBD  Patient denies increase pain with eating or diarrhea. Pain is not right sided. Pain on left side likely not related to pancreas     - low fat, gluten free, lactose free diet  - GI will sign off   - Discussed above with hospitalist                  Interval History:     no new complaints, denies chest pain, denies shortness of breath, pain is controlled, and doing well; no cp, sob, n/v/d, or abd pain.              Review of Systems:     C: NEGATIVE for fever, chills, change in weight  E/M: NEGATIVE for ear, mouth and throat problems  R: NEGATIVE for significant cough or SOB  CV: NEGATIVE for chest pain, palpitations or peripheral edema             Medications:   I have reviewed this patient's current medications   [Held by provider] amLODIPine  5 mg Oral QPM    cefTRIAXone  1 g Intravenous Q24H    menthol   Transdermal Q8H    sodium chloride (PF)  3 mL Intracatheter Q8H                   Physical Exam:   Vitals were reviewed  Vital Signs with Ranges  Temp:  [97.9  F (36.6  C)-98  F (36.7  C)] 97.9  F (36.6  C)  Pulse:  [61-66] 61  Resp:  [16-18] 18  BP: (130-141)/(73-97) 135/75  SpO2:  [94 %-98 %] 98 %  I/O last 3 completed shifts:  In: 480 [P.O.:480]  Out: 700 [Urine:700]  Constitutional: healthy, alert, mild distress, and cooperative   Cardiovascular: negative, PMI normal. No lifts, heaves, or thrills. RRR. No murmurs, clicks gallops or rub  Respiratory: negative, Percussion normal. Good diaphragmatic excursion. Lungs clear  Abdomen: Abdomen soft, non-tender. BS normal. No masses, organomegaly             Data:   I reviewed the patient's new clinical lab test results.   Recent Labs   Lab Test 11/30/23  0922 11/29/23  1317 08/21/23  0842   WBC 6.5 9.9 5.7   HGB 12.6 13.4 11.4*   * 104* 103*    309 441     Recent Labs   Lab Test 11/30/23  0922 11/29/23  1317 08/29/23  0840   POTASSIUM 3.8 4.5 4.5   CHLORIDE 94* 92* 100   CO2 24 22 24   BUN 9.8 11.3 11.4   ANIONGAP 10 14 10     Recent Labs   Lab Test 11/30/23  0922 11/29/23  1732 11/29/23  1527 08/08/23  0902 08/07/23 2017   ALBUMIN 3.7  --  3.9  --  4.4   BILITOTAL 0.7  --  0.8  --  0.9   ALT 17  --  21  --  37   AST 23  --  33  --  33   PROTEIN  --  10*  --  Negative  --    LIPASE  --   --  22  --   --        I reviewed the patient's new imaging results.    All laboratory data reviewed  All imaging studies reviewed by me.    Helen Pantoja PA-C,  12/1/2023  [facility]  Office : [phone]  Cell: [phone] (Dr. Ashley)  Cell: [phone] (Helen Pantoja PA-C)

## 2023-12-01 NOTE — PROVIDER NOTIFICATION
MD Notification    Notified Person: MD    Notified Person Name: Nitesh     Notification Date/Time: 1700 12/1     Notification Interaction: Callum     Purpose of Notification: Per pt chart she is lactose intolerant. Pt stated just now that she eats cheese at home and does not have an allergy to milk. She also stated that she was lactose intolerant.     Orders Received: Low lactose diet ordered     Comments:

## 2023-12-02 ENCOUNTER — APPOINTMENT (OUTPATIENT)
Dept: PHYSICAL THERAPY | Facility: CLINIC | Age: 83
DRG: 184 | End: 2023-12-02
Payer: MEDICARE

## 2023-12-02 LAB
ANION GAP SERPL CALCULATED.3IONS-SCNC: 6 MMOL/L (ref 7–15)
BUN SERPL-MCNC: 5.9 MG/DL (ref 8–23)
CALCIUM SERPL-MCNC: 9.1 MG/DL (ref 8.8–10.2)
CHLORIDE SERPL-SCNC: 97 MMOL/L (ref 98–107)
CREAT SERPL-MCNC: 0.61 MG/DL (ref 0.51–0.95)
DEPRECATED HCO3 PLAS-SCNC: 27 MMOL/L (ref 22–29)
EGFRCR SERPLBLD CKD-EPI 2021: 88 ML/MIN/1.73M2
GLUCOSE SERPL-MCNC: 88 MG/DL (ref 70–99)
POTASSIUM SERPL-SCNC: 3.3 MMOL/L (ref 3.4–5.3)
POTASSIUM SERPL-SCNC: 4.6 MMOL/L (ref 3.4–5.3)
SODIUM SERPL-SCNC: 130 MMOL/L (ref 135–145)

## 2023-12-02 PROCEDURE — 36415 COLL VENOUS BLD VENIPUNCTURE: CPT

## 2023-12-02 PROCEDURE — G0378 HOSPITAL OBSERVATION PER HR: HCPCS

## 2023-12-02 PROCEDURE — 250N000013 HC RX MED GY IP 250 OP 250 PS 637

## 2023-12-02 PROCEDURE — 97161 PT EVAL LOW COMPLEX 20 MIN: CPT | Mod: GP

## 2023-12-02 PROCEDURE — 97530 THERAPEUTIC ACTIVITIES: CPT | Mod: GP

## 2023-12-02 PROCEDURE — 84132 ASSAY OF SERUM POTASSIUM: CPT | Performed by: STUDENT IN AN ORGANIZED HEALTH CARE EDUCATION/TRAINING PROGRAM

## 2023-12-02 PROCEDURE — 250N000013 HC RX MED GY IP 250 OP 250 PS 637: Performed by: STUDENT IN AN ORGANIZED HEALTH CARE EDUCATION/TRAINING PROGRAM

## 2023-12-02 PROCEDURE — 36415 COLL VENOUS BLD VENIPUNCTURE: CPT | Performed by: STUDENT IN AN ORGANIZED HEALTH CARE EDUCATION/TRAINING PROGRAM

## 2023-12-02 PROCEDURE — 99232 SBSQ HOSP IP/OBS MODERATE 35: CPT | Performed by: STUDENT IN AN ORGANIZED HEALTH CARE EDUCATION/TRAINING PROGRAM

## 2023-12-02 PROCEDURE — 80048 BASIC METABOLIC PNL TOTAL CA: CPT

## 2023-12-02 PROCEDURE — 120N000001 HC R&B MED SURG/OB

## 2023-12-02 RX ORDER — POTASSIUM CHLORIDE 1500 MG/1
40 TABLET, EXTENDED RELEASE ORAL ONCE
Status: COMPLETED | OUTPATIENT
Start: 2023-12-02 | End: 2023-12-02

## 2023-12-02 RX ADMIN — PANTOPRAZOLE SODIUM 40 MG: 40 TABLET, DELAYED RELEASE ORAL at 06:33

## 2023-12-02 RX ADMIN — GABAPENTIN 200 MG: 100 CAPSULE ORAL at 08:24

## 2023-12-02 RX ADMIN — OXYCODONE HYDROCHLORIDE 2.5 MG: 5 TABLET ORAL at 14:27

## 2023-12-02 RX ADMIN — POTASSIUM CHLORIDE 40 MEQ: 1500 TABLET, EXTENDED RELEASE ORAL at 16:16

## 2023-12-02 RX ADMIN — LIDOCAINE 1 PATCH: 4 PATCH TOPICAL at 20:31

## 2023-12-02 RX ADMIN — AMLODIPINE BESYLATE 5 MG: 5 TABLET ORAL at 20:30

## 2023-12-02 RX ADMIN — OXYCODONE HYDROCHLORIDE 2.5 MG: 5 TABLET ORAL at 20:30

## 2023-12-02 RX ADMIN — OXYCODONE HYDROCHLORIDE 2.5 MG: 5 TABLET ORAL at 08:20

## 2023-12-02 ASSESSMENT — ACTIVITIES OF DAILY LIVING (ADL)
ADLS_ACUITY_SCORE: 23
ADLS_ACUITY_SCORE: 23
ADLS_ACUITY_SCORE: 27
ADLS_ACUITY_SCORE: 23
ADLS_ACUITY_SCORE: 27
ADLS_ACUITY_SCORE: 23
ADLS_ACUITY_SCORE: 23
ADLS_ACUITY_SCORE: 27
ADLS_ACUITY_SCORE: 27
ADLS_ACUITY_SCORE: 23

## 2023-12-02 NOTE — PROGRESS NOTES
[facility]  OUTPATIENT PHYSICAL THERAPY EVALUATION  PLAN OF TREATMENT FOR OUTPATIENT REHABILITATION  (COMPLETE FOR INITIAL CLAIMS ONLY)  Patient's Last Name, First Name, M.I.  YOB: 1940  Anna Marie Babb                        Provider's Name  [facility] Medical Record No.  [id number]                             Onset Date:  11/29/23   Start of Care Date:  12/02/23   Type:     _X_PT   ___OT   ___SLP Medical Diagnosis:  dilated pancreatic duct, UTI              PT Diagnosis:  impaired IND with functional mobility Visits from SOC:  1     See note for plan of treatment, functional goals and certification details    I CERTIFY THE NEED FOR THESE SERVICES FURNISHED UNDER        THIS PLAN OF TREATMENT AND WHILE UNDER MY CARE     (Physician co-signature of this document indicates review and certification of the therapy plan).              12/02/23 1100   Appointment Info   Signing Clinician's Name / Credentials (PT) Ryanne Roper DPT   Quick Adds   Quick Adds Certification   Living Environment   People in Home child(domi), adult   Current Living Arrangements other (see comments)  ([facility])   Home Accessibility stairs to enter home   Number of Stairs, Main Entrance 3   Stair Railings, Main Entrance railing on right side (ascending)   Transportation Anticipated family or friend will provide   Living Environment Comments Has a basement but does not go down to the basement at baseline. Also has dtr who lives in [place]   Self-Care   Usual Activity Tolerance moderate   Current Activity Tolerance fair   Equipment Currently Used at Home walker, rolling;shower chair;grab bar, tub/shower   Fall history within last six months yes   Number of times patient has fallen within last six months 1   Activity/Exercise/Self-Care Comment Uses 4WW for longer distances, able to amb short distances within home with no AD. IND for ADLs   General Information  "  Onset of Illness/Injury or Date of Surgery 11/29/23   Referring Physician Cindy Dowling APRN CNP   Pertinent History of Current Problem (include personal factors and/or comorbidities that impact the POC) \"Anna Marie Babb is a 83 year old female with a history of hypertension, hyperlipidemia, GERD, endometriosis, collagenous colitis, and recent right hip fracture who presented to the ER with left upper quadrant abdominal pain which started within the past few days. Evaluation in the ER was concerning for a UTI and dilated pancreatic ducts. She was started on rocephin and the hosptialist service was contacted to bring her into the hospital for further evaluation and management.\"   Existing Precautions/Restrictions fall   Cognition   Affect/Mental Status (Cognition) WFL   Orientation Status (Cognition) oriented x 3   Follows Commands (Cognition) WFL   Pain Assessment   Patient Currently in Pain Yes, see Vital Sign flowsheet  (L side pain 7/10 w/ mobility)   Posture    Posture Forward head position;Protracted shoulders   Range of Motion (ROM)   Range of Motion ROM is WFL   Strength (Manual Muscle Testing)   Strength (Manual Muscle Testing) Deficits observed during functional mobility   Strength Comments decreased functional LE strength and activity tolerance   Bed Mobility   Comment, (Bed Mobility) CGA sup>sit with heavy rail use, increased effort to complete   Transfers   Comment, (Transfers) CGA sit>stand to FWW   Gait/Stairs (Locomotion)   Comment, (Gait/Stairs) Florentino for 5 ft of ambulation with FWW, slow pace, forward flexed posture   Balance   Balance Comments currently rec use of FWW and phys assist for safe upright mobility   Sensory Examination   Sensory Perception patient reports no sensory changes   Clinical Impression   Criteria for Skilled Therapeutic Intervention Yes, treatment indicated   PT Diagnosis (PT) impaired IND with functional mobility   Influenced by the following impairments impaired " functional strength, balance, activity tolerance, pain   Functional limitations due to impairments impaired bed mobility, transfers, ambulation, stairs   Clinical Presentation (PT Evaluation Complexity) stable   Clinical Presentation Rationale Based on current presentation, PMH, social support   Clinical Decision Making (Complexity) low complexity   Planned Therapy Interventions (PT) balance training;bed mobility training;gait training;home exercise program;patient/family education   Risk & Benefits of therapy have been explained evaluation/treatment results reviewed;care plan/treatment goals reviewed;risks/benefits reviewed;current/potential barriers reviewed;participants voiced agreement with care plan;patient;participants included   PT Total Evaluation Time   PT Eval, Low Complexity Minutes (10014) 10   Therapy Certification   Start of care date 12/02/23   Certification date from 12/02/23   Certification date to 12/09/23   Medical Diagnosis dilated pancreatic duct, UTI   Physical Therapy Goals   PT Frequency 5x/week   PT Predicted Duration/Target Date for Goal Attainment 12/09/23   PT Goals Bed Mobility;Transfers;Gait;Stairs   PT: Bed Mobility Modified independent;Supine to/from sit   PT: Transfers Modified independent;Sit to/from stand;Bed to/from chair;Assistive device   PT: Gait Modified independent;Assistive device;Rolling walker;50 feet   PT: Stairs 3 stairs;Rail on right;Supervision/stand-by assist   Interventions   Interventions Quick Adds Therapeutic Activity   Therapeutic Activity   Therapeutic Activities: dynamic activities to improve functional performance Minutes (04983) 18   Symptoms Noted During/After Treatment Fatigue;Increased pain;Dizziness   Treatment Detail/Skilled Intervention Pt amb further distance following eval, 15 + 5 ft. Vijaya with FWW. Slow pace due to  fatigue and pain. Encouraged to amb further distance second bout, pt declining due to fatigue. Completed one more sit<>Stand with FWW,  modA needed to reach full stand due to LE fatigue. Pt reporting slight dizziness with mobility, BP checked and stable. Vijaya to don jacket sitting EOB. Pt edu on importance of OOB, encouraged to sit up in chair for all meals, and to continue mobilizing with nursing staff. Pt edu on discharge recommendation and current need for assist. Remained in chair, alarm armed and needs in reach.   PT Discharge Planning   PT Plan progress gait, sit<>Stand reps, IND with bed mobility   PT Discharge Recommendation (DC Rec) Transitional Care Facility   PT Rationale for DC Rec Pt currently below reported IND baseline, requires Ax1 with FWW for  limited mobility due to fatigue and pain today. Pt lives with adult son who works outside of the home, and pt is typically alone while he is at work. Would benefit from continued skilled PT at TCU to maximize safety and IND with mobility prior to returning home.   PT Brief overview of current status Ax1 FWW, limited by pain and fatigue today   Total Session Time   Timed Code Treatment Minutes 18   Total Session Time (sum of timed and untimed services) 28

## 2023-12-02 NOTE — PROGRESS NOTES
12-2-23 ([id number]) shift Nursing Update  Pt is observation status  Observation Goals:   1. Diagnostic tests and consults completed and resulted MET  (Sodium levels improved)  2.  Vital signs normal or at patient baseline  MET (Baseline HTN)   3.  Tolerating oral intake to maintain hydration MET   4.  Adequate pain control on oral analgesics MET  5.  Safe Disposition: NOT met (Lives alone. Remains weak and is confused at times. Nursing anticipates TCU)    MISC:   Orientation: A/Ox2, forgetful to time and situation  Activity: SBA GB/W. Remains very unsteady, unable to get out of chait alone due to leg weaknesss. PT following. New order for OT added today  Diet/BS Checks: low fat, low lactose, no gluten  IV Access/Drains: PIV RA SL  Pain Management: Left, low, anterior rib cage pain controlled with Scheduled  Gabapentin, scheduled Lidocaine patches 12hrs on/12hrs off, and was given prn oral 2.5mg Oxycodone x2 this shift  Abnormal VS/Results: VSS on RA. Xo=220  Bowel/Bladder: cont. B/B  Skin/Wounds: skin intact. sacral mepilex intact  Consults: GI signed off  D/C Disposition: Writer and PT recommending TCU, thus anticipate discharge to TCU pending also OT eval and placement by SW    Continue to monitor  Gina Bertrand RN BSN OCN

## 2023-12-02 NOTE — PLAN OF CARE
[id number]  Orientation: A/Ox4, forgetful  Activity: SBA GBW  Diet/BS Checks: low fat, low lactose, no gluten  Tele:  n/a  IV Access/Drains: PIV RA SL  Pain Management: denies pain. Menthol and lidocaine patch to left side ribcage  Abnormal VS/Results: VSS on RA. Yk=018  Bowel/Bladder: cont. B/B  Skin/Wounds: skin intact. sacral mepilex applied  Consults: GI signed off  D/C Disposition: potential discharge in AM  Other Info:     -diagnostic tests and consults completed and resulted MET  -vital signs normal or at patient baseline  MET  -tolerating oral intake to maintain hydration MET   -adequate pain control on oral analgesics MET

## 2023-12-02 NOTE — PLAN OF CARE
Orientation: A&Ox4, forgetful     Activity: SBA with GBA     Diet/BS Checks: Low Fat, low lactose, no gluten     Tele: N/A   IV Access/Drains: RPIV SL CDI     Pain Management: Prn oxy given x1 at bedtime. Menthol and lidocaine patch to left side ribcage     Abnormal VS/Results: VSS on RA    Bowel/Bladder: Continent of both     Skin/Wounds: Intact, sacral mepilex for protection     Consults: GI signed off today     D/C Disposition: Possibly tomorrow per MD

## 2023-12-02 NOTE — PROGRESS NOTES
"[facility]    Medicine Progress Note - Hospitalist Service    Date of Admission:  11/29/2023    Assessment & Plan      Anna Marie Babb is a 83 year old female with a history of hypertension, hyperlipidemia, GERD, endometriosis, collagenous colitis, and recent right hip fracture who presented to the ER with left upper quadrant abdominal pain which started within the past few days. Evaluation in the ER was concerning for a UTI and dilated pancreatic ducts. She was started on rocephin and the hosptialist service was contacted to bring her into the hospital for further evaluation and management.     Left upper quadrant abdominal pain  Pancreatic ductal dilatation  Chronic pancreatitis,  New onset left upper quadrant abdominal pain mainly over lower ribs on 11/29. Pt awake with this pain and notes it as \"hard\" and unable to lay on her sides. Hx of falls, but notes none recently and usually they are on the right side.  No N/V, constipation, diarrhea, SOB, heartburn s/s. No evidence of shingles noted in ED or per exam. CT abdomen/pelvis showed new pancreatic ductal dilatation, this was not seen on prior CT in 2019.  Cardiac work-up unremarkable. CXR without any bony abnormalities/rib fractures. CT C/A/P notable for L2 compression fracture is favored acute or subacute. T9 greater than 50% anterior compression fracture is favored chronic. Admission labs notable for Alk phos mildly elevated, other LFTs within normal limits. Admission lipase normal. Concern for age vs neoplastic vs sludge or stone. MRCP noting pancreatic atrophy with moderate pancreatic, consistent with chronic pancreatitis, with no obstructing stone seen in CBD. Pt without any epigastric pain, pain with eating, or diarrhea. Pt also noting she has been taking ibuprofen nightly \"for a while\" on 12/1. Etiology remains unclear, but suspect multifactorial. Chronic pancreatitis could be playing a role, but also would not rule out " ulcers given reported NSAID usage. Also given falls history (although none lately) and findings on CT C/A/P findings with T9 compression fracture, likely chronic, more likely playing a role in this area as opposed to a L2 fracture.  Consult GI, appreciate their assistance, no signed off. Consider re consulting if ongoing pain for EGD  -Continues to have LUQ pain and will reconsult GI  Trial PPI daily for 8 weeks   Pain management with PRN acetaminophen, oxy, dilaudid, icy hot patches, lidocaine patches, add-on gabapentin 200 mg for neuropathic pain   Monitor        Suspected UTI  UCx with Candida albicans, suspect contaminant   No s/s on admission. CT showed thickened bladder wall, possibly secondary to neurogenic bladder or cystitis. UA abnormal, see below with squamous cells noted.  Urine culture with 10,000-50,000 of Candida albicans, suspect contaminant. Started on empiric rocephin in the ER. Continues to deny s/s.   Stop rocephin given no growth on Cx and no s/s   No indication for antifungals  Trend for ID s/s   No urinary tract symptoms     UA RESULTS:      Recent Labs   Lab Test 11/29/23  1732   COLOR Straw   APPEARANCE Cloudy*   URINEGLC Negative   URINEBILI Negative   URINEKETONE Negative   SG 1.016   UBLD Small*   URINEPH 6.0   PROTEIN 10*   NITRITE Negative   LEUKEST Large*   RBCU 5*   WBCU >182*      Elevated troponin  Troponin 32 initially in the ER, then 25 on recheck. EKG did not have any obvious acute ischemic changes. CT chest negative for PE, no other obvious acute cardiopulmonary abnormality noted, she does have severe coronary artery calcification.  Suspicion for cardiac etiology of her symptoms is low.  No further work-up planned at this time, can reassess pending clinical course.     Hyponatremia, chronic  See trend below. Most recent work-up in 8/2023 revealing hypovolemic hyponatremia that improved with  IVF. Mild increase with mIVF NS continuous infusion. TSH WNL in 8/2023. Given poor PO  intake and improvement with mIVF, concern for hypovolemic hyponatremia.   Trend in AM   Recheck in AM, Na/Urine studies with no improvement of if becomes symptomatic      Hypertension  Blood pressure initially elevated in the ER, improved now.  Resume PTA amlodipine for now  Hold PTA lisinopril for now, reassess in AM.     Recent right hip fracture  S/p percutaneous screw fixation on 8/8/23 by Dr. Duke.  Continue outpatient follow-up with Orthopedic Surgery as previously directed.     L2 compression fracture  CT chest/abdomen/pelvis showed likely acute/subacute L2 compression fracture as well as likely chronic T9 compression fracture. Current symptoms do not appear to correlate with these findings.No back pain       Pulmonary nodule  CT chest in the ER showed a pulmonary nodule in the left lower lobe. This has been noted on prior imaging going back to at least 2012.  No further evaluation needed.           Diet: Combination Diet Gluten Free Diet; Low Lactose Diet; Low Fat Diet (up to 50g)    DVT Prophylaxis: Pneumatic Compression Devices  Lombardi Catheter: Not present  Lines: None     Cardiac Monitoring: None  Code Status: Full Code      Clinically Significant Risk Factors Present on Admission        # Hypokalemia: Lowest K = 3.3 mmol/L in last 2 days, will replace as needed           # Hypertension: Noted on problem list                 Disposition Plan      Expected Discharge Date: 12/04/2023                Physical therapy and Occupational Therapy evaluation continues to have left upper quadrant pain.  As per patient's nurse patient is also weak and deconditioned.  Physical therapy consulted.  Occupational Therapy consulted awaiting their recommendations        Kimberlee Corral MD  Hospitalist Service  [facility]  Securely message with [employer] (more info)  Text page via [employer] Paging/Directory   ______________________________________________________________________    Interval  History     Patient continues to have left upper quadrant pain which is not being relieved.  She also feels weak.  Denies any chest pain or shortness of breath.    Her abdominal pain is not related to heat eating.  She does not have any pain in the back.    Does not have any diarrhea or any urinary tract symptoms        {Physical Exam   Vital Signs: Temp: 97.7  F (36.5  C) Temp src: Oral BP: 102/62 Pulse: 72   Resp: 14 SpO2: 98 % O2 Device: None (Room air)    Weight: 114 lbs 13.75 oz    Physical Exam  Cardiovascular:      Pulses: Normal pulses.      Heart sounds: Normal heart sounds.   Pulmonary:      Effort: No respiratory distress.      Breath sounds: Normal breath sounds. No wheezing.   Abdominal:      General: There is no distension.      Palpations: Abdomen is soft.      Tenderness: There is no abdominal tenderness.   Musculoskeletal:      Right lower leg: No edema.      Left lower leg: No edema.   Neurological:      Mental Status: She is alert.          Medical Decision Making             Data     I have personally reviewed the following data over the past 24 hrs:    N/A  \   N/A   / N/A     130 (L) 97 (L) 5.9 (L) /  88   3.3 (L) 27 0.61 \     ALT: N/A AST: N/A AP: N/A TBILI: N/A   ALB: N/A TOT PROTEIN: N/A LIPASE: N/A       Imaging results reviewed over the past 24 hrs:   No results found for this or any previous visit (from the past 24 hour(s)).

## 2023-12-03 ENCOUNTER — APPOINTMENT (OUTPATIENT)
Dept: OCCUPATIONAL THERAPY | Facility: CLINIC | Age: 83
DRG: 184 | End: 2023-12-03
Attending: STUDENT IN AN ORGANIZED HEALTH CARE EDUCATION/TRAINING PROGRAM
Payer: MEDICARE

## 2023-12-03 ENCOUNTER — APPOINTMENT (OUTPATIENT)
Dept: GENERAL RADIOLOGY | Facility: CLINIC | Age: 83
DRG: 184 | End: 2023-12-03
Attending: STUDENT IN AN ORGANIZED HEALTH CARE EDUCATION/TRAINING PROGRAM
Payer: MEDICARE

## 2023-12-03 LAB
HOLD SPECIMEN: NORMAL
POTASSIUM SERPL-SCNC: 4 MMOL/L (ref 3.4–5.3)
UPPER EUS: NORMAL
UPPER GI ENDOSCOPY: NORMAL

## 2023-12-03 PROCEDURE — 120N000001 HC R&B MED SURG/OB

## 2023-12-03 PROCEDURE — 0DB68ZX EXCISION OF STOMACH, VIA NATURAL OR ARTIFICIAL OPENING ENDOSCOPIC, DIAGNOSTIC: ICD-10-PCS | Performed by: INTERNAL MEDICINE

## 2023-12-03 PROCEDURE — 36415 COLL VENOUS BLD VENIPUNCTURE: CPT | Performed by: STUDENT IN AN ORGANIZED HEALTH CARE EDUCATION/TRAINING PROGRAM

## 2023-12-03 PROCEDURE — G0500 MOD SEDAT ENDO SERVICE >5YRS: HCPCS | Performed by: INTERNAL MEDICINE

## 2023-12-03 PROCEDURE — 99253 IP/OBS CNSLTJ NEW/EST LOW 45: CPT

## 2023-12-03 PROCEDURE — 97165 OT EVAL LOW COMPLEX 30 MIN: CPT | Mod: GO | Performed by: OCCUPATIONAL THERAPIST

## 2023-12-03 PROCEDURE — 250N000009 HC RX 250: Performed by: INTERNAL MEDICINE

## 2023-12-03 PROCEDURE — 250N000011 HC RX IP 250 OP 636: Mod: JZ | Performed by: HOSPITALIST

## 2023-12-03 PROCEDURE — 71100 X-RAY EXAM RIBS UNI 2 VIEWS: CPT | Mod: LT

## 2023-12-03 PROCEDURE — 84132 ASSAY OF SERUM POTASSIUM: CPT | Performed by: STUDENT IN AN ORGANIZED HEALTH CARE EDUCATION/TRAINING PROGRAM

## 2023-12-03 PROCEDURE — 250N000013 HC RX MED GY IP 250 OP 250 PS 637

## 2023-12-03 PROCEDURE — 43239 EGD BIOPSY SINGLE/MULTIPLE: CPT | Performed by: INTERNAL MEDICINE

## 2023-12-03 PROCEDURE — 97535 SELF CARE MNGMENT TRAINING: CPT | Mod: GO | Performed by: OCCUPATIONAL THERAPIST

## 2023-12-03 PROCEDURE — 99232 SBSQ HOSP IP/OBS MODERATE 35: CPT | Performed by: STUDENT IN AN ORGANIZED HEALTH CARE EDUCATION/TRAINING PROGRAM

## 2023-12-03 PROCEDURE — 43237 ENDOSCOPIC US EXAM ESOPH: CPT | Performed by: INTERNAL MEDICINE

## 2023-12-03 PROCEDURE — 250N000011 HC RX IP 250 OP 636: Performed by: INTERNAL MEDICINE

## 2023-12-03 PROCEDURE — 88305 TISSUE EXAM BY PATHOLOGIST: CPT | Mod: TC | Performed by: INTERNAL MEDICINE

## 2023-12-03 RX ORDER — FENTANYL CITRATE 50 UG/ML
INJECTION, SOLUTION INTRAMUSCULAR; INTRAVENOUS PRN
Status: DISCONTINUED | OUTPATIENT
Start: 2023-12-03 | End: 2023-12-03 | Stop reason: HOSPADM

## 2023-12-03 RX ORDER — LIDOCAINE 40 MG/G
CREAM TOPICAL
Status: DISCONTINUED | OUTPATIENT
Start: 2023-12-03 | End: 2023-12-03 | Stop reason: HOSPADM

## 2023-12-03 RX ADMIN — LIDOCAINE 1 PATCH: 4 PATCH TOPICAL at 20:47

## 2023-12-03 RX ADMIN — OXYCODONE HYDROCHLORIDE 2.5 MG: 5 TABLET ORAL at 04:50

## 2023-12-03 RX ADMIN — ONDANSETRON 4 MG: 2 INJECTION INTRAMUSCULAR; INTRAVENOUS at 21:44

## 2023-12-03 RX ADMIN — PANTOPRAZOLE SODIUM 40 MG: 40 TABLET, DELAYED RELEASE ORAL at 06:40

## 2023-12-03 RX ADMIN — AMLODIPINE BESYLATE 5 MG: 5 TABLET ORAL at 20:47

## 2023-12-03 RX ADMIN — OXYCODONE HYDROCHLORIDE 2.5 MG: 5 TABLET ORAL at 20:47

## 2023-12-03 RX ADMIN — OXYCODONE HYDROCHLORIDE 2.5 MG: 5 TABLET ORAL at 00:42

## 2023-12-03 RX ADMIN — OXYCODONE HYDROCHLORIDE 2.5 MG: 5 TABLET ORAL at 15:41

## 2023-12-03 ASSESSMENT — ACTIVITIES OF DAILY LIVING (ADL)
ADLS_ACUITY_SCORE: 27

## 2023-12-03 NOTE — PROGRESS NOTES
[facility]  Gastroenterology Progress Note     Anna Marie Babb MRN# [id number]   YOB: 1940 Age: 83 year old          Assessment and Plan:       Pancreatic duct dilated    LUQ abdominal pain    Urinary tract infection without hematuria, site unspecified  Patient continues to have persistent left upper abdomen and left upper quadrant pain patient is severely tender on the ribs between eighth and ninth and 10th ribs.  Upper GI endoscopy this morning showed diffuse gastritis no significant ulcers biopsies were taken for H. pylori.  Endoscopic ultrasound findings were consistent with pancreatic atrophy prominent pancreatic duct minimally dilated biliary system no obvious mass.  I will recommend to start her on soft regular diet I believe patient's pain is most likely musculoskeletal in nature probably from T9 compression fracture versus other chest wall process.              LUQ abdominal pain  Urinary tract infection without hematuria, site unspecified  Pancreatic duct dilated  Hyponatremia  Compression fracture of L2 vertebra, initial encounter (H)      Interval History:     doing well; no cp, sob, n/v/d, or abd pain.              Review of Systems:     C: NEGATIVE for fever, chills, change in weight  E/M: NEGATIVE for ear, mouth and throat problems  R: NEGATIVE for significant cough or SOB  CV: NEGATIVE for chest pain, palpitations or peripheral edema             Medications:   I have reviewed this patient's current medications   amLODIPine  5 mg Oral QPM    gabapentin  200 mg Oral Daily    lidocaine  1 patch Transdermal Q24h    menthol   Transdermal Q8H    pantoprazole  40 mg Oral QAM AC    sodium chloride (PF)  3 mL Intracatheter Q8H    sodium chloride (PF)  3 mL Intracatheter Q8H                  Physical Exam:   Vitals were reviewed  Vital Signs with Ranges  Temp:  [97.5  F (36.4  C)-97.9  F (36.6  C)] 97.5  F (36.4  C)  Pulse:  [57-85] 61  Resp:  [6-29] 29  BP:  ()/(46-99) 101/60  SpO2:  [86 %-100 %] 99 %  I/O last 3 completed shifts:  In: 300 [P.O.:300]  Out: -   Cardiovascular: negative, PMI normal. No lifts, heaves, or thrills. RRR. No murmurs, clicks gallops or rub  Respiratory: negative, Percussion normal. Good diaphragmatic excursion. Lungs clear  Head: Normocephalic. No masses, lesions, tenderness or abnormalities  Abdomen: Abdomen soft, non-tender. BS normal. No masses, organomegaly  SKIN: no suspicious lesions or rashes             Data:   I reviewed the patient's new clinical lab test results.   Recent Labs   Lab Test 11/30/23  0922 11/29/23  1317 08/21/23  0842   WBC 6.5 9.9 5.7   HGB 12.6 13.4 11.4*   * 104* 103*    309 441     Recent Labs   Lab Test 12/03/23  0845 12/02/23  2034 12/02/23  0728 12/01/23  1625 11/30/23  0922   POTASSIUM 4.0 4.6 3.3* 3.5 3.8   CHLORIDE  --   --  97* 96* 94*   CO2  --   --  27 23 24   BUN  --   --  5.9* 7.4* 9.8   ANIONGAP  --   --  6* 11 10     Recent Labs   Lab Test 12/01/23  1625 11/30/23  0922 11/29/23  1732 11/29/23  1527 08/08/23  0902   ALBUMIN 3.8 3.7  --  3.9  --    BILITOTAL 0.6 0.7  --  0.8  --    ALT 17 17  --  21  --    AST 26 23  --  33  --    PROTEIN  --   --  10*  --  Negative   LIPASE  --   --   --  22  --        I reviewed the patient's new imaging results.    All laboratory data reviewed  All imaging studies reviewed by me.    Jean Ashley MD,  12/3/2023  [facility]  Office : [phone]  Cell: [phone]      [facility]  Ph: [phone] Fax: [phone]

## 2023-12-03 NOTE — PLAN OF CARE
Orientation: A&Ox4, forgetful    Activity: SBA GBW     Diet/BS Checks: Low lactose, low fat, no gluten. NPO at midnight     Tele: N/A    IV Access/Drains: LPIV CDI    Pain Management: Prn oxy given x1, had relief     Abnormal VS/Results: VSS on RA, Potassium came back 4.6, recheck in the AM    Bowel/Bladder: Continent of both     Skin/Wounds: Sacral mepilex for protection     Consults: GI/PT    D/C Disposition: Pending     Other Info: Endoscopy tomorrow per GI

## 2023-12-03 NOTE — PROGRESS NOTES
"[facility]    Medicine Progress Note - Hospitalist Service    Date of Admission:  11/29/2023    Assessment & Plan   Anna Marie Babb is a 83 year old female with a history of hypertension, hyperlipidemia, GERD, endometriosis, collagenous colitis, and recent right hip fracture who presented to the ER with left upper quadrant abdominal pain which started within the past few days. Evaluation in the ER was concerning for a UTI and dilated pancreatic ducts. She was started on rocephin and the hosptialist service was contacted to bring her into the hospital for further evaluation and management.     Left upper quadrant abdominal pain  Pancreatic ductal dilatation  Chronic pancreatitis,  New onset left upper quadrant abdominal pain mainly over lower ribs on 11/29. Pt awake with this pain and notes it as \"hard\" and unable to lay on her sides. Hx of falls, but notes none recently and usually they are on the right side.  No N/V, constipation, diarrhea, SOB, heartburn s/s. No evidence of shingles noted in ED or per exam. CT abdomen/pelvis showed new pancreatic ductal dilatation, this was not seen on prior CT in 2019.  Cardiac work-up unremarkable. CXR without any bony abnormalities/rib fractures. CT C/A/P notable for L2 compression fracture is favored acute or subacute. T9 greater than 50% anterior compression fracture is favored chronic. Admission labs notable for Alk phos mildly elevated, other LFTs within normal limits. Admission lipase normal. Concern for age vs neoplastic vs sludge or stone. MRCP noting pancreatic atrophy with moderate pancreatic, consistent with chronic pancreatitis, with no obstructing stone seen in CBD. Pt without any epigastric pain, pain with eating, or diarrhea. Pt also noting she has been taking ibuprofen nightly \"for a while\" on 12/1. Etiology remains unclear, but suspect multifactorial. Chronic pancreatitis could be playing a role, but also would not rule out ulcers " given reported NSAID usage. Also given falls history (although none lately) and findings on CT C/A/P findings with T9 compression fracture, likely chronic, more likely playing a role in this area as opposed to a L2 fracture.    -Continues to have left upper quadrant pain and GI reconsulted and patient underwent endoscopy and endoscopic ultrasound which showed gastritis.  Endoscopic ultrasound consistent with pancreatic atrophy with prominent pancreatic duct with minimally dilated biliary system with no obvious mass  -Patient has been taking ibuprofen at home and I counseled her not to take ibuprofen which could have caused her gastritis.  Her left upper quadrant pain is likely musculoskeletal with tenderness to touch at her left rib cage.  Will order a left-sided rib view for rib fracture.  Pain could be because of her T9 compression fracture could also be costochondritis  -Continue gabapentin  -Continue lidocaine patch  -Physical therapy recommending TCU  -Case management consult for discharge planning  -GI recommending soft diet and continue PPI        Suspected UTI  UCx with Candida albicans, suspect contaminant   No s/s on admission. CT showed thickened bladder wall, possibly secondary to neurogenic bladder or cystitis. UA abnormal, see below with squamous cells noted.  Urine culture with 10,000-50,000 of Candida albicans, suspect contaminant. Started on empiric rocephin in the ER. Continues to deny s/s.   Stop rocephin given no growth on Cx and no s/s   No indication for antifungals  Trend for ID s/s   No urinary tract symptoms     UA RESULTS:      Recent Labs   Lab Test 11/29/23  1732   COLOR Straw   APPEARANCE Cloudy*   URINEGLC Negative   URINEBILI Negative   URINEKETONE Negative   SG 1.016   UBLD Small*   URINEPH 6.0   PROTEIN 10*   NITRITE Negative   LEUKEST Large*   RBCU 5*   WBCU >182*      Elevated troponin  Troponin 32 initially in the ER, then 25 on recheck. EKG did not have any obvious acute ischemic  changes. CT chest negative for PE, no other obvious acute cardiopulmonary abnormality noted, she does have severe coronary artery calcification.  Suspicion for cardiac etiology of her symptoms is low.  No further work-up planned at this time, can reassess pending clinical course.     Hyponatremia, chronic  See trend below. Most recent work-up in 8/2023 revealing hypovolemic hyponatremia that improved with  IVF. Mild increase with mIVF NS continuous infusion. TSH WNL in 8/2023. Given poor PO intake and improvement with mIVF, concern for hypovolemic hyponatremia.   Trend in AM   Recheck in AM,     Hypertension  Blood pressure initially elevated in the ER, improved now.  Resume PTA amlodipine   Hold PTA lisinopril for now,     Recent right hip fracture  S/p percutaneous screw fixation on 8/8/23 by Dr. Duke.  Continue outpatient follow-up with Orthopedic Surgery as previously directed.     L2 compression fracture  CT chest/abdomen/pelvis showed likely acute/subacute L2 compression fracture as well as likely chronic T9 compression fracture. Current symptoms do not appear to correlate with these findings.No back pain  -Neurosurgery consult appreciated and recommending outpatient follow-up in 6 weeks and neurosurgery coordinate the follow-up       Pulmonary nodule  CT chest in the ER showed a pulmonary nodule in the left lower lobe. This has been noted on prior imaging going back to at least 2012.  No further evaluation needed.             Diet: Low Fiber Diet    DVT Prophylaxis: Pneumatic Compression Devices  Lombardi Catheter: Not present  Lines: None     Cardiac Monitoring: None  Code Status: Full Code      Clinically Significant Risk Factors Present on Admission        # Hypokalemia: Lowest K = 3.3 mmol/L in last 2 days, will replace as needed           # Hypertension: Noted on problem list                 Disposition Plan      Expected Discharge Date: 12/04/2023                    Kimberlee Corral,  MD  Hospitalist Service  [facility]  Securely message with [employer] (more info)  Text page via [employer] Paging/Directory   ______________________________________________________________________    Interval History   Continues to have LUQ pain  No chest pain    Has been using ibuprofen at home     Physical Exam   Vital Signs: Temp: 98.6  F (37  C) Temp src: Oral BP: 109/67 Pulse: 61   Resp: 16 SpO2: 98 % O2 Device: None (Room air)    Weight: 114 lbs 13.75 oz    Physical Exam  Cardiovascular:      Rate and Rhythm: Normal rate and regular rhythm.      Heart sounds: Normal heart sounds.   Pulmonary:      Effort: No respiratory distress.      Breath sounds: Normal breath sounds. No wheezing.   Abdominal:      General: There is no distension.      Palpations: Abdomen is soft.      Comments: Tenderness in LUQ at rib cage area          Medical Decision Making             Data     I have personally reviewed the following data over the past 24 hrs:    N/A  \   N/A   / N/A     N/A N/A N/A /  N/A   4.0 N/A N/A \       Imaging results reviewed over the past 24 hrs:   No results found for this or any previous visit (from the past 24 hour(s)).

## 2023-12-03 NOTE — PROGRESS NOTES
[facility]  Gastroenterology Progress Note     Anna Marie Babb MRN# [id number]   YOB: 1940 Age: 83 year old          Assessment and Plan:       Pancreatic duct dilated    LUQ abdominal pain    Urinary tract infection without hematuria, site unspecified  Patient continues to complain of left upper quadrant pain patient was evaluated patient is severely tender along the ribs patient was evaluated with MRCP which showed dilated pancreatic duct along with prominent sidebranches consistent with chronic pancreatitis patient was also found to have borderline dilated pancreatic and biliary ductal system.  No obvious mass identified.  Patient continues to have rather significant pain finding can be very very due to musculoskeletal causes of the differential can include peptic ulcer disease and possible pancreatic source concern for chronic pancreatitis versus neoplastic process.  Given patient's lack of response and persistent symptoms I will recommend her to be evaluated with upper GI endoscopy and endoscopic ultrasound tomorrow.  Patient also history of fall CT finding consistent with T9 compression fracture most likely chronic which very well could be playing a role in the persistent pain in the distribution area of the pain.  Continue on supportive care n.p.o. after midnight plan for endoscopic evaluation tomorrow.            LUQ abdominal pain  Urinary tract infection without hematuria, site unspecified  Pancreatic duct dilated  Hyponatremia      Interval History:     doing well; no cp, sob, n/v/d, or abd pain.              Review of Systems:     C: NEGATIVE for fever, chills, change in weight  E/M: NEGATIVE for ear, mouth and throat problems  R: NEGATIVE for significant cough or SOB  CV: NEGATIVE for chest pain, palpitations or peripheral edema             Medications:   I have reviewed this patient's current medications    amLODIPine  5 mg Oral QPM     gabapentin  200 mg  Oral Daily     lidocaine  1 patch Transdermal Q24h     menthol   Transdermal Q8H     pantoprazole  40 mg Oral QAM AC     sodium chloride (PF)  3 mL Intracatheter Q8H                  Physical Exam:   Vitals were reviewed  Vital Signs with Ranges  Temp:  [97.7  F (36.5  C)-97.8  F (36.6  C)] 97.8  F (36.6  C)  Pulse:  [55-72] 61  Resp:  [14-16] 16  BP: (102-146)/(58-85) 113/58  SpO2:  [94 %-98 %] 97 %  I/O last 3 completed shifts:  In: 300 [P.O.:300]  Out: -   Cardiovascular: negative, PMI normal. No lifts, heaves, or thrills. RRR. No murmurs, clicks gallops or rub  Respiratory: negative, Percussion normal. Good diaphragmatic excursion. Lungs clear  Head: Normocephalic. No masses, lesions, tenderness or abnormalities  Neck: Neck supple. No adenopathy. Thyroid symmetric, normal size,, Carotids without bruits.  Abdomen: Abdomen soft, non-tender. BS normal. No masses, organomegaly  SKIN: no suspicious lesions or rashes           Data:   I reviewed the patient's new clinical lab test results.   Recent Labs   Lab Test 11/30/23  0922 11/29/23  1317 08/21/23  0842   WBC 6.5 9.9 5.7   HGB 12.6 13.4 11.4*   * 104* 103*    309 441     Recent Labs   Lab Test 12/02/23  0728 12/01/23  1625 11/30/23  0922   POTASSIUM 3.3* 3.5 3.8   CHLORIDE 97* 96* 94*   CO2 27 23 24   BUN 5.9* 7.4* 9.8   ANIONGAP 6* 11 10     Recent Labs   Lab Test 12/01/23  1625 11/30/23  0922 11/29/23  1732 11/29/23  1527 08/08/23  0902   ALBUMIN 3.8 3.7  --  3.9  --    BILITOTAL 0.6 0.7  --  0.8  --    ALT 17 17  --  21  --    AST 26 23  --  33  --    PROTEIN  --   --  10*  --  Negative   LIPASE  --   --   --  22  --        I reviewed the patient's new imaging results.    All laboratory data reviewed  All imaging studies reviewed by me.    Jean Ashley MD,  12/2/2023  [facility]  Office : [phone]  Cell: [phone]      [facility]  Ph: [phone] Fax: [phone]

## 2023-12-03 NOTE — UTILIZATION REVIEW
Admission Status; Secondary Review Determination       Under the authority of the Utilization Management Committee, the utilization review process indicated a secondary review on the above patient. The review outcome is based on review of the medical records, discussions with staff, and applying clinical experience noted on the date of the review.     (x) Inpatient Status Appropriate - This patient's medical care is consistent with medical management for inpatient care and reasonable inpatient medical practice.     RATIONALE FOR DETERMINATION   83-year-old female with a history of hypertension, hyperlipidemia, GERD, endometriosis, collagenous colitis, and a recent right hip fracture. She presented with left upper quadrant abdominal pain and was found to have dilated pancreatic ducts and a suspected UTI. CT scans revealed new pancreatic ductal dilatation, not present in a prior scan from 2019, and compression fractures at L2 (likely acute or subacute) and T9 (likely chronic). Her pancreatic ductal dilatation is consistent with chronic pancreatitis, but no obstructing stones were seen. The patient's long-term use of ibuprofen and history of falls may contribute to her pain, and there is a suspicion of ulcers.  Patient requires inpatient admission versus short stay observation or outpatient treatment for the following reasons:  The patient's severe left upper quadrant pain, chronic pancreatitis evidenced by dilated pancreatic duct and prominent side branches, and borderline dilated pancreatic and biliary ductal system necessitate inpatient admission. The complexity of her condition, including the differential diagnosis of musculoskeletal causes, peptic ulcer disease, and possible neoplastic process, coupled with her history of falls and chronic T9 compression fracture, requires close monitoring and specialized care. The planned upper GI endoscopy and endoscopic ultrasound to further evaluate her symptoms underline  the need for inpatient management. Her pain severity, lack of response to current treatments, and the need for n.p.o. status post-midnight in preparation for endoscopic procedures further support the decision for inpatient care to ensure comprehensive assessment and management of her multiple health issues.    The expected length of stay at the time of admission was more than 2 nights because of the severity of illness, intensity of service provided, and risk for adverse outcome. Inpatient admission is appropriate.         This document was produced using voice recognition software       The information on this document is developed by the utilization review team in order for the business office to ensure compliance. This only denotes the appropriateness of proper admission status and does not reflect the quality of care rendered.   The definitions of Inpatient Status and Observation Status used in making the determination above are those provided in the CMS Coverage Manual, Chapter 1 and Chapter 6, section 70.4.   Sincerely,   AARTI BACON MD   System Medical Director   Utilization Management   [facility].

## 2023-12-03 NOTE — PLAN OF CARE
[id number]  Orientation: A&O x4. forgetful  Activity: SBA GBW  Diet/BS Checks: NPO  Tele:  n/a  IV Access/Drains: PIV LA SL  Pain Management: PRN oxy given x2  Abnormal VS/Results: VSS on RA. K+ standard protocol, K+ = 4.6. lab recheck in AM  Bowel/Bladder: cont. B/B  Skin/Wounds: edema in ankles. Sacral mepilex  Consults: GI, OT, PT  D/C Disposition: possible 12/4  Other Info: NPO at midnight. Upper GI endoscopy and endoscopic ultrasound today. Neurology consult ordered

## 2023-12-03 NOTE — CONSULTS
[facility]    Neurosurgery Consultation     Date of Admission:  11/29/2023  Date of Consult (When I saw the patient): 12/03/23    Assessment & Plan   Anna Marie Babb is a 83 year old female with a history of hypertension, hyperlipidemia, GERD, endometriosis, collagenous colitis, and recent right hip fracture who presented to the ER with left upper quadrant abdominal pain which started within the past few days. Patient is being worked up by gastroenterology services. CT chest/abdomen/pelvis notable for L2 compression fracture favoring acute or subacute and chronic T9 greater kenyetta 50% anterior compression fracture. Patient denies back pain and radicular symptoms. Patient is not tender to palpation of thoracic and lumbar spine, BLE strength 5/5, sensation intact.     Will plan for pain control, and have patient follow up in the out patient setting. Recommend patient follow up with her PCP in regards to osteoporosis evaluation. Patient does not need a brace as she does not have back pain and is non-tender on evaluation.     Imaging.  Imaging Interpretation provided by radiologist. Detailed report in patients chart.   EXAM: CT CHEST PE ABDOMEN PELVIS W CONTRAST  LOCATION: [facility]  DATE: 11/29/2023  IMPRESSION:  1.  No evidence of pulmonary embolus.  2.  New pancreatic ductal dilatation. Pancreatic MRI with and without IV contrast and/or GI consultation recommended to exclude ampullary lesion/stricture.  3.  Thick-walled bladder may be secondary to neurogenic bladder or cystitis. Clinical correlation advised.  4.  Moderate hiatal hernia.  MUSCULOSKELETAL: L2 compression fracture is favored acute or subacute. Correlate for site tenderness. T9 greater than 50% anterior compression fracture is favored chronic.     Plan:  - Pain control measures  - NSGY signing off, please contact with questions or concerns  - Will have patient follow up in outpatient NSGY clinic in 6  weeks with imaging prior, our office will coordinate Follow-up  - Light activity, limit bending, twisting, and lifting >10 pounds      I have discussed the following assessment and plan with Dr. Reece who is in agreement with initial plan and will follow up with further consultation recommendations.    Caridad Thompson PA-C  [facility]  [address] [address]    Text page via [employer] Paging/Directory    Code Status    Full Code    Reason for Consult   Reason for consult: L2 compression fracture    Primary Care Physician   Chema Ramos MD    Chief Complaint   Left upper quadrant pain    History is obtained from the patient    History of Present Illness   Anna Marie Babb is a 83 year old female with a history of hypertension, hyperlipidemia, GERD, endometriosis, collagenous colitis, and recent right hip fracture who presented to the ER with localized left upper quadrant abdominal pain which started within the past few days. Patient is unsure if she has history of osteoporosis or osteopenia.  Denies back pain, radicular symptoms, and bowel/bladder dysfunction. Patient denies history of recent fall/trauma.     Past Medical History   I have reviewed this patient's medical history and updated it with pertinent information if needed.   Past Medical History:   Diagnosis Date    Atrophic vaginitis     Celiac disease     Cystocele     Disorder of bone and cartilage, unspecified     osteopenia    Endometriosis, site unspecified     Essential hypertension, benign     Gastro-oesophageal reflux disease     Hiatal hernia     3 cm sliding hiatal hernia    Hypercalcemia     Hyperlipidemia     HZV (herpes zoster virus) post herpetic neuralgia     Microscopic colitis     Nonspecific abnormal results of liver function study     Mildly elevated transaminases, liver biopsy WNL    Peripheral neuropathy     Pulmonary nodule     Zinc deficiency        Past  Surgical History   I have reviewed this patient's surgical history and updated it with pertinent information if needed.  Past Surgical History:   Procedure Laterality Date    CLOSED REDUCTION, PERCUTANEOUS PINNING HIP Right 8/8/2023    Procedure: CLOSED REDUCTION, HIP, WITH PERCUTANEOUS PINNING;  Surgeon: Bo Duke MD;  Location:  OR    COLONOSCOPY  2011    HC DILATION/CURETTAGE DIAG/THER NON OB      miscarriages , 6 x    HC REMOVAL OF OVARIAN CYST(S)  1977    partial oopherectomy    PHACOEMULSIFICATION CLEAR CORNEA WITH STANDARD INTRAOCULAR LENS IMPLANT Left 8/13/2015    Procedure: PHACOEMULSIFICATION CLEAR CORNEA WITH STANDARD INTRAOCULAR LENS IMPLANT;  Surgeon: Tyler Isaacs MD;  Location:  [facility]    PHACOEMULSIFICATION CLEAR CORNEA WITH STANDARD INTRAOCULAR LENS IMPLANT Right 8/25/2015    Procedure: PHACOEMULSIFICATION CLEAR CORNEA WITH STANDARD INTRAOCULAR LENS IMPLANT;  Surgeon: Tyler Isaacs MD;  Location:  [facility]    ZZC NONSPECIFIC PROCEDURE       rectocoele and enterocoele repair    ZZC NONSPECIFIC PROCEDURE      cystocoele,repair    ZZC TOTAL ABDOM HYSTERECTOMY  1982    endometriosis with adhesions       Prior to Admission Medications   Prior to Admission Medications   Prescriptions Last Dose Informant Patient Reported? Taking?   Loperamide HCl (ANTI-DIARRHEAL PO)   Yes Yes   Sig: Take 1 tablet by mouth daily Takes anti-diarrheal daily, pt not sure its exact name but probably loperamide   amLODIPine (NORVASC) 5 MG tablet 11/28/2023  No Yes   Sig: Take 1 tablet (5 mg) by mouth every evening   lisinopril (ZESTRIL) 20 MG tablet 11/28/2023  No Yes   Sig: Take 1 tablet (20 mg) by mouth every evening   vitamin D3 (CHOLECALCIFEROL) 50 mcg (2000 units) tablet Not Taking  No No   Sig: Take 1 tablet (50 mcg) by mouth daily   Patient not taking: Reported on 11/29/2023      Facility-Administered Medications: None     Allergies   Allergies   Allergen Reactions    Amoxicillin Diarrhea     "Amoxicillin-Pot Clavulanate     Ciprofloxacin     Gluten Meal      celiac    Lactose Intolerance (Gi) Diarrhea    Milk Protein        Social History   I have reviewed this patient's social history and updated it with pertinent information if needed. Anna Marie Babb  reports that she has never smoked. She has been exposed to tobacco smoke. She does not have any smokeless tobacco history on file. She reports current alcohol use. She reports that she does not use drugs.    Family History   I have reviewed this patient's family history and updated it with pertinent information if needed.   Family History   Problem Relation Age of Onset    Cerebrovascular Disease Father          age 42    Cancer Mother         breast cancer,  age 63    Cancer Maternal Grandmother         breast cancer       ROS: 10 point ROS negative other than the symptoms noted above in the HPI.    Physical Exam   Temp: 97.5  F (36.4  C) Temp src: Oral BP: 122/72 Pulse: 63   Resp: 16 SpO2: 94 % O2 Device: None (Room air)    Vital Signs with Ranges  Temp:  [97.5  F (36.4  C)-97.9  F (36.6  C)] 97.5  F (36.4  C)  Pulse:  [61-72] 63  Resp:  [16] 16  BP: (102-140)/(58-72) 122/72  SpO2:  [94 %-98 %] 94 %  114 lbs 13.75 oz     , Blood pressure 122/72, pulse 63, temperature 97.5  F (36.4  C), temperature source Oral, resp. rate 16, height 1.676 m (5' 6\"), weight 52.1 kg (114 lb 13.8 oz), SpO2 94%, not currently breastfeeding.  114 lbs 13.75 oz  NEUROLOGICAL EXAMINATION:   Mental status:  Alert and Oriented x 3, speech is fluent. NAD.  Cranial nerves:  II-XII intact.   Motor:  Strength is 5/5 throughout lower extremities   Hip Flexor:                Right: 5/5  Left:  5/5  Hip Adductor:             Right:  5/5  Left:  5/5  Hip Abductor:             Right:  5/5  Left:  5/5  Gastroc Soleus:        Right:  5/5  Left:  5/5  Tib/Ant:                      Right:  5/5  Left:  5/5  EHL:                     Right:  5/5  Left:  5/5  Sensation:  " "Intact BLE  Reflexes:   Negative Clonus.      Lumbar and thoracic spine examination reveals no tenderness of the spine or paraspinous muscles. Straight leg raise is negative bilaterally.     Tender over left anterior upper quadrant       Data     CBC RESULTS:   Recent Labs   Lab Test 11/30/23  0922   WBC 6.5   RBC 3.51*   HGB 12.6   HCT 37.0   *   MCH 35.9*   MCHC 34.1   RDW 13.1        Basic Metabolic Panel:  Lab Results   Component Value Date     12/02/2023     08/25/2015      Lab Results   Component Value Date    POTASSIUM 4.6 12/02/2023    POTASSIUM 3.8 08/25/2015     Lab Results   Component Value Date    CHLORIDE 97 12/02/2023    CHLORIDE 101 08/25/2015     Lab Results   Component Value Date    KARTHIKEYAN 9.1 12/02/2023    KARTHIKEYAN 8.7 08/25/2015     Lab Results   Component Value Date    CO2 27 12/02/2023    CO2 27 08/25/2015     Lab Results   Component Value Date    BUN 5.9 12/02/2023    BUN 11 08/25/2015     Lab Results   Component Value Date    CR 0.61 12/02/2023    CR 0.88 08/25/2015     Lab Results   Component Value Date    GLC 88 12/02/2023    GLC 97 08/25/2015     INR:  No results found for: \"INR\"      "

## 2023-12-03 NOTE — PLAN OF CARE
12-3-23  Nursing [id number] shift update  Chronic pancreatitis pt with persistent left lower anterior ribcage/LUQ abd area of unknown etiology, which  is reason why observation status was changed to impatient. NPO since midnight for this am's Endoscopy by Dr Ashley which was tolerated well and showed diffuse gastritis no significant ulcers - biopsies were taken for H. pylori.  Endoscopic ultrasound findings were consistent with pancreatic atrophy prominent pancreatic duct minimally dilated biliary system no obvious mass.Hospitalist aware of above an ordered imaging this afternoon to to rule out anterior rib fxUpper  Neuro signed off. Started on soft diet      Orientation: A/Ox2, forgetful. Disorientated  to time and situation  Activity: SBA GB/W. Remains very unsteady, unable to get out of chait alone due to leg weaknesss. PT following. New order for OT added today  Diet: Was on  low fat, low lactose, no gluten diet yesterday  then NPO since midnight prior to procedure . Now currently on soft per GI. Next shift nurse to clarify diet  IV Access/Drains: PIV RA SL  Pain Management: Left, low, anterior rib cage pain controlled with Scheduled  Gabapentin, scheduled Lidocaine patches 12hrs on/12hrs off, and was given prn oral 2.5mg Oxycodone x2 this shift  Abnormal VS/Results: VSS on RA. Zx=501  Bowel/Bladder: cont. B/B  Skin/Wounds: skin intact. sacral mepilex intact  Consults: GI signed off. GI following  D/C Disposition: Writer and PT recommending TCU, thus anticipate discharge to TCU pending OT eval and placement by ROSS

## 2023-12-03 NOTE — PROGRESS NOTES
"   12/03/23 1600   Appointment Info   Signing Clinician's Name / Credentials (OT) NEGRO Mcarthur/L   Rehab Comments (OT) spinal precs. as of 12/3   Living Environment   People in Home child(domi), adult  (son)   Current Living Arrangements house  ([facility])   Home Accessibility stairs to enter home   Stair Railings, Main Entrance railing on right side (ascending)   Transportation Anticipated family or friend will provide   Living Environment Comments Has a basement but does not go down to the basement at baseline. Also has dtr who lives in [place];son does the laundry--in basement;looking someone to clean the home, dtr. does clean 1X/week;manages own medications;son does the cooking, pt.+ son do the dishes;does not drive; standard toilet w/sink support;tub/shower combo. w/ grab bars;access to shower chair(son uses)   Self-Care   Usual Activity Tolerance moderate   Regular Exercise No   Equipment Currently Used at Home walker, rolling;shower chair;grab bar, tub/shower   Fall history within last six months yes   Number of times patient has fallen within last six months 1   Activity/Exercise/Self-Care Comment uses WW in the home, [employer]WW in the community/longer distances   General Information   Onset of Illness/Injury or Date of Surgery 11/29/23   Referring Physician Kimberlee Corral MD   Additional Occupational Profile Info/Pertinent History of Current Problem \"Anna Marie Babb is a 83 year old female with a history of hypertension, hyperlipidemia, GERD, endometriosis, collagenous colitis, and recent right hip fracture who presented to the ER with left upper quadrant abdominal pain which started within the past few days. Evaluation in the ER was concerning for a UTI and dilated pancreatic ducts. She was started on rocephin and the hosptialist service was contacted to bring her into the hospital for further evaluation and management.\" 12/3: CT chest/abdomen/pelvis notable for L2 compression fracture " favoring acute or subacute and chronic T9 greater kenyetta 50% anterior compression fracture. No lifting of more than 10 pounds, no bending, no twisting until follow up visit.   Existing Precautions/Restrictions fall;spinal  (12/3:no bending, twisting, lifting more than 10#)   General Observations and Info Pt. lying in bed, agreeable to OT;endoscopy this a.m.   Cognitive Status Examination   Orientation Status person;time  (stated correct month, year;place as [place])   Affect/Mental Status (Cognitive) confused   Follows Commands 75-90% accuracy;does not follow one-step commands   Safety Deficit   (responded to safety questions approp.)   Memory Deficit moderate deficit  (STM recall 1/3 words after delay)   Executive Function Deficit insight/awareness of deficits   Cognitive Status Comments forgetful, some confusion noted   Visual Perception   Impact of Vision Impairment on Function (Vision) no vision problems noted, reported   Sensory   Sensory Comments no numbness/tingling reported   Pain Assessment   Patient Currently in Pain No  (currently,none at rest/supine, recent pain meds.;per chart--Left, low, anterior rib cage pain)   Posture   Posture forward head position;protracted shoulders   Range of Motion Comprehensive   Comment, General Range of Motion BUE WFL   Strength Comprehensive (MMT)   Comment, General Manual Muscle Testing (MMT) Assessment Banner WFL;gen.weakness apparent   Coordination   Upper Extremity Coordination No deficits were identified   Bed Mobility   Comment (Bed Mobility) SBA-CGA using handrail   Transfers   Transfer Comments min. A +WW   Balance   Balance Comments impaired, unsteady   Lower Body Dressing Assessment/Training   Comment, (Lower Body Dressing) dependent this date, declined to trial on her own   Grooming Assessment/Training   Dunn Level (Grooming) contact guard assist   Toileting   Dunn Level (Toileting) minimum assist (75% patient effort)   Comment, (Toileting)  commode overlay   Clinical Impression   Criteria for Skilled Therapeutic Interventions Met (OT) Yes, treatment indicated   OT Diagnosis Decline in ADL performance   OT Problem List-Impairments impacting ADL problems related to;activity tolerance impaired;balance;cognition;mobility;strength;pain   Assessment of Occupational Performance 3-5 Performance Deficits   Identified Performance Deficits dressing, bathing, standing ADL's/grooming, toileting, mobility, IADL's   Planned Therapy Interventions (OT) ADL retraining;cognition;strengthening;progressive activity/exercise   Clinical Decision Making Complexity (OT) problem focused assessment/low complexity   Risk & Benefits of therapy have been explained evaluation/treatment results reviewed;care plan/treatment goals reviewed;risks/benefits reviewed;current/potential barriers reviewed;participants voiced agreement with care plan;participants included;patient   OT Total Evaluation Time   OT Eval, Low Complexity Minutes (38754) 12   OT Goals   Therapy Frequency (OT) 5 times/week   OT Predicted Duration/Target Date for Goal Attainment 12/10/23   OT Goals Hygiene/Grooming;Lower Body Dressing;Toilet Transfer/Toileting   OT: Hygiene/Grooming independent;while standing;within precautions   OT: Lower Body Dressing Independent;Modified independent;using adaptive equipment;within precautions;including set-up/clothing retrieval   OT: Toilet Transfer/Toileting Independent;Modified independent;toilet transfer;cleaning and garment management;using adaptive equipment;within precautions   Interventions   Interventions Quick Adds Self-Care/Home Management   Self-Care/Home Management   Self-Care/Home Mgmt/ADL, Compensatory, Meal Prep Minutes (39021) 27   Symptoms Noted During/After Treatment (Meal Preparation/Planning Training) fatigue   Treatment Detail/Skilled Intervention OT:Pt. agreeable to OT, spinal precs. as of today due to L2 compressin fracture, no back pain reported;pt. ed. in  spnal precs.;pt. able to come supine-sit w/ overall SBA-CGA using siderail;sitting EOB w/ SBA;pt. needed mod. A to medina fleece jacket;ed. in safe sit-stand transfers/hand placement--completed w/ min. A;ambulated to/from bathroom w/ overall CGA-min. A + WW;pt. completed toilet/commode transfer w/ overall min. A, B arm support;pt. reports diffciulty getting up from toilet at home;ed. in use of RTS w/ armrests for home and showed pic.;pt. indicated will have son or dtr. obtain;pt stood breifly at sink for simulated grooming, declined grooming tasks this date'pt. ambulated to chair w/ CGA, stand-sit w/ CGA-min. A;ed. in current DC recs.--TCU, pt. agreeable;chair alarm activated upon departure.   OT Discharge Planning   OT Plan review spinal precs.,standing sinkside for g/h tasks, toileting, dressing w/ AE, SLUMS--or defer to TCU   OT Discharge Recommendation (DC Rec) Transitional Care Facility   OT Rationale for DC Rec Currently, pt. is below ADl, mobility baseline, limited by weakness, decreased activity vilma, impaired balance, pain + impaired cognition;pt. would benefit from continued skilled OT intervention in the CU setting to maxmize safety/indep. prior to discharge back home.   OT Brief overview of current status CGA-min. A transfers, room mobility, unsteady,weak   OT Equipment Needed at Discharge raised toilet seat

## 2023-12-04 ENCOUNTER — APPOINTMENT (OUTPATIENT)
Dept: PHYSICAL THERAPY | Facility: CLINIC | Age: 83
DRG: 184 | End: 2023-12-04
Payer: MEDICARE

## 2023-12-04 LAB
ANION GAP SERPL CALCULATED.3IONS-SCNC: 8 MMOL/L (ref 7–15)
BUN SERPL-MCNC: 10.6 MG/DL (ref 8–23)
CALCIUM SERPL-MCNC: 9.4 MG/DL (ref 8.8–10.2)
CHLORIDE SERPL-SCNC: 92 MMOL/L (ref 98–107)
CREAT SERPL-MCNC: 0.81 MG/DL (ref 0.51–0.95)
DEPRECATED HCO3 PLAS-SCNC: 27 MMOL/L (ref 22–29)
EGFRCR SERPLBLD CKD-EPI 2021: 71 ML/MIN/1.73M2
GLUCOSE SERPL-MCNC: 96 MG/DL (ref 70–99)
POTASSIUM SERPL-SCNC: 4.7 MMOL/L (ref 3.4–5.3)
SODIUM SERPL-SCNC: 127 MMOL/L (ref 135–145)

## 2023-12-04 PROCEDURE — 97116 GAIT TRAINING THERAPY: CPT | Mod: GP | Performed by: PHYSICAL THERAPIST

## 2023-12-04 PROCEDURE — 36415 COLL VENOUS BLD VENIPUNCTURE: CPT | Performed by: STUDENT IN AN ORGANIZED HEALTH CARE EDUCATION/TRAINING PROGRAM

## 2023-12-04 PROCEDURE — 250N000013 HC RX MED GY IP 250 OP 250 PS 637

## 2023-12-04 PROCEDURE — 80048 BASIC METABOLIC PNL TOTAL CA: CPT | Performed by: STUDENT IN AN ORGANIZED HEALTH CARE EDUCATION/TRAINING PROGRAM

## 2023-12-04 PROCEDURE — 97530 THERAPEUTIC ACTIVITIES: CPT | Mod: GP | Performed by: PHYSICAL THERAPIST

## 2023-12-04 PROCEDURE — 120N000001 HC R&B MED SURG/OB

## 2023-12-04 PROCEDURE — 99232 SBSQ HOSP IP/OBS MODERATE 35: CPT | Performed by: HOSPITALIST

## 2023-12-04 RX ADMIN — GABAPENTIN 200 MG: 100 CAPSULE ORAL at 09:55

## 2023-12-04 RX ADMIN — PANTOPRAZOLE SODIUM 40 MG: 40 TABLET, DELAYED RELEASE ORAL at 09:55

## 2023-12-04 RX ADMIN — OXYCODONE HYDROCHLORIDE 2.5 MG: 5 TABLET ORAL at 11:46

## 2023-12-04 RX ADMIN — MENTHOL 1 PATCH: 205.5 PATCH TOPICAL at 11:46

## 2023-12-04 RX ADMIN — OXYCODONE HYDROCHLORIDE 5 MG: 5 TABLET ORAL at 17:00

## 2023-12-04 RX ADMIN — AMLODIPINE BESYLATE 5 MG: 5 TABLET ORAL at 20:42

## 2023-12-04 RX ADMIN — OXYCODONE HYDROCHLORIDE 5 MG: 5 TABLET ORAL at 22:17

## 2023-12-04 ASSESSMENT — ACTIVITIES OF DAILY LIVING (ADL)
ADLS_ACUITY_SCORE: 27
ADLS_ACUITY_SCORE: 25
ADLS_ACUITY_SCORE: 27

## 2023-12-04 NOTE — PLAN OF CARE
[id number]  Orientation: A&O x4. forgetful  Activity: SBA gbw  Diet/BS Checks:mechanical soft. Gluten free. Low lactose. Low sat fat  Tele:  n/a  IV Access/Drains: PIV LA SL  Pain Management: c/o of pain in left ribcage  Abnormal VS/Results: VSS on RA ex soft bps. Pot stand protocol. K+ 4.0, lab redraw in AM  Bowel/Bladder: cont. B/B  Skin/Wounds: WNL  Consults: neurosurgery, GI, OT, PT, SW  D/C Disposition: pending. PT recommending TCU pending OT eval and SW  Other Info: incentive spirometer q2hr when awake. Emesis  last night, no nausea this AM

## 2023-12-04 NOTE — SIGNIFICANT EVENT
Significant Event Note    Time of event: 6:58 PM December 3, 2023    Description of event:  I was notified of the following X-ray results:    EXAM: XR RIBS LEFT 2 VIEWS  LOCATION: [facility]  DATE: 12/3/2023     INDICATION: rib fracture  COMPARISON: CT from 11/29/2023                                                                      IMPRESSION: Age-indeterminate minimally displaced left seventh through 10th rib fractures. No discernible pneumothorax. Blunting of the left costophrenic angle may represent scarring and/or a small pleural effusion. Chronic lung changes. Osteopenia.   Aortic atherosclerosis.    Plan:  --Incentive spirometer   --Pain control    Discussed with: bedside nurse    Alina Rodriguez MD

## 2023-12-04 NOTE — PLAN OF CARE
Orientation: A&Ox4, forgetful    Activity: SBA GBW    Diet/BS Checks: Mechanical soft, gluten free, low fat, and low lactose     Tele: N/A    IV Access/Drains: LPIV CDI SL    Pain Management: Prn oxy given x2     Abnormal VS/Results: chest XR results showed left rib fx, MD aware and ordered incentive spirometer. Education given and pt does independently     Bowel/Bladder: Continent of both     Skin/Wounds: Intact, sacral mepilex CDI for protection     Consults: GI/PT/OT    D/C Disposition: Pending     Other Info: Pt had endoscopy today. 1 sm emesis occurred around 2130. Prn IV zofran given and pt had relief

## 2023-12-04 NOTE — PROGRESS NOTES
"[facility]    Medicine Progress Note - Hospitalist Service    Date of Admission:  11/29/2023    Assessment & Plan   Anna Marie Babb is a 83 year old female with a history of hypertension, hyperlipidemia, GERD, endometriosis, collagenous colitis, and recent right hip fracture who presented to the ER with left upper quadrant abdominal pain which started within the past few days. Evaluation in the ER was concerning for a UTI and dilated pancreatic ducts. She was started on rocephin and the hosptialist service was contacted to bring her into the hospital for further evaluation and management.     Left upper quadrant abdominal pain likely due to rib fractures  Pancreatic ductal dilatation  Chronic pancreatitis  Gastritis  New onset left upper quadrant abdominal pain mainly over lower ribs on 11/29. Pt awake with this pain and notes it as \"hard\" and unable to lay on her sides. Hx of falls, but notes none recently and usually they are on the right side.  No N/V, constipation, diarrhea, SOB, heartburn s/s. No evidence of shingles noted in ED or per exam. CT abdomen/pelvis showed new pancreatic ductal dilatation, this was not seen on prior CT in 2019.  Cardiac work-up unremarkable. CXR without any bony abnormalities/rib fractures. CT C/A/P notable for L2 compression fracture is favored acute or subacute. T9 greater than 50% anterior compression fracture is favored chronic. Admission labs notable for Alk phos mildly elevated, other LFTs within normal limits. Admission lipase normal. Concern for age vs neoplastic vs sludge or stone. MRCP noting pancreatic atrophy with moderate pancreatic, consistent with chronic pancreatitis, with no obstructing stone seen in CBD. Pt without any epigastric pain, pain with eating, or diarrhea. Pt also noting she has been taking ibuprofen nightly \"for a while\" on 12/1. Etiology remains unclear, but suspect multifactorial. Chronic pancreatitis could be playing a " role, but also would not rule out ulcers given reported NSAID usage. Also given falls history (although none lately) and findings on CT C/A/P findings with T9 compression fracture, likely chronic, more likely playing a role in this area as opposed to a L2 fracture.  Continued to have left upper quadrant pain and GI reconsulted and patient underwent endoscopy and endoscopic ultrasound which showed gastritis.  Endoscopic ultrasound consistent with pancreatic atrophy with prominent pancreatic duct with minimally dilated biliary system with no obvious mass.  GI recommending soft diet and continuing proton pump inhibitor     Left sided rib fractures  No mention of rib fractures on CT but x-ray shows fractures of left ribs 7-10.  No history of trauma and no indication of malignancy on CT or x-ray.  Continue lidocaine patch and gabapentin  Continue IS   Continue physcial therapy   Likely discharge to transitional care unit      Pyuria, candiduria   No s/s on admission. CT showed thickened bladder wall, possibly secondary to neurogenic bladder or cystitis. UA abnormal, see below with squamous cells noted.  Urine culture with 10,000-50,000 of Candida albicans, suspect contaminant. Started on empiric rocephin in the ER. Continues to deny s/s.   Stopped antibiotic given no symptoms and negative urine culture  Follow up with primary care provider     Elevated troponin  Troponin 32 initially in the ER, then 25 on recheck. EKG did not have any obvious acute ischemic changes. CT chest negative for PE, no other obvious acute cardiopulmonary abnormality noted, she does have severe coronary artery calcification.  Suspicion for cardiac etiology of her symptoms is low.  No further work-up planned at this time, can reassess pending clinical course.     Hyponatremia, chronic  Sodium   Date Value Ref Range Status   12/02/2023 130 (L) 135 - 145 mmol/L Final     Comment:     Reference intervals for this test were updated on 09/26/2023 to  more accurately reflect our healthy population. There may be differences in the flagging of prior results with similar values performed with this method. Interpretation of those prior results can be made in the context of the updated reference intervals.    2015 135 133 - 144 mmol/L Final   Recently had urine Na 22 with urine osmolality 178 but this could have been on intravenous fluid.  Her serum Na is stable from previous readings.  She is not on medications that would cause this.  No further inpatient evaluation planned unless it worsens.  Follow up with primary care provider     Hypertension  Systolic (24hrs), Av , Min:98 , Max:116   Blood pressure initially elevated in the ER, improved now.  Continue PTA amlodipine   Holding PTA lisinopril for now    Recent right hip fracture  S/p percutaneous screw fixation on 23 by Dr. Duke.  Continue outpatient follow-up with Orthopedic Surgery as previously directed     L2 compression fracture  CT chest/abdomen/pelvis showed likely acute/subacute L2 compression fracture as well as likely chronic T9 compression fracture. No back pain.  Neurosurgery consult appreciated and recommending outpatient follow-up in 6 weeks    Pulmonary nodule  CT chest in the ER showed a pulmonary nodule in the left lower lobe. This has been noted on prior imaging going back to at least .  No further evaluation needed        Diet: Combination Diet Gluten Free Diet; Mechanical/Dental Soft Diet; Low Lactose Diet; Low Saturated Fat Diet    DVT Prophylaxis: Pneumatic Compression Devices  Lombardi Catheter: Not present  Lines: None     Cardiac Monitoring: None  Code Status: Full Code      Clinically Significant Risk Factors                  # Hypertension: Noted on problem list                   Disposition Plan     Expected Discharge Date: 2023                    Rasheed Constantino MD  Hospitalist Service  [facility]  Securely message with Clipboard  (more info)  Text page via [facility] Paging/Directory   ______________________________________________________________________    Interval History   Xray showed 4 left sided rib fractures and the pain she has been having is actually over this area. Has pleuritic type pain there as well.     Physical Exam   Vital Signs: Temp: 98.5  F (36.9  C) Temp src: Oral BP: 106/64 Pulse: 60   Resp: 16 SpO2: 95 % O2 Device: None (Room air)    Weight: 114 lbs 13.75 oz  Constitutional: awake, alert, cooperative, no apparent distress  Respiratory: No increased work of breathing, good air exchange, clear to auscultation bilaterally, no crackles or wheezing  Cardiovascular: regular rate and rhythm, normal S1 and S2, no S3 or S4, and no murmur noted  GI: normal bowel sounds, soft, non-distended, non-tender, no masses palpated  Neuropsychiatric: General: normal, calm and normal eye contact   Tender to palpation over left lateral chest wall    Medical Decision Making       MANAGEMENT DISCUSSED with the following over the past 24 hours: patient   NOTE(S)/MEDICAL RECORDS REVIEWED over the past 24 hours: EPIC       Data     I have personally reviewed the following data over the past 24 hrs:    N/A  \   N/A   / N/A     N/A N/A N/A /  N/A   N/A N/A N/A \       Imaging results reviewed over the past 24 hrs:   Recent Results (from the past 24 hour(s))   XR Ribs Left 2 Views    Narrative    EXAM: XR RIBS LEFT 2 VIEWS  LOCATION: [facility]  DATE: 12/3/2023    INDICATION: rib fracture  COMPARISON: CT from 11/29/2023      Impression    IMPRESSION: Age-indeterminate minimally displaced left seventh through 10th rib fractures. No discernible pneumothorax. Blunting of the left costophrenic angle may represent scarring and/or a small pleural effusion. Chronic lung changes. Osteopenia.   Aortic atherosclerosis.

## 2023-12-04 NOTE — PLAN OF CARE
Goal Outcome Evaluation:  Orientations: A&Ox4  Vitals/Pain: VSS on RA. Pain controlled with oxy, lido patches, and icey hot patches.   Tele: n/a  Lines/Drains: PIV SL  Skin/Wounds: Blanchable redness coccyx.   GI/: Mech soft diet, low lactose diet, and celiac diet. Adequate UP{.   Labs: Abnormal/Trends, Electrolyte Replacement- K protocol.   Ambulation/Assist: SBA GB/W  Sleep Quality: Poor  Plan: Plan to discharge to a TCU. Pt up walking today and up in chair most of the day. Using IS.

## 2023-12-05 ENCOUNTER — APPOINTMENT (OUTPATIENT)
Dept: PHYSICAL THERAPY | Facility: CLINIC | Age: 83
DRG: 184 | End: 2023-12-05
Payer: MEDICARE

## 2023-12-05 ENCOUNTER — APPOINTMENT (OUTPATIENT)
Dept: OCCUPATIONAL THERAPY | Facility: CLINIC | Age: 83
DRG: 184 | End: 2023-12-05
Payer: MEDICARE

## 2023-12-05 LAB
ANION GAP SERPL CALCULATED.3IONS-SCNC: 9 MMOL/L (ref 7–15)
BUN SERPL-MCNC: 8.2 MG/DL (ref 8–23)
CALCIUM SERPL-MCNC: 9.8 MG/DL (ref 8.8–10.2)
CHLORIDE SERPL-SCNC: 97 MMOL/L (ref 98–107)
CREAT SERPL-MCNC: 0.7 MG/DL (ref 0.51–0.95)
DEPRECATED HCO3 PLAS-SCNC: 27 MMOL/L (ref 22–29)
EGFRCR SERPLBLD CKD-EPI 2021: 85 ML/MIN/1.73M2
GLUCOSE SERPL-MCNC: 94 MG/DL (ref 70–99)
PATH REPORT.COMMENTS IMP SPEC: NORMAL
PATH REPORT.COMMENTS IMP SPEC: NORMAL
PATH REPORT.FINAL DX SPEC: NORMAL
PATH REPORT.GROSS SPEC: NORMAL
PATH REPORT.MICROSCOPIC SPEC OTHER STN: NORMAL
PATH REPORT.RELEVANT HX SPEC: NORMAL
PHOTO IMAGE: NORMAL
POTASSIUM SERPL-SCNC: 3.9 MMOL/L (ref 3.4–5.3)
SODIUM SERPL-SCNC: 133 MMOL/L (ref 135–145)

## 2023-12-05 PROCEDURE — 36415 COLL VENOUS BLD VENIPUNCTURE: CPT | Performed by: STUDENT IN AN ORGANIZED HEALTH CARE EDUCATION/TRAINING PROGRAM

## 2023-12-05 PROCEDURE — 250N000013 HC RX MED GY IP 250 OP 250 PS 637: Performed by: HOSPITALIST

## 2023-12-05 PROCEDURE — 97535 SELF CARE MNGMENT TRAINING: CPT | Mod: GO | Performed by: OCCUPATIONAL THERAPIST

## 2023-12-05 PROCEDURE — 99232 SBSQ HOSP IP/OBS MODERATE 35: CPT | Performed by: HOSPITALIST

## 2023-12-05 PROCEDURE — 97530 THERAPEUTIC ACTIVITIES: CPT | Mod: GP | Performed by: PHYSICAL THERAPIST

## 2023-12-05 PROCEDURE — 250N000013 HC RX MED GY IP 250 OP 250 PS 637

## 2023-12-05 PROCEDURE — 88305 TISSUE EXAM BY PATHOLOGIST: CPT | Mod: 26 | Performed by: PATHOLOGY

## 2023-12-05 PROCEDURE — 82310 ASSAY OF CALCIUM: CPT | Performed by: STUDENT IN AN ORGANIZED HEALTH CARE EDUCATION/TRAINING PROGRAM

## 2023-12-05 PROCEDURE — 120N000001 HC R&B MED SURG/OB

## 2023-12-05 PROCEDURE — 97116 GAIT TRAINING THERAPY: CPT | Mod: GP | Performed by: PHYSICAL THERAPIST

## 2023-12-05 RX ADMIN — LIDOCAINE 1 PATCH: 4 PATCH TOPICAL at 20:20

## 2023-12-05 RX ADMIN — OXYCODONE HYDROCHLORIDE 2.5 MG: 5 TABLET ORAL at 13:36

## 2023-12-05 RX ADMIN — GABAPENTIN 200 MG: 100 CAPSULE ORAL at 08:44

## 2023-12-05 RX ADMIN — OXYCODONE HYDROCHLORIDE 2.5 MG: 5 TABLET ORAL at 08:44

## 2023-12-05 RX ADMIN — OXYCODONE HYDROCHLORIDE 5 MG: 5 TABLET ORAL at 02:47

## 2023-12-05 RX ADMIN — OXYCODONE HYDROCHLORIDE 2.5 MG: 5 TABLET ORAL at 17:36

## 2023-12-05 RX ADMIN — ACETAMINOPHEN 650 MG: 325 TABLET, FILM COATED ORAL at 08:41

## 2023-12-05 RX ADMIN — PANTOPRAZOLE SODIUM 40 MG: 40 TABLET, DELAYED RELEASE ORAL at 06:32

## 2023-12-05 RX ADMIN — MENTHOL 1 PATCH: 205.5 PATCH TOPICAL at 02:48

## 2023-12-05 RX ADMIN — AMLODIPINE BESYLATE 5 MG: 5 TABLET ORAL at 20:05

## 2023-12-05 RX ADMIN — DOCUSATE SODIUM 50 MG AND SENNOSIDES 8.6 MG 1 TABLET: 8.6; 5 TABLET, FILM COATED ORAL at 08:44

## 2023-12-05 ASSESSMENT — ACTIVITIES OF DAILY LIVING (ADL)
ADLS_ACUITY_SCORE: 25
DEPENDENT_IADLS:: INDEPENDENT
ADLS_ACUITY_SCORE: 27
ADLS_ACUITY_SCORE: 27
ADLS_ACUITY_SCORE: 25
ADLS_ACUITY_SCORE: 25
ADLS_ACUITY_SCORE: 27
ADLS_ACUITY_SCORE: 25
ADLS_ACUITY_SCORE: 27

## 2023-12-05 NOTE — PROGRESS NOTES
"[facility]    Medicine Progress Note - Hospitalist Service    Date of Admission:  11/29/2023    Assessment & Plan   Anna Marie Babb is a 83 year old female with a history of hypertension, hyperlipidemia, GERD, endometriosis, collagenous colitis, and recent right hip fracture who presented to the ER with left upper quadrant abdominal pain which started within the past few days. Evaluation in the ER was concerning for a UTI and dilated pancreatic ducts. She was started on rocephin and the hosptialist service was contacted to bring her into the hospital for further evaluation and management.     Left upper quadrant abdominal pain likely due to rib fractures  Pancreatic ductal dilatation  Chronic pancreatitis  Gastritis  New onset left upper quadrant abdominal pain mainly over lower ribs on 11/29. Pt awake with this pain and notes it as \"hard\" and unable to lay on her sides. Hx of falls, but notes none recently and usually they are on the right side.  No N/V, constipation, diarrhea, SOB, heartburn s/s. No evidence of shingles noted in ED or per exam. CT abdomen/pelvis showed new pancreatic ductal dilatation, this was not seen on prior CT in 2019.  Cardiac work-up unremarkable. CXR without any bony abnormalities/rib fractures. CT C/A/P notable for L2 compression fracture is favored acute or subacute. T9 greater than 50% anterior compression fracture is favored chronic. Admission labs notable for Alk phos mildly elevated, other LFTs within normal limits. Admission lipase normal. Concern for age vs neoplastic vs sludge or stone. MRCP noting pancreatic atrophy with moderate pancreatic, consistent with chronic pancreatitis, with no obstructing stone seen in CBD. Pt without any epigastric pain, pain with eating, or diarrhea. Pt also noting she has been taking ibuprofen nightly \"for a while\" on 12/1. Etiology remains unclear, but suspect multifactorial. Chronic pancreatitis could be playing a " role, but also would not rule out ulcers given reported NSAID usage. Also given falls history (although none lately) and findings on CT C/A/P findings with T9 compression fracture, likely chronic, more likely playing a role in this area as opposed to a L2 fracture.  Continued to have left upper quadrant pain and GI reconsulted and patient underwent endoscopy and endoscopic ultrasound which showed gastritis.  Endoscopic ultrasound consistent with pancreatic atrophy with prominent pancreatic duct with minimally dilated biliary system with no obvious mass.  GI recommending soft diet and continuing proton pump inhibitor     Left sided rib fractures  No mention of rib fractures on CT but x-ray shows fractures of left ribs 7-10. She did fall prior to admission.   Continue lidocaine patch and gabapentin  Continue IS   Continue physcial therapy   Likely discharge to transitional care unit      Pyuria, candiduria   No s/s on admission. CT showed thickened bladder wall, possibly secondary to neurogenic bladder or cystitis. UA abnormal, see below with squamous cells noted.  Urine culture with 10,000-50,000 of Candida albicans, suspect contaminant. Started on empiric rocephin in the ER. Continues to deny s/s.   Stopped antibiotic given no symptoms and negative urine culture  Follow up with primary care provider     Elevated troponin  Troponin 32 initially in the ER, then 25 on recheck. EKG did not have any obvious acute ischemic changes. CT chest negative for PE, no other obvious acute cardiopulmonary abnormality noted, she does have severe coronary artery calcification.  Suspicion for cardiac etiology of her symptoms is low.  No further work-up planned at this time, can reassess pending clinical course.     Hyponatremia, chronic  Sodium   Date Value Ref Range Status   12/05/2023 133 (L) 135 - 145 mmol/L Final     Comment:     Reference intervals for this test were updated on 09/26/2023 to more accurately reflect our healthy  population. There may be differences in the flagging of prior results with similar values performed with this method. Interpretation of those prior results can be made in the context of the updated reference intervals.    2015 135 133 - 144 mmol/L Final   Recently had urine Na 22 with urine osmolality 178 but this could have been on intravenous fluid.  Her serum Na is stable from previous readings.  She is not on medications that would cause this.  No further inpatient evaluation planned unless it worsens.  Follow up with primary care provider     Hypertension  Systolic (24hrs), Av , Min:98 , Max:116   Blood pressure initially elevated in the ER, improved now.  Continue PTA amlodipine   Holding PTA lisinopril for now    Recent right hip fracture  S/p percutaneous screw fixation on 23 by Dr. Duke.  Continue outpatient follow-up with Orthopedic Surgery as previously directed     L2 compression fracture  CT chest/abdomen/pelvis showed likely acute/subacute L2 compression fracture as well as likely chronic T9 compression fracture. No back pain.  Neurosurgery consult appreciated and recommending outpatient follow-up in 6 weeks  Pulmonary nodule  CT chest in the ER showed a pulmonary nodule in the left lower lobe. This has been noted on prior imaging going back to at least .  No further evaluation needed    Suspected cognitive impairment  She is oriented to person, place, year and month but has difficulty recalling recent events.  She did not remember that I discussed her having rib fractures yesterday.  Occupational therapy  consult for cognitive evaluation         Diet: Room Service  Combination Diet Gluten Free Diet; Mechanical/Dental Soft Diet; Low Lactose Diet  Snacks/Supplements Adult: Other; ok to order supplements; With Meals    DVT Prophylaxis: Pneumatic Compression Devices  Lombardi Catheter: Not present  Lines: None     Cardiac Monitoring: None  Code Status: Full Code      Clinically  Significant Risk Factors         # Hyponatremia: Lowest Na = 127 mmol/L in last 2 days, will monitor as appropriate          # Hypertension: Noted on problem list                   Disposition Plan      Expected Discharge Date: 12/05/2023                    Rasheed Constantino MD  Hospitalist Service  [facility]  Securely message with [employer] (more info)  Text page via [employer] Paging/Directory   ______________________________________________________________________    Interval History   Pain is the same and no new complaints.     Physical Exam   Vital Signs: Temp: 97.4  F (36.3  C) Temp src: Oral BP: 130/70 Pulse: 74   Resp: 16 SpO2: 96 % O2 Device: None (Room air)    Weight: 114 lbs 13.75 oz  Constitutional: awake, alert, cooperative, no apparent distress  Neuropsychiatric: General: normal, calm and normal eye contact   Tender to palpation over left lateral chest wall    Medical Decision Making       MANAGEMENT DISCUSSED with the following over the past 24 hours: patient   NOTE(S)/MEDICAL RECORDS REVIEWED over the past 24 hours: EPIC       Data     I have personally reviewed the following data over the past 24 hrs:    N/A  \   N/A   / N/A     133 (L) 97 (L) 8.2 /  94   3.9 27 0.70 \       Imaging results reviewed over the past 24 hrs:   No results found for this or any previous visit (from the past 24 hour(s)).

## 2023-12-05 NOTE — PLAN OF CARE
Nursing shift 12-5-23 ([id number]) Update  Orientation: A/Ox2, forgetful. Disorientated  to time and situation  Activity: SBA GB/W. Remains unsteady, unable to get out of chair alone due to leg weaknesss. PT/OT following and recommend TCU for rehab. Pt was up in chair most of shift and refused to get out of it despite writer giving pt skin rationale.  Diet  low fat, low lactose, no gluten diet yesterday    IV Access/Drains: PIV RA SL  Pain Management: Has persistent Left, low, anterior rib cage pain (now newly believed to be caused from newly found displaced left seventh through 10th rib fractures on 12-3-23 CXR)  This pain is being controlled with Scheduled  Gabapentin, and scheduled Lidocaine patches to this sight every night and removed every morning (to prevent potential toxicitiy effects).  In addition was given prn oral 2.5mg Oxycodone x2 this shift and Tylenol x1  Abnormal VS/Results: VSS on RA. Jn=281  Bowel/Bladder: cont. B/B  Skin/Wounds:. sacral mepilex intact Has blanchable redness. Cushion is being used in chair per WOC order  Consults: GI   D/C Disposition: To TCU pending opening.SW following

## 2023-12-05 NOTE — PLAN OF CARE
Goal Outcome Evaluation:         Orientation: A&O x4- forgetful   Activity: A1 GBW  Diet/BS Checks: mechanically soft, gluten free, low lactose  Tele:  n/a   IV Access/Drains: PIV SL   Pain Management: Pain at right ribcage, oxycodone given x1 with relief   Abnormal VS/Results: VSS on RA- on K+ protocols.   Bowel/Bladder: continent- no BM this shift  Skin/Wounds: blanchable redness to coccyx  Consults: GI, PT/OT, SW  D/C Disposition: Plan to discharge to TCU   Other Info:

## 2023-12-05 NOTE — CONSULTS
Care Management Initial Consult    General Information  Assessment completed with: Patient, Patient  Type of CM/SW Visit: Initial Assessment    Primary Care Provider verified and updated as needed: Yes   Readmission within the last 30 days:        Reason for Consult: discharge planning  Advance Care Planning: Advance Care Planning Reviewed: no concerns identified          Communication Assessment  Patient's communication style: spoken language (English or Bilingual)    Hearing Difficulty or Deaf: no   Wear Glasses or Blind: no    Cognitive  Cognitive/Neuro/Behavioral: .WDL except, orientation  Level of Consciousness: alert  Arousal Level: opens eyes spontaneously  Orientation: disoriented to, situation, other (see comments), time (forgetful at timnes)  Mood/Behavior: cooperative, calm  Best Language: 0 - No aphasia  Speech: spontaneous, clear, logical    Living Environment:   People in home: child(domi), adult  Darius  Current living Arrangements: house      Able to return to prior arrangements: no       Family/Social Support:  Care provided by: self  Provides care for: no one  Marital Status:   Children          Description of Support System: Supportive, Involved    Support Assessment: Adequate family and caregiver support, Adequate social supports    Current Resources:   Patient receiving home care services: No     Community Resources: None  Equipment currently used at home: walker, rolling  Supplies currently used at home:      Employment/Financial:  Employment Status: retired        Financial Concerns: none   Referral to Financial Worker: No       Does the patient's insurance plan have a 3 day qualifying hospital stay waiver?  No    Lifestyle & Psychosocial Needs:  Social Determinants of Health     Food Insecurity: Low Risk  (11/26/2023)    Food Insecurity     Within the past 12 months, did you worry that your food would run out before you got money to buy more?: No     Within the past 12 months, did the  food you bought just not last and you didn t have money to get more?: No   Depression: At risk (9/7/2023)    PHQ-2     PHQ-2 Score: 3   Housing Stability: Low Risk  (11/26/2023)    Housing Stability     Do you have housing? : Yes     Are you worried about losing your housing?: No   Tobacco Use: Medium Risk (12/4/2023)    Patient History     Smoking Tobacco Use: Never     Smokeless Tobacco Use: Unknown     Passive Exposure: Yes   Financial Resource Strain: Low Risk  (11/26/2023)    Financial Resource Strain     Within the past 12 months, have you or your family members you live with been unable to get utilities (heat, electricity) when it was really needed?: No   Alcohol Use: Not on file   Transportation Needs: Low Risk  (11/26/2023)    Transportation Needs     Within the past 12 months, has lack of transportation kept you from medical appointments, getting your medicines, non-medical meetings or appointments, work, or from getting things that you need?: No   Physical Activity: Not on file   Interpersonal Safety: Not on file   Stress: Not on file   Social Connections: Not on file       Functional Status:  Prior to admission patient needed assistance:   Dependent ADLs:: Independent  Dependent IADLs:: Independent       Mental Health Status:  Mental Health Status: No Current Concerns       Chemical Dependency Status:  Chemical Dependency Status: No Current Concerns             Values/Beliefs:  Spiritual, Cultural Beliefs, Yazidi Practices, Values that affect care: no               Additional Information:  Per Care Management/social work consult for discharge planning patient admitted on 11/29 due to pancreatic duct dilated. ROSS reviewed chart and spoke with patient. Per patient report she reside sin a townhouse with her son. She has a walker and is independent with all cares at baseline. There are 3 stairs to enter her home. ROSS discussed recommendation for TCU and patient in agreement and would like [facility] to [facility]. SW discussed additional facilities and patient in agreement with highly rated facilities in [place]. SW discussed transport and informed she would like her son in law Derian and her son to transport. SW discussed private vs shared room and costs associated and patient would prefer a private room. SW sent referrals via DOD.     RAYSA Nassar    [facility]

## 2023-12-05 NOTE — PROGRESS NOTES
Orientation: Aox4  Vitals: VSS on RA  Pain management: PRN oxy x1 for rib pain  IV Access/drains: PIV SL  Diet: Gluten Free Diet; Mechanical/Dental Soft Diet; Low Lactose Diet  Mobility: Ax1 GBW  GI/: Cont. B/B  Discharge Plan: Pending      See Flow sheets for assessment

## 2023-12-06 VITALS
DIASTOLIC BLOOD PRESSURE: 66 MMHG | SYSTOLIC BLOOD PRESSURE: 114 MMHG | HEART RATE: 63 BPM | HEIGHT: 66 IN | TEMPERATURE: 96.3 F | OXYGEN SATURATION: 97 % | BODY MASS INDEX: 19.06 KG/M2 | WEIGHT: 118.6 LBS | RESPIRATION RATE: 16 BRPM

## 2023-12-06 VITALS
HEART RATE: 53 BPM | TEMPERATURE: 98 F | HEIGHT: 66 IN | SYSTOLIC BLOOD PRESSURE: 119 MMHG | OXYGEN SATURATION: 96 % | BODY MASS INDEX: 18.46 KG/M2 | RESPIRATION RATE: 18 BRPM | WEIGHT: 114.86 LBS | DIASTOLIC BLOOD PRESSURE: 61 MMHG

## 2023-12-06 LAB
ANION GAP SERPL CALCULATED.3IONS-SCNC: 7 MMOL/L (ref 7–15)
BUN SERPL-MCNC: 5.7 MG/DL (ref 8–23)
CALCIUM SERPL-MCNC: 9.3 MG/DL (ref 8.8–10.2)
CHLORIDE SERPL-SCNC: 98 MMOL/L (ref 98–107)
CREAT SERPL-MCNC: 0.6 MG/DL (ref 0.51–0.95)
DEPRECATED HCO3 PLAS-SCNC: 27 MMOL/L (ref 22–29)
EGFRCR SERPLBLD CKD-EPI 2021: 88 ML/MIN/1.73M2
GLUCOSE SERPL-MCNC: 83 MG/DL (ref 70–99)
POTASSIUM SERPL-SCNC: 3.7 MMOL/L (ref 3.4–5.3)
SODIUM SERPL-SCNC: 132 MMOL/L (ref 135–145)

## 2023-12-06 PROCEDURE — 36415 COLL VENOUS BLD VENIPUNCTURE: CPT | Performed by: STUDENT IN AN ORGANIZED HEALTH CARE EDUCATION/TRAINING PROGRAM

## 2023-12-06 PROCEDURE — 99239 HOSP IP/OBS DSCHRG MGMT >30: CPT | Performed by: HOSPITALIST

## 2023-12-06 PROCEDURE — 250N000013 HC RX MED GY IP 250 OP 250 PS 637: Performed by: HOSPITALIST

## 2023-12-06 PROCEDURE — 82310 ASSAY OF CALCIUM: CPT | Performed by: STUDENT IN AN ORGANIZED HEALTH CARE EDUCATION/TRAINING PROGRAM

## 2023-12-06 PROCEDURE — 250N000013 HC RX MED GY IP 250 OP 250 PS 637

## 2023-12-06 RX ORDER — OXYCODONE HYDROCHLORIDE 5 MG/1
2.5 TABLET ORAL EVERY 4 HOURS PRN
Qty: 30 TABLET | Refills: 0 | Status: SHIPPED | OUTPATIENT
Start: 2023-12-06 | End: 2023-12-13

## 2023-12-06 RX ORDER — GABAPENTIN 100 MG/1
200 CAPSULE ORAL DAILY
DISCHARGE
Start: 2023-12-07 | End: 2024-01-02

## 2023-12-06 RX ORDER — ACETAMINOPHEN 325 MG/1
650 TABLET ORAL EVERY 4 HOURS PRN
Status: ON HOLD | DISCHARGE
Start: 2023-12-06 | End: 2024-08-13

## 2023-12-06 RX ORDER — PANTOPRAZOLE SODIUM 40 MG/1
40 TABLET, DELAYED RELEASE ORAL DAILY
DISCHARGE
Start: 2023-12-06 | End: 2023-12-13

## 2023-12-06 RX ORDER — LIDOCAINE 4 G/G
1 PATCH TOPICAL EVERY 24 HOURS
Qty: 30 PATCH | Refills: 0 | Status: SHIPPED | OUTPATIENT
Start: 2023-12-06 | End: 2023-12-13

## 2023-12-06 RX ADMIN — ACETAMINOPHEN 650 MG: 325 TABLET, FILM COATED ORAL at 10:14

## 2023-12-06 RX ADMIN — ACETAMINOPHEN 650 MG: 325 TABLET, FILM COATED ORAL at 14:31

## 2023-12-06 RX ADMIN — ACETAMINOPHEN 650 MG: 325 TABLET, FILM COATED ORAL at 02:33

## 2023-12-06 RX ADMIN — GABAPENTIN 200 MG: 100 CAPSULE ORAL at 09:53

## 2023-12-06 RX ADMIN — PANTOPRAZOLE SODIUM 40 MG: 40 TABLET, DELAYED RELEASE ORAL at 06:49

## 2023-12-06 RX ADMIN — OXYCODONE HYDROCHLORIDE 2.5 MG: 5 TABLET ORAL at 00:02

## 2023-12-06 ASSESSMENT — ACTIVITIES OF DAILY LIVING (ADL)
ADLS_ACUITY_SCORE: 27

## 2023-12-06 NOTE — PLAN OF CARE
[id number]  Orientation: AO x2, forgetful.   Activity: A1 GB + W  Diet/BS Checks: Mechanical soft, low lactose, Gluten free  Tele:    IV Access/Drains: PIV SL.   Pain Management: PRN oxycodone and tylenol  Abnormal VS/Results: VSS on RA  Bowel/Bladder: Continent of B/B.   Skin/Wounds: blanchable redness to sacrum.   Consults: GI  D/C Disposition: Pending TCU placement.   Other Info:

## 2023-12-06 NOTE — PROGRESS NOTES
Care Management Follow Up    Length of Stay (days): 4    Expected Discharge Date: 12/05/2023     Concerns to be Addressed:       Patient plan of care discussed at interdisciplinary rounds: Yes    Anticipated Discharge Disposition: Skilled Nursing Facility, Transitional Care     Anticipated Discharge Services: None  Anticipated Discharge DME: None    Patient/family educated on Medicare website which has current facility and service quality ratings: yes  Education Provided on the Discharge Plan:    Patient/Family in Agreement with the Plan: yes    Referrals Placed by CM/SW:    Private pay costs discussed: Not applicable    Additional Information:  ROSS called Daughter Liv and provided update on plan for TCU. Liv is in agreement with discharge plan. ROSS called Eunice and left a vm requesting a call back.    RAYSA Nassar    [facility]

## 2023-12-06 NOTE — PROGRESS NOTES
Nursing shift 12/6/23 [id number]  Orientation: A/Ox2/3, very forgetful  Activity: SBA GB/W  Diet  low fat, low lactose, no gluten diet   IV Access/Drains: R PIV SL  Pain Management: PRN Tylenol, pt prefers tylenol instead of oxy. Reports left rib pain is 0 when not moving.  Abnormal VS/Results: VSS on RA  Bowel/Bladder: cont. B/B  Skin/Wounds:. Blanchable redness to sacrum  Consults: GI   D/C Disposition: Discharge to [facility] today at 4pm, being transported by family.

## 2023-12-06 NOTE — PLAN OF CARE
AVS packet given to daughter. Questions answered. Belongings with patient. IV removed. Scripts in packet. Wheelchair ride via daughter to [place].

## 2023-12-06 NOTE — PROGRESS NOTES
Care Management Discharge Note    Discharge Date: 12/06/2023       Discharge Disposition: Skilled Nursing Facility, Transitional Care    Discharge Services: None    Discharge DME: None    Discharge Transportation: family or friend will provide    Private pay costs discussed: Not applicable    Does the patient's insurance plan have a 3 day qualifying hospital stay waiver?  No    PAS Confirmation Code: [id number]  Patient/family educated on Medicare website which has current facility and service quality ratings: yes    Education Provided on the Discharge Plan:    Persons Notified of Discharge Plans: Patient/daughter  Patient/Family in Agreement with the Plan: yes    Handoff Referral Completed: No    Additional Information:  ROSS informed [facility] can accept patient today. ROSS spoke with patient/daughter Liv and Liv will transport via the skyway at 1600. ROSS received discharge orders and paper scripts and faxed to [facility].SW completed PAS and faxed/added to the chart. Patient/family in agreement with discharge to [facility] via family at 1600.    PAS-RR    D: Per DHS regulation, ROSS completed and submitted PAS-RR to [place] [employer] via the Senior LinkAge Line.  PAS-RR confirmation # is : [id number]    I: SW spoke with Patient and they are aware a PAS-RR has been submitted.  SW reviewed with patient that they may be contacted for a follow up appointment within 10 days of hospital discharge if their SNF stay is < 30 days.  Contact information for [facility] LinkAge Line was also provided.    A: Patient verbalized understanding.    P: Further questions may be directed to [facility] LinkAge Line at #[phone], option #4 for PAS-RR staff.      RAYSA Nassar    [facility]

## 2023-12-06 NOTE — DISCHARGE SUMMARY
"[facility]  Hospitalist Discharge Summary      Date of Admission:  11/29/2023  Date of Discharge:  12/6/2023  Discharging Provider: Rasheed Constantino MD  Discharge Service: Hospitalist Service    Discharge Diagnoses   Left upper quadrant abdominal pain likely due to rib fractures  Pancreatic ductal dilatation  Chronic pancreatitis  Gastritis  New onset left upper quadrant abdominal pain mainly over lower ribs on 11/29. Pt awake with this pain and notes it as \"hard\" and unable to lay on her sides. Hx of falls, but notes none recently and usually they are on the right side.  No N/V, constipation, diarrhea, SOB, heartburn s/s. No evidence of shingles noted in ED or per exam. CT abdomen/pelvis showed new pancreatic ductal dilatation, this was not seen on prior CT in 2019.  Cardiac work-up unremarkable. CXR without any bony abnormalities/rib fractures. CT C/A/P notable for L2 compression fracture is favored acute or subacute. T9 greater than 50% anterior compression fracture is favored chronic. Admission labs notable for Alk phos mildly elevated, other LFTs within normal limits. Admission lipase normal. Concern for age vs neoplastic vs sludge or stone. MRCP noting pancreatic atrophy with moderate pancreatic, consistent with chronic pancreatitis, with no obstructing stone seen in CBD. Pt without any epigastric pain, pain with eating, or diarrhea. Pt also noting she has been taking ibuprofen nightly \"for a while\" on 12/1. Etiology remains unclear, but suspect multifactorial. Chronic pancreatitis could be playing a role, but also would not rule out ulcers given reported NSAID usage. Also given falls history (although none lately) and findings on CT C/A/P findings with T9 compression fracture, likely chronic, more likely playing a role in this area as opposed to a L2 fracture.  Continued to have left upper quadrant pain and GI reconsulted and patient underwent endoscopy and endoscopic " ultrasound which showed gastritis.  Endoscopic ultrasound consistent with pancreatic atrophy with prominent pancreatic duct with minimally dilated biliary system with no obvious mass.  GI recommending soft diet and continuing proton pump inhibitor      Left sided rib fractures  No mention of rib fractures on CT but x-ray shows fractures of left ribs 7-10. She did fall prior to admission.   Continue lidocaine patch and gabapentin  PRN oxycodone  Continue IS   Continue physcial therapy at transitional care unit      Pyuria, candiduria   No s/s on admission. CT showed thickened bladder wall, possibly secondary to neurogenic bladder or cystitis. UA abnormal, see below with squamous cells noted.  Urine culture with 10,000-50,000 of Candida albicans, suspect contaminant. Started on empiric rocephin in the ER. Continues to deny s/s.   Stopped antibiotic given no symptoms and negative urine culture  Follow up with primary care provider      Elevated troponin  Troponin 32 initially in the ER, then 25 on recheck. EKG did not have any obvious acute ischemic changes. CT chest negative for PE, no other obvious acute cardiopulmonary abnormality noted, she does have severe coronary artery calcification.  Suspicion for cardiac etiology of her symptoms is low.  No further work-up planned at this time, can reassess pending clinical course.     Hyponatremia, chronic          Sodium   Date Value Ref Range Status   12/05/2023 133 (L) 135 - 145 mmol/L Final       Comment:       Reference intervals for this test were updated on 09/26/2023 to more accurately reflect our healthy population. There may be differences in the flagging of prior results with similar values performed with this method. Interpretation of those prior results can be made in the context of the updated reference intervals.    08/25/2015 135 133 - 144 mmol/L Final   Recently had urine Na 22 with urine osmolality 178 but this could have been on intravenous fluid.  Her  serum Na is stable from previous readings.  She is not on medications that would cause this.  No further inpatient evaluation planned unless it worsens.  Follow up with primary care provider      Hypertension  Systolic (24hrs), Av , Min:98 , Max:116   Blood pressure initially elevated in the ER, improved now.  Continue PTA amlodipine   Holding PTA lisinopril      Recent right hip fracture  S/p percutaneous screw fixation on 23 by Dr. Duke.  Continue outpatient follow-up with Orthopedic Surgery as previously directed     L2 compression fracture  CT chest/abdomen/pelvis showed likely acute/subacute L2 compression fracture as well as likely chronic T9 compression fracture. No back pain.  Neurosurgery consult appreciated and recommending outpatient follow-up in 6 weeks    Pulmonary nodule  CT chest in the ER showed a pulmonary nodule in the left lower lobe. This has been noted on prior imaging going back to at least .  No further evaluation needed     Suspected cognitive impairment  She is oriented to person, place, year and month but has difficulty recalling recent events.  She did not remember that I discussed her having rib fractures yesterday.  Occupational therapy can do cognitive evaluation at transitional care unit        Clinically Significant Risk Factors          Follow-ups Needed After Discharge   Follow-up Appointments     Follow Up and recommended labs and tests      Follow up with prison physician.  The following labs/tests are   recommended: complete blood count and basic metabolic profile in 5 days.        Follow-up and recommended labs and tests       Please follow up at [facility] in 6 weeks with   imaging prior.  Please call the clinic at [phone] to schedule your   appointment.            Unresulted Labs Ordered in the Past 30 Days of this Admission       No orders found from 10/30/2023 to 2023.            Discharge Disposition   Discharged to  short-term care facility  Condition at discharge: Stable    Hospital Course   Left upper quadrant abdominal pain likely due to rib fractures  Pancreatic ductal dilatation  Chronic pancreatitis  Gastritis  Left sided rib fractures  Pyuria, candiduria   Elevated troponin  Hyponatremia, chronic  Hypertension  Recent right hip fracture  L2 compression fracture  Pulmonary nodule  Suspected cognitive impairment  Consultations This Hospital Stay   GASTROENTEROLOGY IP CONSULT  PHYSICAL THERAPY ADULT IP CONSULT  OCCUPATIONAL THERAPY ADULT IP CONSULT  GASTROENTEROLOGY IP CONSULT  NEUROSURGERY IP CONSULT  CARE MANAGEMENT / SOCIAL WORK IP CONSULT  OCCUPATIONAL THERAPY ADULT IP CONSULT  PHYSICAL THERAPY ADULT IP CONSULT  OCCUPATIONAL THERAPY ADULT IP CONSULT  PHYSICAL THERAPY ADULT IP CONSULT    Code Status   Full Code    Time Spent on this Encounter   I, Rasheed Constantino MD, personally saw the patient today and spent greater than 30 minutes discharging this patient.       Rasheed Constantino MD  [facility] [id number] [facility]  [address] [place] [address], [address] [address]  Phone: [phone]  ______________________________________________________________________    Physical Exam   Vital Signs: Temp: 98  F (36.7  C) Temp src: Oral BP: 119/61 Pulse: 53   Resp: 18 SpO2: 96 % O2 Device: None (Room air)    Weight: 114 lbs 13.75 oz  Constitutional: awake, alert, cooperative, no apparent distress  Neuropsychiatric: General: normal, calm and normal eye contact         Primary Care Physician   Chema Ramos MD    Discharge Orders      X-ray lumbar spine 2-3 views*     Follow-up and recommended labs and tests     Please follow up at [facility] in 6 weeks with imaging prior.  Please call the clinic at [phone] to schedule your appointment.     Activity    Discharge instructions:  No lifting of more than 10 pounds, no bending, no twisting until follow up visit.    Ok to walk as tolerated, avoid bed  rest and prolonged sitting.    No contact sports until after follow up visit  No high impact activities such as; running/jogging, snowmobile or 4 rose riding or any other recreational vehicles.    Do NOT drive while taking narcotic pain medication.    Call [facility] at [phone] for increased pain or any other questions and concerns.     General info for SNF    Length of Stay Estimate: Short Term Care: Estimated # of Days <30  Condition at Discharge: Improving  Level of care:skilled   Rehabilitation Potential: Good  Admission H&P remains valid and up-to-date: Yes  Recent Chemotherapy: N/A  Use Nursing Home Standing Orders: Yes     Mantoux instructions    Give two-step Mantoux (PPD) Per Facility Policy Yes     Follow Up and recommended labs and tests    Follow up with senior living physician.  The following labs/tests are recommended: complete blood count and basic metabolic profile in 5 days.     Activity - Up with nursing assistance     Reason for your hospital stay    Pain from rib fractures     Full Code     Physical Therapy Adult Consult    Evaluate and treat as clinically indicated.    Reason:  deconditioning     Occupational Therapy Adult Consult    Evaluate and treat as clinically indicated.    Reason:  deconditioning     Fall precautions     Diet    Follow this diet upon discharge:       Snacks/Supplements Adult: Other; ok to order supplements; With Meals      Combination Diet Gluten Free Diet; Mechanical/Dental Soft Diet       Significant Results and Procedures   Most Recent 3 CBC's:  Recent Labs   Lab Test 11/30/23  0922 11/29/23  1317 08/21/23  0842   WBC 6.5 9.9 5.7   HGB 12.6 13.4 11.4*   * 104* 103*    309 441     Most Recent 3 BMP's:  Recent Labs   Lab Test 12/06/23  0756 12/05/23  1149 12/04/23  2222   * 133* 127*   POTASSIUM 3.7 3.9 4.7   CHLORIDE 98 97* 92*   CO2 27 27 27   BUN 5.7* 8.2 10.6   CR 0.60 0.70 0.81   ANIONGAP 7 9 8   KARTHIKEYAN 9.3 9.8 9.4   GLC  83 94 96   ,   Results for orders placed or performed during the hospital encounter of 11/29/23   XR Chest 2 Views    Narrative    XR CHEST 2 VIEWS   11/29/2023 1:42 PM     HISTORY: left sided chest pain    COMPARISON: Chest radiograph 8/25/2015.      Impression    IMPRESSION: Stable size of cardiomediastinal silhouette. No focal  airspace consolidation, pleural effusion or pneumothorax.  Thoracolumbar scoliosis. No acute bony abnormality.    SYDNI LEE MD         SYSTEM ID:  [id number]   CT Chest (PE) Abdomen Pelvis w Contrast    Narrative    EXAM: CT CHEST PE ABDOMEN PELVIS W CONTRAST  LOCATION: [facility]  DATE: 11/29/2023    INDICATION: LEFT FLANK PAIN, EVAL FOR PE, EVAL FOR SPLENIC INFARCT, COLITIS COMPLICATIONS  COMPARISON: 02/11/2004 abdominal and pelvic CT  TECHNIQUE: CT chest pulmonary angiogram and routine CT abdomen pelvis with IV contrast. Arterial phase through the chest and venous phase through the abdomen and pelvis. Multiplanar reformats and MIP reconstructions were performed. Dose reduction   techniques were used.   CONTRAST: 60mL Isovue 370    FINDINGS:  ANGIOGRAM CHEST: Pulmonary arteries are normal caliber and negative for pulmonary emboli. Thoracic aorta is negative for dissection. No CT evidence of right heart strain.     LUNGS AND PLEURA: 8 mm ill-defined nodule in the left lower lobe image 57, not imaged previously. This appears centrally calcified on delayed images, however, likely benign. No lobar consolidation, effusion, or pneumothorax.    MEDIASTINUM/AXILLAE: Moderately large hiatal hernia. No significant mediastinal adenopathy. Moderate cardiomegaly.    CORONARY ARTERY CALCIFICATION: Severe.    HEPATOBILIARY: Mild hepatic steatosis. No biliary dilatation.    PANCREAS: Generalized mild pancreatic ductal dilatation reaching 6 mm, new in the interval.    SPLEEN: Unremarkable    ADRENAL GLANDS: Unremarkable    KIDNEYS/BLADDER: No hydronephrosis. Thick-walled  bladder despite significant distention. Moderate cystocele.    BOWEL: No bowel obstruction.    LYMPH NODES: No significant retroperitoneal adenopathy    VASCULATURE: Significant atherosclerotic calcification. No abdominal aortic aneurysm.    PELVIC ORGANS: Hysterectomy. No free fluid.    MUSCULOSKELETAL: L2 compression fracture is favored acute or subacute. Correlate for site tenderness. T9 greater than 50% anterior compression fracture is favored chronic.       Impression    IMPRESSION:  1.  No evidence of pulmonary embolus.  2.  New pancreatic ductal dilatation. Pancreatic MRI with and without IV contrast and/or GI consultation recommended to exclude ampullary lesion/stricture.  3.  Thick-walled bladder may be secondary to neurogenic bladder or cystitis. Clinical correlation advised.  4.  Moderate hiatal hernia.   MR Abdomen MRCP w/o & w Contrast    Narrative    MR ABDOMEN MAGNETIC RESONANCE CHOLANGIOPANCREATOGRAPHY WITHOUT AND  WITH CONTRAST  11/30/2023 3:13 PM     HISTORY: Dilated pancreatic duct.    COMPARISON: CT 11/29/2023    TECHNIQUE: Routine MR liver/pancreas protocol including axial and  coronal MRCP sequences. 2D and 3D reconstruction performed by MR  technologist including MIP reconstruction and slab cholangiograms. If  performed with contrast, additional dynamic T1 post IV contrast  images.   CONTRAST: 5 mL Gadavist    FINDINGS:   MRCP: The common bile duct is mildly dilated at 8 mm. The duct is  visualized to the ampulla. No obstructing stone or mass. The  pancreatic duct is also mildly diffusely dilated measuring 7 mm in the  head of the pancreas, and 5 mm in the body of the pancreas. Numerous  pancreatic side duct branches are visualized.     LIVER: No significant hepatic abnormality. No focal masses. No  evidence of cirrhosis or significant fat or iron deposition.    PANCREAS: Mild diffuse pancreatic atrophy. No pancreatic mass.    ADDITIONAL FINDINGS: Layering sludge in the  gallbladder.  Moderate-sized hiatal hernia.      Impression    IMPRESSION:  1.  Pancreatic atrophy and moderate pancreatic ductal dilation with  visualization of multiple sidebranches. Findings most consistent with  chronic pancreatitis. No evidence of an obstructing stone or mass.  2.  Common bile duct is borderline dilated, possibly within normal  limits for the patient's age. No obstructing stone or mass. Ampullary  stenosis possible.    IBAN MCKEON MD         SYSTEM ID:  [id number]   XR Ribs Left 2 Views    Narrative    EXAM: XR RIBS LEFT 2 VIEWS  LOCATION: [facility]  DATE: 12/3/2023    INDICATION: rib fracture  COMPARISON: CT from 11/29/2023      Impression    IMPRESSION: Age-indeterminate minimally displaced left seventh through 10th rib fractures. No discernible pneumothorax. Blunting of the left costophrenic angle may represent scarring and/or a small pleural effusion. Chronic lung changes. Osteopenia.   Aortic atherosclerosis.       Discharge Medications   Current Discharge Medication List        START taking these medications    Details   acetaminophen (TYLENOL) 325 MG tablet Take 2 tablets (650 mg) by mouth every 4 hours as needed for mild pain or other (and adjunct with moderate or severe pain or per patient request)    Associated Diagnoses: Closed fracture of multiple ribs of left side, initial encounter      gabapentin (NEURONTIN) 100 MG capsule Take 2 capsules (200 mg) by mouth daily    Associated Diagnoses: Closed fracture of multiple ribs of left side, initial encounter      Lidocaine (LIDOCARE) 4 % Patch Place 1 patch onto the skin every 24 hours To prevent lidocaine toxicity, patient should be patch free for 12 hrs daily.  Qty: 30 patch, Refills: 0    Associated Diagnoses: Closed fracture of multiple ribs of left side, initial encounter      oxyCODONE (ROXICODONE) 5 MG tablet Take 0.5 tablets (2.5 mg) by mouth every 4 hours as needed for moderate pain or severe  pain  Qty: 30 tablet, Refills: 0    Associated Diagnoses: Closed fracture of multiple ribs of left side, initial encounter      pantoprazole (PROTONIX) 40 MG EC tablet Take 1 tablet (40 mg) by mouth daily    Associated Diagnoses: Gastritis without bleeding, unspecified chronicity, unspecified gastritis type           CONTINUE these medications which have NOT CHANGED    Details   amLODIPine (NORVASC) 5 MG tablet Take 1 tablet (5 mg) by mouth every evening  Qty: 90 tablet, Refills: 3    Associated Diagnoses: Essential hypertension      vitamin D3 (CHOLECALCIFEROL) 50 mcg (2000 units) tablet Take 1 tablet (50 mcg) by mouth daily  Qty: 30 tablet    Associated Diagnoses: Closed fracture of neck of right femur, initial encounter (H)           STOP taking these medications       lisinopril (ZESTRIL) 20 MG tablet Comments:   Reason for Stopping:         Loperamide HCl (ANTI-DIARRHEAL PO) Comments:   Reason for Stopping:             Allergies   Allergies   Allergen Reactions    Amoxicillin Diarrhea    Amoxicillin-Pot Clavulanate     Ciprofloxacin     Gluten Meal      celiac

## 2023-12-07 ENCOUNTER — TRANSITIONAL CARE UNIT VISIT (OUTPATIENT)
Dept: GERIATRICS | Facility: CLINIC | Age: 83
End: 2023-12-07
Payer: COMMERCIAL

## 2023-12-07 ENCOUNTER — LAB REQUISITION (OUTPATIENT)
Dept: LAB | Facility: CLINIC | Age: 83
End: 2023-12-07

## 2023-12-07 DIAGNOSIS — R10.12 LUQ ABDOMINAL PAIN: Primary | ICD-10-CM

## 2023-12-07 DIAGNOSIS — D62 ABLA (ACUTE BLOOD LOSS ANEMIA): ICD-10-CM

## 2023-12-07 DIAGNOSIS — Z00.01 ENCOUNTER FOR GENERAL ADULT MEDICAL EXAMINATION WITH ABNORMAL FINDINGS: ICD-10-CM

## 2023-12-07 DIAGNOSIS — I10 ESSENTIAL HYPERTENSION, BENIGN: ICD-10-CM

## 2023-12-07 DIAGNOSIS — K86.1 CHRONIC PANCREATITIS, UNSPECIFIED PANCREATITIS TYPE (H): ICD-10-CM

## 2023-12-07 DIAGNOSIS — K52.831 COLLAGENOUS COLITIS: ICD-10-CM

## 2023-12-07 DIAGNOSIS — R53.81 PHYSICAL DECONDITIONING: ICD-10-CM

## 2023-12-07 DIAGNOSIS — S22.42XD CLOSED FRACTURE OF MULTIPLE RIBS OF LEFT SIDE WITH ROUTINE HEALING, SUBSEQUENT ENCOUNTER: ICD-10-CM

## 2023-12-07 DIAGNOSIS — E78.5 DYSLIPIDEMIA: ICD-10-CM

## 2023-12-07 DIAGNOSIS — E87.1 HYPONATREMIA: ICD-10-CM

## 2023-12-07 DIAGNOSIS — K21.9 GASTROESOPHAGEAL REFLUX DISEASE, UNSPECIFIED WHETHER ESOPHAGITIS PRESENT: ICD-10-CM

## 2023-12-07 DIAGNOSIS — I10 ESSENTIAL HYPERTENSION: ICD-10-CM

## 2023-12-07 PROCEDURE — 99310 SBSQ NF CARE HIGH MDM 45: CPT | Performed by: PHYSICIAN ASSISTANT

## 2023-12-07 NOTE — PLAN OF CARE
Physical Therapy Discharge Summary    Reason for therapy discharge:    Discharged to transitional care facility.    Progress towards therapy goal(s). See goals on Care Plan in [facility] electronic health record for goal details.  Goals partially met.  Barriers to achieving goals:   discharge from facility.    Therapy recommendation(s):    Continued therapy is recommended.  Rationale/Recommendations:  Pt below baseline of indpendent functional activity. She is CGA for all mobility due to weakness, imbalance, and decreased functional activity tolerance from bed rest. Pt has support at home, but not 24/7 while son is at work. Continue to recommend TCU for 24/7 available assistance and pt will benefit from further skilled therapy provided by TCU to improve aformentioned deficits so pt can return to PLOF before returning home..

## 2023-12-07 NOTE — PROGRESS NOTES
[facility] GERIATRICS    PRIMARY CARE PROVIDER AND CLINIC:  [place], [id number]  / [place] [address]  Chief Complaint   Patient presents with    Hospital F/U      [place] Medical Record Number:  [id number]  Place of Service where encounter took place:  [facility] ([facility]) [[id number]]      HPI:    Patient is a 83-year-old female with past medical history of hypertension, hyperlipidemia, GERD, endometriosis, collagenous colitis, and recent right hip fracture who was admitted at [facility] from 11/29 - 12/6, 2023 after presenting with a multiday history of left upper quadrant abdominal pain.  Workup in the emergency department included a CT abdomen and pelvis which revealed a new pancreatic ductal dilatation, acute/subacute L2 compression fracture, and T9 compression fracture which was favored to be chronic.  Chest x-ray indicated fractures of left ribs 7-10.  Laboratory studies were otherwise at patient's baseline, including normal LFTs and lipase.  MRCP was performed and indicated pancreatic atrophy with moderate inflammation, consistent with chronic pancreatitis without obstructing stone seen in CBD.  Patient was relatively asymptomatic, noting that she had been tolerating oral intake without difficulty, known recent nausea/vomiting, no epigastric abdominal pain.  Patient was seen by GI due to persistent left upper quadrant pain, underwent endoscopy and endoscopic ultrasound which revealed gastritis.  Ultrasound was consistent with pancreatic atrophy and prominent pancreatic duct, minimally dilated biliary system without obvious mass.  Ultimately, it was suspected that her pain was due to underlying rib fractures.  She was initiated on a pain medication regimen with improvement.  Following therapy evaluations, she was referred to U for ongoing rehab and medical management.    Patient is presently evaluated as initial visit.  She is sitting up in recliner at bedside, confirms  Sepsis Note   Hussain Salas 79 y o  female MRN: [id number]  Unit/Bed#: ICU 11-01 Encounter: [id number]       qSOFA     [address] Name 04/23/22 1700 04/23/22 1614 04/23/22 1532 04/23/22 1530 04/23/22 1515    Altered mental status GCS < 15 -- -- -- -- --    Respiratory Rate > / =22 1 1 0 -- --    Systolic BP < / =817 0 1 0 1 1    Q Sofa Score 1 2 0 2 2    Row Name 04/23/22 1500 04/23/22 1455 04/23/22 1445 04/23/22 1440 04/23/22 1435    Altered mental status GCS < 15 -- -- -- -- --    Respiratory Rate > / =22 -- 1 1 0 0    Systolic BP < / =101 0 1 1 0 1    Q Sofa Score 1 2 2 0 1    Row Name 04/23/22 1425 04/23/22 1420 04/23/22 1415 04/23/22 1350 04/23/22 1345    Altered mental status GCS < 15 -- -- -- -- --    Respiratory Rate > / =22 1 -- 0 1 1    Systolic BP < / =574 1 0 0 1 1    Q Sofa Score 2 0 0 2 2    Row Name 04/23/22 1340 04/23/22 1335 04/23/22 1330 04/23/22 1315 04/23/22 1300    Altered mental status GCS < 15 -- -- -- -- --    Respiratory Rate > / =22 1 1 1 0 0    Systolic BP < / =191 1 1 1 1 1    Q Sofa Score 2 2 2 1 1               Initial Sepsis Screening     Row Name 04/23/22 1503                Is the patient's history suggestive of a new or worsening infection? No  -EN        Suspected source of infection --        Are two or more of the following signs & symptoms of infection both present and new to the patient? Yes (Proceed)  -EN        Indicate SIRS criteria Tachycardia > 90 bpm;Tachypnea > 20 resp per min;Leukocytosis (WBC > 23800 IJL)  -EN        If the answer is yes to both questions, suspicion of sepsis is present --        If severe sepsis is present AND tissue hypoperfusion perists in the hour after fluid resuscitation or lactate > 4, the patient meets criteria for SEPTIC SHOCK --        Are any of the following organ dysfunction criteria present within 6 hours of suspected infection and SIRS criteria that are NOT considered to be chronic conditions?  --        Organ dysfunction SBP < 90 mmHg;MAP < 65 mmHg  due to ABLA, lactic acid pending  -EN        Date of presentation of severe sepsis --        Time of presentation of severe sepsis --        Tissue hypoperfusion persists in the hour after crystalloid fluid administration, evidenced, by either: --        Was hypotension present within one hour of the conclusion of crystalloid fluid administration? --        Date of presentation of septic shock --        Time of presentation of septic shock --              User Key  (r) = Recorded By, (t) = Taken By, (c) = Cosigned By    [facility] [address] Name Provider Type    EN KUSH Kearns Nurse Practitioner                  No indication of infection with negative procalcitonin and CXR and UA without evidence of infection  ABLA leading to tachycardia and hypotension above history.  States that the left-sided pain remains present but is improved from the hospital.  States that her appetite has been excellent, further denies any recent nausea/vomiting, epigastric abdominal pain, indigestion.  Facility staff are without concerns. Prior to hospitalization she lives with her adult son here in [place].    CODE STATUS/ADVANCE DIRECTIVES DISCUSSION:  Full Code  CPR/Full code   ALLERGIES:   Allergies   Allergen Reactions    Amoxicillin Diarrhea    Amoxicillin-Pot Clavulanate     Ciprofloxacin     Gluten Meal      celiac      PAST MEDICAL HISTORY:   Past Medical History:   Diagnosis Date    Atrophic vaginitis     Celiac disease     Cystocele     Disorder of bone and cartilage, unspecified     osteopenia    Endometriosis, site unspecified     Essential hypertension, benign     Gastro-oesophageal reflux disease     Hiatal hernia     3 cm sliding hiatal hernia    Hypercalcemia     Hyperlipidemia     HZV (herpes zoster virus) post herpetic neuralgia     Microscopic colitis     Nonspecific abnormal results of liver function study     Mildly elevated transaminases, liver biopsy WNL    Peripheral neuropathy     Pulmonary nodule     Zinc deficiency       PAST SURGICAL HISTORY:   has a past surgical history that includes TOTAL ABDOM HYSTERECTOMY (1982); REMOVAL OF OVARIAN CYST(S) (1977); DILATION/CURETTAGE DIAG/THER NON OB; NONSPECIFIC PROCEDURE; NONSPECIFIC PROCEDURE; Phacoemulsification clear cornea with standard intraocular lens implant (Left, 8/13/2015); colonoscopy (2011); Phacoemulsification clear cornea with standard intraocular lens implant (Right, 8/25/2015); Closed reduction, percutaneous pinning hip (Right, 8/8/2023); Esophagoscopy, gastroscopy, duodenoscopy (EGD), combined (N/A, 12/3/2023); and Endoscopic ultrasound upper gastrointestinal tract (GI) (N/A, 12/3/2023).  FAMILY HISTORY: family history includes Cancer in her maternal grandmother and mother; Cerebrovascular Disease in her  "father.  SOCIAL HISTORY:   reports that she has never smoked. She has been exposed to tobacco smoke. She does not have any smokeless tobacco history on file. She reports current alcohol use. She reports that she does not use drugs.  Patient's living condition: lives with family, son     Post Discharge Medication Reconciliation Status:   MED REC REQUIRED  Post Medication Reconciliation Status: patient was not discharged from an inpatient facility or TCU       Current Outpatient Medications   Medication Sig    acetaminophen (TYLENOL) 325 MG tablet Take 2 tablets (650 mg) by mouth every 4 hours as needed for mild pain or other (and adjunct with moderate or severe pain or per patient request)    amLODIPine (NORVASC) 5 MG tablet Take 1 tablet (5 mg) by mouth every evening    gabapentin (NEURONTIN) 100 MG capsule Take 2 capsules (200 mg) by mouth daily    Lidocaine (LIDOCARE) 4 % Patch Place 1 patch onto the skin every 24 hours To prevent lidocaine toxicity, patient should be patch free for 12 hrs daily.    oxyCODONE (ROXICODONE) 5 MG tablet Take 0.5 tablets (2.5 mg) by mouth every 4 hours as needed for moderate pain or severe pain    pantoprazole (PROTONIX) 40 MG EC tablet Take 1 tablet (40 mg) by mouth daily    vitamin D3 (CHOLECALCIFEROL) 50 mcg (2000 units) tablet Take 1 tablet (50 mcg) by mouth daily     No current facility-administered medications for this visit.       ROS:  4 point ROS including Respiratory, CV, GI and , other than that noted in the HPI,  is negative    Vitals:  /66   Pulse 63   Temp (!) 96.3  F (35.7  C)   Resp 16   Ht 1.676 m (5' 6\")   Wt 53.8 kg (118 lb 9.6 oz)   LMP  (LMP Unknown)   SpO2 97%   BMI 19.14 kg/m    Exam:  GEN: well-developed, well-nourished, appears comfortable  HEENT: NCAT, EOM intact bilaterally, sclera clear, conjunctiva normal, nose & mouth patent, mucous membranes moist  CHEST: lungs CTA bilaterally, no increased work of breathing, no wheeze, crackles, " rhonchi  HEART: RRR, S1 & S2, no murmur  ABD: soft, nontender, nondistended, no guarding or rigidity, +BS in all 4 quadrants  MSK: AROM bilateral UE/LE, pedal & radial pulses 2+ bilaterally  NEURO: awake, alert, oriented to name, place, and time. CN II-XII grossly intact. Sensation grossly intact to light touch.   SKIN: warm & dry without rash, no pedal edema    Lab/Diagnostic data:  Recent labs in [facility] reviewed by me today.     ASSESSMENT/PLAN:    LUQ abdominal pain  Left-sided rib fractures  As noted above, patient identified to have left-sided rib fractures #7-10 on chest x-ray.  Felt to be etiology of left upper quadrant abdominal pain.  Pain appears controlled on today's evaluation.  -Continues on pain control with as needed Tylenol, gabapentin 200 mg twice daily, Lidoderm, as needed oxycodone  -Encouraged I-S, OOB  -Monitor pulmonary state    Pancreatic duct dilatation  Chronic pancreatitis  Gastritis  CT A/P with new pancreatic ductal dilatation, LFTs with mildly elevated alk phos otherwise within normal limits.  Lipase was normal.  MRCP with pancreatic atrophy and moderate inflammation consistent with chronic pancreatitis, no obstructing stone.  Patient underwent EGD which indicated gastritis, endoscopic ultrasound revealed pancreatic atrophy and prominent pancreatic duct and minimally dilated biliary system without obvious mass.  -Continues on soft diet as recommended by GI  -Continue Protonix  -LFTs 12/11    Physical deconditioning  Impaired mobility and ADLs  -PT/OT evaluations  -As W for discharge planning    Hyponatremia, chronic  Sodium values ranging 133-135.  -BMP on 12/11    Hypertension  Chronic and stable, lisinopril discontinued at time of discharge.  -Continues on amlodipine 5 mg daily    L2 compression fracture, acute/subacute  T9 compression fracture, suspected chronic  Noted on CT C/A/P, seen by neurosurgery and recommending outpatient follow-up in 6 weeks.  -Pain control as above  -Follow-up  with neurosurgery as an outpatient    Pulmonary nodule  CT chest revealed pulmonary nodule in the left lower lobe, previously noted on imaging going back to at least 2012.    Suspected cognitive impairment  Patient was noted to be forgetful while in the hospital raising concern for cognitive impairment.  -OT for cog eval    Collagenous colitis  Chronic, stable.  -Monitor    Total time spent with patient visit at the skilled nursing facility was 45 min including patient visit and review of past records.     Electronically signed by:  Mateusz Mejia PA-C

## 2023-12-08 PROCEDURE — 36415 COLL VENOUS BLD VENIPUNCTURE: CPT | Performed by: PHYSICIAN ASSISTANT

## 2023-12-08 PROCEDURE — 86481 TB AG RESPONSE T-CELL SUSP: CPT | Performed by: PHYSICIAN ASSISTANT

## 2023-12-10 ENCOUNTER — LAB REQUISITION (OUTPATIENT)
Dept: LAB | Facility: CLINIC | Age: 83
End: 2023-12-10

## 2023-12-10 DIAGNOSIS — E87.1 HYPO-OSMOLALITY AND HYPONATREMIA: ICD-10-CM

## 2023-12-10 DIAGNOSIS — K86.1 OTHER CHRONIC PANCREATITIS (H): ICD-10-CM

## 2023-12-10 LAB
GAMMA INTERFERON BACKGROUND BLD IA-ACNC: 0.02 IU/ML
M TB IFN-G BLD-IMP: NEGATIVE
M TB IFN-G CD4+ BCKGRND COR BLD-ACNC: 1.37 IU/ML
MITOGEN IGNF BCKGRD COR BLD-ACNC: 0.01 IU/ML
MITOGEN IGNF BCKGRD COR BLD-ACNC: 0.02 IU/ML
QUANTIFERON MITOGEN: 1.39 IU/ML
QUANTIFERON NIL TUBE: 0.02 IU/ML
QUANTIFERON TB1 TUBE: 0.04 IU/ML
QUANTIFERON TB2 TUBE: 0.03

## 2023-12-11 ENCOUNTER — TRANSITIONAL CARE UNIT VISIT (OUTPATIENT)
Dept: GERIATRICS | Facility: CLINIC | Age: 83
End: 2023-12-11
Payer: COMMERCIAL

## 2023-12-11 VITALS
HEIGHT: 66 IN | SYSTOLIC BLOOD PRESSURE: 105 MMHG | DIASTOLIC BLOOD PRESSURE: 53 MMHG | BODY MASS INDEX: 17.9 KG/M2 | TEMPERATURE: 97.8 F | OXYGEN SATURATION: 98 % | WEIGHT: 111.4 LBS | RESPIRATION RATE: 18 BRPM | HEART RATE: 60 BPM

## 2023-12-11 DIAGNOSIS — R53.81 PHYSICAL DECONDITIONING: ICD-10-CM

## 2023-12-11 DIAGNOSIS — E87.1 HYPONATREMIA: ICD-10-CM

## 2023-12-11 DIAGNOSIS — Z74.09 IMPAIRED MOBILITY AND ACTIVITIES OF DAILY LIVING: ICD-10-CM

## 2023-12-11 DIAGNOSIS — I10 ESSENTIAL HYPERTENSION: ICD-10-CM

## 2023-12-11 DIAGNOSIS — I10 ESSENTIAL HYPERTENSION, BENIGN: ICD-10-CM

## 2023-12-11 DIAGNOSIS — K86.1 CHRONIC PANCREATITIS, UNSPECIFIED PANCREATITIS TYPE (H): ICD-10-CM

## 2023-12-11 DIAGNOSIS — E78.5 DYSLIPIDEMIA: ICD-10-CM

## 2023-12-11 DIAGNOSIS — D62 ABLA (ACUTE BLOOD LOSS ANEMIA): ICD-10-CM

## 2023-12-11 DIAGNOSIS — R10.12 LUQ ABDOMINAL PAIN: Primary | ICD-10-CM

## 2023-12-11 DIAGNOSIS — K29.70 GASTRITIS WITHOUT BLEEDING, UNSPECIFIED CHRONICITY, UNSPECIFIED GASTRITIS TYPE: ICD-10-CM

## 2023-12-11 DIAGNOSIS — Z78.9 IMPAIRED MOBILITY AND ACTIVITIES OF DAILY LIVING: ICD-10-CM

## 2023-12-11 DIAGNOSIS — S22.42XD CLOSED FRACTURE OF MULTIPLE RIBS OF LEFT SIDE WITH ROUTINE HEALING, SUBSEQUENT ENCOUNTER: ICD-10-CM

## 2023-12-11 DIAGNOSIS — S22.42XA CLOSED FRACTURE OF MULTIPLE RIBS OF LEFT SIDE, INITIAL ENCOUNTER: ICD-10-CM

## 2023-12-11 LAB
ALBUMIN SERPL BCG-MCNC: 3.6 G/DL (ref 3.5–5.2)
ALP SERPL-CCNC: 175 U/L (ref 40–150)
ALT SERPL W P-5'-P-CCNC: 74 U/L (ref 0–50)
ANION GAP SERPL CALCULATED.3IONS-SCNC: 10 MMOL/L (ref 7–15)
AST SERPL W P-5'-P-CCNC: 60 U/L (ref 0–45)
BILIRUB DIRECT SERPL-MCNC: <0.2 MG/DL (ref 0–0.3)
BILIRUB SERPL-MCNC: 0.3 MG/DL
BUN SERPL-MCNC: 9.4 MG/DL (ref 8–23)
CALCIUM SERPL-MCNC: 9.8 MG/DL (ref 8.8–10.2)
CHLORIDE SERPL-SCNC: 100 MMOL/L (ref 98–107)
CREAT SERPL-MCNC: 0.57 MG/DL (ref 0.51–0.95)
DEPRECATED HCO3 PLAS-SCNC: 25 MMOL/L (ref 22–29)
EGFRCR SERPLBLD CKD-EPI 2021: 89 ML/MIN/1.73M2
GLUCOSE SERPL-MCNC: 67 MG/DL (ref 70–99)
POTASSIUM SERPL-SCNC: 3.6 MMOL/L (ref 3.4–5.3)
PROT SERPL-MCNC: 6 G/DL (ref 6.4–8.3)
SODIUM SERPL-SCNC: 135 MMOL/L (ref 135–145)

## 2023-12-11 PROCEDURE — 82310 ASSAY OF CALCIUM: CPT | Performed by: PHYSICIAN ASSISTANT

## 2023-12-11 PROCEDURE — 99316 NF DSCHRG MGMT 30 MIN+: CPT | Performed by: PHYSICIAN ASSISTANT

## 2023-12-11 PROCEDURE — P9604 ONE-WAY ALLOW PRORATED TRIP: HCPCS | Performed by: PHYSICIAN ASSISTANT

## 2023-12-11 PROCEDURE — 36415 COLL VENOUS BLD VENIPUNCTURE: CPT | Performed by: PHYSICIAN ASSISTANT

## 2023-12-11 PROCEDURE — 82248 BILIRUBIN DIRECT: CPT | Performed by: PHYSICIAN ASSISTANT

## 2023-12-11 NOTE — PROGRESS NOTES
[facility] GERIATRICS DISCHARGE SUMMARY  PATIENT'S NAME: Anna Marie Babb  YOB: 1940  MEDICAL RECORD NUMBER:  [id number]  Place of Service where encounter took place:  [facility] (TCU) [[id number]]    PRIMARY CARE PROVIDER AND CLINIC RESPONSIBLE AFTER TRANSFER:   [place], [address] / [place] [address]    [facility] [place]     Transferring providers: Mateusz Mejia PA-C, Dr. Sarahi MD  Recent Hospitalization/ED:  [facility] stay 11/29 to 12/6, 2023.  Date of SNF Admission:  12/6/23  Date of SNF (anticipated) Discharge:  12/12/23  Discharged to: previous independent home  Cognitive Scores: TBA  Physical Function: ambulating with FWW  DME: SNF  coordinating DME needs     CODE STATUS/ADVANCE DIRECTIVES DISCUSSION:  Full Code   ALLERGIES: Amoxicillin, Amoxicillin-pot clavulanate, Ciprofloxacin, and Gluten meal    NURSING FACILITY COURSE     Summary of nursing facility stay:   Patient is a 83-year-old female with past medical history of hypertension, hyperlipidemia, GERD, endometriosis, collagenous colitis, and recent right hip fracture who was admitted at [facility] from 11/29 - 12/6, 2023 after presenting with a multiday history of left upper quadrant abdominal pain.  Workup in the emergency department included a CT abdomen and pelvis which revealed a new pancreatic ductal dilatation, acute/subacute L2 compression fracture, and T9 compression fracture which was favored to be chronic.  Chest x-ray indicated fractures of left ribs 7-10.  Laboratory studies were otherwise at patient's baseline, including normal LFTs and lipase.  MRCP was performed and indicated pancreatic atrophy with moderate inflammation, consistent with chronic pancreatitis without obstructing stone seen in CBD.  Patient was relatively asymptomatic, noting that she had been tolerating oral intake without difficulty, known recent nausea/vomiting, no  epigastric abdominal pain.  Patient was seen by GI due to persistent left upper quadrant pain, underwent endoscopy and endoscopic ultrasound which revealed gastritis.  Ultrasound was consistent with pancreatic atrophy and prominent pancreatic duct, minimally dilated biliary system without obvious mass.  Ultimately, it was suspected that her pain was due to underlying rib fractures.  She was initiated on a pain medication regimen with improvement.  Following therapy evaluations, she was referred to TCU for ongoing rehab and medical management.     Shortly after admission to TCU, pt progressed in therapies and requested discharge to home. She will be returning to her home in [place] with adult son, the following were managed during TCU stay.    LUQ abdominal pain  Left-sided rib fractures  As noted above, patient identified to have left-sided rib fractures #7-10 on chest x-ray.  Felt to be etiology of left upper quadrant abdominal pain.  Pain appears controlled.  -Continues on pain control with as needed Tylenol, Lidoderm, as needed oxycodone  -Encouraged I-S, OOB     Pancreatic duct dilatation  Chronic pancreatitis  Gastritis  CT A/P with new pancreatic ductal dilatation, LFTs with mildly elevated alk phos otherwise within normal limits.  Lipase was normal.  MRCP with pancreatic atrophy and moderate inflammation consistent with chronic pancreatitis, no obstructing stone.  Patient underwent EGD which indicated gastritis, endoscopic ultrasound revealed pancreatic atrophy and prominent pancreatic duct and minimally dilated biliary system without obvious mass.  -Continues on soft diet as recommended by GI  -Continue Protonix     Physical deconditioning  Impaired mobility and ADLs  -PT/OT     Hyponatremia, chronic  Sodium values ranging 133-135. Normal on 12/11.     Hypertension  Chronic and stable, lisinopril discontinued at time of discharge.  -Continues on amlodipine 5 mg daily     L2 compression fracture,  acute/subacute  T9 compression fracture, suspected chronic  Noted on CT C/A/P, seen by neurosurgery and recommending outpatient follow-up in 6 weeks.  -Pain control as above  -Follow-up with neurosurgery as an outpatient     Pulmonary nodule  CT chest revealed pulmonary nodule in the left lower lobe, previously noted on imaging going back to at least 2012.     Suspected cognitive impairment  Patient was noted to be forgetful while in the hospital raising concern for cognitive impairment. Pt discharged prior to cognitive testing.     Collagenous colitis  Chronic, stable.  -Monitor    Discharge Medications:  MED REC REQUIRED  Post Medication Reconciliation Status: patient was not discharged from an inpatient facility or TCU     Current Outpatient Medications   Medication Sig Dispense Refill    Lidocaine (LIDOCARE) 4 % Patch Place 1 patch onto the skin every 24 hours To prevent lidocaine toxicity, patient should be patch free for 12 hrs daily. 30 patch 0    oxyCODONE (ROXICODONE) 5 MG tablet Take 0.5 tablets (2.5 mg) by mouth every 4 hours as needed for moderate pain or severe pain 30 tablet 0    pantoprazole (PROTONIX) 40 MG EC tablet Take 1 tablet (40 mg) by mouth daily 30 tablet 1    acetaminophen (TYLENOL) 325 MG tablet Take 2 tablets (650 mg) by mouth every 4 hours as needed for mild pain or other (and adjunct with moderate or severe pain or per patient request)      amLODIPine (NORVASC) 5 MG tablet Take 1 tablet (5 mg) by mouth every evening 90 tablet 3    gabapentin (NEURONTIN) 100 MG capsule Take 2 capsules (200 mg) by mouth daily      vitamin D3 (CHOLECALCIFEROL) 50 mcg (2000 units) tablet Take 1 tablet (50 mcg) by mouth daily 30 tablet        Controlled medications:   Medication oxycodone electronically prescribed to home pharmacy     Past Medical History:   Past Medical History:   Diagnosis Date    Atrophic vaginitis     Celiac disease     Cystocele     Disorder of bone and cartilage, unspecified      "osteopenia    Endometriosis, site unspecified     Essential hypertension, benign     Gastro-oesophageal reflux disease     Hiatal hernia     3 cm sliding hiatal hernia    Hypercalcemia     Hyperlipidemia     HZV (herpes zoster virus) post herpetic neuralgia     Microscopic colitis     Nonspecific abnormal results of liver function study     Mildly elevated transaminases, liver biopsy WNL    Peripheral neuropathy     Pulmonary nodule     Zinc deficiency      Physical Exam:   Vitals: /53   Pulse 60   Temp 97.8  F (36.6  C)   Resp 18   Ht 1.676 m (5' 6\")   Wt 50.5 kg (111 lb 6.4 oz)   LMP  (LMP Unknown)   SpO2 98%   BMI 17.98 kg/m    BMI: Body mass index is 17.98 kg/m .  GEN: well-developed, well-nourished, appears comfortable  HEENT: NCAT, EOM intact bilaterally, sclera clear, conjunctiva normal, nose & mouth patent, mucous membranes moist  CHEST: lungs CTA bilaterally, no increased work of breathing, no wheeze, crackles, rhonchi  HEART: RRR, S1 & S2, no murmur  ABD: soft, nontender, nondistended, no guarding or rigidity, +BS in all 4 quadrants  MSK: AROM bilateral UE/LE, pedal & radial pulses 2+ bilaterally  NEURO: awake, alert, oriented to name, place, and time. CN II-XII grossly intact. Sensation grossly intact to light touch.   SKIN: warm & dry without rash, no pedal edema    SNF labs: Most Recent 3 CBC's:  Recent Labs   Lab Test 11/30/23  0922 11/29/23  1317 08/21/23  0842   WBC 6.5 9.9 5.7   HGB 12.6 13.4 11.4*   * 104* 103*    309 441     Most Recent 3 BMP's:  Recent Labs   Lab Test 12/11/23  0630 12/06/23  0756 12/05/23  1149    132* 133*   POTASSIUM 3.6 3.7 3.9   CHLORIDE 100 98 97*   CO2 25 27 27   BUN 9.4 5.7* 8.2   CR 0.57 0.60 0.70   ANIONGAP 10 7 9   KARTHIKEYAN 9.8 9.3 9.8   GLC 67* 83 94     Most Recent 2 LFT's:  Recent Labs   Lab Test 12/11/23  0630 12/01/23  1625   AST 60* 26   ALT 74* 17   ALKPHOS 175* 168*   BILITOTAL 0.3 0.6       DISCHARGE PLAN:  Follow up labs: No labs " orders/due  Medical Follow Up:      Follow up with primary care provider in 1-2 weeks  [facility] scheduled appointments:  Next 5 appointments (look out 90 days)      Kaveh 15, 2024  1:30 PM  Return Visit with Rosanne Argueta NP  [facility] [address], [address]  [facility] [address]  [phone]           Discharge Services: Home Care:  Occupational Therapy, Physical Therapy, Registered Nurse, and Home Health Aide  Discharge Instructions Verbalized to Patient at Discharge:   None    TOTAL DISCHARGE TIME:   Greater than 30min  Electronically signed by:  Mateusz Mejia PA-C     Documentation of Face to Face and Certification for Home Health Services    I certify that services are/were furnished while this patient was under the care of a physician and that a physician or an allowed non-physician practitioner (NPP), had a face-to-face encounter that meets the physician face-to-face encounter requirements. The encounter was in whole, or in part, related to the primary reason for home health. The patient is confined to his/her home and needs intermittent skilled nursing, physical therapy, speech-language pathology, or the continued need for occupational therapy. A plan of care has been established by a physician and is periodically reviewed by a physician.  Date of Face-to-Face Encounter: 12/11/2023.    I certify that, based on my findings, the following services are medically necessary home health services: Nursing, Occupational Therapy, and Physical Therapy.    My clinical findings support the need for the above skilled services because: Requires assistance of another person or specialized equipment to access medical services because patient: Range of motion limitations prevents ability to exit home safely...    Patient to re-establish plan of care with their PCP within 7-10 days after leaving the facility to reestablish  care.  Medicare certified PECOS provider: Mateusz Mejia PA-C  Date: December 13, 2023

## 2023-12-13 RX ORDER — PANTOPRAZOLE SODIUM 40 MG/1
40 TABLET, DELAYED RELEASE ORAL DAILY
Qty: 30 TABLET | Refills: 1 | Status: SHIPPED | OUTPATIENT
Start: 2023-12-13 | End: 2024-03-14

## 2023-12-13 RX ORDER — LIDOCAINE 4 G/G
1 PATCH TOPICAL EVERY 24 HOURS
Qty: 30 PATCH | Refills: 0 | Status: SHIPPED | OUTPATIENT
Start: 2023-12-13 | End: 2024-03-14

## 2023-12-13 RX ORDER — OXYCODONE HYDROCHLORIDE 5 MG/1
2.5 TABLET ORAL EVERY 4 HOURS PRN
Qty: 30 TABLET | Refills: 0 | Status: SHIPPED | OUTPATIENT
Start: 2023-12-13 | End: 2024-01-02

## 2023-12-14 PROBLEM — K55.9 ISCHEMIC COLITIS (H): Status: RESOLVED | Noted: 2019-10-09 | Resolved: 2023-12-14

## 2023-12-19 ENCOUNTER — E-VISIT (OUTPATIENT)
Dept: URGENT CARE | Facility: CLINIC | Age: 83
End: 2023-12-19
Payer: COMMERCIAL

## 2023-12-19 DIAGNOSIS — R05.1 ACUTE COUGH: Primary | ICD-10-CM

## 2023-12-19 PROCEDURE — 99207 PR NON-BILLABLE SERV PER CHARTING: CPT | Performed by: NURSE PRACTITIONER

## 2023-12-20 NOTE — PATIENT INSTRUCTIONS
Dear Anna Marie Babb,    We are sorry you are not feeling well. Based on the responses you provided, it is recommended that you be seen in-person in urgent care so we can better evaluate your symptoms. Please click here to find the nearest urgent care location to you.   You will not be charged for this Visit. Thank you for trusting us with your care.    Aissatou Gerardo NP

## 2024-01-02 ENCOUNTER — APPOINTMENT (OUTPATIENT)
Dept: GENERAL RADIOLOGY | Facility: CLINIC | Age: 84
DRG: 565 | End: 2024-01-02
Attending: EMERGENCY MEDICINE
Payer: MEDICARE

## 2024-01-02 ENCOUNTER — APPOINTMENT (OUTPATIENT)
Dept: ULTRASOUND IMAGING | Facility: CLINIC | Age: 84
DRG: 565 | End: 2024-01-02
Attending: EMERGENCY MEDICINE
Payer: MEDICARE

## 2024-01-02 ENCOUNTER — APPOINTMENT (OUTPATIENT)
Dept: CT IMAGING | Facility: CLINIC | Age: 84
DRG: 565 | End: 2024-01-02
Attending: EMERGENCY MEDICINE
Payer: MEDICARE

## 2024-01-02 ENCOUNTER — HOSPITAL ENCOUNTER (INPATIENT)
Facility: CLINIC | Age: 84
LOS: 3 days | Discharge: NURSING, REHAB OR LONG-TERM CARE | DRG: 565 | End: 2024-01-05
Attending: EMERGENCY MEDICINE | Admitting: STUDENT IN AN ORGANIZED HEALTH CARE EDUCATION/TRAINING PROGRAM
Payer: MEDICARE

## 2024-01-02 DIAGNOSIS — R79.89 ABNORMAL LFTS: ICD-10-CM
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
## 2024-01-02 DIAGNOSIS — T79.6XXA TRAUMATIC RHABDOMYOLYSIS, INITIAL ENCOUNTER (H): ICD-10-CM

## 2024-01-02 DIAGNOSIS — M25.552 HIP PAIN, LEFT: ICD-10-CM

## 2024-01-02 LAB
ALBUMIN SERPL BCG-MCNC: 4 G/DL (ref 3.5–5.2)
ALBUMIN UR-MCNC: 30 MG/DL
ALP SERPL-CCNC: 200 U/L (ref 40–150)
ALT SERPL W P-5'-P-CCNC: 56 U/L (ref 0–50)
ANION GAP SERPL CALCULATED.3IONS-SCNC: 12 MMOL/L (ref 7–15)
APPEARANCE UR: CLEAR
AST SERPL W P-5'-P-CCNC: 71 U/L (ref 0–45)
BACTERIA #/AREA URNS HPF: ABNORMAL /HPF
BILIRUB DIRECT SERPL-MCNC: 0.62 MG/DL (ref 0–0.3)
BILIRUB SERPL-MCNC: 1.7 MG/DL
BILIRUB UR QL STRIP: NEGATIVE
BUN SERPL-MCNC: 26.6 MG/DL (ref 8–23)
CALCIUM SERPL-MCNC: 10.4 MG/DL (ref 8.8–10.2)
CHLORIDE SERPL-SCNC: 90 MMOL/L (ref 98–107)
CK SERPL-CCNC: 590 U/L (ref 26–192)
COLOR UR AUTO: YELLOW
CREAT SERPL-MCNC: 0.65 MG/DL (ref 0.51–0.95)
DEPRECATED HCO3 PLAS-SCNC: 28 MMOL/L (ref 22–29)
EGFRCR SERPLBLD CKD-EPI 2021: 86 ML/MIN/1.73M2
ERYTHROCYTE [DISTWIDTH] IN BLOOD BY AUTOMATED COUNT: 13.7 % (ref 10–15)
FLUAV RNA SPEC QL NAA+PROBE: NEGATIVE
FLUBV RNA RESP QL NAA+PROBE: NEGATIVE
GLUCOSE SERPL-MCNC: 94 MG/DL (ref 70–99)
GLUCOSE UR STRIP-MCNC: NEGATIVE MG/DL
HCT VFR BLD AUTO: 35.5 % (ref 35–47)
HGB BLD-MCNC: 12.5 G/DL (ref 11.7–15.7)
HGB UR QL STRIP: ABNORMAL
HOLD SPECIMEN: NORMAL
KETONES UR STRIP-MCNC: 10 MG/DL
LACTATE SERPL-SCNC: 1.7 MMOL/L (ref 0.7–2)
LACTATE SERPL-SCNC: 3.2 MMOL/L (ref 0.7–2)
LEUKOCYTE ESTERASE UR QL STRIP: ABNORMAL
MCH RBC QN AUTO: 36 PG (ref 26.5–33)
MCHC RBC AUTO-ENTMCNC: 35.2 G/DL (ref 31.5–36.5)
MCV RBC AUTO: 102 FL (ref 78–100)
NITRATE UR QL: NEGATIVE
PH UR STRIP: 6 [PH] (ref 5–7)
PLATELET # BLD AUTO: 280 10E3/UL (ref 150–450)
POTASSIUM SERPL-SCNC: 4 MMOL/L (ref 3.4–5.3)
PROT SERPL-MCNC: 7.3 G/DL (ref 6.4–8.3)
RBC # BLD AUTO: 3.47 10E6/UL (ref 3.8–5.2)
RBC URINE: 7 /HPF
RSV RNA SPEC NAA+PROBE: NEGATIVE
SARS-COV-2 RNA RESP QL NAA+PROBE: NEGATIVE
SODIUM SERPL-SCNC: 130 MMOL/L (ref 135–145)
SP GR UR STRIP: 1.02 (ref 1–1.03)
TROPONIN T SERPL HS-MCNC: 29 NG/L
TROPONIN T SERPL HS-MCNC: 32 NG/L
TROPONIN T SERPL HS-MCNC: 33 NG/L
UROBILINOGEN UR STRIP-MCNC: NORMAL MG/DL
WBC # BLD AUTO: 10.1 10E3/UL (ref 4–11)
WBC URINE: 17 /HPF
YEAST #/AREA URNS HPF: ABNORMAL /HPF

## 2024-01-02 PROCEDURE — 82550 ASSAY OF CK (CPK): CPT | Performed by: EMERGENCY MEDICINE

## 2024-01-02 PROCEDURE — 72192 CT PELVIS W/O DYE: CPT | Mod: MG

## 2024-01-02 PROCEDURE — 71101 X-RAY EXAM UNILAT RIBS/CHEST: CPT | Mod: LT

## 2024-01-02 PROCEDURE — 83605 ASSAY OF LACTIC ACID: CPT | Performed by: EMERGENCY MEDICINE

## 2024-01-02 PROCEDURE — 87637 SARSCOV2&INF A&B&RSV AMP PRB: CPT | Performed by: EMERGENCY MEDICINE

## 2024-01-02 PROCEDURE — 258N000003 HC RX IP 258 OP 636: Performed by: EMERGENCY MEDICINE

## 2024-01-02 PROCEDURE — 36415 COLL VENOUS BLD VENIPUNCTURE: CPT | Performed by: EMERGENCY MEDICINE

## 2024-01-02 PROCEDURE — 84484 ASSAY OF TROPONIN QUANT: CPT | Performed by: EMERGENCY MEDICINE

## 2024-01-02 PROCEDURE — 96366 THER/PROPH/DIAG IV INF ADDON: CPT

## 2024-01-02 PROCEDURE — 80076 HEPATIC FUNCTION PANEL: CPT | Performed by: EMERGENCY MEDICINE

## 2024-01-02 PROCEDURE — 683N000003 HC TRAUMA EVALUATION W/O CC LEVEL III W/NOTIFICATION

## 2024-01-02 PROCEDURE — 250N000013 HC RX MED GY IP 250 OP 250 PS 637: Performed by: PHYSICIAN ASSISTANT

## 2024-01-02 PROCEDURE — 96365 THER/PROPH/DIAG IV INF INIT: CPT | Mod: 59

## 2024-01-02 PROCEDURE — 87040 BLOOD CULTURE FOR BACTERIA: CPT | Performed by: EMERGENCY MEDICINE

## 2024-01-02 PROCEDURE — 76705 ECHO EXAM OF ABDOMEN: CPT

## 2024-01-02 PROCEDURE — 93005 ELECTROCARDIOGRAM TRACING: CPT

## 2024-01-02 PROCEDURE — 99285 EMERGENCY DEPT VISIT HI MDM: CPT | Mod: 25

## 2024-01-02 PROCEDURE — 120N000001 HC R&B MED SURG/OB

## 2024-01-02 PROCEDURE — 258N000003 HC RX IP 258 OP 636: Performed by: PHYSICIAN ASSISTANT

## 2024-01-02 PROCEDURE — 250N000011 HC RX IP 250 OP 636: Performed by: EMERGENCY MEDICINE

## 2024-01-02 PROCEDURE — 85027 COMPLETE CBC AUTOMATED: CPT | Performed by: EMERGENCY MEDICINE

## 2024-01-02 PROCEDURE — 85014 HEMATOCRIT: CPT | Performed by: EMERGENCY MEDICINE

## 2024-01-02 PROCEDURE — 81001 URINALYSIS AUTO W/SCOPE: CPT | Performed by: EMERGENCY MEDICINE

## 2024-01-02 PROCEDURE — 36415 COLL VENOUS BLD VENIPUNCTURE: CPT | Performed by: PHYSICIAN ASSISTANT

## 2024-01-02 PROCEDURE — 87086 URINE CULTURE/COLONY COUNT: CPT | Performed by: EMERGENCY MEDICINE

## 2024-01-02 PROCEDURE — 99223 1ST HOSP IP/OBS HIGH 75: CPT | Mod: AI | Performed by: PHYSICIAN ASSISTANT

## 2024-01-02 PROCEDURE — 73502 X-RAY EXAM HIP UNI 2-3 VIEWS: CPT

## 2024-01-02 PROCEDURE — 70450 CT HEAD/BRAIN W/O DYE: CPT | Mod: ME

## 2024-01-02 PROCEDURE — 84484 ASSAY OF TROPONIN QUANT: CPT | Performed by: PHYSICIAN ASSISTANT

## 2024-01-02 RX ORDER — CALCIUM CARBONATE 500 MG/1
1000 TABLET, CHEWABLE ORAL 4 TIMES DAILY PRN
Status: DISCONTINUED | OUTPATIENT
Start: 2024-01-02 | End: 2024-01-05 | Stop reason: HOSPADM

## 2024-01-02 RX ORDER — AMLODIPINE BESYLATE 5 MG/1
5 TABLET ORAL EVERY EVENING
Status: DISCONTINUED | OUTPATIENT
Start: 2024-01-02 | End: 2024-01-05 | Stop reason: HOSPADM

## 2024-01-02 RX ORDER — ACETAMINOPHEN 325 MG/1
650 TABLET ORAL EVERY 4 HOURS PRN
Status: DISCONTINUED | OUTPATIENT
Start: 2024-01-02 | End: 2024-01-05 | Stop reason: HOSPADM

## 2024-01-02 RX ORDER — ONDANSETRON 4 MG/1
4 TABLET, ORALLY DISINTEGRATING ORAL EVERY 6 HOURS PRN
Status: DISCONTINUED | OUTPATIENT
Start: 2024-01-02 | End: 2024-01-05 | Stop reason: HOSPADM

## 2024-01-02 RX ORDER — NALOXONE HYDROCHLORIDE 0.4 MG/ML
0.4 INJECTION, SOLUTION INTRAMUSCULAR; INTRAVENOUS; SUBCUTANEOUS
Status: DISCONTINUED | OUTPATIENT
Start: 2024-01-02 | End: 2024-01-05 | Stop reason: HOSPADM

## 2024-01-02 RX ORDER — LIDOCAINE 40 MG/G
CREAM TOPICAL
Status: DISCONTINUED | OUTPATIENT
Start: 2024-01-02 | End: 2024-01-05 | Stop reason: HOSPADM

## 2024-01-02 RX ORDER — POLYETHYLENE GLYCOL 3350 17 G/17G
17 POWDER, FOR SOLUTION ORAL 2 TIMES DAILY PRN
Status: DISCONTINUED | OUTPATIENT
Start: 2024-01-02 | End: 2024-01-05 | Stop reason: HOSPADM

## 2024-01-02 RX ORDER — CEFTRIAXONE 2 G/1
2 INJECTION, POWDER, FOR SOLUTION INTRAMUSCULAR; INTRAVENOUS ONCE
Status: COMPLETED | OUTPATIENT
Start: 2024-01-02 | End: 2024-01-02

## 2024-01-02 RX ORDER — ACETAMINOPHEN 650 MG/1
650 SUPPOSITORY RECTAL EVERY 4 HOURS PRN
Status: DISCONTINUED | OUTPATIENT
Start: 2024-01-02 | End: 2024-01-05 | Stop reason: HOSPADM

## 2024-01-02 RX ORDER — ONDANSETRON 2 MG/ML
4 INJECTION INTRAMUSCULAR; INTRAVENOUS EVERY 6 HOURS PRN
Status: DISCONTINUED | OUTPATIENT
Start: 2024-01-02 | End: 2024-01-05 | Stop reason: HOSPADM

## 2024-01-02 RX ORDER — SODIUM CHLORIDE 9 MG/ML
INJECTION, SOLUTION INTRAVENOUS CONTINUOUS
Status: DISCONTINUED | OUTPATIENT
Start: 2024-01-02 | End: 2024-01-03

## 2024-01-02 RX ORDER — BISACODYL 10 MG
10 SUPPOSITORY, RECTAL RECTAL DAILY PRN
Status: DISCONTINUED | OUTPATIENT
Start: 2024-01-02 | End: 2024-01-05 | Stop reason: HOSPADM

## 2024-01-02 RX ORDER — AMOXICILLIN 250 MG
1 CAPSULE ORAL 2 TIMES DAILY PRN
Status: DISCONTINUED | OUTPATIENT
Start: 2024-01-02 | End: 2024-01-05 | Stop reason: HOSPADM

## 2024-01-02 RX ORDER — AMOXICILLIN 250 MG
2 CAPSULE ORAL 2 TIMES DAILY PRN
Status: DISCONTINUED | OUTPATIENT
Start: 2024-01-02 | End: 2024-01-05 | Stop reason: HOSPADM

## 2024-01-02 RX ORDER — PANTOPRAZOLE SODIUM 40 MG/1
40 TABLET, DELAYED RELEASE ORAL DAILY
Status: DISCONTINUED | OUTPATIENT
Start: 2024-01-03 | End: 2024-01-05 | Stop reason: HOSPADM

## 2024-01-02 RX ORDER — NALOXONE HYDROCHLORIDE 0.4 MG/ML
0.2 INJECTION, SOLUTION INTRAMUSCULAR; INTRAVENOUS; SUBCUTANEOUS
Status: DISCONTINUED | OUTPATIENT
Start: 2024-01-02 | End: 2024-01-05 | Stop reason: HOSPADM

## 2024-01-02 RX ADMIN — SODIUM CHLORIDE 1000 ML: 9 INJECTION, SOLUTION INTRAVENOUS at 14:15

## 2024-01-02 RX ADMIN — SODIUM CHLORIDE: 9 INJECTION, SOLUTION INTRAVENOUS at 20:17

## 2024-01-02 RX ADMIN — OXYCODONE HYDROCHLORIDE 2.5 MG: 5 TABLET ORAL at 20:20

## 2024-01-02 RX ADMIN — AMLODIPINE BESYLATE 5 MG: 5 TABLET ORAL at 20:20

## 2024-01-02 RX ADMIN — CEFTRIAXONE SODIUM 2 G: 2 INJECTION, POWDER, FOR SOLUTION INTRAMUSCULAR; INTRAVENOUS at 14:14

## 2024-01-02 ASSESSMENT — ACTIVITIES OF DAILY LIVING (ADL)
ADLS_ACUITY_SCORE: 35

## 2024-01-02 NOTE — ED NOTES
"[facility]  ED Nurse Handoff Report    ED Chief complaint: Fall      ED Diagnosis:   Final diagnoses:   Fall, initial encounter   Traumatic rhabdomyolysis, initial encounter (H24)   Hip pain, left   Abnormal LFTs       Code Status: unknown    Allergies:   Allergies   Allergen Reactions    Amoxicillin Diarrhea    Amoxicillin-Pot Clavulanate     Ciprofloxacin     Gluten Meal      celiac       Patient Story: Anna Marie Babb is a 83 year old female with a history of falls who presents after a fall. Last night, the patient reports that she was sitting in a chair in her kitchen when she slid off and fell to the floor. She thinks that she may have hit her head on the floor but denies LOC. The patient was unable to get off the floor after falling. Her daughter came to check on her this morning and called EMS.   Focused Assessment:      Treatments and/or interventions provided: IV antibiotics, IV fluid bolus  Patient's response to treatments and/or interventions:     To be done/followed up on inpatient unit:      Does this patient have any cognitive concerns?: Forgetful and Disoriented to time    Activity level - Baseline/Home:  Independent  Activity Level - Current:   Unknown    Patient's Preferred language: English   Needed?: No    Isolation: None  Infection: Not Applicable  Patient tested for COVID 19 prior to admission: YES  Bariatric?: No    Vital Signs:   Vitals:    01/02/24 1302 01/02/24 1304 01/02/24 1619 01/02/24 1700   BP:   131/68 (!) 154/85   BP Location:    Right arm   Pulse:    104   Resp:   16    Temp:   97.9  F (36.6  C)    TempSrc:   Oral    SpO2:  99% 95% 96%   Weight:  49.9 kg (110 lb)     Height: 1.676 m (5' 6\")          Cardiac Rhythm:Cardiac Rhythm: Normal sinus rhythm    Was the PSS-3 completed:   Yes  What interventions are required if any?               Family Comments:   OBS brochure/video discussed/provided to patient/family: N/A              Name of person given " brochure if not patient:               Relationship to patient:     For the majority of the shift this patient's behavior was Green.   Behavioral interventions performed were     ED NURSE PHONE NUMBER: [phone]

## 2024-01-02 NOTE — H&P
[facility]  History and Physical - Hospitalist Service       Date of Admission:  1/2/2024  PRIMARY CARE PROVIDER:    Chema Ramos    Assessment & Plan   Anna Marie Babb is a 83 year old female admitted on 1/2/2024.    Past medical history significant for HTN, HLP, GERD, Hiatal hernia, MDD with anxiety, Neuropathy, Hypercalcemia, Known pulmonary nodule, Celiac disease who was admitted to [facility] due to suspected mechanical fall resulting in patient being down on the ground overnight and developing mild rhabdomyolysis as well as lactic acidosis, troponin elevation and transaminitis.    Discussed with Dr. Sanchez and reviewed ED notes.  Patient presented to the ED via EMS after being found down for unknown amount of time after a fall.  EMS reported glucose level was 104 and stroke scale was negative.  Patient had reported to EMS that he was having left hip pain.      Patient reported that she was seated in a chair in her kitchen the night prior to admission and that she slid off the chair and fell to the floor.  She is uncertain if she struck her head but denied loss of consciousness.  Patient was unable to get up off the floor.  Patient's daughter came by to visit the morning of presentation and called 911.      Work-up in the ED included a CMP that revealed a sodium of 130, chloride of 90, BUN of 26.6, calcium of 10.4, alkaline phosphatase of 200, ALT of 56, AST of 71, direct bilirubin of 0.62, total bilirubin of 1.7 otherwise within normal limits.  Total CK was elevated at 590.  Lactic acid level was elevated at 3.2.  High-sensitivity troponin T was elevated at 29.  CBC with platelets revealed an RBC count of 3.47, MCV of 102, MCH of 36.  Respiratory viral PCR panel was all negative.  Blood cultures were taken x 2 and in process.  1 view left hip and pelvis x-ray showed demineralized bones without evidence of acute left hip fracture and noted internal fixation changes securing the  right femoral neck fracture with fracture plane still visible and hardware in place.  3 view left chest and rib x-ray revealed healing nondisplaced fractures of the left lateral sixth, seventh, eighth, ninth and 10th ribs without evidence of acute rib fracture, moderate-sized hiatal hernia which is unchanged.    Repeat troponin T increased to 32.  Lactic acid level when repeated after IV fluids resolved at 1.7.  Limited abdominal ultrasound with possible minimal sludge or stones in the gallbladder without evidence of cholecystitis.  Pelvis bone CT without contrast was negative for acute fracture or joint malalignment with evidence of ORIF healing right proximal femur fracture with hardware intact and moderate right hip degenerative arthrosis.  Head CT without contrast was negative for acute intracranial process with noted brain atrophy and presumed chronic microvascular ischemic changes and nonspecific bilateral maxillary air-fluid levels (can be seen with acute sinusitis in the appropriate clinical setting).    Patient received 1 L IV fluid bolus with normal saline and 2 g of IV ceftriaxone while in the ED.      Mechanical fall  Acute mild rhabdomyolysis   - PT consult requested.    - Check orthostatic BP's.    - IV fluids at 100 ml/hr.    - PRN oxycodone 2.5 mg every 4 hours available.      Hyponatremia, acute and mild  - IV fluids at 100 ml/hr.  - CMP ordered for the morning.      Hypercalcemia  - CMP ordered in the morning.    - IV fluids as above.      Troponin elevation  Tachycardia  *Suspect secondary to demand due to fall and development of rhabdo.  - Trend troponin.    - Monitor on telemetry.    - ECHO ordered.      Transaminitis  Gallbladder sludge noted on Abd US.  - Avoiding APAP due to elevated LFTs.    - IV Fluids as above.    - CMP ordered for the morning.      Lactic acidosis (Resolved)  - IV fluids as above.      Right nare/nasal lesion  *Patient unable to provide much information regarding this  "lesion but stated she has been blowing her nose a lot.    - Follow up with PCP.      Previous/healing left rib fractures  - PRN oxycodone as above.      HTN  - Resumed on PTA amlodipine 5 mg/d.  Hold parameters in place.    - PRN IV hydralazine 10 mg every 4 hours for SBP GREATER THAN 180.      HLP  *Noted on chart review.  Not currently on any medications.      GERD  Hiatal hernia  - Resumed on PTA Protonix 40 mg/d.      Major depressive disorder with anxiety  *Noted on chart review.  Not currently on any medications.      Neuropathy  *Noted on chart review.  Not currently on any medications.      Known pulmonary nodule  - Follow up with PCP.    Celiac disease  - Modified diet ordered.        Clinically Significant Risk Factors Present on Admission           # Hypercalcemia: Highest Ca = 10.4 mg/dL in last 2 days, will monitor as appropriate        # Hypertension: Noted on problem list      # Cachexia: Estimated body mass index is 17.75 kg/m  as calculated from the following:    Height as of this encounter: 1.676 m (5' 6\").    Weight as of this encounter: 49.9 kg (110 lb).       # Financial/Environmental Concerns:                Diet: Modified diet ordered.    DVT Prophylaxis: Pneumatic Compression Devices  Lombardi Catheter: Not present  Lines: None     Cardiac Monitoring: Ordered  Code Status:FULL CODE; confirmed with the patient         Disposition Plan   Inpatient status.  Anticipate patient will be hospitalized for minimum of 2 evenings while undergoing continued evaluation and management regarding mechanical fall with mild rhabdomyolysis, tachycardia with elevated troponin and generalized weakness.    The patient's care was discussed with the Patient and Dr. Sanchez .    The patient has been discussed with Dr. Ayala, who agrees with the assessment and plan at this time.    Kvng Styles PA-C  [facility]  Securely message with the Vocera Web Console (learn more here)  Text " page via [facility] Paging/Directory    ______________________________________________________________________    Chief Complaint   Fall and down on the ground overnight and inability to get up    History is obtained from Dr. Sanchez, the patient and EMR.      History of Present Illness   Anna Marie Babb is a 83 year old female with a past medical history significant for HTN, HLP, GERD, Hiatal hernia, MDD with anxiety, Neuropathy, Hypercalcemia, Known pulmonary nodule, Celiac disease who was admitted to [facility] due to suspected mechanical fall resulting in patient being down on the ground overnight and developing mild rhabdomyolysis as well as lactic acidosis, troponin elevation and transaminitis.    Discussed with Dr. Sanchez and reviewed ED notes.  Patient presented to the ED via EMS after being found down for unknown amount of time after a fall.  EMS reported glucose level was 104 and stroke scale was negative.  Patient had reported to EMS that he was having left hip pain.      Patient reported that she was seated in a chair in her kitchen the night prior to admission and that she slid off the chair and fell to the floor.  She is uncertain if she struck her head but denied loss of consciousness.  Patient was unable to get up off the floor.  Patient's daughter came by to visit the morning of presentation and called 911.      Work-up in the ED included a CMP that revealed a sodium of 130, chloride of 90, BUN of 26.6, calcium of 10.4, alkaline phosphatase of 200, ALT of 56, AST of 71, direct bilirubin of 0.62, total bilirubin of 1.7 otherwise within normal limits.  Total CK was elevated at 590.  Lactic acid level was elevated at 3.2.  High-sensitivity troponin T was elevated at 29.  CBC with platelets revealed an RBC count of 3.47, MCV of 102, MCH of 36.  Respiratory viral PCR panel was all negative.  Blood cultures were taken x 2 and in process.  1 view left hip and pelvis x-ray showed demineralized bones  without evidence of acute left hip fracture and noted internal fixation changes securing the right femoral neck fracture with fracture plane still visible and hardware in place.  3 view left chest and rib x-ray revealed healing nondisplaced fractures of the left lateral sixth, seventh, eighth, ninth and 10th ribs without evidence of acute rib fracture, moderate-sized hiatal hernia which is unchanged.    Repeat troponin T increased to 32.  Lactic acid level when repeated after IV fluids resolved at 1.7.  Limited abdominal ultrasound with possible minimal sludge or stones in the gallbladder without evidence of cholecystitis.  Pelvis bone CT without contrast was negative for acute fracture or joint malalignment with evidence of ORIF healing right proximal femur fracture with hardware intact and moderate right hip degenerative arthrosis.  Head CT without contrast was negative for acute intracranial process with noted brain atrophy and presumed chronic microvascular ischemic changes and nonspecific bilateral maxillary air-fluid levels (can be seen with acute sinusitis in the appropriate clinical setting).    Patient received 1 L IV fluid bolus with normal saline and 2 g of IV ceftriaxone while in the ED.      Patient was seen in the ED where she was resting comfortably on the gurney upon arrival.  Initially, we reviewed some of her medical history.  We then reviewed events that led to patient's presentation to the ED.  Patient did indicate that she believes she slipped out of a chair last night and remained on the floor overnight until her daughter found her the morning of presentation.    Patient indicated that she has a current dry mouth making it difficult to speak.  She feels extremely tired.  She is also had a slightly dry cough.  Patient was asked regarding I lesion on her right nose which she stated she is on sure about but then stated she has been blowing her nose a lot lately.    Patient resides in a [facility] in  [place], [place] with her son.  Patient was asked where her son was when she fell out of the chair last night or this morning and she stated that he was at work.  Patient denies use of tobacco products.  She reported that she does consume small glass of wine every now and then.  She does not utilize recreational drugs.  She ambulates with a walker.  She does not utilize a CPAP or supplemental oxygen.      Discussed and reviewed CODE STATUS and patient elected to be full code.      Past Medical History    I have reviewed this patient's medical history and updated it with pertinent information if needed.   Past Medical History:   Diagnosis Date    Atrophic vaginitis     Celiac disease     Cystocele     Disorder of bone and cartilage, unspecified     osteopenia    Endometriosis, site unspecified     Essential hypertension, benign     Gastro-oesophageal reflux disease     Hiatal hernia     3 cm sliding hiatal hernia    Hypercalcemia     Hyperlipidemia     HZV (herpes zoster virus) post herpetic neuralgia     Microscopic colitis     Nonspecific abnormal results of liver function study     Mildly elevated transaminases, liver biopsy WNL    Peripheral neuropathy     Pulmonary nodule     Zinc deficiency    HTN, HLP, GERD, Hiatal hernia, MDD with anxiety, Neuropathy, Hypercalcemia, Known pulmonary nodule, Celiac disease    Prior to Admission Medications   Prior to Admission Medications   Prescriptions Last Dose Informant Patient Reported? Taking?   Lidocaine (LIDOCARE) 4 % Patch 1/2/2024  No Yes   Sig: Place 1 patch onto the skin every 24 hours To prevent lidocaine toxicity, patient should be patch free for 12 hrs daily.   acetaminophen (TYLENOL) 325 MG tablet 1/1/2024  No Yes   Sig: Take 2 tablets (650 mg) by mouth every 4 hours as needed for mild pain or other (and adjunct with moderate or severe pain or per patient request)   amLODIPine (NORVASC) 5 MG tablet 1/1/2024  No Yes   Sig: Take 1 tablet (5 mg) by mouth every  "evening   gabapentin (NEURONTIN) 100 MG capsule   No No   Sig: Take 2 capsules (200 mg) by mouth daily   oxyCODONE (ROXICODONE) 5 MG tablet   No No   Sig: Take 0.5 tablets (2.5 mg) by mouth every 4 hours as needed for moderate pain or severe pain   pantoprazole (PROTONIX) 40 MG EC tablet 1/2/2024  No Yes   Sig: Take 1 tablet (40 mg) by mouth daily   vitamin D3 (CHOLECALCIFEROL) 50 mcg (2000 units) tablet 1/2/2024  No Yes   Sig: Take 1 tablet (50 mcg) by mouth daily      Facility-Administered Medications: None     Allergies   Allergies   Allergen Reactions    Amoxicillin Diarrhea    Amoxicillin-Pot Clavulanate     Ciprofloxacin     Gluten Meal      celiac       Physical Exam   BP (!) 154/85 (BP Location: Right arm)   Pulse 104   Temp 97.9  F (36.6  C) (Oral)   Resp 16   Ht 1.676 m (5' 6\")   Wt 49.9 kg (110 lb)   LMP  (LMP Unknown)   SpO2 96%   BMI 17.75 kg/m        Constitutional: Asleep upon arrival but awakened easily.  She remained awake, alert, cooperative, no apparent distress.    ENT: Normocephalic, without obvious abnormality, atraumatic, oral pharynx with dry mucus membranes.  Eyes extra occular movements intact.  Normal sclera.    Neck: Supple, symmetrical, trachea midline, no adenopathy.  Pulmonary: No increased work of breathing, fair air exchange, clear to auscultation bilaterally, no crackles or wheezing.  Cardiovascular: Regular rhythm but slightly tachycardic, normal S1 and S2, no S3 or S4, and no murmur noted.  GI: Normal bowel sounds, soft, non-distended, non-tender.    Skin/Integumen: Right nasal/nare with a lesion otherwise visualized skin appeared clear.  Neuro: CN II-XII grossly intact.  Upper and lower extremities strength, coordination and sensation intact bilaterally.    Psych:  Alert and oriented x 3. Normal affect.  Extremities: No lower extremity edema noted, and calves are non-tender to palpation bilaterally.     Medical Decision Making       Please see A&P for additional details " of medical decision making.  Greater than 75 MINUTES SPENT BY ME on the date of service doing chart review, history, exam, documentation & further activities per the note.         Data   Data reviewed today: I reviewed all medications, new labs and imaging results over the last 24 hours. I personally reviewed no images or EKG's today.      I have personally reviewed the following data over the past 24 hrs:    10.1  \   12.5   / 280     130 (L) 90 (L) 26.6 (H) /  94   4.0 28 0.65 \     ALT: 56 (H) AST: 71 (H) AP: 200 (H) TBILI: 1.7 (H)   ALB: 4.0 TOT PROTEIN: 7.3 LIPASE: N/A     Trop: 32 (H) BNP: N/A     Procal: N/A CRP: N/A Lactic Acid: 1.7         Imaging results reviewed over the past 24 hrs:   Recent Results (from the past 24 hour(s))   Ribs XR, unilat 3 views + PA chest,  left    Narrative    RIBS AND CHEST LEFT THREE VIEWS January 2, 2024 2:05 PM     INDICATION: Left chest pain. Recent rib fractures.     COMPARISON: 12/3/2023.      Impression    IMPRESSION:  1.  Healing nondisplaced fractures of the left lateral sixth, seventh,  eighth, ninth, and tenth ribs. There is sclerosis and callus formation  at the fracture margins.  2.  No acute rib fracture.  3.  No airspace consolidation or pleural effusion. Normal heart size.  4.  Moderate-sized gastroesophageal hiatal hernia, unchanged.  5.  Advanced left glenohumeral degenerative arthrosis, with a  degenerative ossicle in the axillary recess. This is unchanged.    KAUSHAL PHILIPPE MD         SYSTEM ID:  [id number]   XR Pelvis w Hip Left 1 View    Narrative    PELVIS AND LEFT HIP, ONE VIEW 1/2/2024 2:05 PM     HISTORY: Fall, pain.    COMPARISON: None.       Impression    IMPRESSION:  Bones are demineralized. There is no evidence of acute  left hip fracture. Left hip joint space is maintained.  Internal  fixation changes securing a right femoral neck fracture with fracture  plane still visible and hardware in place.     MILA PATEL MD         SYSTEM ID:   [id number]   US Abdomen Limited    Narrative    US ABDOMEN LIMITED 1/2/2024 4:00 PM    CLINICAL HISTORY: Abnormal LFTs.     TECHNIQUE: Limited abdominal ultrasound.    COMPARISON: None.    FINDINGS:    GALLBLADDER: Minimal sludge vs. stones, no cholecystitis.    BILE DUCTS: There is no biliary dilatation. The common duct measures 5  mm.    LIVER: Unremarkable where seen.    RIGHT KIDNEY: No hydronephrosis.    PANCREAS: The visualized portions of the pancreas are normal.    No ascites.      Impression    IMPRESSION: Question some minimal sludge or stones in the gallbladder,  no cholecystitis demonstrated.     ALLYSSA VARGAS MD         SYSTEM ID:  [id number]   CT Pelvis Bone wo Contrast    Narrative    EXAM: CT PELVIS BONE WO CONTRAST  LOCATION: [facility]  DATE: 1/2/2024    INDICATION: 83-year-old patient with pelvic pain after a fall.  COMPARISON: 11/29/2023 CT.  TECHNIQUE: CT scan of the pelvis was performed without IV contrast. Multiplanar reformats were obtained. Dose reduction techniques were used.  CONTRAST: None.    FINDINGS:    BONES AND JOINTS:  -No acute fracture or joint malalignment.   -ORIF healing right proximal femoral neck fracture with multiple screw fixation. The hardware is intact. There is sclerosis and partial bridging bone at the fracture margins.   -Moderate right hip degenerative arthrosis, stable. This includes moderate medial joint space narrowing and mild marginal osteophytosis.   -Transitional lumbosacral vertebral body (designated S1). Degenerative disc disease at L4/5 and L5/S1. Advanced lower lumbar facet arthrosis.   -Moderate bilateral sacroiliac degenerative arthrosis.    MUSCLES AND SOFT TISSUES:   -No asymmetric muscle enlargement.   -No soft tissue fluid collection.    OTHER:   -No free fluid in the pelvis. Hysterectomy. Low-lying bladder. Atherosclerotic calcification.      Impression    IMPRESSION:  1.  No acute fracture or joint malalignment.  2.  ORIF  healing right proximal femur fracture; the hardware is intact.  3.  Moderate right hip degenerative arthrosis, stable.  4.  No CT evidence of acute myotendinous or soft tissue pathology.   Head CT w/o contrast    Narrative    EXAM: CT HEAD W/O CONTRAST  LOCATION: [facility]  DATE: 1/2/2024    INDICATION: fall  COMPARISON: 08/07/2023.  TECHNIQUE: Routine CT Head without IV contrast. Multiplanar reformats. Dose reduction techniques were used.    FINDINGS:  INTRACRANIAL CONTENTS: No intracranial hemorrhage, extraaxial collection, or mass effect.  No CT evidence of acute infarct. Mild to moderate presumed chronic small vessel ischemic changes. Mild to moderate generalized volume loss. No hydrocephalus.     VISUALIZED ORBITS/SINUSES/MASTOIDS: Prior bilateral cataract surgery. Visualized portions of the orbits are otherwise unremarkable. Bilateral maxillary air-fluid levels. Mild scattered paranasal sinus mucosal thickening of the ethmoid air cells. No   middle ear or mastoid effusion.    BONES/SOFT TISSUES: No acute abnormality.      Impression    IMPRESSION:  1.  No CT evidence for acute intracranial process.  2.  Brain atrophy and presumed chronic microvascular ischemic changes as above.  3.  Nonspecific bilateral maxillary air-fluid levels, can be seen with acute sinusitis in the appropriate clinical setting.

## 2024-01-02 NOTE — PHARMACY-ADMISSION MEDICATION HISTORY
Pharmacist Admission Medication History    Admission medication history is complete. The information provided in this note is only as accurate as the sources available at the time of the update.    Information Source(s): Patient and CareEverywhere/SureScripts via in-person    Pertinent Information:    - Patient reports that she has a Lidocaine patch in place but does not recall what time she placed it.    Changes made to PTA medication list:  Added: None  Deleted: Oxycodone and Gabapentin  Changed: None     Allergies reviewed with patient and updates made in EHR: no - already reviewed by RN    Medication History Completed By: Alanna Wise, PharmD, BCPS    PTA Med List   Medication Sig Last Dose    acetaminophen (TYLENOL) 325 MG tablet Take 2 tablets (650 mg) by mouth every 4 hours as needed for mild pain or other (and adjunct with moderate or severe pain or per patient request)  at prn    amLODIPine (NORVASC) 5 MG tablet Take 1 tablet (5 mg) by mouth every evening 1/1/2024 at pm    Lidocaine (LIDOCARE) 4 % Patch Place 1 patch onto the skin every 24 hours To prevent lidocaine toxicity, patient should be patch free for 12 hrs daily. 1/2/2024 at in place    pantoprazole (PROTONIX) 40 MG EC tablet Take 1 tablet (40 mg) by mouth daily 1/2/2024    vitamin D3 (CHOLECALCIFEROL) 50 mcg (2000 units) tablet Take 1 tablet (50 mcg) by mouth daily 1/2/2024

## 2024-01-02 NOTE — ED NOTES
Pt here after a fall with unknown downtime, but EMS is assuming this morning. Per report doesn't remember falling, but currently remembers upon questioning. Glucose in the field was 104.  Stroke scale in field was negative. Was told by EMS was having left hip pain, but currently denying.

## 2024-01-02 NOTE — ED PROVIDER NOTES
"  History     Chief Complaint:  Fall       The history is provided by the patient.      Anna Marie Babb is a 83 year old female with a history of falls who presents after a fall. Last night, the patient reports that she was sitting in a chair in her kitchen when she slid off and fell to the floor. She thinks that she may have hit her head on the floor but denies LOC. The patient was unable to get off the floor after falling. Her daughter came to check on her this morning and called EMS. Currently, the patient reports left hip pain. She notes that her left upper back pain feels similar to when she fell and was admitted to the hospital on 11/29/23 and was found to have old rib fractures. The patient denies fever, cough, vomiting, diarrhea, chest pain, extremity pain, neck or midline back pain, or abdominal pain. She has no history of heart failure.     Independent Historian:   None - Patient Only    Review of External Notes:   I reviewed the discharge summary from 11/29/23 in which the patient was admitted after a fall and rib fractures.       Medications:    Amlodipine   Protonix   Gabapentin     Past Medical History:    Celiac disease  Cystocele   Endometriosis   GERD   Hiatal hernia   Hyperlipidemia   Hypercalcemia   Neuropathy   Pulmonary nodule   Hypertension  Osteoporosis   SIBO   Depression      Past Surgical History:    Closed reduction   Colonoscopy   Endoscopy   EGD   D&C   Partial oophorectomy   Phacoemulsification x2   Rectocele and enterocele repair   Cystocele repair   Hysterectomy       Physical Exam   Patient Vitals for the past 24 hrs:   BP Temp Temp src Pulse Resp SpO2 Height Weight   01/02/24 1700 (!) 154/85 -- -- 104 -- 96 % -- --   01/02/24 1619 131/68 97.9  F (36.6  C) Oral -- 16 95 % -- --   01/02/24 1304 -- -- -- -- -- 99 % -- 49.9 kg (110 lb)   01/02/24 1302 -- -- -- -- -- -- 1.676 m (5' 6\") --   01/02/24 1245 (!) 157/92 98  F (36.7  C) Temporal 109 16 98 % -- --        Physical Exam  VS: " Reviewed per above  HENT: Mucous membranes dry, no nuchal rigidity  EYES: sclera anicteric  CV: Rate as noted,  regular rhythm.   RESP: Effort normal. Breath sounds are normal bilaterally.  GI: no tenderness/rebound/guarding, not distended.  NEURO: GCS 15, cranial nerves II through XII are intact, 5 out of 5 strength in all 4 extremities, sensation is intact light touch in all 4 extremities  MSK: No deformity of the extremities, mild tenderness of the left lateral pelvis but no significant pain with passive range of motion of the bilateral hips or remainder of the joints of all 4 extremities for that matter.  SKIN: Warm and dry    Emergency Department Course   ECG  ECG taken at 1327, ECG read at 1350  Normal sinus rhythm with sinus arrhythmia   Nonspecific ST abnormality   Abnormal ECG    Compared to prior, dated 11/29/23.  Rate 98 bpm. OK interval 134 ms. QRS duration 76 ms. QT/QTc 342/436 ms. P-R-T axes 83 -12 38.     Imaging:  Head CT w/o contrast   Final Result   IMPRESSION:   1.  No CT evidence for acute intracranial process.   2.  Brain atrophy and presumed chronic microvascular ischemic changes as above.   3.  Nonspecific bilateral maxillary air-fluid levels, can be seen with acute sinusitis in the appropriate clinical setting.      CT Pelvis Bone wo Contrast   Final Result   IMPRESSION:   1.  No acute fracture or joint malalignment.   2.  ORIF healing right proximal femur fracture; the hardware is intact.   3.  Moderate right hip degenerative arthrosis, stable.   4.  No CT evidence of acute myotendinous or soft tissue pathology.      US Abdomen Limited   Final Result   IMPRESSION: Question some minimal sludge or stones in the gallbladder,   no cholecystitis demonstrated.       ALLYSSA VARGAS MD            SYSTEM ID:  [id number]      Ribs XR, unilat 3 views + PA chest,  left   Final Result   IMPRESSION:   1.  Healing nondisplaced fractures of the left lateral sixth, seventh,   eighth, ninth, and tenth ribs.  There is sclerosis and callus formation   at the fracture margins.   2.  No acute rib fracture.   3.  No airspace consolidation or pleural effusion. Normal heart size.   4.  Moderate-sized gastroesophageal hiatal hernia, unchanged.   5.  Advanced left glenohumeral degenerative arthrosis, with a   degenerative ossicle in the axillary recess. This is unchanged.      KAUSHAL PHILIPPE MD            SYSTEM ID:  [id number]      XR Pelvis w Hip Left 1 View   Final Result   IMPRESSION:  Bones are demineralized. There is no evidence of acute   left hip fracture. Left hip joint space is maintained.  Internal   fixation changes securing a right femoral neck fracture with fracture   plane still visible and hardware in place.       MILA PATEL MD            SYSTEM ID:  [id number]      Report per radiology.        Laboratory:  Labs Ordered and Resulted from Time of ED Arrival to Time of ED Departure   CBC WITH PLATELETS - Abnormal       Result Value    WBC Count 10.1      RBC Count 3.47 (*)     Hemoglobin 12.5      Hematocrit 35.5       (*)     MCH 36.0 (*)     MCHC 35.2      RDW 13.7      Platelet Count 280     BASIC METABOLIC PANEL - Abnormal    Sodium 130 (*)     Potassium 4.0      Chloride 90 (*)     Carbon Dioxide (CO2) 28      Anion Gap 12      Urea Nitrogen 26.6 (*)     Creatinine 0.65      GFR Estimate 86      Calcium 10.4 (*)     Glucose 94     LACTIC ACID WHOLE BLOOD - Abnormal    Lactic Acid 3.2 (*)    HEPATIC FUNCTION PANEL - Abnormal    Protein Total 7.3      Albumin 4.0      Bilirubin Total 1.7 (*)     Alkaline Phosphatase 200 (*)     AST 71 (*)     ALT 56 (*)     Bilirubin Direct 0.62 (*)    CK TOTAL - Abnormal     (*)    TROPONIN T, HIGH SENSITIVITY - Abnormal    Troponin T, High Sensitivity 29 (*)    TROPONIN T, HIGH SENSITIVITY - Abnormal    Troponin T, High Sensitivity 32 (*)    INFLUENZA A/B, RSV, & SARS-COV2 PCR - Normal    Influenza A PCR Negative      Influenza B PCR Negative      RSV PCR  Negative      SARS CoV2 PCR Negative     LACTIC ACID WHOLE BLOOD - Normal    Lactic Acid 1.7     ROUTINE UA WITH MICROSCOPIC REFLEX TO CULTURE   BLOOD CULTURE   BLOOD CULTURE          Emergency Department Course & Assessments:         Interventions:  Medications   sodium chloride 0.9% BOLUS 1,000 mL (1,000 mLs Intravenous $New Bag 1/2/24 1415)   cefTRIAXone (ROCEPHIN) 2 g vial to attach to  ml bag for ADULTS or NS 50 ml bag for PEDS (2 g Intravenous $New Bag 1/2/24 1414)        Assessments:  1305 I obtained history and examined the patient as noted above.       Disposition:  The patient was admitted to the hospital under the care of Dr. sin.     Impression & Plan    CMS Diagnoses: The Lactic acid level is elevated due to dehydration, at this time there is no sign of severe sepsis or septic shock. and None    Medical Decision Making:  Patient presents to the ER for evaluation after mechanical fall on the floor last night, unable to get up.  On arrival vital signs are notable for low-grade tachycardia.  She has some left lateral hip tenderness but no other external signs of trauma.  She also has some left upper back tenderness which has been there for some time.  At recent hospital admission, she was found to have old rib fractures in that area.  Patient reports that this pain is not necessarily worse today.    Trauma evaluation notable for no new injury on chest x-ray or left hip x-ray.  CT of the head and pelvis notable for no acute pathology.      Laboratory evaluation notable for mild CK elevation and lactic acidosis.  She has no leukocytosis or fever and lactic acid normalized after IV fluid bolus.  While she was given antibiotics initially after obtaining blood cultures and pursuing further testing, suspect lactic acidosis is related to dehydration and sitting on the floor overnight rather than sepsis.  There was mild troponin elevation that was stable on recheck without concerning ECG changes.  Low  suspicion for ACS.  Of note she did have some abnormal LFTs with concerns for sludge in the gallbladder but no obvious signs of choledocholithiasis.    Patient was admitted to the hospitalist team for further therapy evaluation and monitoring of LFTs and CK.      Diagnosis:    ICD-10-CM    1. Fall, initial encounter  W19.XXXA       2. Traumatic rhabdomyolysis, initial encounter (H24)  T79.6XXA       3. Hip pain, left  M25.552       4. Abnormal LFTs  R79.89            Discharge Medications:  New Prescriptions    No medications on file     Scribe Disclosure:  I, Libertad Lozoya, am serving as a scribe at 1:39 PM on 1/2/2024 to document services personally performed by Mahad Sanchez MD based on my observations and the provider's statements to me.     1/2/2024   Mahad Sanchez MD Lindenbaum, Elan, MD  01/02/24 3993

## 2024-01-03 ENCOUNTER — APPOINTMENT (OUTPATIENT)
Dept: PHYSICAL THERAPY | Facility: CLINIC | Age: 84
DRG: 565 | End: 2024-01-03
Attending: PHYSICIAN ASSISTANT
Payer: MEDICARE

## 2024-01-03 ENCOUNTER — APPOINTMENT (OUTPATIENT)
Dept: CARDIOLOGY | Facility: CLINIC | Age: 84
DRG: 565 | End: 2024-01-03
Attending: PHYSICIAN ASSISTANT
Payer: MEDICARE

## 2024-01-03 ENCOUNTER — APPOINTMENT (OUTPATIENT)
Dept: OCCUPATIONAL THERAPY | Facility: CLINIC | Age: 84
DRG: 565 | End: 2024-01-03
Attending: PHYSICIAN ASSISTANT
Payer: MEDICARE

## 2024-01-03 LAB
ALBUMIN SERPL BCG-MCNC: 3.2 G/DL (ref 3.5–5.2)
ALP SERPL-CCNC: 155 U/L (ref 40–150)
ALT SERPL W P-5'-P-CCNC: 40 U/L (ref 0–50)
ANION GAP SERPL CALCULATED.3IONS-SCNC: 10 MMOL/L (ref 7–15)
AST SERPL W P-5'-P-CCNC: 47 U/L (ref 0–45)
BACTERIA UR CULT: NORMAL
BILIRUB SERPL-MCNC: 1.4 MG/DL
BUN SERPL-MCNC: 14.7 MG/DL (ref 8–23)
CALCIUM SERPL-MCNC: 9.1 MG/DL (ref 8.8–10.2)
CHLORIDE SERPL-SCNC: 97 MMOL/L (ref 98–107)
CK SERPL-CCNC: 267 U/L (ref 26–192)
CREAT SERPL-MCNC: 0.5 MG/DL (ref 0.51–0.95)
DEPRECATED HCO3 PLAS-SCNC: 24 MMOL/L (ref 22–29)
EGFRCR SERPLBLD CKD-EPI 2021: >90 ML/MIN/1.73M2
ERYTHROCYTE [DISTWIDTH] IN BLOOD BY AUTOMATED COUNT: 14 % (ref 10–15)
GLUCOSE SERPL-MCNC: 70 MG/DL (ref 70–99)
HCT VFR BLD AUTO: 30 % (ref 35–47)
HGB BLD-MCNC: 10.4 G/DL (ref 11.7–15.7)
LVEF ECHO: NORMAL
MAGNESIUM SERPL-MCNC: 1.5 MG/DL (ref 1.7–2.3)
MAGNESIUM SERPL-MCNC: 3 MG/DL (ref 1.7–2.3)
MCH RBC QN AUTO: 35.3 PG (ref 26.5–33)
MCHC RBC AUTO-ENTMCNC: 34.7 G/DL (ref 31.5–36.5)
MCV RBC AUTO: 102 FL (ref 78–100)
PHOSPHATE SERPL-MCNC: 2.3 MG/DL (ref 2.5–4.5)
PLATELET # BLD AUTO: 239 10E3/UL (ref 150–450)
POTASSIUM SERPL-SCNC: 3.5 MMOL/L (ref 3.4–5.3)
PROT SERPL-MCNC: 5.9 G/DL (ref 6.4–8.3)
RBC # BLD AUTO: 2.95 10E6/UL (ref 3.8–5.2)
SODIUM SERPL-SCNC: 131 MMOL/L (ref 135–145)
TROPONIN T SERPL HS-MCNC: 31 NG/L
TROPONIN T SERPL HS-MCNC: 45 NG/L
WBC # BLD AUTO: 7.5 10E3/UL (ref 4–11)

## 2024-01-03 PROCEDURE — 82550 ASSAY OF CK (CPK): CPT | Performed by: PHYSICIAN ASSISTANT

## 2024-01-03 PROCEDURE — 250N000013 HC RX MED GY IP 250 OP 250 PS 637: Performed by: PHYSICIAN ASSISTANT

## 2024-01-03 PROCEDURE — 250N000013 HC RX MED GY IP 250 OP 250 PS 637: Performed by: INTERNAL MEDICINE

## 2024-01-03 PROCEDURE — 99233 SBSQ HOSP IP/OBS HIGH 50: CPT | Performed by: INTERNAL MEDICINE

## 2024-01-03 PROCEDURE — 93306 TTE W/DOPPLER COMPLETE: CPT | Mod: 26 | Performed by: INTERNAL MEDICINE

## 2024-01-03 PROCEDURE — 85014 HEMATOCRIT: CPT | Performed by: PHYSICIAN ASSISTANT

## 2024-01-03 PROCEDURE — 250N000011 HC RX IP 250 OP 636: Performed by: INTERNAL MEDICINE

## 2024-01-03 PROCEDURE — 97161 PT EVAL LOW COMPLEX 20 MIN: CPT | Mod: GP | Performed by: PHYSICAL THERAPIST

## 2024-01-03 PROCEDURE — 36415 COLL VENOUS BLD VENIPUNCTURE: CPT | Performed by: INTERNAL MEDICINE

## 2024-01-03 PROCEDURE — 36415 COLL VENOUS BLD VENIPUNCTURE: CPT | Performed by: PHYSICIAN ASSISTANT

## 2024-01-03 PROCEDURE — 97535 SELF CARE MNGMENT TRAINING: CPT | Mod: GO | Performed by: OCCUPATIONAL THERAPIST

## 2024-01-03 PROCEDURE — 97530 THERAPEUTIC ACTIVITIES: CPT | Mod: GO | Performed by: OCCUPATIONAL THERAPIST

## 2024-01-03 PROCEDURE — 255N000002 HC RX 255 OP 636: Performed by: STUDENT IN AN ORGANIZED HEALTH CARE EDUCATION/TRAINING PROGRAM

## 2024-01-03 PROCEDURE — 84484 ASSAY OF TROPONIN QUANT: CPT | Performed by: PHYSICIAN ASSISTANT

## 2024-01-03 PROCEDURE — 97165 OT EVAL LOW COMPLEX 30 MIN: CPT | Mod: GO | Performed by: OCCUPATIONAL THERAPIST

## 2024-01-03 PROCEDURE — 80053 COMPREHEN METABOLIC PANEL: CPT | Performed by: PHYSICIAN ASSISTANT

## 2024-01-03 PROCEDURE — 83735 ASSAY OF MAGNESIUM: CPT | Performed by: PHYSICIAN ASSISTANT

## 2024-01-03 PROCEDURE — 120N000001 HC R&B MED SURG/OB

## 2024-01-03 PROCEDURE — 97530 THERAPEUTIC ACTIVITIES: CPT | Mod: GP | Performed by: PHYSICAL THERAPIST

## 2024-01-03 PROCEDURE — 84100 ASSAY OF PHOSPHORUS: CPT | Performed by: PHYSICIAN ASSISTANT

## 2024-01-03 PROCEDURE — 258N000003 HC RX IP 258 OP 636: Performed by: PHYSICIAN ASSISTANT

## 2024-01-03 PROCEDURE — 999N000208 ECHOCARDIOGRAM COMPLETE

## 2024-01-03 PROCEDURE — 83735 ASSAY OF MAGNESIUM: CPT | Performed by: INTERNAL MEDICINE

## 2024-01-03 RX ORDER — MAGNESIUM SULFATE HEPTAHYDRATE 40 MG/ML
4 INJECTION, SOLUTION INTRAVENOUS ONCE
Qty: 100 ML | Refills: 0 | Status: COMPLETED | OUTPATIENT
Start: 2024-01-03 | End: 2024-01-03

## 2024-01-03 RX ADMIN — AMLODIPINE BESYLATE 5 MG: 5 TABLET ORAL at 20:35

## 2024-01-03 RX ADMIN — MAGNESIUM SULFATE IN WATER 4 G: 40 INJECTION, SOLUTION INTRAVENOUS at 12:01

## 2024-01-03 RX ADMIN — ACETAMINOPHEN 650 MG: 325 TABLET, FILM COATED ORAL at 11:03

## 2024-01-03 RX ADMIN — POTASSIUM & SODIUM PHOSPHATES POWDER PACK 280-160-250 MG 1 PACKET: 280-160-250 PACK at 14:26

## 2024-01-03 RX ADMIN — HUMAN ALBUMIN MICROSPHERES AND PERFLUTREN 9 ML: 10; .22 INJECTION, SOLUTION INTRAVENOUS at 14:06

## 2024-01-03 RX ADMIN — PANTOPRAZOLE SODIUM 40 MG: 40 TABLET, DELAYED RELEASE ORAL at 09:21

## 2024-01-03 RX ADMIN — SODIUM CHLORIDE: 9 INJECTION, SOLUTION INTRAVENOUS at 16:20

## 2024-01-03 RX ADMIN — OXYCODONE HYDROCHLORIDE 2.5 MG: 5 TABLET ORAL at 11:03

## 2024-01-03 RX ADMIN — POTASSIUM & SODIUM PHOSPHATES POWDER PACK 280-160-250 MG 1 PACKET: 280-160-250 PACK at 11:03

## 2024-01-03 RX ADMIN — POTASSIUM & SODIUM PHOSPHATES POWDER PACK 280-160-250 MG 1 PACKET: 280-160-250 PACK at 18:24

## 2024-01-03 ASSESSMENT — ACTIVITIES OF DAILY LIVING (ADL)
ADLS_ACUITY_SCORE: 49
ADLS_ACUITY_SCORE: 48
ADLS_ACUITY_SCORE: 49
ADLS_ACUITY_SCORE: 46
ADLS_ACUITY_SCORE: 49
ADLS_ACUITY_SCORE: 45
ADLS_ACUITY_SCORE: 49
ADLS_ACUITY_SCORE: 45
ADLS_ACUITY_SCORE: 48

## 2024-01-03 NOTE — PROGRESS NOTES
Patient is A&Ox4, on RA. With PIV infusing NS @100ml/hr. On TELE. Noted redness on the sacrum,. Incontinent, pad was changed. Denies SOB and N/V.

## 2024-01-03 NOTE — PROGRESS NOTES
01/03/24 1015   Appointment Info   Signing Clinician's Name / Credentials (PT) Casie Fair, PT   Living Environment   People in Home child(domi), adult   Current Living Arrangements house   Home Accessibility stairs to enter home   Number of Stairs, Main Entrance 3   Stair Railings, Main Entrance railing on right side (ascending)   Transportation Anticipated family or friend will provide   Living Environment Comments Has a basement but does not go down to the basement at baseline. Also has dtr who lives in [place];son does the laundry--in basement;looking someone to clean the home, dtr. does clean 1X/week;manages own medications;son does the cooking, pt.+ son do the dishes;does not drive; standard toilet w/sink support;tub/shower combo. w/ grab bars;access to shower chair(son uses)   Self-Care   Usual Activity Tolerance moderate   Current Activity Tolerance poor   Regular Exercise No   Equipment Currently Used at Home walker, rolling   Fall history within last six months yes   Number of times patient has fallen within last six months 2   Activity/Exercise/Self-Care Comment uses WW but not all the time; sounds like she uses furniture in the house. States she manages walking around alone. Lives with son but he works (at [employer]) early in morning. Pt. unable to state when he is at home.   General Information   Onset of Illness/Injury or Date of Surgery 01/02/24   Referring Physician Kvng Styles PA-C   Patient/Family Therapy Goals Statement (PT) None stated; is open to TCU if needed.   Pertinent History of Current Problem (include personal factors and/or comorbidities that impact the POC) Anna Marie Babb is a 83 year old female admitted on 1/2/2024.     Past medical history significant for HTN, HLP, GERD, Hiatal hernia, MDD with anxiety, Neuropathy, Hypercalcemia, Known pulmonary nodule, Celiac disease who was admitted to [facility] due to suspected mechanical fall resulting in patient  "being down on the ground overnight and developing mild rhabdomyolysis as well as lactic acidosis, troponin elevation and transaminitis.   Existing Precautions/Restrictions fall   General Observations Long sitting in bed; reports feeling very weak and sore on her L side   Cognition   Affect/Mental Status (Cognition) WFL   Orientation Status (Cognition) oriented x 3   Follows Commands (Cognition) WFL   Memory Deficit (Cognition) other (see comments)   Cognitive Status Comments notable memory deficit; pt. unsure when son is at work; how much she is home alone. Stated she did not have a button to push if she feel. Later stated the \"bracelet\" on her wrist is a button to push if she falls; notably it is. Pt. unsure why she didn't push it.   Pain Assessment   Patient Currently in Pain Yes, see Vital Sign flowsheet  (not rated but variable indicating L hip and L knee hurt; varies and not consisten. Reports laying on L side.)   Integumentary/Edema   Integumentary/Edema no deficits were identifed   Posture    Posture Forward head position;Kyphosis   Range of Motion (ROM)   Range of Motion ROM deficits secondary to pain;ROM deficits secondary to weakness   Strength (Manual Muscle Testing)   Strength (Manual Muscle Testing) Deficits observed during functional mobility   Bed Mobility   Comment, (Bed Mobility) Supine to sit with HOB raised and Mod A   Transfers   Comment, (Transfers) STS into WW; unable. Into SS with Max plus Mod   Gait/Stairs (Locomotion)   Comment, (Gait/Stairs) unable   Balance   Balance Comments compromised in stance inside SS   Sensory Examination   Sensory Perception patient reports no sensory changes   Coordination   Coordination no deficits were identified   Muscle Tone   Muscle Tone no deficits were identified   Clinical Impression   Criteria for Skilled Therapeutic Intervention Yes, treatment indicated   PT Diagnosis (PT) Impaired functional mobility   Influenced by the following impairments pain, " weakness   Functional limitations due to impairments decreased I and endurance in functional mobility   Clinical Presentation (PT Evaluation Complexity) stable   Clinical Presentation Rationale per clinical judgement   Clinical Decision Making (Complexity) low complexity   Planned Therapy Interventions (PT) balance training;bed mobility training;gait training;patient/family education;postural re-education;stair training;strengthening;transfer training;progressive activity/exercise   Risk & Benefits of therapy have been explained evaluation/treatment results reviewed;care plan/treatment goals reviewed;risks/benefits reviewed;current/potential barriers reviewed;participants voiced agreement with care plan;participants included;patient   PT Total Evaluation Time   PT Eval, Low Complexity Minutes (61012) 10   Physical Therapy Goals   PT Frequency 5x/week   PT Predicted Duration/Target Date for Goal Attainment 01/10/24   PT: Bed Mobility Supervision/stand-by assist;Supine to/from sit   PT: Transfers Supervision/stand-by assist;Sit to/from stand   PT: Gait Supervision/stand-by assist;Rolling walker;100 feet   PT: Stairs Supervision/stand-by assist;3 stairs;Rail on right;Assistive device   Therapeutic Activity   Therapeutic Activities: dynamic activities to improve functional performance Minutes (48254) 35   Symptoms Noted During/After Treatment Fatigue;Increased pain   Treatment Detail/Skilled Intervention Patient long sitting in bed; reluctant but agreeable to participate. Reporting feeling very weak and sore on her left L/E. At various times talked about pain in L ankle, then hip, then knee during session. No specific consistant focus. In supine with HOB 40 degrees, /96 and .  Moved from supine to sit with Mod A; pt. very shaky. Sat with CGA. In sit /94 and . Nursing present and assisting. Attempted to stand into WW and pt. unable with Max A of 2. SS was brought in. Stood with Max plus Mod, hip  paddles placed and pt. rested on them. Moved to commode as had to urinate. Stood from paddles with Mod A of 2 and assisted into sitting on commode. On commode up to 10 min. /101 and . Stood from paddles with Mod A plus CGA; nursing peformed pericares. Pt. maintained stance with SS with Min A while turned to back to recliner. Attempted BP in this position but not reading. Sat on paddles, then stood again and lowered to recliner with Mod A. Positioned comfortably. Breakfast tray here; chair alarm on and nurse still in room.   PT Discharge Planning   PT Plan transfers, try standing in WW, if unable then SS transfers, strengthening   PT Discharge Recommendation (DC Rec) Transitional Care Facility   PT Rationale for DC Rec Pt below baseline. Unable to stand into WW and Mod for bed transfers. Mod A plus CGA to  SS by end of session; dependent for toileting. Will need to TCU to increase strength and functional mobility. Pt. is alone at home for periods of the day.   PT Brief overview of current status Mod A supine to sit; unable to  WW. Max plus Mod initially to  SS, progressed to Mod plus CGA from commode.   Total Session Time   Timed Code Treatment Minutes 35   Total Session Time (sum of timed and untimed services) 45

## 2024-01-03 NOTE — PROGRESS NOTES
Transfer from ED arrived on the floor @ 2000. A/O x4.On RA.NS infusing@ 100ml/hr. Tele:NSR. Blanchable redness and some abrasion on buttock; mepilex applied. Rate pain 7/10,prn Oxy was given. Denies SOB and nausea. No other concern @ the moment, plan of care on-going.

## 2024-01-03 NOTE — PROGRESS NOTES
[facility]    Medicine Progress Note - Hospitalist Service    Date of Admission:  1/2/2024    Assessment & Plan   82 yo female with history of HTN, HLP, GERD, Hiatal hernia, MDD with anxiety, Neuropathy, Hypercalcemia, Known pulmonary nodule, Celiac disease who presented after a fall with immobilization and found to have lactic acidosis, troponin elevation.     Mechanical fall  Acute, mild rhabdomyolysis   - PT consult requested.    - stop IV fluids      Hypercalcemia  Hyponatremia, acute and mild  - monitor trend     Troponin elevation  Tachycardia  Suspect demand ischemia secondary to fall and development of rhabdo.  - troponin trend flat  - TTE with EF 60-65%, no WMA, valves ok.      Transaminitis, improved  Gallbladder sludge noted on Abd US.  - Avoiding APAP due to elevated LFTs.       Lactic acidosis, resolved     Right nare/nasal lesion  Patient unable to provide much information regarding this lesion but stated she has been blowing her nose a lot.    - Follow up with PCP      Previous/healing left rib fractures  - PRN oxycodone as above.       Hypertension  - Resumed on PTA amlodipine 5 mg/d.  Hold parameters in place.    - PRN IV hydralazine 10 mg every 4 hours for SBP GREATER THAN 180.       Dyslipidemia  *Noted on chart review.  Not currently on any medications.       GERD  Hiatal hernia  - Resumed PTA Protonix 40 mg/d.       Major depressive disorder with anxiety  Noted on chart review.  Not currently on any medications.       Neuropathy  Noted on chart review.  Not currently on any medications.       Known pulmonary nodule  - Follow up with PCP.     Celiac disease  - Modified diet ordered.            Diet: Combination Diet Regular Diet Adult, Gluten Free Diet    DVT Prophylaxis: Pneumatic Compression Devices and Ambulate every shift  Lombardi Catheter: Not present  Lines: None     Cardiac Monitoring: ACTIVE order. Indication: Tachyarrhythmias, acute (48 hours)  Code Status: Full  Code      Clinically Significant Risk Factors Present on Admission           # Hypercalcemia: Highest Ca = 10.4 mg/dL in last 2 days, will monitor as appropriate  # Hypomagnesemia: Lowest Mg = 1.5 mg/dL in last 2 days, will replace as needed   # Hypoalbuminemia: Lowest albumin = 3.2 g/dL at 1/3/2024  7:36 AM, will monitor as appropriate     # Hypertension: Noted on problem list          # Financial/Environmental Concerns:           Disposition Plan     Expected Discharge Date: 01/04/2024            Probably home tomorrow if labs stable and therapy evals completed.          Marti Hernadez MD  Hospitalist Service  [facility]  Securely message with [employer] (more info)  Text page via [facility] Paging/Directory   ______________________________________________________________________    Interval History   Sitting up in chair about to have supper. Pleasant and conversant. Reports tailbone pain and diffuse aches. No fevers, chills, shortness of breath, chest discomfort.     Physical Exam   Vital Signs: Temp: 97.8  F (36.6  C) Temp src: Oral BP: (!) 147/80 Pulse: 89   Resp: 17 SpO2: 97 % O2 Device: None (Room air)    Weight: 113 lbs 5.06 oz    General Appearance: Sitting up in chair, NAD, pleasant and cooperative  HEENT: +alopecia, mouth dry, nare with dried blood  Respiratory: non-labored, no wheezing or cough  Cardiovascular: regular, no murmur  GI: soft, nontender  Skin: frail, no jaundice or acute rashes    Medical Decision Making       MANAGEMENT DISCUSSED with the following over the past 24 hours: patient, bedside nurse   NOTE(S)/MEDICAL RECORDS REVIEWED over the past 24 hours: H&P, nursing notes, med admin record, flowsheets  Tests ORDERED & REVIEWED in the past 24 hours:  - See lab/imaging results included in the data section of the note      Data     I have personally reviewed the following data over the past 24 hrs:    7.5  \   10.4 (L)   / 239     131 (L) 97 (L) 14.7 /  70   3.5 24 0.50 (L)  \     ALT: 40 AST: 47 (H) AP: 155 (H) TBILI: 1.4 (H)   ALB: 3.2 (L) TOT PROTEIN: 5.9 (L) LIPASE: N/A     Trop: 31 (H) BNP: N/A       Imaging results reviewed over the past 24 hrs:   Recent Results (from the past 24 hour(s))   Echocardiogram Complete   Result Value    LVEF  60-65%    Narrative    [id number]  [id number]  [id number]  [id number]^SUNNY^JOHN^SHAYE     [facility]  Echocardiography Laboratory  [address] [place] [id number]     Name: IZA WEAVER  MRN: [id number]  : 1940  Study Date: 2024 01:37 PM  Age: 83 yrs  Gender: Female  Patient Location: [facility]  Reason For Study: Hypertension (HTN), Tachycardia  Ordering Physician: JOHN CORREA  Referring Physician: Chema Ramos  Performed By: Nadeen Moreno     BSA: 1.6 m2  Height: 66 in  Weight: 110 lb  HR: 72  BP: 147/80 mmHg  ______________________________________________________________________________  Procedure  Complete Portable Echo Adult. Optison (NDC #[id number]) given intravenously.  ______________________________________________________________________________  Interpretation Summary     The left ventricle is normal in size.  The visual ejection fraction is 60-65%.  Diastolic Doppler findings (E/E' ratio and/or other parameters) suggest left  ventricular filling pressures are indeterminate.  No regional wall motion abnormalities noted.  No significant valvular heart disease.  ______________________________________________________________________________  Left Ventricle  The left ventricle is normal in size. There is normal left ventricular wall  thickness. Diastolic Doppler findings (E/E' ratio and/or other parameters)  suggest left ventricular filling pressures are indeterminate. The visual  ejection fraction is 60-65%. No regional wall motion abnormalities noted.     Right Ventricle  The right ventricle is normal in size and function.     Atria  The left atrium is mildly dilated. Right atrial size is  normal. There is no  color Doppler evidence of an atrial shunt.     Mitral Valve  The mitral valve leaflets are mildly thickened. There is mild mitral annular  calcification. There is trace mitral regurgitation.     Tricuspid Valve  There is trace tricuspid regurgitation. IVC diameter and respiratory changes  fall into an intermediate range suggesting an RA pressure of 8 mmHg. The right  ventricular systolic pressure is approximated at 25.0 mmHg plus the right  atrial pressure.     Aortic Valve  There is trivial trileaflet aortic sclerosis. No aortic regurgitation is  present.     Pulmonic Valve  There is trace pulmonic valvular regurgitation.     Vessels  Normal size aorta. The aortic root is normal size.     Pericardium  There is no pericardial effusion.     Rhythm  Sinus rhythm was noted.  ______________________________________________________________________________  MMode/2D Measurements & Calculations  IVSd: 1.2 cm     LVIDd: 3.5 cm  LVIDs: 1.9 cm  LVPWd: 1.2 cm  FS: 44.4 %  LV mass(C)d: 129.5 grams  LV mass(C)dI: 83.5 grams/m2  Ao root diam: 3.5 cm  LA dimension: 3.8 cm  asc Aorta Diam: 3.1 cm  LA/Ao: 1.1  Ao root diam index Ht(cm/m): 2.1  Ao root diam index BSA (cm/m2): 2.3  Asc Ao diam index BSA (cm/m2): 2.0  Asc Ao diam index Ht(cm/m): 1.9  LA Volume (BP): 52.6 ml     LA Volume Index (BP): 33.9 ml/m2  RV Base: 3.9 cm  RWT: 0.67  TAPSE: 2.3 cm     Doppler Measurements & Calculations  MV E max matt: 78.3 cm/sec  MV A max matt: 87.1 cm/sec  MV E/A: 0.90  MV dec slope: 292.5 cm/sec2  MV dec time: 0.27 sec  TR max matt: 249.8 cm/sec  TR max P.0 mmHg  E/E' av.4  Lateral E/e': 11.1  Medial E/e': 13.8     ______________________________________________________________________________  Report approved by: Remigio Lacy 2024 04:23 PM

## 2024-01-03 NOTE — PROGRESS NOTES
RECEIVING UNIT ED HANDOFF REVIEW    ED Nurse Handoff Report was reviewed by: Katlin Stafford RN on January 2, 2024 at 7:35 PM

## 2024-01-03 NOTE — PROGRESS NOTES
01/03/24 1502   Appointment Info   Signing Clinician's Name / Credentials (OT) Grace Host, OTR/L   Living Environment   People in Home child(domi), adult   Current Living Arrangements house   Home Accessibility stairs to enter home   Number of Stairs, Main Entrance 3   Stair Railings, Main Entrance railing on right side (ascending)   Transportation Anticipated family or friend will provide   Living Environment Comments Has a basement but does not go down to the basement at baseline. Also has dtr who lives in [place];son does the laundry--in basement;looking someone to clean the home, dtr. does clean 1X/week;manages own medications;son does the cooking, pt.+ son do the dishes;does not drive; standard toilet w/sink support;tub/shower combo. w/ grab bars;access to shower chair(son uses   Self-Care   Usual Activity Tolerance moderate   Current Activity Tolerance poor   Equipment Currently Used at Home walker, rolling   Fall history within last six months yes   Number of times patient has fallen within last six months 2   Activity/Exercise/Self-Care Comment uses WW but not all the time; sounds like she uses furniture in the house. States she manages walking around alone. Lives with son but he works (at [employer]) early in morning. Pt. unable to state when he is at home.   General Information   Onset of Illness/Injury or Date of Surgery 01/02/24   Referring Physician Kvng Styles PA-C   Patient/Family Therapy Goal Statement (OT) None stated   Additional Occupational Profile Info/Pertinent History of Current Problem Anna Marie Babb is a 83 year old female admitted on 1/2/2024.     Past medical history significant for HTN, HLP, GERD, Hiatal hernia, MDD with anxiety, Neuropathy, Hypercalcemia, Known pulmonary nodule, Celiac disease who was admitted to [facility] due to suspected mechanical fall resulting in patient being down on the ground overnight and developing mild rhabdomyolysis as well as  lactic acidosis, troponin elevation and transaminitis.   Existing Precautions/Restrictions fall   Cognitive Status Examination   Orientation Status orientation to person, place and time   Affect/Mental Status (Cognitive) confused   Follows Commands 75-90% accuracy;repetition of directions required   Memory Deficit short-term memory   Attention Deficit focused/sustained attention   Executive Function Deficit information processing;insight/awareness of deficits   Pain Assessment   Patient Currently in Pain Yes, see Vital Sign flowsheet  (L knee pain per pt report)   Range of Motion Comprehensive   General Range of Motion bilateral upper extremity ROM WFL   Strength Comprehensive (MMT)   Comment, General Manual Muscle Testing (MMT) Assessment generalized weakness   Bed Mobility   Bed Mobility supine-sit   Supine-Sit Rowley (Bed Mobility) moderate assist (50% patient effort)   Assistive Device (Bed Mobility) bed rails   Transfers   Transfers sit-stand transfer   Sit-Stand Transfer   Sit-Stand Rowley (Transfers) maximum assist (25% patient effort);2 person assist   Activities of Daily Living   BADL Assessment/Intervention lower body dressing;toileting;grooming   Lower Body Dressing Assessment/Training   Rowley Level (Lower Body Dressing) dependent (less than 25% patient effort)   Grooming Assessment/Training   Rowley Level (Grooming) minimum assist (75% patient effort)   Comment, (Grooming) per clinical judgment   Toileting   Rowley Level (Toileting) dependent (less than 25% patient effort)   Comment, (Toileting) per clinical judgment   Clinical Impression   Criteria for Skilled Therapeutic Interventions Met (OT) Yes, treatment indicated   OT Diagnosis decreased ADL independence   OT Problem List-Impairments impacting ADL problems related to;activity tolerance impaired;cognition;mobility;strength;pain   Assessment of Occupational Performance 3-5 Performance Deficits   Identified Performance  "Deficits dressing, bathing, G/H, functional mobility, toileting   Planned Therapy Interventions (OT) ADL retraining;transfer training;progressive activity/exercise;cognition;strengthening   Clinical Decision Making Complexity (OT) detailed assessment/moderate complexity   Risk & Benefits of therapy have been explained patient   OT Total Evaluation Time   OT Eval, Low Complexity Minutes (52869) 10   OT Goals   Therapy Frequency (OT) 4 times/week   OT Predicted Duration/Target Date for Goal Attainment 01/10/24   OT Goals Transfers   OT: Hygiene/Grooming supervision/stand-by assist   OT: Lower Body Dressing Moderate assist   OT: Transfer Minimal assist;with assistive device   OT: Toilet Transfer/Toileting Moderate assist;toilet transfer;cleaning and garment management;using adaptive equipment   Interventions   Interventions Quick Adds Therapeutic Activity   Self-Care/Home Management   Self-Care/Home Mgmt/ADL, Compensatory, Meal Prep Minutes (72601) 8   Symptoms Noted During/After Treatment (Meal Preparation/Planning Training) fatigue;increased pain   Treatment Detail/Skilled Intervention Pt required max A to medina socks despite encouragement to complete herself d/t pain and fatigue. Ed on fall prevention techniques at home d/t multiple falls recently. Pt reports having life alert bracelet however does not like to use because she has a \"big black lab\". Ed on safety measures to implement at home. Pt reports son has already assisted w/ some techniques. pt limited w/ ed d/t decreased cognition.   Therapeutic Activities   Therapeutic Activity Minutes (85949) 15   Symptoms noted during/after treatment fatigue;increased pain   Treatment Detail/Skilled Intervention Pt required VCs for technique, hand placement during bed mobility. Performed w/ min A x1 and increased time d/t weakness and fatigue. Pt ed on use of alondra stedy for transfers. Performed STS from EOB w/  mod A x2 after ed. VCs for posture and safety in alondra stedy. " Assisted to chair and ed on safe chair transfer, pt performed w/  min Ax2. Pt set up in chair w/ all needs,  alarm on at end of session.   OT Discharge Planning   OT Plan BSC transfer w/ alondra lema,  G/H sinkside in alondra lema   OT Discharge Recommendation (DC Rec) Transitional Care Facility   OT Rationale for DC Rec Pt below baseline for ADL performance. Multiple falls, hospital admissions recently. Limited by pain, decreased strength, cognition. Pt will benefit from TCU at d/c to increase independence, safety. Pt may benefit from more supportive living environment such as [facility].   OT Brief overview of current status min/mod A x2 w/ alondra lema. Max A for ADLs   Total Session Time   Timed Code Treatment Minutes 23   Total Session Time (sum of timed and untimed services) 33

## 2024-01-03 NOTE — UTILIZATION REVIEW
Admission Status; Secondary Review Determination    Under the authority of the Utilization Management Committee, the utilization review process indicated a secondary review on the above patient. The review outcome is based on review of the medical records, discussions with staff, and applying clinical experience noted on the date of the review.    (x) Inpatient Status Appropriate - This patient's medical care is consistent with medical management for inpatient care and reasonable inpatient medical practice.    RATIONALE FOR DETERMINATION: 83-year-old female with history of hypertension, depression/anxiety, neuropathy, history of hypercalcemia, celiac disease who was sitting in a chair in her kitchen when she slid off and fell to the floor possibly hitting her head without loss of consciousness.  Patient was unable to get off the floor and was found the following morning by family members.  Patient notes left after back pain.  Patient found to have some rhabdomyolysis as well as lactic acidosis, elevated troponin level and elevated transaminases.  Patient also has ongoing hyponatremia with hypercalcemia, hypomagnesia him and hypophosphatemia.  Patient is expected require greater than 2 nights in the hospital for IV fluids, electrolyte repletion as well as pain control in addition to mobility assessment with patient's acute illness resulting in poor mobility.    At the time of admission with the information available to the attending physician more than 2 nights Hospital complex care was anticipated, based on patient risk of adverse outcome if treated as outpatient and complex care required. Inpatient admission is appropriate based on the Medicare guidelines.    This document was produced using voice recognition software    The information on this document is developed by the utilization review team in order for the business office to ensure compliance. This only denotes the appropriateness of proper admission  status and does not reflect the quality of care rendered.    The definitions of Inpatient Status and Observation Status used in making the determination above are those provided in the CMS Coverage Manual, Chapter 1 and Chapter 6, section 70.4.    Sincerely,    Neymar Ling MD  Utilization Review  Physician Advisor  [facility].

## 2024-01-04 ENCOUNTER — APPOINTMENT (OUTPATIENT)
Dept: OCCUPATIONAL THERAPY | Facility: CLINIC | Age: 84
DRG: 565 | End: 2024-01-04
Payer: MEDICARE

## 2024-01-04 ENCOUNTER — APPOINTMENT (OUTPATIENT)
Dept: PHYSICAL THERAPY | Facility: CLINIC | Age: 84
DRG: 565 | End: 2024-01-04
Payer: MEDICARE

## 2024-01-04 LAB
ALBUMIN SERPL BCG-MCNC: 3.3 G/DL (ref 3.5–5.2)
ALP SERPL-CCNC: 173 U/L (ref 40–150)
ALT SERPL W P-5'-P-CCNC: 47 U/L (ref 0–50)
ANION GAP SERPL CALCULATED.3IONS-SCNC: 4 MMOL/L (ref 7–15)
AST SERPL W P-5'-P-CCNC: 53 U/L (ref 0–45)
BILIRUB SERPL-MCNC: 1.2 MG/DL
BUN SERPL-MCNC: 9.4 MG/DL (ref 8–23)
CALCIUM SERPL-MCNC: 9.2 MG/DL (ref 8.8–10.2)
CHLORIDE SERPL-SCNC: 97 MMOL/L (ref 98–107)
CREAT SERPL-MCNC: 0.53 MG/DL (ref 0.51–0.95)
DEPRECATED HCO3 PLAS-SCNC: 32 MMOL/L (ref 22–29)
EGFRCR SERPLBLD CKD-EPI 2021: >90 ML/MIN/1.73M2
GLUCOSE SERPL-MCNC: 104 MG/DL (ref 70–99)
MAGNESIUM SERPL-MCNC: 1.7 MG/DL (ref 1.7–2.3)
PHOSPHATE SERPL-MCNC: 2.2 MG/DL (ref 2.5–4.5)
POTASSIUM SERPL-SCNC: 3.6 MMOL/L (ref 3.4–5.3)
PROT SERPL-MCNC: 5.9 G/DL (ref 6.4–8.3)
SODIUM SERPL-SCNC: 133 MMOL/L (ref 135–145)

## 2024-01-04 PROCEDURE — 99233 SBSQ HOSP IP/OBS HIGH 50: CPT | Performed by: INTERNAL MEDICINE

## 2024-01-04 PROCEDURE — 83735 ASSAY OF MAGNESIUM: CPT | Performed by: INTERNAL MEDICINE

## 2024-01-04 PROCEDURE — 120N000001 HC R&B MED SURG/OB

## 2024-01-04 PROCEDURE — 97110 THERAPEUTIC EXERCISES: CPT | Mod: GP | Performed by: PHYSICAL THERAPIST

## 2024-01-04 PROCEDURE — 36415 COLL VENOUS BLD VENIPUNCTURE: CPT | Performed by: INTERNAL MEDICINE

## 2024-01-04 PROCEDURE — 250N000013 HC RX MED GY IP 250 OP 250 PS 637: Performed by: INTERNAL MEDICINE

## 2024-01-04 PROCEDURE — 250N000013 HC RX MED GY IP 250 OP 250 PS 637: Performed by: PHYSICIAN ASSISTANT

## 2024-01-04 PROCEDURE — 97535 SELF CARE MNGMENT TRAINING: CPT | Mod: GO | Performed by: OCCUPATIONAL THERAPIST

## 2024-01-04 PROCEDURE — 84100 ASSAY OF PHOSPHORUS: CPT | Performed by: INTERNAL MEDICINE

## 2024-01-04 PROCEDURE — 97530 THERAPEUTIC ACTIVITIES: CPT | Mod: GP | Performed by: PHYSICAL THERAPIST

## 2024-01-04 PROCEDURE — 80053 COMPREHEN METABOLIC PANEL: CPT | Performed by: INTERNAL MEDICINE

## 2024-01-04 RX ADMIN — ACETAMINOPHEN 650 MG: 325 TABLET, FILM COATED ORAL at 17:31

## 2024-01-04 RX ADMIN — ACETAMINOPHEN 650 MG: 325 TABLET, FILM COATED ORAL at 00:44

## 2024-01-04 RX ADMIN — POTASSIUM & SODIUM PHOSPHATES POWDER PACK 280-160-250 MG 1 PACKET: 280-160-250 PACK at 17:31

## 2024-01-04 RX ADMIN — AMLODIPINE BESYLATE 5 MG: 5 TABLET ORAL at 21:19

## 2024-01-04 RX ADMIN — POTASSIUM & SODIUM PHOSPHATES POWDER PACK 280-160-250 MG 1 PACKET: 280-160-250 PACK at 11:17

## 2024-01-04 RX ADMIN — POTASSIUM & SODIUM PHOSPHATES POWDER PACK 280-160-250 MG 1 PACKET: 280-160-250 PACK at 13:35

## 2024-01-04 RX ADMIN — PANTOPRAZOLE SODIUM 40 MG: 40 TABLET, DELAYED RELEASE ORAL at 08:11

## 2024-01-04 RX ADMIN — ACETAMINOPHEN 650 MG: 325 TABLET, FILM COATED ORAL at 23:15

## 2024-01-04 RX ADMIN — ACETAMINOPHEN 650 MG: 325 TABLET, FILM COATED ORAL at 11:17

## 2024-01-04 RX ADMIN — ACETAMINOPHEN 650 MG: 325 TABLET, FILM COATED ORAL at 06:51

## 2024-01-04 ASSESSMENT — ACTIVITIES OF DAILY LIVING (ADL)
ADLS_ACUITY_SCORE: 52
ADLS_ACUITY_SCORE: 52
ADLS_ACUITY_SCORE: 48
ADLS_ACUITY_SCORE: 52
ADLS_ACUITY_SCORE: 49
ADLS_ACUITY_SCORE: 52
ADLS_ACUITY_SCORE: 48
ADLS_ACUITY_SCORE: 52

## 2024-01-04 NOTE — PROGRESS NOTES
A/O x4, forgetful. VSS. On RA. Tele. PIV SL. Was up in the chair for dinner. Transfer with 2 assist/Coco steady/GB. Denies SOB and nausea. No other concern @ the moment, plan of care on-going.

## 2024-01-04 NOTE — PROGRESS NOTES
Pt A&O x3, forgetful, up x2 with alondra steady. Up in chair for meals. Uses bedside commode. VSS.RA. NS infusing. Tele: NSR. L hip pain managed with Oxy and Tyl, Mepilex on coccyx area. Mg and P replace, will recheck tomorrow. Echo done today.

## 2024-01-04 NOTE — PROGRESS NOTES
Pt A&O x3, forgetful, up x2 with alondra steady, up in chair for meals and to the BR. VSS.RA. L hip pain managed with Tyl. BM x3. P replace x1. TCU placement pending.

## 2024-01-04 NOTE — PROGRESS NOTES
[facility]    Medicine Progress Note - Hospitalist Service    Date of Admission:  1/2/2024    Assessment & Plan   84 yo female with history of HTN, HLP, GERD, Hiatal hernia, MDD with anxiety, Neuropathy, Hypercalcemia, Known pulmonary nodule, Celiac disease who presented after a fall with immobilization and found to have lactic acidosis, troponin elevation.     Mechanical fall  Acute, mild rhabdomyolysis, resolved  - PT consult requested.  Transitional care unit recommended.  -Stopped IV fluids as tolerating regular diet     Hypercalcemia  Hyponatremia, acute and mild  - monitor trend     Troponin elevation  Tachycardia  Suspect demand ischemia secondary to fall and development of rhabdo.  - troponin trend flat  - TTE with EF 60-65%, no WMA, valves ok.      Transaminitis, improved  Gallbladder sludge noted on Abd US.  - Avoiding APAP due to elevated LFTs.       Lactic acidosis, resolved     Right nare/nasal lesion  Patient unable to provide much information regarding this lesion but stated she has been blowing her nose a lot.    - Follow up with PCP      Previous/healing left rib fractures  - PRN oxycodone as above.       Hypertension  - Resumed on PTA amlodipine 5 mg/d.  Hold parameters in place.    - PRN IV hydralazine 10 mg every 4 hours for SBP GREATER THAN 180.       Dyslipidemia  *Noted on chart review.  Not currently on any medications.       GERD  Hiatal hernia  - Resumed PTA Protonix 40 mg/d.       Major depressive disorder with anxiety  Noted on chart review.  Not currently on any medications.       Neuropathy  Noted on chart review.  Not currently on any medications.       Known pulmonary nodule  - Follow up with PCP.     Celiac disease  - Modified diet ordered.            Diet: Combination Diet Regular Diet Adult, Gluten Free Diet    DVT Prophylaxis: Pneumatic Compression Devices and Ambulate every shift  Lombardi Catheter: Not present  Lines: None     Cardiac Monitoring: ACTIVE  order. Indication: Tachyarrhythmias, acute (48 hours)  Code Status: Full Code      Clinically Significant Risk Factors            # Hypomagnesemia: Lowest Mg = 1.5 mg/dL in last 2 days, will replace as needed   # Hypoalbuminemia: Lowest albumin = 3.2 g/dL at 1/3/2024  7:36 AM, will monitor as appropriate     # Hypertension: Noted on problem list            # Financial/Environmental Concerns: none         Disposition Plan      Expected Discharge Date: 01/05/2024      Destination: other (comment) (TCU)        Medically stable for TCU discharge when bed available.       Marti Hernadez MD  Hospitalist Service  [facility]  Securely message with Get[employer] (more info)  Text page via [employer] Paging/Directory   ______________________________________________________________________    Interval History     No new complaints.  Minimal appetite but she still is trying to eat to keep up strength.  Agreeable to transitional care unit placement.    Physical Exam   Vital Signs: Temp: 97  F (36.1  C) Temp src: Oral BP: 115/67 Pulse: 83   Resp: 16 SpO2: 96 % O2 Device: None (Room air)    Weight: 113 lbs 5.06 oz    General Appearance:  Sitting up in chair, NAD, pleasant and cooperative  HEENT: +alopecia, mouth dry, nare with dried blood  Respiratory: non-labored, no wheezing or cough  Cardiovascular: regular, no murmur  GI: soft, nontender  Skin: frail, no jaundice or acute rashes      Medical Decision Making       MANAGEMENT DISCUSSED with the following over the past 24 hours: Bedside nurse, charge nurse, patient   NOTE(S)/MEDICAL RECORDS REVIEWED over the past 24 hours: Social work notes, care coordination, therapy notes  Tests ORDERED & REVIEWED in the past 24 hours:  - See lab/imaging results included in the data section of the note      Data     I have personally reviewed the following data over the past 24 hrs:    N/A  \   N/A   / N/A     133 (L) 97 (L) 9.4 /  104 (H)   3.6 32 (H) 0.53 \     ALT: 47 AST:  53 (H) AP: 173 (H) TBILI: 1.2   ALB: 3.3 (L) TOT PROTEIN: 5.9 (L) LIPASE: N/A

## 2024-01-04 NOTE — CONSULTS
Care Management Initial Consult    General Information  Assessment completed with: Patient,    Type of CM/SW Visit: Offer D/C Planning    Primary Care Provider verified and updated as needed: Yes   Readmission within the last 30 days: current reason for admission unrelated to previous admission   Return Category: New Diagnosis  Reason for Consult: discharge planning  Advance Care Planning:            Communication Assessment  Patient's communication style: spoken language (English or Bilingual)             Cognitive  Cognitive/Neuro/Behavioral: .WDL except  Level of Consciousness: alert  Arousal Level: opens eyes spontaneously  Orientation: disoriented to, time  Mood/Behavior: calm, cooperative  Best Language: 0 - No aphasia  Speech: clear    Living Environment:   People in home: child(domi), adult  Darius, son  Current living Arrangements: house      Able to return to prior arrangements: no       Family/Social Support:  Care provided by: self  Provides care for: no one  Marital Status:   Children          Description of Support System: Supportive, Involved         Current Resources:   Patient receiving home care services: No     Community Resources: None  Equipment currently used at home: walker, rolling  Supplies currently used at home: None    Employment/Financial:  Employment Status: retired        Financial Concerns: none           Does the patient's insurance plan have a 3 day qualifying hospital stay waiver?  No    Lifestyle & Psychosocial Needs:  Social Determinants of Health     Food Insecurity: Low Risk  (12/19/2023)    Food Insecurity     Within the past 12 months, did you worry that your food would run out before you got money to buy more?: No     Within the past 12 months, did the food you bought just not last and you didn t have money to get more?: No   Depression: At risk (9/7/2023)    PHQ-2     PHQ-2 Score: 3   Housing Stability: Low Risk  (12/19/2023)    Housing Stability     Do you have  housing? : Yes     Are you worried about losing your housing?: No   Tobacco Use: Medium Risk (11/28/2023)    Patient History     Smoking Tobacco Use: Never     Smokeless Tobacco Use: Unknown     Passive Exposure: Yes   Financial Resource Strain: Low Risk  (12/19/2023)    Financial Resource Strain     Within the past 12 months, have you or your family members you live with been unable to get utilities (heat, electricity) when it was really needed?: No   Alcohol Use: Not on file   Transportation Needs: Low Risk  (12/19/2023)    Transportation Needs     Within the past 12 months, has lack of transportation kept you from medical appointments, getting your medicines, non-medical meetings or appointments, work, or from getting things that you need?: No   Physical Activity: Not on file   Interpersonal Safety: Not on file   Stress: Not on file   Social Connections: Not on file       Functional Status:  Prior to admission patient needed assistance:   Dependent ADLs:: Independent, Ambulation-walker  Dependent IADLs:: Transportation, Laundry, Shopping, Cooking, Cleaning  Assesssment of Functional Status: Not at baseline with mobility, Not at baseline with ADL Functioning    Mental Health Status:  Mental Health Status: No Current Concerns       Chemical Dependency Status:                Values/Beliefs:  Spiritual, Cultural Beliefs, Mandaen Practices, Values that affect care: no       Cultural/Mandaen Practices Patient Routinely Participates In: prayer       Additional Information:  Writer met with pt in room for discharge planning. Introduced self and role and therapy recommending TCU. Pt states she's been to [facility] before and prefer that as her #1 choice. Writer printed off TCU options close her home with pt listing other TCU preferences as [facility], [facility], and [facility]. SW updated with pt's choices.    Pt states she lives in a house with her son, Darius who works full time at [employer] in [facility]. He  assists her with laundry, cooking and cleaning. Her daughter Liv ([phone]) lives in [place], [place] and doesn't work. She assists her mother also with duties and transportation. Pt doesn't own a car. She states she has a FWW and a seated walker at her disposal that she uses for longer distances. NICLOE/ROSS will continue to follow for further safe discharge planning.      Piter Javed RN  [facility]  Inpatient Care Management - Ortho Spine  CM RN Mobile: [phone] daily 7:30-4:00

## 2024-01-04 NOTE — PROGRESS NOTES
Slept well overnight. A&Ox4 but forgetful. Tylenol given x1 for pain control. Ambulated to BSC x2 with alondra steady. BM x1. Continent. VSS on room air. Recheck phos and mag today. Tolerating gluten free diet. PT recommending TCU, needs placement if that is the plan.

## 2024-01-05 ENCOUNTER — LAB REQUISITION (OUTPATIENT)
Dept: LAB | Facility: CLINIC | Age: 84
End: 2024-01-05
Payer: MEDICARE

## 2024-01-05 VITALS
BODY MASS INDEX: 18.21 KG/M2 | OXYGEN SATURATION: 97 % | HEART RATE: 92 BPM | DIASTOLIC BLOOD PRESSURE: 72 MMHG | SYSTOLIC BLOOD PRESSURE: 115 MMHG | RESPIRATION RATE: 16 BRPM | WEIGHT: 113.32 LBS | TEMPERATURE: 98 F | HEIGHT: 66 IN

## 2024-01-05 DIAGNOSIS — Z11.1 ENCOUNTER FOR SCREENING FOR RESPIRATORY TUBERCULOSIS: ICD-10-CM

## 2024-01-05 LAB
MAGNESIUM SERPL-MCNC: 1.5 MG/DL (ref 1.7–2.3)
PHOSPHATE SERPL-MCNC: 2.3 MG/DL (ref 2.5–4.5)
POTASSIUM SERPL-SCNC: 3.3 MMOL/L (ref 3.4–5.3)

## 2024-01-05 PROCEDURE — 250N000013 HC RX MED GY IP 250 OP 250 PS 637: Performed by: INTERNAL MEDICINE

## 2024-01-05 PROCEDURE — 84100 ASSAY OF PHOSPHORUS: CPT | Performed by: INTERNAL MEDICINE

## 2024-01-05 PROCEDURE — 99239 HOSP IP/OBS DSCHRG MGMT >30: CPT | Performed by: INTERNAL MEDICINE

## 2024-01-05 PROCEDURE — 83735 ASSAY OF MAGNESIUM: CPT | Performed by: INTERNAL MEDICINE

## 2024-01-05 PROCEDURE — 36415 COLL VENOUS BLD VENIPUNCTURE: CPT | Performed by: INTERNAL MEDICINE

## 2024-01-05 PROCEDURE — 250N000011 HC RX IP 250 OP 636: Performed by: INTERNAL MEDICINE

## 2024-01-05 PROCEDURE — 250N000013 HC RX MED GY IP 250 OP 250 PS 637: Performed by: PHYSICIAN ASSISTANT

## 2024-01-05 PROCEDURE — 84132 ASSAY OF SERUM POTASSIUM: CPT | Performed by: INTERNAL MEDICINE

## 2024-01-05 RX ORDER — POTASSIUM CHLORIDE 1.5 G/1.58G
40 POWDER, FOR SOLUTION ORAL ONCE
Status: COMPLETED | OUTPATIENT
Start: 2024-01-05 | End: 2024-01-05

## 2024-01-05 RX ORDER — MAGNESIUM SULFATE HEPTAHYDRATE 40 MG/ML
4 INJECTION, SOLUTION INTRAVENOUS ONCE
Status: COMPLETED | OUTPATIENT
Start: 2024-01-05 | End: 2024-01-05

## 2024-01-05 RX ADMIN — MAGNESIUM SULFATE IN WATER 4 G: 40 INJECTION, SOLUTION INTRAVENOUS at 12:19

## 2024-01-05 RX ADMIN — POTASSIUM & SODIUM PHOSPHATES POWDER PACK 280-160-250 MG 1 PACKET: 280-160-250 PACK at 11:21

## 2024-01-05 RX ADMIN — POTASSIUM CHLORIDE 40 MEQ: 1.5 POWDER, FOR SOLUTION ORAL at 11:21

## 2024-01-05 RX ADMIN — PANTOPRAZOLE SODIUM 40 MG: 40 TABLET, DELAYED RELEASE ORAL at 09:07

## 2024-01-05 ASSESSMENT — ACTIVITIES OF DAILY LIVING (ADL)
ADLS_ACUITY_SCORE: 49

## 2024-01-05 NOTE — PROGRESS NOTES
A/OX4,  but forgetful at times, Assist x2 with  aolndra steady, VSS on RA, tolerating gluten free diet, takes pills whole with thin liquids, Denies N/V/SOB/CP, pain managed with PRN tylenol. PT recommending TCU.

## 2024-01-05 NOTE — PLAN OF CARE
Physical Therapy Discharge Summary    Reason for therapy discharge:    Discharged to transitional care facility.    Progress towards therapy goal(s). See goals on Care Plan in [facility] electronic health record for goal details.  Goals not met.     Therapy recommendation(s):    Continued therapy is recommended.  Rationale/Recommendations:  Pt progressing but below baseline and requires Mod A x 1 to stand with FWW, unable to ambulate d/t LE weakness and fear of falling; dependent for toileting. Will need to TCU to increase strength and functional mobility. Pt. is alone at home for periods of the day..

## 2024-01-05 NOTE — DISCHARGE SUMMARY
[facility]  Hospitalist Discharge Summary      Date of Admission:  1/2/2024  Date of Discharge:  1/5/2024  Discharging Provider: Marti Hernadez MD  Discharge Service: Hospitalist Service    Discharge Diagnoses   See below     Clinically Significant Risk Factors          Follow-ups Needed After Discharge   Follow-up Appointments     Follow Up and recommended labs and tests      Follow up with Nursing home physician.  No follow up labs or test are   needed.            Unresulted Labs Ordered in the Past 30 Days of this Admission       Date and Time Order Name Status Description    1/2/2024  1:15 PM Blood Culture Peripheral Blood Preliminary     1/2/2024  1:15 PM Blood Culture Peripheral Blood Preliminary         These results will be followed up by myself or my group    Discharge Disposition   Discharged to short-term care facility  Condition at discharge: Stable    Hospital Course   This is a delightful 84 yo female with history of HTN, HLP, GERD, Hiatal hernia, MDD with anxiety, Neuropathy, Hypercalcemia, Known pulmonary nodule, Celiac disease who presented after a fall with immobilization and found to have rhabdomyolysis, lactic acidosis, troponin elevation.      Mechanical fall  Acute, mild rhabdomyolysis, resolved  Lactic acidosis, resolved  - PT consult requested.  Transitional care unit recommended.  -Stopped IV fluids as tolerating regular diet     Hypercalcemia  Hyponatremia, acute and mild  - monitor trend     Troponin elevation  Tachycardia  Suspect demand ischemia secondary to fall and development of rhabdo.  - troponin trend flat  - TTE with EF 60-65%, no WMA, valves ok.      Transaminitis, improved  Gallbladder sludge noted on Abd US.  - Avoiding APAP due to elevated LFTs.       Lactic acidosis, resolved     Right nare/nasal lesion  Patient unable to provide much information regarding this lesion but stated she has been blowing her nose a lot.    - Follow up with PCP       Previous/healing left rib fractures  - PRN oxycodone as above.       Hypertension  - Resumed on PTA amlodipine 5 mg/d.  Hold parameters in place.    - PRN IV hydralazine 10 mg every 4 hours for SBP GREATER THAN 180.       Dyslipidemia  *Noted on chart review.  Not currently on any medications.       GERD  Hiatal hernia  - Resumed PTA Protonix 40 mg/d.       Major depressive disorder with anxiety  Noted on chart review.  Not currently on any medications.       Neuropathy  Noted on chart review.  Not currently on any medications.       Known pulmonary nodule  - Follow up with PCP.     Celiac disease  - Modified diet ordered.    Consultations This Hospital Stay   PHYSICAL THERAPY ADULT IP CONSULT  OCCUPATIONAL THERAPY ADULT IP CONSULT  CARE MANAGEMENT / SOCIAL WORK IP CONSULT  CARE MANAGEMENT / SOCIAL WORK IP CONSULT  PHYSICAL THERAPY ADULT IP CONSULT  OCCUPATIONAL THERAPY ADULT IP CONSULT    Code Status   Full Code    Time Spent on this Encounter   I, Marti Hernadez MD, personally saw the patient today and spent greater than 30 minutes discharging this patient.       Marti Hernadez MD  [facility]  [address] [place] [id number]  Phone: [phone]  Fax: [phone]  ______________________________________________________________________    Physical Exam   Vital Signs: Temp: 98  F (36.7  C) Temp src: Oral BP: (!) 153/84 Pulse: 86   Resp: 16 SpO2: 95 % O2 Device: None (Room air)    Weight: 113 lbs 5.06 oz         Primary Care Physician   Chema Ramos MD    Discharge Orders      General info for SNF    Length of Stay Estimate: Short Term Care: Estimated # of Days <30  Condition at Discharge: Improving  Level of care:skilled   Rehabilitation Potential: Excellent  Admission H&P remains valid and up-to-date: Yes  Recent Chemotherapy: N/A  Use Nursing Home Standing Orders: Yes     Mantoux instructions    Give two-step Mantoux (PPD) Per Facility Policy Yes     Reason for  your hospital stay    Fall, rhabdomyolysis     Activity - Up with assistive device     Follow Up and recommended labs and tests    Follow up with Nursing home physician.  No follow up labs or test are needed.     Full Code     Physical Therapy Adult Consult    Evaluate and treat as clinically indicated.    Reason:   fall, physical deconditioning     Occupational Therapy Adult Consult    Evaluate and treat as clinically indicated.    Reason:  fall, physical deconditioning     Fall precautions     Diet    Follow this diet upon discharge:       Regular Diet Adult, Gluten Free Diet       Significant Results and Procedures   See Epic    Discharge Medications   Current Discharge Medication List        CONTINUE these medications which have NOT CHANGED    Details   acetaminophen (TYLENOL) 325 MG tablet Take 2 tablets (650 mg) by mouth every 4 hours as needed for mild pain or other (and adjunct with moderate or severe pain or per patient request)    Associated Diagnoses: Closed fracture of multiple ribs of left side, initial encounter      amLODIPine (NORVASC) 5 MG tablet Take 1 tablet (5 mg) by mouth every evening  Qty: 90 tablet, Refills: 3    Associated Diagnoses: Essential hypertension      Lidocaine (LIDOCARE) 4 % Patch Place 1 patch onto the skin every 24 hours To prevent lidocaine toxicity, patient should be patch free for 12 hrs daily.  Qty: 30 patch, Refills: 0    Associated Diagnoses: Closed fracture of multiple ribs of left side, initial encounter      pantoprazole (PROTONIX) 40 MG EC tablet Take 1 tablet (40 mg) by mouth daily  Qty: 30 tablet, Refills: 1    Associated Diagnoses: Gastritis without bleeding, unspecified chronicity, unspecified gastritis type      vitamin D3 (CHOLECALCIFEROL) 50 mcg (2000 units) tablet Take 1 tablet (50 mcg) by mouth daily  Qty: 30 tablet    Associated Diagnoses: Closed fracture of neck of right femur, initial encounter (H)           Allergies   Allergies   Allergen Reactions     Amoxicillin Diarrhea    Amoxicillin-Pot Clavulanate     Ciprofloxacin     Gluten Meal      celiac

## 2024-01-05 NOTE — PROGRESS NOTES
Occupational Therapy Discharge Summary    Reason for therapy discharge:    Discharged to transitional care facility.    Progress towards therapy goal(s). See goals on Care Plan in [facility] electronic health record for goal details.  Goals partially met.  Barriers to achieving goals:   discharge from facility.    Therapy recommendation(s):    Continued therapy is recommended.  Rationale/Recommendations:  OT at TCU to increase ADL and functional mobility, independence, and safety to PLOF.

## 2024-01-05 NOTE — PROGRESS NOTES
Hospitalist Interval Note  1/5/2024    Medically stable for TCU discharge. Orders are ready awaiting acceptance to facility.     Marti Hernadez MD

## 2024-01-05 NOTE — PROGRESS NOTES
Care Management Discharge Note    Discharge Date: 01/05/2024       Discharge Disposition:      Discharge Services:      Discharge DME: None    Discharge Transportation: health plan transportation    Private pay costs discussed: private room/amenity fees and transportation costs    Does the patient's insurance plan have a 3 day qualifying hospital stay waiver?  No    PAS Confirmation Code: [id number]  Patient/family educated on Medicare website which has current facility and service quality ratings: yes    Education Provided on the Discharge Plan:  (TCUs in pt's address given, options chosen by patient)  Persons Notified of Discharge Plans: Patient, Daughter, TCU, and [facility] nursing staff  Patient/Family in Agreement with the Plan: yes    Handoff Referral Completed: No    Additional Information:  Writer received a call from Letty at [facility] stating they can accept the patient today as early as 1300, but agreed to have patient discharged at 1230 to arrive there by 1300.    Writer paged Dr. Peguero who stated the patient is ready and will place the orders in.     Writer updated the patient who stated that it was ok to move forward with the facility. Patient called their daughter Liv for transportation. However, writer noted the patient is an assist of 2 with a [employer]. Daughter requested to have the patient transported with [facility]. Writer provided the following information regarding [facility];     Depending on the patient's assist level or whether or not family/friends are physically able to ambulate the patient, would determine if the patient is safe to be transported by family. Otherwise, the writer would set up transportation with [facility]. There are 2 types of methods for transporation depending on what the patient is able to manage. A W/C ride is an 85-90$ flat fee with about $6 dollars per mile added. A Stretcher ride is about 1025$ with $25 added  per mile. The writer will  completed a PCS form but still cannot guarantee full coverage on the ride     Writer called [facility] and set up a W/C ride with Max between [phone]. Writer updated the patient, daughter, TCU, and [facility] nursing staff.     Writer completed the PAS and faxed it to [facility] [phone]  PAS-RR    D: Per DHS regulation, SW completed and submitted PAS-RR to [place] [employer] on [employer]ging Direct Connect via the Senior LinkAge Line.  PAS-RR confirmation # is : [id number]     I: SW spoke with Patient and they are aware a PAS-RR has been submitted.  SW reviewed with Patient that they may be contacted for a follow up appointment within 10 days of hospital discharge if their SNF stay is < 30 days.  Contact information for [facility] LinkAge Line was also provided.    A: Patient verbalized understanding.    P: Further questions may be directed to [facility] LinkAge Line at #[phone], option #4 for PAS-RR staff.    Writer sent over orders.      Patient will discharge from [facility] to [facility] with  [employer] W/C at [phone].          RAYSA Almendarez  [facility]  Social Work

## 2024-01-07 LAB
BACTERIA BLD CULT: NO GROWTH
BACTERIA BLD CULT: NO GROWTH

## 2024-01-08 ENCOUNTER — PATIENT OUTREACH (OUTPATIENT)
Dept: CARE COORDINATION | Facility: CLINIC | Age: 84
End: 2024-01-08
Payer: COMMERCIAL

## 2024-01-08 ENCOUNTER — TRANSITIONAL CARE UNIT VISIT (OUTPATIENT)
Dept: GERIATRICS | Facility: CLINIC | Age: 84
End: 2024-01-08
Payer: COMMERCIAL

## 2024-01-08 VITALS
HEART RATE: 76 BPM | BODY MASS INDEX: 18.01 KG/M2 | SYSTOLIC BLOOD PRESSURE: 111 MMHG | DIASTOLIC BLOOD PRESSURE: 66 MMHG | WEIGHT: 111.6 LBS | TEMPERATURE: 98 F | RESPIRATION RATE: 18 BRPM | OXYGEN SATURATION: 94 %

## 2024-01-08 DIAGNOSIS — I10 ESSENTIAL HYPERTENSION, BENIGN: ICD-10-CM

## 2024-01-08 DIAGNOSIS — E22.2 SIADH (SYNDROME OF INAPPROPRIATE ADH PRODUCTION) (H): ICD-10-CM
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
## 2024-01-08 DIAGNOSIS — K86.1 CHRONIC PANCREATITIS, UNSPECIFIED PANCREATITIS TYPE (H): ICD-10-CM

## 2024-01-08 DIAGNOSIS — R53.81 PHYSICAL DECONDITIONING: ICD-10-CM

## 2024-01-08 DIAGNOSIS — F03.B0 MODERATE DEMENTIA WITHOUT BEHAVIORAL DISTURBANCE, PSYCHOTIC DISTURBANCE, MOOD DISTURBANCE, OR ANXIETY, UNSPECIFIED DEMENTIA TYPE (H): ICD-10-CM

## 2024-01-08 PROCEDURE — 99310 SBSQ NF CARE HIGH MDM 45: CPT | Performed by: NURSE PRACTITIONER

## 2024-01-08 NOTE — LETTER
1/8/2024        RE: Anna Marie Babb  [address]  [place] [address]        [facility] GERIATRICS    PRIMARY CARE PROVIDER AND CLINIC:  [place], [id number] / [place] [address]  Chief Complaint   Patient presents with     Hospital F/U      [place] Medical Record Number:  [id number]  Place of Service where encounter took place:  [facility] (TCU) [[id number]]    Anna Marie Babb  is a 83 year old  (1940) female with a medical history of HTN, HLP, GERD, Hiatal hernia, MDD with anxiety, Neuropathy, Hypercalcemia, Known pulmonary nodule. She presented to the ED on 1/2/24 after a fall and being down at home. She had rhabdomyolysis and electrolyte abnormalities.  Hospital stay was uneventful.  She was admitted to the above facility from  [facility]. Hospital stay 1/2/24 through 1/5/24.     Of note, Anna Marie was admitted at [facility] from 11/29 - 12/6, 2023 after presenting with a multiday history of left upper quadrant abdominal pain.  Workup in the emergency department included a CT abdomen and pelvis which revealed a new pancreatic ductal dilatation, acute/subacute L2 compression fracture, and T9 compression fracture which was favored to be chronic.  Chest x-ray indicated fractures of left ribs 7-10. She went to [facility] for a short TCU stay then discharged back home.     Kailey was visited today while in her room while resting in the recliner.  She reports that she lives in her home with her son.  He currently works full-time at [employer] but he is available to help her should she need it while home alone.  She does not recall why she is in the transitional care unit.  She denies pain, shortness of breath or palpitations.  She is urinating without difficulty and having regular bowel movements.  She slept well last night.  Staff does not have any concerns today.    CODE STATUS/ADVANCE DIRECTIVES DISCUSSION:  Full  Code    ALLERGIES:   Allergies   Allergen Reactions     Amoxicillin Diarrhea     Amoxicillin-Pot Clavulanate      Ciprofloxacin      Gluten Meal      celiac      PAST MEDICAL HISTORY:   Past Medical History:   Diagnosis Date     Atrophic vaginitis      Celiac disease      Cystocele      Disorder of bone and cartilage, unspecified     osteopenia     Endometriosis, site unspecified      Essential hypertension, benign      Gastro-oesophageal reflux disease      Hiatal hernia     3 cm sliding hiatal hernia     Hypercalcemia      Hyperlipidemia      HZV (herpes zoster virus) post herpetic neuralgia      Microscopic colitis      Nonspecific abnormal results of liver function study     Mildly elevated transaminases, liver biopsy WNL     Peripheral neuropathy      Pulmonary nodule      Zinc deficiency       PAST SURGICAL HISTORY:   has a past surgical history that includes TOTAL ABDOM HYSTERECTOMY (1982); REMOVAL OF OVARIAN CYST(S) (1977); DILATION/CURETTAGE DIAG/THER NON OB; NONSPECIFIC PROCEDURE; NONSPECIFIC PROCEDURE; Phacoemulsification clear cornea with standard intraocular lens implant (Left, 8/13/2015); colonoscopy (2011); Phacoemulsification clear cornea with standard intraocular lens implant (Right, 8/25/2015); Closed reduction, percutaneous pinning hip (Right, 8/8/2023); Esophagoscopy, gastroscopy, duodenoscopy (EGD), combined (N/A, 12/3/2023); and Endoscopic ultrasound upper gastrointestinal tract (GI) (N/A, 12/3/2023).  FAMILY HISTORY: family history includes Cancer in her maternal grandmother and mother; Cerebrovascular Disease in her father.  SOCIAL HISTORY:   reports that she has never smoked. She has been exposed to tobacco smoke. She does not have any smokeless tobacco history on file. She reports current alcohol use. She reports that she does not use drugs.  Patient's living condition: lives with family, son     Post Discharge Medication Reconciliation Status:   MED REC REQUIRED  Post Medication  Reconciliation Status: discharge medications reconciled, continue medications without change       Current Outpatient Medications   Medication Sig     acetaminophen (TYLENOL) 325 MG tablet Take 2 tablets (650 mg) by mouth every 4 hours as needed for mild pain or other (and adjunct with moderate or severe pain or per patient request)     amLODIPine (NORVASC) 5 MG tablet Take 1 tablet (5 mg) by mouth every evening     Lidocaine (LIDOCARE) 4 % Patch Place 1 patch onto the skin every 24 hours To prevent lidocaine toxicity, patient should be patch free for 12 hrs daily.     pantoprazole (PROTONIX) 40 MG EC tablet Take 1 tablet (40 mg) by mouth daily     vitamin D3 (CHOLECALCIFEROL) 50 mcg (2000 units) tablet Take 1 tablet (50 mcg) by mouth daily     No current facility-administered medications for this visit.       ROS:  Limited secondary to cognitive impairment but today pt reports as noted in ROS    Vitals:  /66   Pulse 76   Temp 98  F (36.7  C)   Resp 18   Wt 50.6 kg (111 lb 9.6 oz)   LMP  (LMP Unknown)   SpO2 94%   BMI 18.01 kg/m    Exam:  GENERAL APPEARANCE:  Alert, in no distress, pleasant, cooperative  EYES: no discharge or mattering on lids or lashes noted  ENT:  moist mucous membranes, hearing acuity intact  NECK: supple, symmetrical  RESP: no respiratory distress, Lung sounds clear, patient is on room air  CV:  rate and rhythm regular, no murmur. Edema none in bilateral lower extremities. VASCULAR: warm extremities without open areas.  ABDOMEN: normal bowel sounds, soft, nontender.  M/S:   Gait and station: currently in recliner, will await therapy eval to determine status, no tenderness or swelling of the joints; able to move all extremities   SKIN:  Inspection and palpation of skin and subcutaneous tissue: skin warm, dry and intact without rashes  NEURO: no facial asymmetry, no speech deficits and able to follow directions, moves all extremities symmetrically  PSYCH:  insight, judgement, and  memory impaired affect and mood normal    Lab/Diagnostic data:  CBC RESULTS:   Recent Labs   Lab Test 01/03/24  0736 01/02/24  1249   WBC 7.5 10.1   RBC 2.95* 3.47*   HGB 10.4* 12.5   HCT 30.0* 35.5   * 102*   MCH 35.3* 36.0*   MCHC 34.7 35.2   RDW 14.0 13.7    280       Last Basic Metabolic Panel:  Recent Labs   Lab Test 01/10/24  0834 01/05/24  0823 01/04/24  0735   *  --  133*   POTASSIUM 5.1 3.3* 3.6   CHLORIDE 95*  --  97*   KARTHIKEYAN 10.3*  --  9.2   CO2 31*  --  32*   BUN 5.7*  --  9.4   CR 0.56  --  0.53   GLC 83  --  104*       Liver Function Studies -   Recent Labs   Lab Test 01/04/24  0735 01/03/24  0736   PROTTOTAL 5.9* 5.9*   ALBUMIN 3.3* 3.2*   BILITOTAL 1.2 1.4*   ALKPHOS 173* 155*   AST 53* 47*   ALT 47 40       TSH   Date Value Ref Range Status   08/12/2023 2.83 0.30 - 4.20 uIU/mL Final   11/02/2004 1.86 0.4 - 5.0 mU/L Final     ASSESSMENT/PLAN:  Fall  Developed rhabdomylisis. Does appear deconditioned so will need therapies for strengthening. Will need to assess her care level as she may benefit from moving into LTC vs AL for more support.   --ongoing therapies for strenthening.      Pancreatic duct dilatation  Chronic pancreatitis  Hospitalized in November for abdominal pain. CT A/P with new pancreatic ductal dilatation, LFTs with mildly elevated alk phos otherwise within normal limits.  Lipase was normal.  MRCP with pancreatic atrophy and moderate inflammation consistent with chronic pancreatitis, no obstructing stone.  Patient underwent EGD which indicated gastritis, endoscopic ultrasound revealed pancreatic atrophy and prominent pancreatic duct and minimally dilated biliary system without obvious mass.  --Continues on soft diet as recommended by GI  --Continue Protonix     Hyponatremia, chronic  Sodium values ranging 133-135.  --redraw Na on 1/10/24     Hypertension  Chronic and stable  -Continues on amlodipine 5 mg daily     Pulmonary nodule  CT chest revealed pulmonary nodule in  the left lower lobe, previously noted on imaging going back to at least 2012.     Dementia  Slums 15/30 while at [facility] September 2023.  She was at the TCU in December 2023 and unfortunately left before they could get any cognitive scores on her.  Concerned that her scores are even lower than 15 and question if she should be at home alone.   -- Will ask OT to get the CPT score on her ASAP    Physical deconditioning  Secondary to recent hospitalization and underlying medical conditions.   --ongoing PT/OT for strengthening.     Total time spent with patient visit at the skilled nursing facility was 45 min including patient visit and review of past records, collaborating with facility  and nursing staff.      Electronically signed by:  CHINMAY Drew CNP                  Sincerely,        CHINMAY Drew CNP

## 2024-01-08 NOTE — PROGRESS NOTES
Clinic Care Coordination Contact  Care Coordination Transition Communication    Clinical Data: Patient was hospitalized at [facility] from 1/2/24 to 1/5/24 with diagnosis of Abnormal LFTs      Assessment: Patient has transitioned to TCU/ARU for short term rehabilitation:    [facility]ility Name: [facility]  Transition Communication:  Notified facility of Primary Care- Care Coordination support via Epic fax.    Plan: Care Coordinator will await notification from facility staff informing of patient's discharge plans/needs. Care Coordinator will review chart and outreach to facility staff every 4 weeks and as needed.     Merry Wilcox RN, BSN, PHN Care Coordinator  [facility]   Phone: [phone]

## 2024-01-08 NOTE — LETTER
TCU Hand In    Patient name: Anna Marie Babb  PCP: Chema Ramos  PCP Care Coordinator's information (phone number/email): Merry Wilcox RN, BSN, PHN Care Coordinator  [facility] [place]   Phone: [phone]   Primary family contact: Liv Diane [phone]    Patient Care Team:  Chema Ramos MD as PCP - General (Family Medicine)  Melissa Fairbanks MD as Assigned PCP  Melissa Fairbanks MD as Assigned Pain Medication Provider  Merry Wilcox RN as Clinic Care Coordinator (Primary Care - CC)    Advanced Directive: N    Social History     Socioeconomic History    Marital status:      Spouse name: Not on file    Number of children: 2    Years of education: Not on file    Highest education level: Not on file   Occupational History     Employer: [employer]   Tobacco Use    Smoking status: Never     Passive exposure: Yes    Smokeless tobacco: Not on file    Tobacco comments:      smokes   Vaping Use    Vaping Use: Never used   Substance and Sexual Activity    Alcohol use: Yes     Comment: 3-4 glasses per week    Drug use: Never    Sexual activity: Yes     Partners: Male   Other Topics Concern    Not on file   Social History Narrative    Not on file     Social Determinants of Health     Financial Resource Strain: Low Risk  (12/19/2023)    Financial Resource Strain     Within the past 12 months, have you or your family members you live with been unable to get utilities (heat, electricity) when it was really needed?: No   Food Insecurity: Low Risk  (12/19/2023)    Food Insecurity     Within the past 12 months, did you worry that your food would run out before you got money to buy more?: No     Within the past 12 months, did the food you bought just not last and you didn t have money to get more?: No   Transportation Needs: Low Risk  (12/19/2023)    Transportation Needs     Within the past 12 months, has lack of transportation kept you from medical appointments, getting your medicines,  non-medical meetings or appointments, work, or from getting things that you need?: No   Physical Activity: Not on file   Stress: Not on file   Social Connections: Not on file   Interpersonal Safety: Not on file   Housing Stability: Low Risk  (12/19/2023)    Housing Stability     Do you have housing? : Yes     Are you worried about losing your housing?: No     STEVE score: Elevated    Please contact me with discharge planning info. I would like to be contacted with discharge date, level of care discharging to, and possible supports such as home care or county assistance.     Thank you, Merry Wilcox RN, BSN, PHN Care Coordinator  [place] [place]   Phone: [phone]

## 2024-01-08 NOTE — PROGRESS NOTES
[facility] GERIATRICS    PRIMARY CARE PROVIDER AND CLINIC:  [place], [id number] [address]  / [facility] [address]  Chief Complaint   Patient presents with    Hospital F/U      [facility] Medical Record Number:  [id number]  Place of Service where encounter took place:  [facility] (TCU) [[id number]]    Anna Marie Babb  is a 83 year old  (1940) female with a medical history of HTN, HLP, GERD, Hiatal hernia, MDD with anxiety, Neuropathy, Hypercalcemia, Known pulmonary nodule. She presented to the ED on 1/2/24 after a fall and being down at home. She had rhabdomyolysis and electrolyte abnormalities.  Hospital stay was uneventful.  She was admitted to the above facility from  [facility]. Hospital stay 1/2/24 through 1/5/24.     Of note, Anna Marie was admitted at [facility] from 11/29 - 12/6, 2023 after presenting with a multiday history of left upper quadrant abdominal pain.  Workup in the emergency department included a CT abdomen and pelvis which revealed a new pancreatic ductal dilatation, acute/subacute L2 compression fracture, and T9 compression fracture which was favored to be chronic.  Chest x-ray indicated fractures of left ribs 7-10. She went to [place] for a short TCU stay then discharged back home.     Kailey was visited today while in her room while resting in the recliner.  She reports that she lives in her home with her son.  He currently works full-time at [employer] but he is available to help her should she need it while home alone.  She does not recall why she is in the transitional care unit.  She denies pain, shortness of breath or palpitations.  She is urinating without difficulty and having regular bowel movements.  She slept well last night.  Staff does not have any concerns today.    CODE STATUS/ADVANCE DIRECTIVES DISCUSSION:  Full Code    ALLERGIES:   Allergies   Allergen Reactions    Amoxicillin Diarrhea     Amoxicillin-Pot Clavulanate     Ciprofloxacin     Gluten Meal      celiac      PAST MEDICAL HISTORY:   Past Medical History:   Diagnosis Date    Atrophic vaginitis     Celiac disease     Cystocele     Disorder of bone and cartilage, unspecified     osteopenia    Endometriosis, site unspecified     Essential hypertension, benign     Gastro-oesophageal reflux disease     Hiatal hernia     3 cm sliding hiatal hernia    Hypercalcemia     Hyperlipidemia     HZV (herpes zoster virus) post herpetic neuralgia     Microscopic colitis     Nonspecific abnormal results of liver function study     Mildly elevated transaminases, liver biopsy WNL    Peripheral neuropathy     Pulmonary nodule     Zinc deficiency       PAST SURGICAL HISTORY:   has a past surgical history that includes TOTAL ABDOM HYSTERECTOMY (1982); REMOVAL OF OVARIAN CYST(S) (1977); DILATION/CURETTAGE DIAG/THER NON OB; NONSPECIFIC PROCEDURE; NONSPECIFIC PROCEDURE; Phacoemulsification clear cornea with standard intraocular lens implant (Left, 8/13/2015); colonoscopy (2011); Phacoemulsification clear cornea with standard intraocular lens implant (Right, 8/25/2015); Closed reduction, percutaneous pinning hip (Right, 8/8/2023); Esophagoscopy, gastroscopy, duodenoscopy (EGD), combined (N/A, 12/3/2023); and Endoscopic ultrasound upper gastrointestinal tract (GI) (N/A, 12/3/2023).  FAMILY HISTORY: family history includes Cancer in her maternal grandmother and mother; Cerebrovascular Disease in her father.  SOCIAL HISTORY:   reports that she has never smoked. She has been exposed to tobacco smoke. She does not have any smokeless tobacco history on file. She reports current alcohol use. She reports that she does not use drugs.  Patient's living condition: lives with family, son     Post Discharge Medication Reconciliation Status:   MED REC REQUIRED  Post Medication Reconciliation Status: discharge medications reconciled, continue medications without change       Current  Outpatient Medications   Medication Sig    acetaminophen (TYLENOL) 325 MG tablet Take 2 tablets (650 mg) by mouth every 4 hours as needed for mild pain or other (and adjunct with moderate or severe pain or per patient request)    amLODIPine (NORVASC) 5 MG tablet Take 1 tablet (5 mg) by mouth every evening    Lidocaine (LIDOCARE) 4 % Patch Place 1 patch onto the skin every 24 hours To prevent lidocaine toxicity, patient should be patch free for 12 hrs daily.    pantoprazole (PROTONIX) 40 MG EC tablet Take 1 tablet (40 mg) by mouth daily    vitamin D3 (CHOLECALCIFEROL) 50 mcg (2000 units) tablet Take 1 tablet (50 mcg) by mouth daily     No current facility-administered medications for this visit.       ROS:  Limited secondary to cognitive impairment but today pt reports as noted in ROS    Vitals:  /66   Pulse 76   Temp 98  F (36.7  C)   Resp 18   Wt 50.6 kg (111 lb 9.6 oz)   LMP  (LMP Unknown)   SpO2 94%   BMI 18.01 kg/m    Exam:  GENERAL APPEARANCE:  Alert, in no distress, pleasant, cooperative  EYES: no discharge or mattering on lids or lashes noted  ENT:  moist mucous membranes, hearing acuity intact  NECK: supple, symmetrical  RESP: no respiratory distress, Lung sounds clear, patient is on room air  CV:  rate and rhythm regular, no murmur. Edema none in bilateral lower extremities. VASCULAR: warm extremities without open areas.  ABDOMEN: normal bowel sounds, soft, nontender.  M/S:   Gait and station: currently in recliner, will await therapy eval to determine status, no tenderness or swelling of the joints; able to move all extremities   SKIN:  Inspection and palpation of skin and subcutaneous tissue: skin warm, dry and intact without rashes  NEURO: no facial asymmetry, no speech deficits and able to follow directions, moves all extremities symmetrically  PSYCH:  insight, judgement, and memory impaired affect and mood normal    Lab/Diagnostic data:  CBC RESULTS:   Recent Labs   Lab Test 01/03/24  0736  01/02/24  1249   WBC 7.5 10.1   RBC 2.95* 3.47*   HGB 10.4* 12.5   HCT 30.0* 35.5   * 102*   MCH 35.3* 36.0*   MCHC 34.7 35.2   RDW 14.0 13.7    280       Last Basic Metabolic Panel:  Recent Labs   Lab Test 01/10/24  0834 01/05/24  0823 01/04/24  0735   *  --  133*   POTASSIUM 5.1 3.3* 3.6   CHLORIDE 95*  --  97*   KARTHIKEYAN 10.3*  --  9.2   CO2 31*  --  32*   BUN 5.7*  --  9.4   CR 0.56  --  0.53   GLC 83  --  104*       Liver Function Studies -   Recent Labs   Lab Test 01/04/24  0735 01/03/24  0736   PROTTOTAL 5.9* 5.9*   ALBUMIN 3.3* 3.2*   BILITOTAL 1.2 1.4*   ALKPHOS 173* 155*   AST 53* 47*   ALT 47 40       TSH   Date Value Ref Range Status   08/12/2023 2.83 0.30 - 4.20 uIU/mL Final   11/02/2004 1.86 0.4 - 5.0 mU/L Final     ASSESSMENT/PLAN:  Fall  Developed rhabdomylisis. Does appear deconditioned so will need therapies for strengthening. Will need to assess her care level as she may benefit from moving into LTC vs AL for more support.   --ongoing therapies for strenthening.      Pancreatic duct dilatation  Chronic pancreatitis  Hospitalized in November for abdominal pain. CT A/P with new pancreatic ductal dilatation, LFTs with mildly elevated alk phos otherwise within normal limits.  Lipase was normal.  MRCP with pancreatic atrophy and moderate inflammation consistent with chronic pancreatitis, no obstructing stone.  Patient underwent EGD which indicated gastritis, endoscopic ultrasound revealed pancreatic atrophy and prominent pancreatic duct and minimally dilated biliary system without obvious mass.  --Continues on soft diet as recommended by GI  --Continue Protonix     Hyponatremia, chronic  Sodium values ranging 133-135.  --redraw Na on 1/10/24     Hypertension  Chronic and stable  -Continues on amlodipine 5 mg daily     Pulmonary nodule  CT chest revealed pulmonary nodule in the left lower lobe, previously noted on imaging going back to at least 2012.     Dementia  Slums 15/30 while at  [facility] September 2023.  She was at the TCU in December 2023 and unfortunately left before they could get any cognitive scores on her.  Concerned that her scores are even lower than 15 and question if she should be at home alone.   -- Will ask OT to get the CPT score on her ASAP    Physical deconditioning  Secondary to recent hospitalization and underlying medical conditions.   --ongoing PT/OT for strengthening.     Total time spent with patient visit at the skilled nursing facility was 45 min including patient visit and review of past records, collaborating with facility  and nursing staff.      Electronically signed by:  CHINMAY Drew CNP

## 2024-01-09 ENCOUNTER — LAB REQUISITION (OUTPATIENT)
Dept: LAB | Facility: CLINIC | Age: 84
End: 2024-01-09
Payer: MEDICARE

## 2024-01-09 DIAGNOSIS — Z11.1 ENCOUNTER FOR SCREENING FOR RESPIRATORY TUBERCULOSIS: ICD-10-CM

## 2024-01-09 DIAGNOSIS — E78.5 HYPERLIPIDEMIA, UNSPECIFIED: ICD-10-CM

## 2024-01-09 DIAGNOSIS — E83.40 DISORDERS OF MAGNESIUM METABOLISM, UNSPECIFIED: ICD-10-CM

## 2024-01-10 PROBLEM — E22.2 SIADH (SYNDROME OF INAPPROPRIATE ADH PRODUCTION) (H): Status: ACTIVE | Noted: 2024-01-10

## 2024-01-10 PROBLEM — K86.1 CHRONIC PANCREATITIS, UNSPECIFIED PANCREATITIS TYPE (H): Status: ACTIVE | Noted: 2024-01-10

## 2024-01-10 LAB
ANION GAP SERPL CALCULATED.3IONS-SCNC: 8 MMOL/L (ref 7–15)
BUN SERPL-MCNC: 5.7 MG/DL (ref 8–23)
CALCIUM SERPL-MCNC: 10.3 MG/DL (ref 8.8–10.2)
CHLORIDE SERPL-SCNC: 95 MMOL/L (ref 98–107)
CREAT SERPL-MCNC: 0.56 MG/DL (ref 0.51–0.95)
DEPRECATED HCO3 PLAS-SCNC: 31 MMOL/L (ref 22–29)
EGFRCR SERPLBLD CKD-EPI 2021: 89 ML/MIN/1.73M2
GLUCOSE SERPL-MCNC: 83 MG/DL (ref 70–99)
MAGNESIUM SERPL-MCNC: 1.5 MG/DL (ref 1.7–2.3)
PHOSPHATE SERPL-MCNC: 3 MG/DL (ref 2.5–4.5)
POTASSIUM SERPL-SCNC: 5.1 MMOL/L (ref 3.4–5.3)
SODIUM SERPL-SCNC: 134 MMOL/L (ref 135–145)

## 2024-01-10 PROCEDURE — 83735 ASSAY OF MAGNESIUM: CPT | Mod: ORL | Performed by: INTERNAL MEDICINE

## 2024-01-10 PROCEDURE — 84100 ASSAY OF PHOSPHORUS: CPT | Mod: ORL | Performed by: INTERNAL MEDICINE

## 2024-01-10 PROCEDURE — P9604 ONE-WAY ALLOW PRORATED TRIP: HCPCS | Mod: ORL | Performed by: INTERNAL MEDICINE

## 2024-01-10 PROCEDURE — 36415 COLL VENOUS BLD VENIPUNCTURE: CPT | Mod: ORL | Performed by: INTERNAL MEDICINE

## 2024-01-10 PROCEDURE — 86481 TB AG RESPONSE T-CELL SUSP: CPT | Mod: ORL | Performed by: INTERNAL MEDICINE

## 2024-01-10 PROCEDURE — 80048 BASIC METABOLIC PNL TOTAL CA: CPT | Mod: ORL | Performed by: INTERNAL MEDICINE

## 2024-01-11 ENCOUNTER — TRANSITIONAL CARE UNIT VISIT (OUTPATIENT)
Dept: GERIATRICS | Facility: CLINIC | Age: 84
End: 2024-01-11
Payer: COMMERCIAL

## 2024-01-11 VITALS
RESPIRATION RATE: 18 BRPM | TEMPERATURE: 97.8 F | BODY MASS INDEX: 17.59 KG/M2 | WEIGHT: 109 LBS | OXYGEN SATURATION: 96 % | DIASTOLIC BLOOD PRESSURE: 73 MMHG | HEART RATE: 78 BPM | SYSTOLIC BLOOD PRESSURE: 131 MMHG

## 2024-01-11 DIAGNOSIS — E22.2 SIADH (SYNDROME OF INAPPROPRIATE ADH PRODUCTION) (H): ICD-10-CM

## 2024-01-11 DIAGNOSIS — R53.81 PHYSICAL DECONDITIONING: ICD-10-CM

## 2024-01-11 DIAGNOSIS — I10 ESSENTIAL HYPERTENSION, BENIGN: ICD-10-CM

## 2024-01-11 DIAGNOSIS — F03.B0 MODERATE DEMENTIA WITHOUT BEHAVIORAL DISTURBANCE, PSYCHOTIC DISTURBANCE, MOOD DISTURBANCE, OR ANXIETY, UNSPECIFIED DEMENTIA TYPE (H): ICD-10-CM
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
## 2024-01-11 DIAGNOSIS — K86.1 CHRONIC PANCREATITIS, UNSPECIFIED PANCREATITIS TYPE (H): ICD-10-CM

## 2024-01-11 LAB
GAMMA INTERFERON BACKGROUND BLD IA-ACNC: 0.06 IU/ML
M TB IFN-G BLD-IMP: NEGATIVE
M TB IFN-G CD4+ BCKGRND COR BLD-ACNC: 1.32 IU/ML
MITOGEN IGNF BCKGRD COR BLD-ACNC: 0.02 IU/ML
MITOGEN IGNF BCKGRD COR BLD-ACNC: 0.02 IU/ML
QUANTIFERON MITOGEN: 1.38 IU/ML
QUANTIFERON NIL TUBE: 0.06 IU/ML
QUANTIFERON TB1 TUBE: 0.08 IU/ML
QUANTIFERON TB2 TUBE: 0.08

## 2024-01-11 PROCEDURE — 99309 SBSQ NF CARE MODERATE MDM 30: CPT | Performed by: NURSE PRACTITIONER

## 2024-01-11 NOTE — PROGRESS NOTES
[facility] GERIATRICS    Chief Complaint   Patient presents with    RECHECK     HPI:  Anna Marie Babb is a 83 year old  (1940), who is being seen today for an episodic care visit at: [facility] ([facility]) [[id number]]. Today's concern is:    Kailey was visited today while in her room sitting in the recliner.  She reports that she feels wiped out as she just got finished with an occupational therapy session.  She denies pain or discomfort.  She slept well last night.  Eating well with no complaints of pain with urination.  Having regular bowel movements.      Allergies, and PMH/PSH reviewed in EPIC today.  REVIEW OF SYSTEMS:  4 point ROS including Respiratory, CV, GI and , other than that noted in the HPI,  is negative    Objective:   /73   Pulse 78   Temp 97.8  F (36.6  C)   Resp 18   Wt 49.4 kg (109 lb)   LMP  (LMP Unknown)   SpO2 96%   BMI 17.59 kg/m    GENERAL APPEARANCE:  Alert, in no distress, pleasant, cooperative  EYES: no discharge or mattering on lids or lashes noted  ENT:  moist mucous membranes, hearing acuity intact  NECK: supple, symmetrical  RESP: no respiratory distress, Lung sounds clear, patient is on room air  CV:  rate and rhythm regular, no murmur. Edema none in bilateral lower extremities. VASCULAR: warm extremities without open areas.  ABDOMEN: normal bowel sounds, soft, nontender.  M/S:   Gait and station: currently in recliner, will await therapy eval to determine status, no tenderness or swelling of the joints; able to move all extremities   SKIN:  Inspection and palpation of skin and subcutaneous tissue: skin warm, dry and intact without rashes  NEURO: no facial asymmetry, no speech deficits and able to follow directions, moves all extremities symmetrically  PSYCH:  insight, judgement, and memory impaired affect and mood normal    CBC RESULTS:   Recent Labs   Lab Test 01/03/24  0736 01/02/24  1249   WBC 7.5 10.1   RBC 2.95* 3.47*   HGB 10.4* 12.5   HCT  30.0* 35.5   * 102*   MCH 35.3* 36.0*   MCHC 34.7 35.2   RDW 14.0 13.7    280       Last Basic Metabolic Panel:  Recent Labs   Lab Test 01/10/24  0834 01/05/24  0823 01/04/24  0735   *  --  133*   POTASSIUM 5.1 3.3* 3.6   CHLORIDE 95*  --  97*   KARTHIKEYAN 10.3*  --  9.2   CO2 31*  --  32*   BUN 5.7*  --  9.4   CR 0.56  --  0.53   GLC 83  --  104*       Liver Function Studies -   Recent Labs   Lab Test 01/04/24  0735 01/03/24  0736   PROTTOTAL 5.9* 5.9*   ALBUMIN 3.3* 3.2*   BILITOTAL 1.2 1.4*   ALKPHOS 173* 155*   AST 53* 47*   ALT 47 40       TSH   Date Value Ref Range Status   08/12/2023 2.83 0.30 - 4.20 uIU/mL Final   11/02/2004 1.86 0.4 - 5.0 mU/L Final     Assessment/Plan:  Fall  Developed rhabdomylisis. Does appear deconditioned so will need therapies for strengthening. Will need to assess her care level as she may benefit from moving into LTC vs AL for more support.   --ongoing therapies for strenthening.      Pancreatic duct dilatation  Chronic pancreatitis  Hospitalized in November for abdominal pain. CT A/P with new pancreatic ductal dilatation, LFTs with mildly elevated alk phos otherwise within normal limits.  Lipase was normal.  MRCP with pancreatic atrophy and moderate inflammation consistent with chronic pancreatitis, no obstructing stone.  Patient underwent EGD which indicated gastritis, endoscopic ultrasound revealed pancreatic atrophy and prominent pancreatic duct and minimally dilated biliary system without obvious mass.  --Continues on soft diet as recommended by GI  --Continue Protonix     Hyponatremia, chronic  Sodium values ranging 133-135.  --follow clinically.      Hypertension  Chronic and stable with blood pressures SBPs 110-130s  -Continues on amlodipine 5 mg daily     Pulmonary nodule  CT chest revealed pulmonary nodule in the left lower lobe, previously noted on imaging going back to at least 2012.     Dementia  Slums 15/30 while at [facility] September 2023.  She was at  the TCU in December 2023 and unfortunately left before they could get any cognitive scores on her.  Concerned that her scores are even lower than 15 and question if she should be at home alone.   -- Will ask OT to get the CPT score   --Family is looking into other placement options    Physical deconditioning  Secondary to recent hospitalization and underlying medical conditions.   --ongoing PT/OT for strengthening.     MED REC REQUIRED  Post Medication Reconciliation Status: discharge medications reconciled, continue medications without change      Electronically signed by: CHINMAY Drew CNP

## 2024-01-11 NOTE — LETTER
1/11/2024        RE: Anna Marie Babb  [address]  [place] [address]        [facility] GERIATRICS    Chief Complaint   Patient presents with     RECHECK     HPI:  Anna Marie Babb is a 83 year old  (1940), who is being seen today for an episodic care visit at: [facility] ([facility]) [[id number]]. Today's concern is:    Kailey was visited today while in her room sitting in the recliner.  She reports that she feels wiped out as she just got finished with an occupational therapy session.  She denies pain or discomfort.  She slept well last night.  Eating well with no complaints of pain with urination.  Having regular bowel movements.      Allergies, and PMH/PSH reviewed in [facility] today.  REVIEW OF SYSTEMS:  4 point ROS including Respiratory, CV, GI and , other than that noted in the HPI,  is negative    Objective:   /73   Pulse 78   Temp 97.8  F (36.6  C)   Resp 18   Wt 49.4 kg (109 lb)   LMP  (LMP Unknown)   SpO2 96%   BMI 17.59 kg/m    GENERAL APPEARANCE:  Alert, in no distress, pleasant, cooperative  EYES: no discharge or mattering on lids or lashes noted  ENT:  moist mucous membranes, hearing acuity intact  NECK: supple, symmetrical  RESP: no respiratory distress, Lung sounds clear, patient is on room air  CV:  rate and rhythm regular, no murmur. Edema none in bilateral lower extremities. VASCULAR: warm extremities without open areas.  ABDOMEN: normal bowel sounds, soft, nontender.  M/S:   Gait and station: currently in recliner, will await therapy eval to determine status, no tenderness or swelling of the joints; able to move all extremities   SKIN:  Inspection and palpation of skin and subcutaneous tissue: skin warm, dry and intact without rashes  NEURO: no facial asymmetry, no speech deficits and able to follow directions, moves all extremities symmetrically  PSYCH:  insight, judgement, and memory impaired affect and mood normal    CBC RESULTS:   Recent Labs   Lab Test  01/03/24  0736 01/02/24  1249   WBC 7.5 10.1   RBC 2.95* 3.47*   HGB 10.4* 12.5   HCT 30.0* 35.5   * 102*   MCH 35.3* 36.0*   MCHC 34.7 35.2   RDW 14.0 13.7    280       Last Basic Metabolic Panel:  Recent Labs   Lab Test 01/10/24  0834 01/05/24  0823 01/04/24  0735   *  --  133*   POTASSIUM 5.1 3.3* 3.6   CHLORIDE 95*  --  97*   KARTHIKEYAN 10.3*  --  9.2   CO2 31*  --  32*   BUN 5.7*  --  9.4   CR 0.56  --  0.53   GLC 83  --  104*       Liver Function Studies -   Recent Labs   Lab Test 01/04/24  0735 01/03/24  0736   PROTTOTAL 5.9* 5.9*   ALBUMIN 3.3* 3.2*   BILITOTAL 1.2 1.4*   ALKPHOS 173* 155*   AST 53* 47*   ALT 47 40       TSH   Date Value Ref Range Status   08/12/2023 2.83 0.30 - 4.20 uIU/mL Final   11/02/2004 1.86 0.4 - 5.0 mU/L Final     Assessment/Plan:  Fall  Developed rhabdomylisis. Does appear deconditioned so will need therapies for strengthening. Will need to assess her care level as she may benefit from moving into LTC vs AL for more support.   --ongoing therapies for strenthening.      Pancreatic duct dilatation  Chronic pancreatitis  Hospitalized in November for abdominal pain. CT A/P with new pancreatic ductal dilatation, LFTs with mildly elevated alk phos otherwise within normal limits.  Lipase was normal.  MRCP with pancreatic atrophy and moderate inflammation consistent with chronic pancreatitis, no obstructing stone.  Patient underwent EGD which indicated gastritis, endoscopic ultrasound revealed pancreatic atrophy and prominent pancreatic duct and minimally dilated biliary system without obvious mass.  --Continues on soft diet as recommended by GI  --Continue Protonix     Hyponatremia, chronic  Sodium values ranging 133-135.  --follow clinically.      Hypertension  Chronic and stable with blood pressures SBPs 110-130s  -Continues on amlodipine 5 mg daily     Pulmonary nodule  CT chest revealed pulmonary nodule in the left lower lobe, previously noted on imaging going back to at  least 2012.     Dementia  Slums 15/30 while at [facility] September 2023.  [facility] December [facility] and unfortunately left before they could get any cognitive scores on her.  Concerned that her scores are even lower than 15 and question if she should be at home alone.   -- Will ask OT to get the CPT score   --Family is looking into other placement options    Physical deconditioning  Secondary to recent hospitalization and underlying medical conditions.   --ongoing PT/OT for strengthening.     MED REC REQUIRED  Post Medication Reconciliation Status: discharge medications reconciled, continue medications without change      Electronically signed by: CHINMAY Drew CNP         Sincerely,        CHINMAY Drew CNP

## 2024-01-16 NOTE — PROGRESS NOTES
[facility] GERIATRICS    PRIMARY CARE PROVIDER AND CLINIC:  [place], [id number] [address]  / [place] [facility] [id number]  Chief Complaint   Patient presents with    Hospital /U      [place] Medical Record Number:  [id number]  Place of Service where encounter took place:  [facility] ([facility])     Anna Marie Babb  is a 83 year old  (1940), admitted to the above facility from  [facility]. Hospital stay 1/2/24 through 1/5/24..     Hospital course was reviewed by me, is as per the hospital discharge summary and nurse practitioner note.    Discussed also with patient's daughter who was present    Patient has a past medical history of hypertension, cognitive impairment, SIADH, GERD, neuropathy.  She was hospitalized for the management of weakness following a fall.  She was found to have rhabdomyolysis and multiple electrolyte abnormalities.    She previously had been hospitalized in November through December for the evaluation of left upper abdominal pain.  Evaluation at that time revealed pancreatic ductal dilatation, acute/subacute L2 compression fracture, T9 compression fracture as well as multiple left rib fractures.  At that time she was discharged to [place] for U stay and then discharged back home.    Patient states she is feeling stronger, is walking short distances with therapy.  She notes right hip discomfort, relates this to her recent fall.  She denies abdominal pain, nausea, vomiting, chest pain, shortness of breath.  History appears to be limited by cognitive impairment.    CODE STATUS/ADVANCE DIRECTIVES DISCUSSION:  Full Code  CPR/Full code   ALLERGIES:   Allergies   Allergen Reactions    Amoxicillin Diarrhea    Amoxicillin-Pot Clavulanate     Ciprofloxacin     Gluten Meal      celiac      PAST MEDICAL HISTORY:   Past Medical History:   Diagnosis Date    Atrophic vaginitis     Celiac disease     Cystocele     Disorder of bone and cartilage, unspecified      osteopenia    Endometriosis, site unspecified     Essential hypertension, benign     Gastro-oesophageal reflux disease     Hiatal hernia     3 cm sliding hiatal hernia    Hypercalcemia     Hyperlipidemia     HZV (herpes zoster virus) post herpetic neuralgia     Microscopic colitis     Nonspecific abnormal results of liver function study     Mildly elevated transaminases, liver biopsy WNL    Peripheral neuropathy     Pulmonary nodule     Zinc deficiency       PAST SURGICAL HISTORY:   has a past surgical history that includes TOTAL ABDOM HYSTERECTOMY (1982); REMOVAL OF OVARIAN CYST(S) (1977); DILATION/CURETTAGE DIAG/THER NON OB; NONSPECIFIC PROCEDURE; NONSPECIFIC PROCEDURE; Phacoemulsification clear cornea with standard intraocular lens implant (Left, 8/13/2015); colonoscopy (2011); Phacoemulsification clear cornea with standard intraocular lens implant (Right, 8/25/2015); Closed reduction, percutaneous pinning hip (Right, 8/8/2023); Esophagoscopy, gastroscopy, duodenoscopy (EGD), combined (N/A, 12/3/2023); and Endoscopic ultrasound upper gastrointestinal tract (GI) (N/A, 12/3/2023).  FAMILY HISTORY: family history includes Cancer in her maternal grandmother and mother; Cerebrovascular Disease in her father.  SOCIAL HISTORY:   reports that she has never smoked. She has been exposed to tobacco smoke. She does not have any smokeless tobacco history on file. She reports current alcohol use. She reports that she does not use drugs.  Patient's living condition: lives with family, adult child(domi)     Current medications were reviewed by me today    Current Outpatient Medications   Medication Sig    acetaminophen (TYLENOL) 325 MG tablet Take 2 tablets (650 mg) by mouth every 4 hours as needed for mild pain or other (and adjunct with moderate or severe pain or per patient request)    amLODIPine (NORVASC) 5 MG tablet Take 1 tablet (5 mg) by mouth every evening    Lidocaine (LIDOCARE) 4 % Patch Place 1 patch onto the skin  "every 24 hours To prevent lidocaine toxicity, patient should be patch free for 12 hrs daily.    pantoprazole (PROTONIX) 40 MG EC tablet Take 1 tablet (40 mg) by mouth daily    vitamin D3 (CHOLECALCIFEROL) 50 mcg (2000 units) tablet Take 1 tablet (50 mcg) by mouth daily     No current facility-administered medications for this visit.       ROS:  10 point ROS of systems including Constitutional, Eyes, Respiratory, Cardiovascular, Gastroenterology, Genitourinary, Integumentary, Musculoskeletal, Psychiatric were all negative except for pertinent positives noted in my HPI.    Vitals:  /69   Pulse 64   Temp 97.8  F (36.6  C)   Resp 16   Ht 1.676 m (5' 6\")   Wt 51.3 kg (113 lb)   LMP  (LMP Unknown)   SpO2 97%   BMI 18.24 kg/m    Exam:  Frail-appearing female, sitting in a chair in her room.  Daughter is present.  Close are very loosely fitting.  HEENT: Oral mucosa moist  Lungs clear  CV regular rate and rhythm  Abdomen soft, nondistended  Extremities without edema  No significant discomfort with movement of extremities.  Gait was not assessed.  Neuro: Patient is alert, pleasant, oriented to person and place.  Insight appears diminished.    Lab/Diagnostic data:  Most Recent 3 CBC's:  Recent Labs   Lab Test 01/03/24  0736 01/02/24  1249 11/30/23  0922   WBC 7.5 10.1 6.5   HGB 10.4* 12.5 12.6   * 102* 105*    280 272     Most Recent 3 BMP's:  Recent Labs   Lab Test 01/10/24  0834 01/05/24  0823 01/04/24  0735 01/03/24  0736   *  --  133* 131*   POTASSIUM 5.1 3.3* 3.6 3.5   CHLORIDE 95*  --  97* 97*   CO2 31*  --  32* 24   BUN 5.7*  --  9.4 14.7   CR 0.56  --  0.53 0.50*   ANIONGAP 8  --  4* 10   KARTHIKEYAN 10.3*  --  9.2 9.1   GLC 83  --  104* 70     Most Recent 2 LFT's:  Recent Labs   Lab Test 01/04/24  0735 01/03/24  0736   AST 53* 47*   ALT 47 40   ALKPHOS 173* 155*   BILITOTAL 1.2 1.4*     Most Recent TSH and T4:  Recent Labs   Lab Test 08/12/23  1415   TSH 2.83 "         ASSESSMENT/PLAN:    Recent falls with multiple orthopedic injuries, as detailed above.  Patient currently with right hip discomfort  History of right femur fracture but no acute process noted on CT pelvis January 2, 2024  Etiology of falls not clear  Plan: Therapies.  Monitor vital signs    Recent hospitalization for abdominal pain with CT scan revealing pancreatic ductal dilatation, with finding of pancreatic atrophy and inflammation consistent with chronic pancreatitis found on MRCP, but no evidence for tumor or obstructing stone.  No current abdominal symptoms.  Patient appears frail, unclear if this is related to pancreatic disease  Plan: PPI.  Monitor nutritional status.  Consider trial of pancreatic enzymes if recurrent abdominal pain or evidence of malabsorption    Hypertension  Stable on amlodipine  Plan: Monitor vital signs, avoid hypotension above all    Dementia  Slums 15/30 in September 2023  Plan: Monitor mental and functional status  Therapies.  Assure safe discharge    History of SIADH  Stable, sodium, not on fluid restriction  Plan: Monitor electrolytes      Nehemiah Aguilera MD

## 2024-01-17 ENCOUNTER — LAB REQUISITION (OUTPATIENT)
Dept: LAB | Facility: CLINIC | Age: 84
End: 2024-01-17
Payer: MEDICARE

## 2024-01-17 ENCOUNTER — TRANSITIONAL CARE UNIT VISIT (OUTPATIENT)
Dept: GERIATRICS | Facility: CLINIC | Age: 84
End: 2024-01-17
Payer: COMMERCIAL

## 2024-01-17 VITALS
OXYGEN SATURATION: 97 % | HEIGHT: 66 IN | RESPIRATION RATE: 16 BRPM | TEMPERATURE: 97.8 F | SYSTOLIC BLOOD PRESSURE: 116 MMHG | HEART RATE: 64 BPM | WEIGHT: 113 LBS | DIASTOLIC BLOOD PRESSURE: 69 MMHG | BODY MASS INDEX: 18.16 KG/M2

## 2024-01-17 DIAGNOSIS — Z11.1 ENCOUNTER FOR SCREENING FOR RESPIRATORY TUBERCULOSIS: ICD-10-CM

## 2024-01-17 DIAGNOSIS — K86.1 CHRONIC PANCREATITIS, UNSPECIFIED PANCREATITIS TYPE (H): ICD-10-CM

## 2024-01-17 DIAGNOSIS — F03.B0 MODERATE DEMENTIA WITHOUT BEHAVIORAL DISTURBANCE, PSYCHOTIC DISTURBANCE, MOOD DISTURBANCE, OR ANXIETY, UNSPECIFIED DEMENTIA TYPE (H): ICD-10-CM
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
## 2024-01-17 DIAGNOSIS — I10 ESSENTIAL HYPERTENSION, BENIGN: ICD-10-CM

## 2024-01-17 PROCEDURE — 99305 1ST NF CARE MODERATE MDM 35: CPT | Performed by: INTERNAL MEDICINE

## 2024-01-17 NOTE — LETTER
1/17/2024        RE: Anna Marie Babb  [address]  [place] [address]        [facility] GERIATRICS    PRIMARY CARE PROVIDER AND CLINIC:  [place], [address] [address] / [place] [address]  Chief Complaint   Patient presents with     Hospital F/U      [place] Medical Record Number:  [id number]  Place of Service where encounter took place:  [facility] ([facility])     Anna Marie Babb  is a 83 year old  (1940), admitted to the above facility from  [facility]. Hospital stay 1/2/24 through 1/5/24..     Hospital course was reviewed by me, is as per the hospital discharge summary and nurse practitioner note.    Discussed also with patient's daughter who was present    Patient has a past medical history of hypertension, cognitive impairment, SIADH, GERD, neuropathy.  She was hospitalized for the management of weakness following a fall.  She was found to have rhabdomyolysis and multiple electrolyte abnormalities.    She previously had been hospitalized in November through December for the evaluation of left upper abdominal pain.  Evaluation at that time revealed pancreatic ductal dilatation, acute/subacute L2 compression fracture, T9 compression fracture as well as multiple left rib fractures.  At that time she was discharged to [place] for U stay and then discharged back home.    Patient states she is feeling stronger, is walking short distances with therapy.  She notes right hip discomfort, relates this to her recent fall.  She denies abdominal pain, nausea, vomiting, chest pain, shortness of breath.  History appears to be limited by cognitive impairment.    CODE STATUS/ADVANCE DIRECTIVES DISCUSSION:  Full Code  CPR/Full code   ALLERGIES:   Allergies   Allergen Reactions     Amoxicillin Diarrhea     Amoxicillin-Pot Clavulanate      Ciprofloxacin      Gluten Meal      celiac      PAST MEDICAL HISTORY:   Past Medical History:   Diagnosis Date      Atrophic vaginitis      Celiac disease      Cystocele      Disorder of bone and cartilage, unspecified     osteopenia     Endometriosis, site unspecified      Essential hypertension, benign      Gastro-oesophageal reflux disease      Hiatal hernia     3 cm sliding hiatal hernia     Hypercalcemia      Hyperlipidemia      HZV (herpes zoster virus) post herpetic neuralgia      Microscopic colitis      Nonspecific abnormal results of liver function study     Mildly elevated transaminases, liver biopsy WNL     Peripheral neuropathy      Pulmonary nodule      Zinc deficiency       PAST SURGICAL HISTORY:   has a past surgical history that includes TOTAL ABDOM HYSTERECTOMY (1982); REMOVAL OF OVARIAN CYST(S) (1977); DILATION/CURETTAGE DIAG/THER NON OB; NONSPECIFIC PROCEDURE; NONSPECIFIC PROCEDURE; Phacoemulsification clear cornea with standard intraocular lens implant (Left, 8/13/2015); colonoscopy (2011); Phacoemulsification clear cornea with standard intraocular lens implant (Right, 8/25/2015); Closed reduction, percutaneous pinning hip (Right, 8/8/2023); Esophagoscopy, gastroscopy, duodenoscopy (EGD), combined (N/A, 12/3/2023); and Endoscopic ultrasound upper gastrointestinal tract (GI) (N/A, 12/3/2023).  FAMILY HISTORY: family history includes Cancer in her maternal grandmother and mother; Cerebrovascular Disease in her father.  SOCIAL HISTORY:   reports that she has never smoked. She has been exposed to tobacco smoke. She does not have any smokeless tobacco history on file. She reports current alcohol use. She reports that she does not use drugs.  Patient's living condition: lives with family, adult child(domi)     Current medications were reviewed by me today    Current Outpatient Medications   Medication Sig     acetaminophen (TYLENOL) 325 MG tablet Take 2 tablets (650 mg) by mouth every 4 hours as needed for mild pain or other (and adjunct with moderate or severe pain or per patient request)     amLODIPine (NORVASC) 5  "MG tablet Take 1 tablet (5 mg) by mouth every evening     Lidocaine (LIDOCARE) 4 % Patch Place 1 patch onto the skin every 24 hours To prevent lidocaine toxicity, patient should be patch free for 12 hrs daily.     pantoprazole (PROTONIX) 40 MG EC tablet Take 1 tablet (40 mg) by mouth daily     vitamin D3 (CHOLECALCIFEROL) 50 mcg (2000 units) tablet Take 1 tablet (50 mcg) by mouth daily     No current facility-administered medications for this visit.       ROS:  10 point ROS of systems including Constitutional, Eyes, Respiratory, Cardiovascular, Gastroenterology, Genitourinary, Integumentary, Musculoskeletal, Psychiatric were all negative except for pertinent positives noted in my HPI.    Vitals:  /69   Pulse 64   Temp 97.8  F (36.6  C)   Resp 16   Ht 1.676 m (5' 6\")   Wt 51.3 kg (113 lb)   LMP  (LMP Unknown)   SpO2 97%   BMI 18.24 kg/m    Exam:  Frail-appearing female, sitting in a chair in her room.  Daughter is present.  Close are very loosely fitting.  HEENT: Oral mucosa moist  Lungs clear  CV regular rate and rhythm  Abdomen soft, nondistended  Extremities without edema  No significant discomfort with movement of extremities.  Gait was not assessed.  Neuro: Patient is alert, pleasant, oriented to person and place.  Insight appears diminished.    Lab/Diagnostic data:  Most Recent 3 CBC's:  Recent Labs   Lab Test 01/03/24  0736 01/02/24  1249 11/30/23  0922   WBC 7.5 10.1 6.5   HGB 10.4* 12.5 12.6   * 102* 105*    280 272     Most Recent 3 BMP's:  Recent Labs   Lab Test 01/10/24  0834 01/05/24  0823 01/04/24  0735 01/03/24  0736   *  --  133* 131*   POTASSIUM 5.1 3.3* 3.6 3.5   CHLORIDE 95*  --  97* 97*   CO2 31*  --  32* 24   BUN 5.7*  --  9.4 14.7   CR 0.56  --  0.53 0.50*   ANIONGAP 8  --  4* 10   KARTHIKEYAN 10.3*  --  9.2 9.1   GLC 83  --  104* 70     Most Recent 2 LFT's:  Recent Labs   Lab Test 01/04/24  0735 01/03/24  0736   AST 53* 47*   ALT 47 40   ALKPHOS 173* 155*   BILITOTAL " 1.2 1.4*     Most Recent TSH and T4:  Recent Labs   Lab Test 08/12/23  1415   TSH 2.83         ASSESSMENT/PLAN:    Recent falls with multiple orthopedic injuries, as detailed above.  Patient currently with right hip discomfort  History of right femur fracture but no acute process noted on CT pelvis January 2, 2024  Etiology of falls not clear  Plan: Therapies.  Monitor vital signs    Recent hospitalization for abdominal pain with CT scan revealing pancreatic ductal dilatation, with finding of pancreatic atrophy and inflammation consistent with chronic pancreatitis found on MRCP, but no evidence for tumor or obstructing stone.  No current abdominal symptoms.  Patient appears frail, unclear if this is related to pancreatic disease  Plan: PPI.  Monitor nutritional status.  Consider trial of pancreatic enzymes if recurrent abdominal pain or evidence of malabsorption    Hypertension  Stable on amlodipine  Plan: Monitor vital signs, avoid hypotension above all    Dementia  Slums 15/30 in September 2023  Plan: Monitor mental and functional status  Therapies.  Assure safe discharge    History of SIADH  Stable, sodium, not on fluid restriction  Plan: Monitor electrolytes      Nehemiah Aguilera MD            Sincerely,        Nehemiah Aguilera MD

## 2024-01-18 ENCOUNTER — LAB REQUISITION (OUTPATIENT)
Dept: LAB | Facility: CLINIC | Age: 84
End: 2024-01-18
Payer: COMMERCIAL

## 2024-01-18 DIAGNOSIS — Z11.1 ENCOUNTER FOR SCREENING FOR RESPIRATORY TUBERCULOSIS: ICD-10-CM

## 2024-01-19 PROCEDURE — 86481 TB AG RESPONSE T-CELL SUSP: CPT | Performed by: INTERNAL MEDICINE

## 2024-01-19 PROCEDURE — P9604 ONE-WAY ALLOW PRORATED TRIP: HCPCS | Performed by: INTERNAL MEDICINE

## 2024-01-19 PROCEDURE — 36415 COLL VENOUS BLD VENIPUNCTURE: CPT | Performed by: INTERNAL MEDICINE

## 2024-01-21 LAB
GAMMA INTERFERON BACKGROUND BLD IA-ACNC: 0.05 IU/ML
M TB IFN-G BLD-IMP: NEGATIVE
M TB IFN-G CD4+ BCKGRND COR BLD-ACNC: 6.6 IU/ML
MITOGEN IGNF BCKGRD COR BLD-ACNC: 0 IU/ML
MITOGEN IGNF BCKGRD COR BLD-ACNC: 0 IU/ML
QUANTIFERON MITOGEN: 6.65 IU/ML
QUANTIFERON NIL TUBE: 0.05 IU/ML
QUANTIFERON TB1 TUBE: 0.05 IU/ML
QUANTIFERON TB2 TUBE: 0.05

## 2024-01-22 ENCOUNTER — TRANSITIONAL CARE UNIT VISIT (OUTPATIENT)
Dept: GERIATRICS | Facility: CLINIC | Age: 84
End: 2024-01-22
Payer: COMMERCIAL

## 2024-01-22 VITALS
SYSTOLIC BLOOD PRESSURE: 115 MMHG | BODY MASS INDEX: 18.24 KG/M2 | TEMPERATURE: 97.1 F | WEIGHT: 113 LBS | HEART RATE: 63 BPM | OXYGEN SATURATION: 96 % | DIASTOLIC BLOOD PRESSURE: 73 MMHG | RESPIRATION RATE: 18 BRPM
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
## 2024-01-22 DIAGNOSIS — E22.2 SIADH (SYNDROME OF INAPPROPRIATE ADH PRODUCTION) (H): ICD-10-CM

## 2024-01-22 DIAGNOSIS — R53.81 PHYSICAL DECONDITIONING: ICD-10-CM

## 2024-01-22 DIAGNOSIS — F03.B0 MODERATE DEMENTIA WITHOUT BEHAVIORAL DISTURBANCE, PSYCHOTIC DISTURBANCE, MOOD DISTURBANCE, OR ANXIETY, UNSPECIFIED DEMENTIA TYPE (H): ICD-10-CM

## 2024-01-22 DIAGNOSIS — K86.1 CHRONIC PANCREATITIS, UNSPECIFIED PANCREATITIS TYPE (H): ICD-10-CM

## 2024-01-22 DIAGNOSIS — I10 ESSENTIAL HYPERTENSION, BENIGN: ICD-10-CM

## 2024-01-22 PROCEDURE — 99308 SBSQ NF CARE LOW MDM 20: CPT | Performed by: NURSE PRACTITIONER

## 2024-01-22 NOTE — LETTER
1/22/2024        RE: Anna Marie Babb  [address]  [place] [address]        [facility] GERIATRICS    Chief Complaint   Patient presents with     RECHECK     HPI:  Anna Marie Babb is a 83 year old  (1940), who is being seen today for an episodic care visit at: [facility] ([facility]) [[id number]]. Today's concern is:    Kailey was visited today while in her room sitting in the recliner.  She feels chilly and that it has always been cold in her room. She denies pain or discomfort.  She slept well last night.  Eating well with no complaints of pain with urination.  Having regular bowel movements. Shares that she will be turning 83 soon and is looking forward to spending time with her children.     Allergies, and PMH/PSH reviewed in [facility] today.  REVIEW OF SYSTEMS:  4 point ROS including Respiratory, CV, GI and , other than that noted in the HPI,  is negative    Objective:   /73   Pulse 63   Temp 97.1  F (36.2  C)   Resp 18   Wt 51.3 kg (113 lb)   LMP  (LMP Unknown)   SpO2 96%   BMI 18.24 kg/m    GENERAL APPEARANCE:  Alert, in no distress, pleasant, cooperative  EYES: no discharge or mattering on lids or lashes noted  ENT:  moist mucous membranes, hearing acuity intact  NECK: supple, symmetrical  RESP: no respiratory distress, Lung sounds clear, patient is on room air  CV:  rate and rhythm regular, no murmur. Edema none in bilateral lower extremities. VASCULAR: warm extremities without open areas.  ABDOMEN: normal bowel sounds, soft, nontender.  M/S:   Gait and station: currently in recliner, will await therapy eval to determine status, no tenderness or swelling of the joints; able to move all extremities   SKIN:  Inspection and palpation of skin and subcutaneous tissue: skin warm, dry and intact without rashes  NEURO: no facial asymmetry, no speech deficits and able to follow directions, moves all extremities symmetrically  PSYCH:  insight, judgement, and memory impaired  affect and mood normal    CBC RESULTS:   Recent Labs   Lab Test 01/03/24  0736 01/02/24  1249   WBC 7.5 10.1   RBC 2.95* 3.47*   HGB 10.4* 12.5   HCT 30.0* 35.5   * 102*   MCH 35.3* 36.0*   MCHC 34.7 35.2   RDW 14.0 13.7    280       Last Basic Metabolic Panel:  Recent Labs   Lab Test 01/10/24  0834 01/05/24  0823 01/04/24  0735   *  --  133*   POTASSIUM 5.1 3.3* 3.6   CHLORIDE 95*  --  97*   KARTHIKEYAN 10.3*  --  9.2   CO2 31*  --  32*   BUN 5.7*  --  9.4   CR 0.56  --  0.53   GLC 83  --  104*       Liver Function Studies -   Recent Labs   Lab Test 01/04/24  0735 01/03/24  0736   PROTTOTAL 5.9* 5.9*   ALBUMIN 3.3* 3.2*   BILITOTAL 1.2 1.4*   ALKPHOS 173* 155*   AST 53* 47*   ALT 47 40       TSH   Date Value Ref Range Status   08/12/2023 2.83 0.30 - 4.20 uIU/mL Final   11/02/2004 1.86 0.4 - 5.0 mU/L Final     Assessment/Plan:  Fall  Developed rhabdomylisis. Does appear deconditioned so will need therapies for strengthening. Will need to assess her care level as she may benefit from moving into LTC vs AL for more support.   --ongoing therapies for strengthening.      Pancreatic duct dilatation  Chronic pancreatitis  Hospitalized in November for abdominal pain. CT A/P with new pancreatic ductal dilatation, LFTs with mildly elevated alk phos otherwise within normal limits.  Lipase was normal.  MRCP with pancreatic atrophy and moderate inflammation consistent with chronic pancreatitis, no obstructing stone.  Patient underwent EGD which indicated gastritis, endoscopic ultrasound revealed pancreatic atrophy and prominent pancreatic duct and minimally dilated biliary system without obvious mass.  --Continues on soft diet as recommended by GI  --Continue Protonix     Hyponatremia, chronic  Sodium values ranging 133-135.  --follow clinically.      Hypertension  Chronic and stable with blood pressures SBPs 110-130s  -Continues on amlodipine 5 mg daily     Pulmonary nodule  CT chest revealed pulmonary nodule in  the left lower lobe, previously noted on imaging going back to at least 2012.     Dementia  Slums 15/30 while at [facility] September 2023. And 19/30 here.   --OT will work on CPT this week.     Physical deconditioning  Secondary to recent hospitalization and underlying medical conditions.   --ongoing PT/OT for strengthening.   -- Family would like to look for alternative placement    MED REC REQUIRED  Post Medication Reconciliation Status: discharge medications reconciled, continue medications without change      Electronically signed by: CHINMAY Drew CNP           Sincerely,        CHINMAY Drew CNP

## 2024-01-22 NOTE — PROGRESS NOTES
[facility] GERIATRICS    Chief Complaint   Patient presents with    RECHECK     HPI:  Anna Marie Babb is a 83 year old  (1940), who is being seen today for an episodic care visit at: [facility] ([facility]) [[id number]]. Today's concern is:    Kailey was visited today while in her room sitting in the recliner.  She feels chilly and that it has always been cold in her room. She denies pain or discomfort.  She slept well last night.  Eating well with no complaints of pain with urination.  Having regular bowel movements. Shares that she will be turning 83 soon and is looking forward to spending time with her children.     Allergies, and PMH/PSH reviewed in [facility] today.  REVIEW OF SYSTEMS:  4 point ROS including Respiratory, CV, GI and , other than that noted in the HPI,  is negative    Objective:   /73   Pulse 63   Temp 97.1  F (36.2  C)   Resp 18   Wt 51.3 kg (113 lb)   LMP  (LMP Unknown)   SpO2 96%   BMI 18.24 kg/m    GENERAL APPEARANCE:  Alert, in no distress, pleasant, cooperative  EYES: no discharge or mattering on lids or lashes noted  ENT:  moist mucous membranes, hearing acuity intact  NECK: supple, symmetrical  RESP: no respiratory distress, Lung sounds clear, patient is on room air  CV:  rate and rhythm regular, no murmur. Edema none in bilateral lower extremities. VASCULAR: warm extremities without open areas.  ABDOMEN: normal bowel sounds, soft, nontender.  M/S:   Gait and station: currently in recliner, will await therapy eval to determine status, no tenderness or swelling of the joints; able to move all extremities   SKIN:  Inspection and palpation of skin and subcutaneous tissue: skin warm, dry and intact without rashes  NEURO: no facial asymmetry, no speech deficits and able to follow directions, moves all extremities symmetrically  PSYCH:  insight, judgement, and memory impaired affect and mood normal    CBC RESULTS:   Recent Labs   Lab Test 01/03/24  0736 01/02/24  1245    WBC 7.5 10.1   RBC 2.95* 3.47*   HGB 10.4* 12.5   HCT 30.0* 35.5   * 102*   MCH 35.3* 36.0*   MCHC 34.7 35.2   RDW 14.0 13.7    280       Last Basic Metabolic Panel:  Recent Labs   Lab Test 01/10/24  0834 01/05/24  0823 01/04/24  0735   *  --  133*   POTASSIUM 5.1 3.3* 3.6   CHLORIDE 95*  --  97*   KARTHIKEYAN 10.3*  --  9.2   CO2 31*  --  32*   BUN 5.7*  --  9.4   CR 0.56  --  0.53   GLC 83  --  104*       Liver Function Studies -   Recent Labs   Lab Test 01/04/24  0735 01/03/24  0736   PROTTOTAL 5.9* 5.9*   ALBUMIN 3.3* 3.2*   BILITOTAL 1.2 1.4*   ALKPHOS 173* 155*   AST 53* 47*   ALT 47 40       TSH   Date Value Ref Range Status   08/12/2023 2.83 0.30 - 4.20 uIU/mL Final   11/02/2004 1.86 0.4 - 5.0 mU/L Final     Assessment/Plan:  Fall  Developed rhabdomylisis. Does appear deconditioned so will need therapies for strengthening. Will need to assess her care level as she may benefit from moving into LTC vs AL for more support.   --ongoing therapies for strengthening.      Pancreatic duct dilatation  Chronic pancreatitis  Hospitalized in November for abdominal pain. CT A/P with new pancreatic ductal dilatation, LFTs with mildly elevated alk phos otherwise within normal limits.  Lipase was normal.  MRCP with pancreatic atrophy and moderate inflammation consistent with chronic pancreatitis, no obstructing stone.  Patient underwent EGD which indicated gastritis, endoscopic ultrasound revealed pancreatic atrophy and prominent pancreatic duct and minimally dilated biliary system without obvious mass.  --Continues on soft diet as recommended by GI  --Continue Protonix     Hyponatremia, chronic  Sodium values ranging 133-135.  --follow clinically.      Hypertension  Chronic and stable with blood pressures SBPs 110-130s  -Continues on amlodipine 5 mg daily     Pulmonary nodule  CT chest revealed pulmonary nodule in the left lower lobe, previously noted on imaging going back to at least 2012.      Dementia  Slums 15/30 while at [facility] September 2023. And 19/30 here.   --OT will work on CPT this week.     Physical deconditioning  Secondary to recent hospitalization and underlying medical conditions.   --ongoing PT/OT for strengthening.   -- Family would like to look for alternative placement    MED REC REQUIRED  Post Medication Reconciliation Status: discharge medications reconciled, continue medications without change      Electronically signed by: CHINMAY Drew CNP

## 2024-01-23 ENCOUNTER — LAB REQUISITION (OUTPATIENT)
Dept: LAB | Facility: CLINIC | Age: 84
End: 2024-01-23
Payer: MEDICARE

## 2024-01-23 DIAGNOSIS — E78.5 HYPERLIPIDEMIA, UNSPECIFIED: ICD-10-CM

## 2024-01-23 DIAGNOSIS — E83.40 DISORDERS OF MAGNESIUM METABOLISM, UNSPECIFIED: ICD-10-CM

## 2024-02-01 ENCOUNTER — TRANSITIONAL CARE UNIT VISIT (OUTPATIENT)
Dept: GERIATRICS | Facility: CLINIC | Age: 84
End: 2024-02-01
Payer: COMMERCIAL

## 2024-02-01 VITALS
SYSTOLIC BLOOD PRESSURE: 110 MMHG | DIASTOLIC BLOOD PRESSURE: 62 MMHG | RESPIRATION RATE: 18 BRPM | WEIGHT: 115.5 LBS | OXYGEN SATURATION: 94 % | TEMPERATURE: 97.7 F | HEART RATE: 73 BPM | BODY MASS INDEX: 18.64 KG/M2

## 2024-02-01 DIAGNOSIS — K86.1 CHRONIC PANCREATITIS, UNSPECIFIED PANCREATITIS TYPE (H): ICD-10-CM

## 2024-02-01 DIAGNOSIS — F03.B0 MODERATE DEMENTIA WITHOUT BEHAVIORAL DISTURBANCE, PSYCHOTIC DISTURBANCE, MOOD DISTURBANCE, OR ANXIETY, UNSPECIFIED DEMENTIA TYPE (H): Primary | ICD-10-CM

## 2024-02-01 DIAGNOSIS — R53.81 PHYSICAL DECONDITIONING: ICD-10-CM
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
## 2024-02-01 DIAGNOSIS — I10 ESSENTIAL HYPERTENSION, BENIGN: ICD-10-CM

## 2024-02-01 PROCEDURE — 99309 SBSQ NF CARE MODERATE MDM 30: CPT | Performed by: NURSE PRACTITIONER

## 2024-02-01 NOTE — PROGRESS NOTES
[facility] GERIATRICS    Chief Complaint   Patient presents with    RECHECK     HPI:  Anna Marie Babb is a 83 year old  (1940), who is being seen today for an episodic care visit at: [facility] ([facility]) [[id number]]. Today's concern is:    Kailey was visited today while in her room sitting in the recliner.  She reports that she feels tired today as she did not sleep well last night.  She was up and had breakfast today and will be having a shower this morning which she is not looking forward to.  She has pain in her left knee and typically has a pain patch in place but will do that after she showers today.  Otherwise feels that she is doing well and is looking forward to going home.  Eating well, having regular bowel movements, no urinary complaints. No chest pain, shortness of breath, cough or palpitations.     IDT this week:  SLUMS 19. BARTHEL 55/100. A1 ADLs. Working on CPT this week. Amb CGA-min A. 80' w/ walker. Transfers & bed CGA. CPT 5.4. Min A Stairs.    Allergies, and PMH/PSH reviewed in EPIC today.  REVIEW OF SYSTEMS:  4 point ROS including Respiratory, CV, GI and , other than that noted in the HPI,  is negative    Objective:   /73   Pulse 63   Temp 97.1  F (36.2  C)   Resp 18   Wt 51.3 kg (113 lb)   LMP  (LMP Unknown)   SpO2 96%   BMI 18.24 kg/m    GENERAL APPEARANCE:  Alert, in no distress, pleasant, cooperative  EYES: no discharge or mattering on lids or lashes noted  ENT:  moist mucous membranes, hearing acuity intact  NECK: supple, symmetrical  RESP: no respiratory distress, Lung sounds clear, patient is on room air  CV:  rate and rhythm regular, no murmur. Edema none in bilateral lower extremities. VASCULAR: warm extremities without open areas.  ABDOMEN: normal bowel sounds, soft, nontender.  M/S:   Gait and station: currently in recliner, will await therapy eval to determine status, no tenderness or swelling of the joints; able to move all extremities   SKIN:   Inspection and palpation of skin and subcutaneous tissue: skin warm, dry and intact without rashes  NEURO: no facial asymmetry, no speech deficits and able to follow directions, moves all extremities symmetrically  PSYCH:  insight, judgement, and memory impaired affect and mood normal    CBC RESULTS:   Recent Labs   Lab Test 01/03/24  0736 01/02/24  1249   WBC 7.5 10.1   RBC 2.95* 3.47*   HGB 10.4* 12.5   HCT 30.0* 35.5   * 102*   MCH 35.3* 36.0*   MCHC 34.7 35.2   RDW 14.0 13.7    280       Last Basic Metabolic Panel:  Recent Labs   Lab Test 01/10/24  0834 01/05/24  0823 01/04/24  0735   *  --  133*   POTASSIUM 5.1 3.3* 3.6   CHLORIDE 95*  --  97*   KARTHIKEYAN 10.3*  --  9.2   CO2 31*  --  32*   BUN 5.7*  --  9.4   CR 0.56  --  0.53   GLC 83  --  104*       Liver Function Studies -   Recent Labs   Lab Test 01/04/24  0735 01/03/24  0736   PROTTOTAL 5.9* 5.9*   ALBUMIN 3.3* 3.2*   BILITOTAL 1.2 1.4*   ALKPHOS 173* 155*   AST 53* 47*   ALT 47 40       TSH   Date Value Ref Range Status   08/12/2023 2.83 0.30 - 4.20 uIU/mL Final   11/02/2004 1.86 0.4 - 5.0 mU/L Final     Assessment/Plan:  Fall  Developed rhabdomylisis. Does appear deconditioned so will need therapies for strengthening. Will need to assess her care level as she may benefit from moving into LTC vs AL for more support.   --ongoing therapies for strengthening.   --Plan is to move home with her son until her MME is approved and we will then look into [facility]    Chronic pancreatitis  Hospitalized in November for abdominal pain. CT A/P with new pancreatic ductal dilatation, LFTs with mildly elevated alk phos otherwise within normal limits.  Lipase was normal.  MRCP with pancreatic atrophy and moderate inflammation consistent with chronic pancreatitis, no obstructing stone.  Patient underwent EGD which indicated gastritis, endoscopic ultrasound revealed pancreatic atrophy and prominent pancreatic duct and minimally dilated biliary system without  obvious mass.  --Continues on soft diet as recommended by GI  --Continue Protonix     Hyponatremia, chronic  Sodium values ranging 133-135.  Was 134 last week  --follow clinically.      Hypertension  Chronic and stable with blood pressures SBPs 110-130s  -Continues on amlodipine 5 mg daily     Pulmonary nodule  CT chest revealed pulmonary nodule in the left lower lobe, previously noted on imaging going back to at least 2012.  --Follow-up with PCP after discharge from [facility] 15/30 while at [facility] September 2023. And 19/30 here at [facility]   --OT will work on CPT this week.   -- Will likely be moving to [facility] in the future    Physical deconditioning  Secondary to recent hospitalization and underlying medical conditions.   --ongoing PT/OT for strengthening.   -- Family would like to look for alternative placement    MED REC REQUIRED  Post Medication Reconciliation Status: discharge medications reconciled, continue medications without change      Electronically signed by: CHINMAY Drew CNP

## 2024-02-01 NOTE — LETTER
2/1/2024        RE: Anna Marie Babb  [address]  [place] [address]        [facility] GERIATRICS    Chief Complaint   Patient presents with     RECHECK     HPI:  Anna Marie Babb is a 83 year old  (1940), who is being seen today for an episodic care visit at: [facility]) [[id number]]. Today's concern is:    Kailey was visited today while in her room sitting in the recliner.  She reports that she feels tired today as she did not sleep well last night.  She was up and had breakfast today and will be having a shower this morning which she is not looking forward to.  She has pain in her left knee and typically has a pain patch in place but will do that after she showers today.  Otherwise feels that she is doing well and is looking forward to going home.  Eating well, having regular bowel movements, no urinary complaints. No chest pain, shortness of breath, cough or palpitations.     IDT this week:  SLUMS 19. BARTHEL 55/100. A1 ADLs. Working on CPT this week. Amb CGA-min A. 80' w/ walker. Transfers & bed CGA. CPT 5.4. Min A Stairs.    Allergies, and PMH/PSH reviewed in EPIC today.  REVIEW OF SYSTEMS:  4 point ROS including Respiratory, CV, GI and , other than that noted in the HPI,  is negative    Objective:   /73   Pulse 63   Temp 97.1  F (36.2  C)   Resp 18   Wt 51.3 kg (113 lb)   LMP  (LMP Unknown)   SpO2 96%   BMI 18.24 kg/m    GENERAL APPEARANCE:  Alert, in no distress, pleasant, cooperative  EYES: no discharge or mattering on lids or lashes noted  ENT:  moist mucous membranes, hearing acuity intact  NECK: supple, symmetrical  RESP: no respiratory distress, Lung sounds clear, patient is on room air  CV:  rate and rhythm regular, no murmur. Edema none in bilateral lower extremities. VASCULAR: warm extremities without open areas.  ABDOMEN: normal bowel sounds, soft, nontender.  M/S:   Gait and station: currently in recliner, will await therapy eval to determine  status, no tenderness or swelling of the joints; able to move all extremities   SKIN:  Inspection and palpation of skin and subcutaneous tissue: skin warm, dry and intact without rashes  NEURO: no facial asymmetry, no speech deficits and able to follow directions, moves all extremities symmetrically  PSYCH:  insight, judgement, and memory impaired affect and mood normal    CBC RESULTS:   Recent Labs   Lab Test 01/03/24  0736 01/02/24  1249   WBC 7.5 10.1   RBC 2.95* 3.47*   HGB 10.4* 12.5   HCT 30.0* 35.5   * 102*   MCH 35.3* 36.0*   MCHC 34.7 35.2   RDW 14.0 13.7    280       Last Basic Metabolic Panel:  Recent Labs   Lab Test 01/10/24  0834 01/05/24  0823 01/04/24  0735   *  --  133*   POTASSIUM 5.1 3.3* 3.6   CHLORIDE 95*  --  97*   KARTHIKEYAN 10.3*  --  9.2   CO2 31*  --  32*   BUN 5.7*  --  9.4   CR 0.56  --  0.53   GLC 83  --  104*       Liver Function Studies -   Recent Labs   Lab Test 01/04/24  0735 01/03/24  0736   PROTTOTAL 5.9* 5.9*   ALBUMIN 3.3* 3.2*   BILITOTAL 1.2 1.4*   ALKPHOS 173* 155*   AST 53* 47*   ALT 47 40       TSH   Date Value Ref Range Status   08/12/2023 2.83 0.30 - 4.20 uIU/mL Final   11/02/2004 1.86 0.4 - 5.0 mU/L Final     Assessment/Plan:  Fall  Developed rhabdomylisis. Does appear deconditioned so will need therapies for strengthening. Will need to assess her care level as she may benefit from moving into LTC vs AL for more support.   --ongoing therapies for strengthening.   --Plan is to move home with her son until her MME is approved and we will then look into [facility]    Chronic pancreatitis  Hospitalized in November for abdominal pain. CT A/P with new pancreatic ductal dilatation, LFTs with mildly elevated alk phos otherwise within normal limits.  Lipase was normal.  MRCP with pancreatic atrophy and moderate inflammation consistent with chronic pancreatitis, no obstructing stone.  Patient underwent EGD which indicated gastritis, endoscopic ultrasound revealed pancreatic  atrophy and prominent pancreatic duct and minimally dilated biliary system without obvious mass.  --Continues on soft diet as recommended by GI  --Continue Protonix     Hyponatremia, chronic  Sodium values ranging 133-135.  Was 134 last week  --follow clinically.      Hypertension  Chronic and stable with blood pressures SBPs 110-130s  -Continues on amlodipine 5 mg daily     Pulmonary nodule  CT chest revealed pulmonary nodule in the left lower lobe, previously noted on imaging going back to at least 2012.  --Follow-up with PCP after discharge from [facility] 15/30 while at [facility] September 2023. And 19/30 here at [facility]   --OT will work on CPT this week.   -- Will likely be moving to [facility] in the future    Physical deconditioning  Secondary to recent hospitalization and underlying medical conditions.   --ongoing PT/OT for strengthening.   -- Family would like to look for alternative placement    MED REC REQUIRED  Post Medication Reconciliation Status: discharge medications reconciled, continue medications without change      Electronically signed by: CHINMAY Drew CNP             Sincerely,        CHINMAY Drew CNP

## 2024-02-06 ENCOUNTER — PATIENT OUTREACH (OUTPATIENT)
Dept: CARE COORDINATION | Facility: CLINIC | Age: 84
End: 2024-02-06
Payer: COMMERCIAL

## 2024-02-06 NOTE — PROGRESS NOTES
Clinic Care Coordination Contact  Care Coordination Transition Communication    Clinical Data: Clinical Data: Patient was hospitalized at [facility] from 1/2/24 to 1/5/24 with diagnosis of Abnormal LFTs  .     Assessment: Patient has transitioned to TCU/ARU for short term rehabilitation:    Facility Name: [facility]   Transition Communication:  Notified facility of Primary Care- Care Coordination support via Epic fax.    Per 2/1/24 TCU assessment/plan:   Fall  Developed rhabdomylisis. Does appear deconditioned so will need therapies for strengthening. Will need to assess her care level as she may benefit from moving into LTC vs AL for more support.   --ongoing therapies for strengthening.   --Plan is to move home with her son until her MME is approved and we will then look into [facility]    Plan: Care Coordinator will await notification from facility staff informing of patient's discharge plans/needs. Care Coordinator will review chart and outreach to facility staff every 4 weeks and as needed.     Merry Wilcox RN, BSN, PHN Care Coordinator  [facility]   Phone: [phone]

## 2024-02-07 ENCOUNTER — LAB REQUISITION (OUTPATIENT)
Dept: LAB | Facility: CLINIC | Age: 84
End: 2024-02-07
Payer: MEDICARE

## 2024-02-07 DIAGNOSIS — I16.0 HYPERTENSIVE URGENCY: ICD-10-CM

## 2024-02-08 ENCOUNTER — DISCHARGE SUMMARY NURSING HOME (OUTPATIENT)
Dept: GERIATRICS | Facility: CLINIC | Age: 84
End: 2024-02-08
Payer: COMMERCIAL

## 2024-02-08 VITALS
TEMPERATURE: 98 F | BODY MASS INDEX: 18.96 KG/M2 | OXYGEN SATURATION: 96 % | SYSTOLIC BLOOD PRESSURE: 131 MMHG | HEIGHT: 66 IN | WEIGHT: 118 LBS | RESPIRATION RATE: 18 BRPM | DIASTOLIC BLOOD PRESSURE: 70 MMHG | HEART RATE: 70 BPM
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
## 2024-02-08 DIAGNOSIS — K86.1 CHRONIC PANCREATITIS, UNSPECIFIED PANCREATITIS TYPE (H): ICD-10-CM

## 2024-02-08 DIAGNOSIS — F03.B0 MODERATE DEMENTIA WITHOUT BEHAVIORAL DISTURBANCE, PSYCHOTIC DISTURBANCE, MOOD DISTURBANCE, OR ANXIETY, UNSPECIFIED DEMENTIA TYPE (H): Primary | ICD-10-CM

## 2024-02-08 DIAGNOSIS — R53.81 PHYSICAL DECONDITIONING: ICD-10-CM

## 2024-02-08 DIAGNOSIS — E22.2 SIADH (SYNDROME OF INAPPROPRIATE ADH PRODUCTION) (H): ICD-10-CM

## 2024-02-08 DIAGNOSIS — I10 ESSENTIAL HYPERTENSION, BENIGN: ICD-10-CM

## 2024-02-08 LAB
ANION GAP SERPL CALCULATED.3IONS-SCNC: 10 MMOL/L (ref 7–15)
BUN SERPL-MCNC: 9.7 MG/DL (ref 8–23)
CALCIUM SERPL-MCNC: 9.7 MG/DL (ref 8.8–10.2)
CHLORIDE SERPL-SCNC: 98 MMOL/L (ref 98–107)
CREAT SERPL-MCNC: 0.6 MG/DL (ref 0.51–0.95)
DEPRECATED HCO3 PLAS-SCNC: 26 MMOL/L (ref 22–29)
EGFRCR SERPLBLD CKD-EPI 2021: 87 ML/MIN/1.73M2
GLUCOSE SERPL-MCNC: 69 MG/DL (ref 70–99)
POTASSIUM SERPL-SCNC: 3.6 MMOL/L (ref 3.4–5.3)
SODIUM SERPL-SCNC: 134 MMOL/L (ref 135–145)

## 2024-02-08 PROCEDURE — 80048 BASIC METABOLIC PNL TOTAL CA: CPT | Mod: ORL | Performed by: NURSE PRACTITIONER

## 2024-02-08 PROCEDURE — 36415 COLL VENOUS BLD VENIPUNCTURE: CPT | Mod: ORL | Performed by: NURSE PRACTITIONER

## 2024-02-08 PROCEDURE — 99316 NF DSCHRG MGMT 30 MIN+: CPT | Performed by: NURSE PRACTITIONER

## 2024-02-08 PROCEDURE — P9604 ONE-WAY ALLOW PRORATED TRIP: HCPCS | Mod: ORL | Performed by: NURSE PRACTITIONER

## 2024-02-08 NOTE — PROGRESS NOTES
[facility] GERIATRICS DISCHARGE SUMMARY  PATIENT'S NAME: Anna Marie Babb  YOB: 1940  MEDICAL RECORD NUMBER:  [id number]  Place of Service where encounter took place:  [facility] (TCU) [[id number]]    PRIMARY CARE PROVIDER AND CLINIC RESPONSIBLE AFTER TRANSFER:   [place], [id number] / [place] [address]    Nursing Home: [facility]     Transferring providers: CHINMAY Drew CNP, Nehemiah Aguilera MD  Recent Hospitalization/ED:  Hospital  [facility] stay 1/2/2024 to 1/5/2024.  Date of SNF Admission:  1/5/2024  Date of SNF (anticipated) Discharge:  2/12/24  Discharged to: previous independent home with son  Cognitive Scores:  CPT 5.4  Physical Function: ambulating 80 feet with walker, min A with stairs.   DME: No new DME needed    CODE STATUS/ADVANCE DIRECTIVES DISCUSSION:  Full Code   ALLERGIES: Amoxicillin, Amoxicillin-pot clavulanate, Ciprofloxacin, and Gluten meal    NURSING FACILITY COURSE   Summary of nursing facility stay:   Anna Marie Babb  is a 83 year old  (1940) female with a medical history of HTN, HLP, GERD, Hiatal hernia, MDD with anxiety, Neuropathy, Hypercalcemia, Known pulmonary nodule. She presented to the ED on 1/2/24 after a fall and being down at home. She had rhabdomyolysis and electrolyte abnormalities.  Hospital stay was uneventful.  She was admitted to the above facility from  [facility]. Hospital stay 1/2/24 through 1/5/24.      Fall  Developed rhabdomylisis. Does appear deconditioned so will need therapies for strengthening. Will need to assess her care level as she may benefit from moving into LTC vs AL for more support.   --ongoing therapies for strengthening.   --Plan is to move home with her son until her MA is approved and we will then look into skilled nursing     Chronic pancreatitis  Hospitalized in November for abdominal pain. CT A/P with new pancreatic ductal  dilatation, LFTs with mildly elevated alk phos otherwise within normal limits.  Lipase was normal.  MRCP with pancreatic atrophy and moderate inflammation consistent with chronic pancreatitis, no obstructing stone.  Patient underwent EGD which indicated gastritis, endoscopic ultrasound revealed pancreatic atrophy and prominent pancreatic duct and minimally dilated biliary system without obvious mass.  --Continues on soft diet as recommended by GI  --Continue Protonix     Hyponatremia, chronic  Sodium values ranging 133-135.    --follow clinically.      Hypertension  Chronic and stable with blood pressures SBPs 110-130s  -Continues on amlodipine 5 mg daily     Pulmonary nodule  CT chest revealed pulmonary nodule in the left lower lobe, previously noted on imaging going back to at least 2012.  --Follow-up with PCP after discharge from [facility] 15/30 while at [facility] September 2023. And 19/30 here at [facility]. CPT 5.4   -- Will likely be moving to [facility] in the future     Physical deconditioning  Secondary to recent hospitalization and underlying medical conditions.   --ongoing PT/OT for strengthening.     Discharge Medications:  MED REC REQUIRED  Post Medication Reconciliation Status: discharge medications reconciled, continue medications without change       Current Outpatient Medications   Medication Sig Dispense Refill    acetaminophen (TYLENOL) 325 MG tablet Take 2 tablets (650 mg) by mouth every 4 hours as needed for mild pain or other (and adjunct with moderate or severe pain or per patient request)      amLODIPine (NORVASC) 5 MG tablet Take 1 tablet (5 mg) by mouth every evening 90 tablet 3    Lidocaine (LIDOCARE) 4 % Patch Place 1 patch onto the skin every 24 hours To prevent lidocaine toxicity, patient should be patch free for 12 hrs daily. 30 patch 0    pantoprazole (PROTONIX) 40 MG EC tablet Take 1 tablet (40 mg) by mouth daily 30 tablet 1    vitamin D3 (CHOLECALCIFEROL) 50 mcg (2000  "units) tablet Take 1 tablet (50 mcg) by mouth daily 30 tablet       Controlled medications:   not applicable/none     Past Medical History:   Past Medical History:   Diagnosis Date    Atrophic vaginitis     Celiac disease     Cystocele     Disorder of bone and cartilage, unspecified     osteopenia    Endometriosis, site unspecified     Essential hypertension, benign     Gastro-oesophageal reflux disease     Hiatal hernia     3 cm sliding hiatal hernia    Hypercalcemia     Hyperlipidemia     HZV (herpes zoster virus) post herpetic neuralgia     Microscopic colitis     Nonspecific abnormal results of liver function study     Mildly elevated transaminases, liver biopsy WNL    Peripheral neuropathy     Pulmonary nodule     Zinc deficiency      Physical Exam:   Vitals: /70   Pulse 70   Temp 98  F (36.7  C)   Resp 18   Ht 1.676 m (5' 6\")   Wt 53.5 kg (118 lb)   LMP  (LMP Unknown)   SpO2 96%   BMI 19.05 kg/m    BMI: Body mass index is 19.05 kg/m .  GENERAL APPEARANCE:  Alert, in no distress, pleasant, cooperative  EYES: no discharge or mattering on lids or lashes noted  ENT:  moist mucous membranes, hearing acuity intact  NECK: supple, symmetrical  RESP: no respiratory distress, Lung sounds clear, patient is on room air  CV:  rate and rhythm regular, no murmur. Edema none in bilateral lower extremities. VASCULAR: warm extremities without open areas.  ABDOMEN: normal bowel sounds, soft, nontender.  M/S:   Gait and station: currently in recliner, will await therapy eval to determine status, no tenderness or swelling of the joints; able to move all extremities   SKIN:  Inspection and palpation of skin and subcutaneous tissue: skin warm, dry and intact without rashes  NEURO: no facial asymmetry, no speech deficits and able to follow directions, moves all extremities symmetrically  PSYCH:  insight, judgement, and memory impaired affect and mood normal    SNF labs:  CBC RESULTS:   Recent Labs   Lab Test " 01/03/24  0736 01/02/24  1249   WBC 7.5 10.1   RBC 2.95* 3.47*   HGB 10.4* 12.5   HCT 30.0* 35.5   * 102*   MCH 35.3* 36.0*   MCHC 34.7 35.2   RDW 14.0 13.7    280       Last Basic Metabolic Panel:  Recent Labs   Lab Test 02/08/24  0525 01/10/24  0834   * 134*   POTASSIUM 3.6 5.1   CHLORIDE 98 95*   KARTHIKEYAN 9.7 10.3*   CO2 26 31*   BUN 9.7 5.7*   CR 0.60 0.56   GLC 69* 83       Liver Function Studies -   Recent Labs   Lab Test 01/04/24  0735 01/03/24  0736   PROTTOTAL 5.9* 5.9*   ALBUMIN 3.3* 3.2*   BILITOTAL 1.2 1.4*   ALKPHOS 173* 155*   AST 53* 47*   ALT 47 40       TSH   Date Value Ref Range Status   08/12/2023 2.83 0.30 - 4.20 uIU/mL Final   11/02/2004 1.86 0.4 - 5.0 mU/L Final       DISCHARGE PLAN:  Follow up labs: per PCP  Medical Follow Up:      Follow up with primary care provider in 1-2 weeks  [facility] scheduled appointments:  Appointments in Next Year      Feb 08, 2024  8:00 AM  Discharge Summary with CHINMAY Barfield CNP  [facility] ([facility] ) [phone] Feb 19, 2024 11:30 AM  (Arrive by 11:25 AM)  ED/Hospital Follow Up with Melissa Fairbanks MD  [facility] ([facility] ) [phone]     Mar 14, 2024 11:00 AM  (Arrive by 10:40 AM)  MEDICARE ANNUAL WELLNESS VISIT with Melissa Fairbanks MD  [facility] ([facility] ) [phone]          Discharge Services: Home Care:  Occupational Therapy and Physical Therapy  Discharge Instructions Verbalized to Patient at Discharge:   None    TOTAL DISCHARGE TIME:   Greater than 30 minutes  Electronically signed by:  CHINMAY Drew CNP     Home care Face to Face documentation done in [facility] attached to Home care orders for [facility].         Documentation of Face-to-Face and Certification for Home Health Services     Patient: Anna Marie Babb   YOB: 1940  MR  Number: [id number]  Today's Date: 2/7/2024    I certify that patient: Anna Marie Babb is under my care and that I, or a nurse practitioner or physician's assistant working with me, had a face-to-face encounter that meets the physician face-to-face encounter requirements with this patient on: 2/8/2024.    This encounter with the patient was in whole, or in part, for the following medical condition, which is the primary reason for home health care: Dementia, recurrent falls.    I certify that, based on my findings, the following services are medically necessary home health services: Occupational Therapy and physical therapy    My clinical findings support the need for the above services because: Occupational Therapy Services are needed to assess and treat cognitive ability and address ADL safety due to impairment in upper body strength, please assess the need for DME in the home. and Physical Therapy Services are needed to assess and treat the following functional impairments: recurrent falls, lower body strengthning. .    Further, I certify that my clinical findings support that this patient is homebound (i.e. absences from home require considerable and taxing effort and are for medical reasons or [employer] services or infrequently or of short duration when for other reasons) because: Requires assistance of another person or specialized equipment to access medical services because patient: Is prone to wander/get lost without assistance...    Based on the above findings. I certify that this patient is confined to the home and needs intermittent skilled nursing care, physical therapy and/or speech therapy.  The patient is under my care, and I have initiated the establishment of the plan of care.  This patient will be followed by a physician who will periodically review the plan of care.  Physician/Provider to provide follow up care: Chema Ramos    Responsible Medicare certified PECOS Physician: Dr. Cerna  Willy  Physician Signature: See electronic signature associated with these discharge orders.  Date: 2/7/2024

## 2024-02-08 NOTE — LETTER
2/8/2024        RE: Anna Marie Babb  [address]  [place] [address]        [facility] GERIATRICS DISCHARGE SUMMARY  PATIENT'S NAME: Anna Marie Babb  YOB: 1940  MEDICAL RECORD NUMBER:  [id number]  Place of Service where encounter took place:  [facility] (TCU) [[id number]]    PRIMARY CARE PROVIDER AND CLINIC RESPONSIBLE AFTER TRANSFER:   [place], [address]  / [place] [address]    Nursing Home: [facility]     Transferring providers: Fauzia Turcios, CHINMAY [place], Nehemiah Aguilera MD  Recent Hospitalization/ED:  Hospital  [facility] stay 1/2/2024 to 1/5/2024.  Date of SNF Admission:  1/5/2024  Date of SNF (anticipated) Discharge:  2/12/24  Discharged to: previous independent home with son  Cognitive Scores:  CPT 5.4  Physical Function: ambulating 80 feet with walker, min A with stairs.   DME: No new DME needed    CODE STATUS/ADVANCE DIRECTIVES DISCUSSION:  Full Code   ALLERGIES: Amoxicillin, Amoxicillin-pot clavulanate, Ciprofloxacin, and Gluten meal    NURSING FACILITY COURSE   Summary of nursing facility stay:   Anna Marie Babb  is a 83 year old  (1940) female with a medical history of HTN, HLP, GERD, Hiatal hernia, MDD with anxiety, Neuropathy, Hypercalcemia, Known pulmonary nodule. She presented to the ED on 1/2/24 after a fall and being down at home. She had rhabdomyolysis and electrolyte abnormalities.  Hospital stay was uneventful.  She was admitted to the above facility from  [facility]. Hospital stay 1/2/24 through 1/5/24.      Fall  Developed rhabdomylisis. Does appear deconditioned so will need therapies for strengthening. Will need to assess her care level as she may benefit from moving into LTC vs AL for more support.   --ongoing therapies for strengthening.   --Plan is to move home with her son until her MA is approved and we will then look into [facility]     Chronic  pancreatitis  Hospitalized in November for abdominal pain. CT A/P with new pancreatic ductal dilatation, LFTs with mildly elevated alk phos otherwise within normal limits.  Lipase was normal.  MRCP with pancreatic atrophy and moderate inflammation consistent with chronic pancreatitis, no obstructing stone.  Patient underwent EGD which indicated gastritis, endoscopic ultrasound revealed pancreatic atrophy and prominent pancreatic duct and minimally dilated biliary system without obvious mass.  --Continues on soft diet as recommended by GI  --Continue Protonix     Hyponatremia, chronic  Sodium values ranging 133-135.    --follow clinically.      Hypertension  Chronic and stable with blood pressures SBPs 110-130s  -Continues on amlodipine 5 mg daily     Pulmonary nodule  CT chest revealed pulmonary nodule in the left lower lobe, previously noted on imaging going back to at least 2012.  --Follow-up with PCP after discharge from [facility] 15/30 while at [facility] September 2023. And 19/30 here at [facility]. CPT 5.4   -- Will likely be moving to [facility] in the future     Physical deconditioning  Secondary to recent hospitalization and underlying medical conditions.   --ongoing PT/OT for strengthening.     Discharge Medications:  MED REC REQUIRED  Post Medication Reconciliation Status: discharge medications reconciled, continue medications without change       Current Outpatient Medications   Medication Sig Dispense Refill     acetaminophen (TYLENOL) 325 MG tablet Take 2 tablets (650 mg) by mouth every 4 hours as needed for mild pain or other (and adjunct with moderate or severe pain or per patient request)       amLODIPine (NORVASC) 5 MG tablet Take 1 tablet (5 mg) by mouth every evening 90 tablet 3     Lidocaine (LIDOCARE) 4 % Patch Place 1 patch onto the skin every 24 hours To prevent lidocaine toxicity, patient should be patch free for 12 hrs daily. 30 patch 0     pantoprazole (PROTONIX) 40 MG EC tablet  "Take 1 tablet (40 mg) by mouth daily 30 tablet 1     vitamin D3 (CHOLECALCIFEROL) 50 mcg (2000 units) tablet Take 1 tablet (50 mcg) by mouth daily 30 tablet       Controlled medications:   not applicable/none     Past Medical History:   Past Medical History:   Diagnosis Date     Atrophic vaginitis      Celiac disease      Cystocele      Disorder of bone and cartilage, unspecified     osteopenia     Endometriosis, site unspecified      Essential hypertension, benign      Gastro-oesophageal reflux disease      Hiatal hernia     3 cm sliding hiatal hernia     Hypercalcemia      Hyperlipidemia      HZV (herpes zoster virus) post herpetic neuralgia      Microscopic colitis      Nonspecific abnormal results of liver function study     Mildly elevated transaminases, liver biopsy WNL     Peripheral neuropathy      Pulmonary nodule      Zinc deficiency      Physical Exam:   Vitals: /70   Pulse 70   Temp 98  F (36.7  C)   Resp 18   Ht 1.676 m (5' 6\")   Wt 53.5 kg (118 lb)   LMP  (LMP Unknown)   SpO2 96%   BMI 19.05 kg/m    BMI: Body mass index is 19.05 kg/m .  GENERAL APPEARANCE:  Alert, in no distress, pleasant, cooperative  EYES: no discharge or mattering on lids or lashes noted  ENT:  moist mucous membranes, hearing acuity intact  NECK: supple, symmetrical  RESP: no respiratory distress, Lung sounds clear, patient is on room air  CV:  rate and rhythm regular, no murmur. Edema none in bilateral lower extremities. VASCULAR: warm extremities without open areas.  ABDOMEN: normal bowel sounds, soft, nontender.  M/S:   Gait and station: currently in recliner, will await therapy eval to determine status, no tenderness or swelling of the joints; able to move all extremities   SKIN:  Inspection and palpation of skin and subcutaneous tissue: skin warm, dry and intact without rashes  NEURO: no facial asymmetry, no speech deficits and able to follow directions, moves all extremities symmetrically  PSYCH:  insight, " judgement, and memory impaired affect and mood normal    SNF labs:  CBC RESULTS:   Recent Labs   Lab Test 01/03/24  0736 01/02/24  1249   WBC 7.5 10.1   RBC 2.95* 3.47*   HGB 10.4* 12.5   HCT 30.0* 35.5   * 102*   MCH 35.3* 36.0*   MCHC 34.7 35.2   RDW 14.0 13.7    280       Last Basic Metabolic Panel:  Recent Labs   Lab Test 02/08/24  0525 01/10/24  0834   * 134*   POTASSIUM 3.6 5.1   CHLORIDE 98 95*   KARTHIKEYAN 9.7 10.3*   CO2 26 31*   BUN 9.7 5.7*   CR 0.60 0.56   GLC 69* 83       Liver Function Studies -   Recent Labs   Lab Test 01/04/24  0735 01/03/24  0736   PROTTOTAL 5.9* 5.9*   ALBUMIN 3.3* 3.2*   BILITOTAL 1.2 1.4*   ALKPHOS 173* 155*   AST 53* 47*   ALT 47 40       TSH   Date Value Ref Range Status   08/12/2023 2.83 0.30 - 4.20 uIU/mL Final   11/02/2004 1.86 0.4 - 5.0 mU/L Final       DISCHARGE PLAN:  Follow up labs: per PCP  Medical Follow Up:      Follow up with primary care provider in 1-2 weeks  [facility] scheduled appointments:  Appointments in Next Year      Feb 08, 2024  8:00 AM  Discharge Summary with CHINMAY Barfield CNP  [facility] ([facility] ) [phone] Feb 19, 2024 11:30 AM  (Arrive by 11:25 AM)  ED/Hospital Follow Up with Melissa Fairbanks MD  [facility] [phone]     Mar 14, 2024 11:00 AM  (Arrive by 10:40 AM)  MEDICARE ANNUAL WELLNESS VISIT with Melissa Fairbanks MD  [facility] [phone]          Discharge Services: Home Care:  Occupational Therapy and Physical Therapy  Discharge Instructions Verbalized to Patient at Discharge:   None    TOTAL DISCHARGE TIME:   Greater than 30 minutes  Electronically signed by:  CHINMAY Drew CNP     Home care Face to Face documentation done in EPIC attached to Home care orders for [facility].         Documentation of Face-to-Face and  Certification for Home Health Services     Patient: Anna Marie Babb   YOB: 1940  MR Number: [id number]  Today's Date: 2/7/2024    I certify that patient: Anna Marie Babb is under my care and that I, or a nurse practitioner or physician's assistant working with me, had a face-to-face encounter that meets the physician face-to-face encounter requirements with this patient on: 2/8/2024.    This encounter with the patient was in whole, or in part, for the following medical condition, which is the primary reason for home health care: Dementia, recurrent falls.    I certify that, based on my findings, the following services are medically necessary home health services: Occupational Therapy and physical therapy    My clinical findings support the need for the above services because: Occupational Therapy Services are needed to assess and treat cognitive ability and address ADL safety due to impairment in upper body strength, please assess the need for DME in the home. and Physical Therapy Services are needed to assess and treat the following functional impairments: recurrent falls, lower body strengthning. .    Further, I certify that my clinical findings support that this patient is homebound (i.e. absences from home require considerable and taxing effort and are for medical reasons or [facility] services or infrequently or of short duration when for other reasons) because: Requires assistance of another person or specialized equipment to access medical services because patient: Is prone to wander/get lost without assistance...    Based on the above findings. I certify that this patient is confined to the home and needs intermittent skilled nursing care, physical therapy and/or speech therapy.  The patient is under my care, and I have initiated the establishment of the plan of care.  This patient will be followed by a physician who will periodically review the plan of care.  Physician/Provider to  provide follow up care: Chema Ramos    Responsible Medicare certified PECOS Physician: Dr. Nehemiah Aguilera  Physician Signature: See electronic signature associated with these discharge orders.  Date: 2/7/2024                Sincerely,        CHINMAY Drew CNP

## 2024-02-15 ENCOUNTER — TELEPHONE (OUTPATIENT)
Dept: FAMILY MEDICINE | Facility: CLINIC | Age: 84
End: 2024-02-15
Payer: COMMERCIAL

## 2024-02-15 NOTE — TELEPHONE ENCOUNTER
Rosa calling from [facility] asking for ok to delay start of care to 2/18/24 due to staffing.   It is for skilled nursing, OT, PT, home health aid, and social work.     Ok to leave detailed voicemail for Rosa when calling back.     Anabel GOMEZN, RN

## 2024-02-16 ENCOUNTER — TELEPHONE (OUTPATIENT)
Dept: FAMILY MEDICINE | Facility: CLINIC | Age: 84
End: 2024-02-16
Payer: COMMERCIAL

## 2024-02-16 NOTE — TELEPHONE ENCOUNTER
Updated pt chart to reflect correct PCP: Melissa Fairbanks MD    Separate encounter entered 2/16/24 - [facility] - Change request - Due to staffing shortages unable to do home visit until 2/18/24.     Mairbel Booker on 2/16/2024 at 5:24 PM

## 2024-02-16 NOTE — TELEPHONE ENCOUNTER
General Call    Who is calling: Rosa, [facility] admin staff, [facility]    Patient's PCP:  Melissa Fairbanks MD  [facility]     Date of last appointment with provider: 9/7/24  Updated pt chart with PCP information.    Reason for Call:  Change to original orders.     Request:   Orders were rcvd for home care 2/14/24.   Due to staffing shortage unable to visit with patient until 2/18/24.    Action:   Watch for fax with revision.  Need change approved (signed and faxed back) to [facility] at [phone]    Maribel KENZIE Booker on 2/16/2024 at 5:16 PM

## 2024-02-19 ENCOUNTER — TELEPHONE (OUTPATIENT)
Dept: FAMILY MEDICINE | Facility: CLINIC | Age: 84
End: 2024-02-19

## 2024-02-19 NOTE — TELEPHONE ENCOUNTER
Home Care is calling regarding an established patient with [facility].       Requesting orders from: Melissa Fairbanks  Provider is following patient: Yes  Is this a 60-day recertification request?  No    Orders Requested    Skilled Nursing  Request for initial certification (first set of orders)   Frequency:  1x/wk for 1 wks  then 2x/wk for 2 wks  1 x wk for 3 wks  2 PRN    HHA (Home Health Aide)  Request for initial certification (first set of orders) for bathing.  Frequency:  1x/wk for 6 wks      Confirmed ok to leave a detailed message with call back.  Contact information confirmed and updated as needed.    Gm Rincon RN

## 2024-02-20 ENCOUNTER — TELEPHONE (OUTPATIENT)
Dept: FAMILY MEDICINE | Facility: CLINIC | Age: 84
End: 2024-02-20
Payer: COMMERCIAL

## 2024-02-20 NOTE — TELEPHONE ENCOUNTER
Home Care is calling regarding an established patient with [facility].       Requesting orders from: Melissa Fairbanks  Provider is following patient: Yes  Is this a 60-day recertification request?  No    Orders Requested    Physical Therapy  Request for initial certification (first set of orders)   Frequency: 1 more visit week  2x/wk for 2 wks  then 1x/wk for 1 wks    Information was gathered and will be sent to provider for review.  RN will contact Home Care with information after provider review.  Confirmed ok to leave a detailed message with call back.  Contact information confirmed and updated as needed.    Olga Lidia Kearney RN

## 2024-02-28 NOTE — TELEPHONE ENCOUNTER
Home Care is calling regarding an established patient with [facility].       Requesting orders from: Melissa Fairbanks  Provider is following patient: Yes  Is this a 60-day recertification request?  No    Orders Requested    Skilled Nursing  Request for discontinuation of care   Goals have been met/progressing.      Verbal orders given.  Home Care will send orders for provider to sign.  Confirmed ok to leave a detailed message with call back.  Contact information confirmed and updated as needed.    Ernestina Camacho RN

## 2024-03-06 ENCOUNTER — PATIENT OUTREACH (OUTPATIENT)
Dept: CARE COORDINATION | Facility: CLINIC | Age: 84
End: 2024-03-06
Payer: COMMERCIAL

## 2024-03-06 ENCOUNTER — DOCUMENTATION ONLY (OUTPATIENT)
Dept: GERIATRICS | Facility: CLINIC | Age: 84
End: 2024-03-06
Payer: COMMERCIAL

## 2024-03-06 NOTE — LETTER
[facility] CARE COORDINATION  [address]  [place] [address]   March 6, 2024    Anna Marie Babb  [address]  [place] [address]      Dear Anna Marie and ,    I am a clinic care coordinator who works with Melissa Fairbanks MD with the [facility].  Below is a description of clinic care coordination and how I can further assist you.       The clinic care coordination team is made up of a registered nurse, , financial resource worker and community health worker who understand the health care system. The goal of clinic care coordination is to help you manage your health and improve access to the health care system. Our team works alongside your provider to assist you in determining your health and social needs. We can help you obtain health care and community resources, providing you with necessary information and education. We can work with you through any barriers and develop a care plan that helps coordinate and strengthen the communication between you and your care team.  Our services are voluntary and are offered without charge to you personally.    Please feel free to contact Emiliana Francis our community health worker at [phone]? with any questions or concerns regarding care coordination and what we can offer.      We are focused on providing you with the highest-quality healthcare experience possible.    Sincerely,     Merry Wilcox RN, BSN, PHN Care Coordinator

## 2024-03-06 NOTE — PROGRESS NOTES
Clinic Care Coordination Contact  [facility]/Voicemail    Clinical Data: Care Coordinator Outreach    Outreach Documentation Number of Outreach Attempt   3/6/2024  11:23 AM 1       Left message on  Darius, patients son whom is on her signed consent to communicate  voicemail with call back information and requested return call.     Plan: Care Coordinator will send care coordination introduction letter with care coordinator contact information and explanation of care coordination services via [email or web address]. Care Coordinator will try to reach patient again in 1-2 business days.    Merry Wilcox RN, BSN, PHN Care Coordinator  [facility]   Phone: [phone]

## 2024-03-07 NOTE — PROGRESS NOTES
Clinic Care Coordination Contact  [facility]/Voicemail    Clinical Data: Care Coordinator Outreach    Outreach Documentation Number of Outreach Attempt   3/6/2024  11:23 AM 1   3/7/2024  10:51 AM 2       Left message on  Darius, patients son whom is on her signed consent to communicate  voicemail with call back information and requested return call.    Plan: Care Coordinator sent care coordination introduction letter on 3/6/24 via [employer]. Care Coordinator will do no further outreaches at this time.    Merry Wilcox RN, BSN, PHN Care Coordinator  [facility]   Phone: [phone]

## 2024-03-11 ENCOUNTER — TELEPHONE (OUTPATIENT)
Dept: FAMILY MEDICINE | Facility: CLINIC | Age: 84
End: 2024-03-11
Payer: COMMERCIAL

## 2024-03-11 NOTE — TELEPHONE ENCOUNTER
FYI - Status Update    Who is Calling: Karen formerly [facility]    Update: Discharging from homecare. Pt has meet all her goals. Confidential voicemail. Can leave a message phone [phone] for Karen from [facility].    Does caller want a call/response back: Yes     Could we send this information to you in [employer] or would you prefer to receive a phone call?:   Patient would prefer a phone call   Okay to leave a detailed message?: Yes at Other phone number:  *phone [phone]

## 2024-03-13 SDOH — HEALTH STABILITY: PHYSICAL HEALTH: ON AVERAGE, HOW MANY MINUTES DO YOU ENGAGE IN EXERCISE AT THIS LEVEL?: 0 MIN

## 2024-03-13 SDOH — HEALTH STABILITY: PHYSICAL HEALTH: ON AVERAGE, HOW MANY DAYS PER WEEK DO YOU ENGAGE IN MODERATE TO STRENUOUS EXERCISE (LIKE A BRISK WALK)?: 0 DAYS

## 2024-03-13 ASSESSMENT — PATIENT HEALTH QUESTIONNAIRE - PHQ9
SUM OF ALL RESPONSES TO PHQ QUESTIONS 1-9: 1
10. IF YOU CHECKED OFF ANY PROBLEMS, HOW DIFFICULT HAVE THESE PROBLEMS MADE IT FOR YOU TO DO YOUR WORK, TAKE CARE OF THINGS AT HOME, OR GET ALONG WITH OTHER PEOPLE: NOT DIFFICULT AT ALL
SUM OF ALL RESPONSES TO PHQ QUESTIONS 1-9: 1

## 2024-03-13 ASSESSMENT — SOCIAL DETERMINANTS OF HEALTH (SDOH): HOW OFTEN DO YOU GET TOGETHER WITH FRIENDS OR RELATIVES?: MORE THAN THREE TIMES A WEEK

## 2024-03-13 NOTE — COMMUNITY RESOURCES LIST (ENGLISH)
March 13, 2024           YOUR PERSONALIZED LIST OF SERVICES & PROGRAMS           & RECREATION    Sports      of the North - Sports clubs and recreational activities - [facility]  [address] [place] [address] (Distance: 1.4 miles)  Language: English  Fee: Self pay, Sliding scale      [employer] - LITTLE LEAGUE BASEBALL AND SOFTBALL  Website: http://www.ClassLinkague.org      [facility] - Adult Enrichment  Phone: (562) 652-8621  Website: https://[employer]/adults-seniors/adult-enrichment/  Language: English  Hours: Mon 7:30 AM - 4:00 PM Tue 7:30 AM - 4:00 PM Wed 7:30 AM - 4:00 PM Thu 7:30 AM - 4:00 PM Fri 7:30 AM - 4:00 PM    Classes/Groups      Park & Recreation Board - Gym or workout facility - [place] [employer] [facility]  [address] [place] [address] (Distance: 6.6 miles)  Language: English  Fee: Free, Self pay  Accessibility: Ada accessible      Park & Recreation Board - Gym or workout facility - [place] [employer] [facility]  [address] [place] [address] (Distance: 5.1 miles)  Phone: [phone]) [phone]  Language: English, Yakut  Fee: Free, Self pay  Accessibility: Translation services      - Online and Local Fitness Classes  Phone: (919) 391-2745  Website: https://tools.[employer]/Search/OnlineClasses  Language: English  Fee: Free               IMPORTANT NUMBERS & WEBSITES        Emergency Services  911  .   United Way  211 http://211unitedway.org  .   Poison Control  (419) 255-4180 http://[place]poison.org http://wis[place]poison.org  .     Suicide and Crisis Lifeline  988 http://988lifeline.org  .   Childhelp National Child Abuse Hotline  105.221.1320 http://Childhelphotline.org   .   National Sexual Assault Hotline  (484) 240-2741 (HOPE) http://Rainn.org   .     National Runaway Safeline  (826) 342-6593 (RUNAWAY) http://[facility].org  .   Pregnancy &  Postpartum Support  Call/text 593-633-0264  [place]: http://ppsupportmn.org  [place]: http://psichapters.com/wi  .   Substance Abuse National Helpline ([facility])  990-854-HELP (2543) http://Findtreatment.gov   .                DISCLAIMER: [employer]  does not endorse any service providers mentioned in this resource list. [employer] does not guarantee that the services mentioned in this resource list will be available to you or will improve your health or wellness.    [facility]

## 2024-03-14 ENCOUNTER — OFFICE VISIT (OUTPATIENT)
Dept: FAMILY MEDICINE | Facility: CLINIC | Age: 84
End: 2024-03-14
Payer: COMMERCIAL

## 2024-03-14 VITALS
SYSTOLIC BLOOD PRESSURE: 123 MMHG | TEMPERATURE: 97.7 F | WEIGHT: 117 LBS | HEIGHT: 66 IN | DIASTOLIC BLOOD PRESSURE: 78 MMHG | OXYGEN SATURATION: 99 % | HEART RATE: 87 BPM | RESPIRATION RATE: 16 BRPM | BODY MASS INDEX: 18.8 KG/M2

## 2024-03-14 DIAGNOSIS — Z00.00 ENCOUNTER FOR MEDICARE ANNUAL WELLNESS EXAM: Primary | ICD-10-CM

## 2024-03-14 DIAGNOSIS — K29.70 GASTRITIS WITHOUT BLEEDING, UNSPECIFIED CHRONICITY, UNSPECIFIED GASTRITIS TYPE: ICD-10-CM

## 2024-03-14 PROCEDURE — 99213 OFFICE O/P EST LOW 20 MIN: CPT | Mod: 25 | Performed by: INTERNAL MEDICINE

## 2024-03-14 PROCEDURE — G0438 PPPS, INITIAL VISIT: HCPCS | Performed by: INTERNAL MEDICINE

## 2024-03-14 RX ORDER — RESPIRATORY SYNCYTIAL VIRUS VACCINE 120MCG/0.5
0.5 KIT INTRAMUSCULAR ONCE
Qty: 1 EACH | Refills: 0 | Status: CANCELLED | OUTPATIENT
Start: 2024-03-14 | End: 2024-03-14

## 2024-03-14 RX ORDER — PANTOPRAZOLE SODIUM 40 MG/1
40 TABLET, DELAYED RELEASE ORAL DAILY
Qty: 90 TABLET | Refills: 1 | Status: SHIPPED | OUTPATIENT
Start: 2024-03-14

## 2024-03-14 ASSESSMENT — PAIN SCALES - GENERAL: PAINLEVEL: NO PAIN (0)

## 2024-03-14 NOTE — PROGRESS NOTES
Preventive Care Visit  [facility]  Melissa Fairbanks MD, Internal Medicine  Mar 14, 2024      Assessment & Plan     Gastritis without bleeding, unspecified chronicity, unspecified gastritis type  - stable  - medication refilled today for pantoprazole (PROTONIX) 40 MG EC tablet  Dispense: 90 tablet; Refill: 1    Encounter for Medicare annual wellness exam  - REVIEW OF HEALTH MAINTENANCE PROTOCOL ORDERS      Patient has been advised of split billing requirements and indicates understanding: Yes        Counseling  Appropriate preventive services were discussed with this patient, including applicable screening as appropriate for fall prevention, nutrition, physical activity, Tobacco-use cessation, weight loss and cognition.  Checklist reviewing preventive services available has been given to the patient.  Reviewed patient's diet, addressing concerns and/or questions.   The patient was instructed to see the dentist every 6 months.       FUTURE APPOINTMENTS:       - Follow-up visit in 1 year    Tiffanie Escobar is a 84 year old, presenting for the following:      Health Care Directive  Patient does not have a Health Care Directive or Living Will: Patient states has Advance Directive and will bring in a copy to clinic.    HPI        3/13/2024   General Health   How would you rate your overall physical health? (!) FAIR   Feel stress (tense, anxious, or unable to sleep) Not at all         3/13/2024   Nutrition   Diet: Gluten-free/reduced         3/13/2024   Exercise   Days per week of moderate/strenous exercise 0 days   Average minutes spent exercising at this level 0 min   (!) EXERCISE CONCERN      3/13/2024   Social Factors   Frequency of gathering with friends or relatives More than three times a week   Worry food won't last until get money to buy more No   Food not last or not have enough money for food? No   Do you have housing?  Yes   Are you worried about losing your housing? No   Lack of  transportation? No   Unable to get utilities (heat,electricity)? No         3/14/2024   Fall Risk   Gait Speed Test (Document in seconds) 9.4   Gait Speed Test Interpretation Greater than 5.01 seconds - ABNORMAL          3/13/2024   Activities of Daily Living- Home Safety   Needs help with the following daily activites None of the above   Safety concerns in the home None of the above         3/13/2024   Dental   Dentist two times every year? (!) NO         3/13/2024   Hearing Screening   Hearing concerns? None of the above         3/13/2024   Driving Risk Screening   Patient/family members have concerns about driving No         3/13/2024   General Alertness/Fatigue Screening   Have you been more tired than usual lately? No         3/13/2024   Urinary Incontinence Screening   Bothered by leaking urine in past 6 months No          Today's PHQ-9 Score:       3/13/2024     4:34 PM   PHQ-9 SCORE   PHQ-9 Total Score MyChart 1 (Minimal depression)   PHQ-9 Total Score 1         3/13/2024   Substance Use   Alcohol more than 3/day or more than 7/wk No   Do you have a current opioid prescription? No   How severe/bad is pain from 1 to 10? 8/10   Do you use any other substances recreationally? No     Social History     Tobacco Use    Smoking status: Never     Passive exposure: Yes    Tobacco comments:      smokes   Vaping Use    Vaping Use: Never used   Substance Use Topics    Alcohol use: Yes     Comment: 3-4 glasses per week    Drug use: Never        Mammogram Screening - After age 74- determine frequency with patient based on health status, life expectancy and patient goals    Reviewed and updated as needed this visit by Provider                    Past Medical History:   Diagnosis Date    Atrophic vaginitis     Celiac disease     Cystocele     Disorder of bone and cartilage, unspecified     osteopenia    Endometriosis, site unspecified     Essential hypertension, benign     Gastro-oesophageal reflux disease     Hiatal  "hernia     3 cm sliding hiatal hernia    Hypercalcemia     Hyperlipidemia     HZV (herpes zoster virus) post herpetic neuralgia     Microscopic colitis     Nonspecific abnormal results of liver function study     Mildly elevated transaminases, liver biopsy WNL    Peripheral neuropathy     Pulmonary nodule     Zinc deficiency      Current providers sharing in care for this patient include:  Patient Care Team:  Melissa Fairbanks MD as PCP - General (Internal Medicine)  Melissa Fairbanks MD as Assigned PCP    The following health maintenance items are reviewed in Epic and correct as of today:  Health Maintenance   Topic Date Due    ANNUAL REVIEW OF HM ORDERS  Never done    DEPRESSION ACTION PLAN  Never done    COLORECTAL CANCER SCREENING  Never done    RSV VACCINE (Pregnancy & 60+) (1 - 1-dose 60+ series) Never done    LIPID  12/12/2006    MAMMO SCREENING  12/11/2019    MEDICARE ANNUAL WELLNESS VISIT  11/19/2021    DTAP/TDAP/TD IMMUNIZATION (2 - Td or Tdap) 12/04/2022    COVID-19 Vaccine (7 - 2023-24 season) 09/01/2023    PHQ-9  09/14/2024    FALL RISK ASSESSMENT  03/14/2025    ADVANCE CARE PLANNING  11/28/2028    INFLUENZA VACCINE  Completed    Pneumococcal Vaccine: 65+ Years  Completed    ZOSTER IMMUNIZATION  Completed    IPV IMMUNIZATION  Aged Out    HPV IMMUNIZATION  Aged Out    MENINGITIS IMMUNIZATION  Aged Out    RSV MONOCLONAL ANTIBODY  Aged Out         Review of Systems  Constitutional, HEENT, cardiovascular, pulmonary, GI, , musculoskeletal, neuro, skin, endocrine and psych systems are negative, except as otherwise noted.     Objective    Exam  /78 (BP Location: Right arm, Patient Position: Sitting, Cuff Size: Adult Regular)   Pulse 87   Temp 97.7  F (36.5  C) (Oral)   Resp 16   Ht 1.676 m (5' 6\")   Wt 53.1 kg (117 lb)   LMP  (LMP Unknown)   SpO2 99%   Breastfeeding No   BMI 18.88 kg/m     Estimated body mass index is 18.88 kg/m  as calculated from the following:    Height as of this " "encounter: 1.676 m (5' 6\").    Weight as of this encounter: 53.1 kg (117 lb).    Physical Exam  GENERAL: alert and no distress  EYES: Eyes grossly normal to inspection, conjunctivae and sclerae normal  HENT: ear canals and TM's normal, nose and mouth without ulcers or lesions  NECK: no asymmetry  RESP: lungs clear to auscultation  CV: regular rate and rhythm, normal S1 S2  ABDOMEN: soft, nontender, bowel sounds normal  MS: no gross musculoskeletal defects noted, no edema  SKIN: no suspicious lesions or rashes  NEURO: Normal strength and tone, mentation intact and speech normal  PSYCH: mentation appears normal, affect normal/bright        3/14/2024   Mini Cog   Clock Draw Score 0 Abnormal   3 Item Recall 0 objects recalled   Mini Cog Total Score 0       Vision Screen         Signed Electronically by: Melissa Fairbanks MD    Answers submitted by the patient for this visit:  Patient Health Questionnaire (Submitted on 3/13/2024)  If you checked off any problems, how difficult have these problems made it for you to do your work, take care of things at home, or get along with other people?: Not difficult at all  PHQ9 TOTAL SCORE: 1    "

## 2024-03-14 NOTE — TELEPHONE ENCOUNTER
Melissa Fairbanks MD Hollerich, Deborah, RN; P Cs Triage Im  Caller: Unspecified (3 days ago,  3:37 PM)  Approved    Left voice message for Karen discharge order approval.

## 2024-05-17 ENCOUNTER — DOCUMENTATION ONLY (OUTPATIENT)
Dept: OTHER | Facility: CLINIC | Age: 84
End: 2024-05-17
Payer: COMMERCIAL

## 2024-07-12 ENCOUNTER — TELEPHONE (OUTPATIENT)
Dept: FAMILY MEDICINE | Facility: CLINIC | Age: 84
End: 2024-07-12
Payer: COMMERCIAL

## 2024-07-12 NOTE — TELEPHONE ENCOUNTER
Pt's daughter Liv reports concern of patients declining health. Decreased eating and sleeping a lot. Family wants to know how to get home care services. Daughter denies acute symptoms.    Triage advised F2F visit to discuss HC orders. Future appt was scheduled. Daughter was offered appt today but family is unable to set up appt today.

## 2024-07-12 NOTE — TELEPHONE ENCOUNTER
Attempt #1: Left Message    Writer left message for patient requesting that they return call to discuss message below:      Please triage:

## 2024-07-15 ENCOUNTER — OFFICE VISIT (OUTPATIENT)
Dept: FAMILY MEDICINE | Facility: CLINIC | Age: 84
End: 2024-07-15
Payer: COMMERCIAL

## 2024-07-15 VITALS
HEIGHT: 66 IN | OXYGEN SATURATION: 97 % | DIASTOLIC BLOOD PRESSURE: 91 MMHG | SYSTOLIC BLOOD PRESSURE: 154 MMHG | HEART RATE: 100 BPM | TEMPERATURE: 97.5 F | RESPIRATION RATE: 16 BRPM | BODY MASS INDEX: 17.31 KG/M2 | WEIGHT: 107.7 LBS
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
## 2024-07-15 DIAGNOSIS — R68.89 FORGETFULNESS: ICD-10-CM

## 2024-07-15 DIAGNOSIS — R63.4 WEIGHT LOSS: ICD-10-CM

## 2024-07-15 DIAGNOSIS — M62.81 GENERALIZED MUSCLE WEAKNESS: Primary | ICD-10-CM

## 2024-07-15 PROCEDURE — 99213 OFFICE O/P EST LOW 20 MIN: CPT | Performed by: NURSE PRACTITIONER

## 2024-07-15 NOTE — PROGRESS NOTES
"  Assessment & Plan     Generalized muscle weakness  Pt has been getting more weak with difficulty with ADLs like showering and getting her food. She has also had some weight loss and a couple falls. She would benefit from home PT and OT for evaluation and treatment to prevent further falls, weight loss and improvement with ADLs   - Home Care Referral    Weight loss  - Home Care Referral    Fall, initial encounter  - Home Care Referral    Forgetfulness  Request eval by OT for declining memory  - Home Care Referral        Subjective   Luz is a 84 year old, presenting for the following health issues:  No chief complaint on file.    History of Present Illness       Reason for visit:  Home health assistance    She eats 0-1 servings of fruits and vegetables daily.She consumes 1 sweetened beverage(s) daily.She exercises with enough effort to increase her heart rate 9 or less minutes per day.  She exercises with enough effort to increase her heart rate 3 or less days per week. She is missing 3 dose(s) of medications per week.  She is not taking prescribed medications regularly due to remembering to take.     Losing weight and getting more weak, shaky   ADLs are more difficult   Lives with her son in her home   Will only eat when it is brought to her   No pain   Fell a couple times in her home. She doesn't remember this.   Daughter adds she has been more forgetful but pt denies   Has assessment for assisted living this week     Just had a detatched retina.   Also working with dentist.       Review of Systems  Detailed as above         Objective    BP (!) 154/91 (BP Location: Left arm, Patient Position: Sitting, Cuff Size: Adult Small)   Pulse 100   Temp 97.5  F (36.4  C)   Resp 16   Ht 1.676 m (5' 6\")   Wt 48.9 kg (107 lb 11.2 oz)   LMP  (LMP Unknown)   SpO2 97%   BMI 17.38 kg/m    There is no height or weight on file to calculate BMI.  Physical Exam  Constitutional:       Appearance: Normal appearance.      " Comments: Thin    Pulmonary:      Effort: Pulmonary effort is normal.   Neurological:      Mental Status: She is alert.      Comments: Uses 4WW    Psychiatric:         Mood and Affect: Mood normal.              Signed Electronically by: CHINMAY Williamson CNP

## 2024-07-19 ENCOUNTER — TELEPHONE (OUTPATIENT)
Dept: FAMILY MEDICINE | Facility: CLINIC | Age: 84
End: 2024-07-19
Payer: COMMERCIAL

## 2024-07-19 NOTE — TELEPHONE ENCOUNTER
Home Care is calling regarding an established patient with [facility].       Requesting orders from: Melissa Fairbanks  Provider is following patient: Yes  Is this a 60-day recertification request?  No    Orders Requested    HHA (Home Health Aide)  Request for initial certification (first set of orders)   Frequency:  1x/wk for 10 wks  Assist with bathing/showering    Information was gathered and will be sent to provider for review.  RN will contact Home Care with information after provider review.  Confirmed ok to leave a detailed message with call back.  Contact information confirmed and updated as needed.    Zo Escobedo RN

## 2024-07-19 NOTE — TELEPHONE ENCOUNTER
Called and left message for Senait with the provider's response. Advised Senait to call the clinic back with any additional questions and/or concerns.     Zo Escobedo RN

## 2024-07-22 ENCOUNTER — MYC MEDICAL ADVICE (OUTPATIENT)
Dept: FAMILY MEDICINE | Facility: CLINIC | Age: 84
End: 2024-07-22
Payer: COMMERCIAL

## 2024-07-31 ENCOUNTER — MEDICAL CORRESPONDENCE (OUTPATIENT)
Dept: HEALTH INFORMATION MANAGEMENT | Facility: CLINIC | Age: 84
End: 2024-07-31

## 2024-07-31 DIAGNOSIS — Z53.9 DIAGNOSIS NOT YET DEFINED: Primary | ICD-10-CM

## 2024-07-31 PROCEDURE — G0180 MD CERTIFICATION HHA PATIENT: HCPCS | Performed by: INTERNAL MEDICINE

## 2024-08-01 ENCOUNTER — MEDICAL CORRESPONDENCE (OUTPATIENT)
Dept: HEALTH INFORMATION MANAGEMENT | Facility: CLINIC | Age: 84
End: 2024-08-01
Payer: COMMERCIAL

## 2024-08-11 ENCOUNTER — APPOINTMENT (OUTPATIENT)
Dept: CT IMAGING | Facility: CLINIC | Age: 84
DRG: 956 | End: 2024-08-11
Attending: EMERGENCY MEDICINE
Payer: MEDICARE

## 2024-08-11 ENCOUNTER — HOSPITAL ENCOUNTER (INPATIENT)
Facility: CLINIC | Age: 84
LOS: 4 days | Discharge: NURSING, REHAB OR LONG-TERM CARE | DRG: 956 | End: 2024-08-15
Attending: EMERGENCY MEDICINE | Admitting: INTERNAL MEDICINE
Payer: MEDICARE

## 2024-08-11 ENCOUNTER — APPOINTMENT (OUTPATIENT)
Dept: GENERAL RADIOLOGY | Facility: CLINIC | Age: 84
DRG: 956 | End: 2024-08-11
Attending: EMERGENCY MEDICINE
Payer: MEDICARE

## 2024-08-11 ENCOUNTER — APPOINTMENT (OUTPATIENT)
Dept: ULTRASOUND IMAGING | Facility: CLINIC | Age: 84
DRG: 956 | End: 2024-08-11
Attending: EMERGENCY MEDICINE
Payer: MEDICARE

## 2024-08-11 DIAGNOSIS — S72.002A HIP FRACTURE, LEFT, CLOSED, INITIAL ENCOUNTER (H): ICD-10-CM

## 2024-08-11 DIAGNOSIS — E83.42 HYPOMAGNESEMIA: ICD-10-CM

## 2024-08-11 DIAGNOSIS — R74.8 ELEVATED CK: ICD-10-CM

## 2024-08-11 DIAGNOSIS — S72.002A CLOSED FRACTURE OF NECK OF LEFT FEMUR, INITIAL ENCOUNTER (H): Primary | ICD-10-CM

## 2024-08-11 DIAGNOSIS — R79.89 ABNORMAL LFTS: ICD-10-CM

## 2024-08-11 DIAGNOSIS — S72.90XA FEMUR FRACTURE (H): ICD-10-CM

## 2024-08-11 PROBLEM — K21.9 GASTROESOPHAGEAL REFLUX DISEASE WITHOUT ESOPHAGITIS: Status: ACTIVE | Noted: 2024-08-11

## 2024-08-11 PROBLEM — R91.1 LUNG NODULE: Status: ACTIVE | Noted: 2024-08-11

## 2024-08-11 LAB
ABO/RH(D): NORMAL
ALBUMIN SERPL BCG-MCNC: 4.2 G/DL (ref 3.5–5.2)
ALP SERPL-CCNC: 237 U/L (ref 40–150)
ALT SERPL W P-5'-P-CCNC: 29 U/L (ref 0–50)
ANION GAP SERPL CALCULATED.3IONS-SCNC: 18 MMOL/L (ref 7–15)
ANTIBODY SCREEN: NEGATIVE
AST SERPL W P-5'-P-CCNC: 46 U/L (ref 0–45)
BASOPHILS # BLD AUTO: 0 10E3/UL (ref 0–0.2)
BASOPHILS NFR BLD AUTO: 0 %
BILIRUB SERPL-MCNC: 2.2 MG/DL
BUN SERPL-MCNC: 11.5 MG/DL (ref 8–23)
CALCIUM SERPL-MCNC: 9.6 MG/DL (ref 8.8–10.4)
CHLORIDE SERPL-SCNC: 89 MMOL/L (ref 98–107)
CK SERPL-CCNC: 486 U/L (ref 26–192)
CREAT SERPL-MCNC: 0.78 MG/DL (ref 0.51–0.95)
EGFRCR SERPLBLD CKD-EPI 2021: 74 ML/MIN/1.73M2
EOSINOPHIL # BLD AUTO: 0 10E3/UL (ref 0–0.7)
EOSINOPHIL NFR BLD AUTO: 0 %
ERYTHROCYTE [DISTWIDTH] IN BLOOD BY AUTOMATED COUNT: 15.2 % (ref 10–15)
FLUAV RNA SPEC QL NAA+PROBE: NEGATIVE
FLUBV RNA RESP QL NAA+PROBE: NEGATIVE
GLUCOSE SERPL-MCNC: 131 MG/DL (ref 70–99)
HCO3 SERPL-SCNC: 24 MMOL/L (ref 22–29)
HCT VFR BLD AUTO: 39.8 % (ref 35–47)
HGB BLD-MCNC: 14.3 G/DL (ref 11.7–15.7)
IMM GRANULOCYTES # BLD: 0.1 10E3/UL
IMM GRANULOCYTES NFR BLD: 1 %
LYMPHOCYTES # BLD AUTO: 0.8 10E3/UL (ref 0.8–5.3)
LYMPHOCYTES NFR BLD AUTO: 7 %
MCH RBC QN AUTO: 34.9 PG (ref 26.5–33)
MCHC RBC AUTO-ENTMCNC: 35.9 G/DL (ref 31.5–36.5)
MCV RBC AUTO: 97 FL (ref 78–100)
MONOCYTES # BLD AUTO: 0.7 10E3/UL (ref 0–1.3)
MONOCYTES NFR BLD AUTO: 7 %
NEUTROPHILS # BLD AUTO: 9.2 10E3/UL (ref 1.6–8.3)
NEUTROPHILS NFR BLD AUTO: 85 %
NRBC # BLD AUTO: 0 10E3/UL
NRBC BLD AUTO-RTO: 0 /100
PLATELET # BLD AUTO: 257 10E3/UL (ref 150–450)
POTASSIUM SERPL-SCNC: 3.9 MMOL/L (ref 3.4–5.3)
PROT SERPL-MCNC: 7.3 G/DL (ref 6.4–8.3)
RBC # BLD AUTO: 4.1 10E6/UL (ref 3.8–5.2)
RSV RNA SPEC NAA+PROBE: NEGATIVE
SARS-COV-2 RNA RESP QL NAA+PROBE: NEGATIVE
SODIUM SERPL-SCNC: 131 MMOL/L (ref 135–145)
SPECIMEN EXPIRATION DATE: NORMAL
TROPONIN T SERPL HS-MCNC: 20 NG/L
TROPONIN T SERPL HS-MCNC: 24 NG/L
VIT D+METAB SERPL-MCNC: 58 NG/ML (ref 20–50)
WBC # BLD AUTO: 10.8 10E3/UL (ref 4–11)

## 2024-08-11 PROCEDURE — 36415 COLL VENOUS BLD VENIPUNCTURE: CPT | Performed by: EMERGENCY MEDICINE

## 2024-08-11 PROCEDURE — 99223 1ST HOSP IP/OBS HIGH 75: CPT | Performed by: INTERNAL MEDICINE

## 2024-08-11 PROCEDURE — 250N000013 HC RX MED GY IP 250 OP 250 PS 637: Performed by: INTERNAL MEDICINE

## 2024-08-11 PROCEDURE — 93005 ELECTROCARDIOGRAM TRACING: CPT

## 2024-08-11 PROCEDURE — 85025 COMPLETE CBC W/AUTO DIFF WBC: CPT | Performed by: EMERGENCY MEDICINE

## 2024-08-11 PROCEDURE — 82550 ASSAY OF CK (CPK): CPT | Performed by: EMERGENCY MEDICINE

## 2024-08-11 PROCEDURE — 86900 BLOOD TYPING SEROLOGIC ABO: CPT | Performed by: INTERNAL MEDICINE

## 2024-08-11 PROCEDURE — 84484 ASSAY OF TROPONIN QUANT: CPT | Performed by: INTERNAL MEDICINE

## 2024-08-11 PROCEDURE — 250N000013 HC RX MED GY IP 250 OP 250 PS 637: Performed by: STUDENT IN AN ORGANIZED HEALTH CARE EDUCATION/TRAINING PROGRAM

## 2024-08-11 PROCEDURE — 120N000001 HC R&B MED SURG/OB

## 2024-08-11 PROCEDURE — 250N000009 HC RX 250: Performed by: INTERNAL MEDICINE

## 2024-08-11 PROCEDURE — 82306 VITAMIN D 25 HYDROXY: CPT | Performed by: STUDENT IN AN ORGANIZED HEALTH CARE EDUCATION/TRAINING PROGRAM

## 2024-08-11 PROCEDURE — 73502 X-RAY EXAM HIP UNI 2-3 VIEWS: CPT

## 2024-08-11 PROCEDURE — 36415 COLL VENOUS BLD VENIPUNCTURE: CPT | Performed by: INTERNAL MEDICINE

## 2024-08-11 PROCEDURE — 99285 EMERGENCY DEPT VISIT HI MDM: CPT | Mod: 25

## 2024-08-11 PROCEDURE — 87637 SARSCOV2&INF A&B&RSV AMP PRB: CPT | Performed by: EMERGENCY MEDICINE

## 2024-08-11 PROCEDURE — 70450 CT HEAD/BRAIN W/O DYE: CPT | Mod: MG

## 2024-08-11 PROCEDURE — 80053 COMPREHEN METABOLIC PANEL: CPT | Performed by: EMERGENCY MEDICINE

## 2024-08-11 PROCEDURE — 84484 ASSAY OF TROPONIN QUANT: CPT | Performed by: EMERGENCY MEDICINE

## 2024-08-11 PROCEDURE — 71045 X-RAY EXAM CHEST 1 VIEW: CPT

## 2024-08-11 PROCEDURE — 76705 ECHO EXAM OF ABDOMEN: CPT

## 2024-08-11 RX ORDER — HYDRALAZINE HYDROCHLORIDE 10 MG/1
10 TABLET, FILM COATED ORAL EVERY 4 HOURS PRN
Status: DISCONTINUED | OUTPATIENT
Start: 2024-08-11 | End: 2024-08-15 | Stop reason: HOSPADM

## 2024-08-11 RX ORDER — CALCIUM CARBONATE 500 MG/1
1000 TABLET, CHEWABLE ORAL 4 TIMES DAILY PRN
Status: DISCONTINUED | OUTPATIENT
Start: 2024-08-11 | End: 2024-08-15 | Stop reason: HOSPADM

## 2024-08-11 RX ORDER — AMOXICILLIN 250 MG
1 CAPSULE ORAL 2 TIMES DAILY
Status: DISCONTINUED | OUTPATIENT
Start: 2024-08-11 | End: 2024-08-12

## 2024-08-11 RX ORDER — AMLODIPINE BESYLATE 5 MG/1
5 TABLET ORAL EVERY EVENING
Status: DISCONTINUED | OUTPATIENT
Start: 2024-08-11 | End: 2024-08-15 | Stop reason: HOSPADM

## 2024-08-11 RX ORDER — ACETAMINOPHEN 325 MG/1
650 TABLET ORAL EVERY 4 HOURS PRN
Status: DISCONTINUED | OUTPATIENT
Start: 2024-08-14 | End: 2024-08-12

## 2024-08-11 RX ORDER — HYDRALAZINE HYDROCHLORIDE 20 MG/ML
10 INJECTION INTRAMUSCULAR; INTRAVENOUS EVERY 4 HOURS PRN
Status: DISCONTINUED | OUTPATIENT
Start: 2024-08-11 | End: 2024-08-15 | Stop reason: HOSPADM

## 2024-08-11 RX ORDER — HYDROMORPHONE HCL IN WATER/PF 6 MG/30 ML
0.2 PATIENT CONTROLLED ANALGESIA SYRINGE INTRAVENOUS
Status: DISCONTINUED | OUTPATIENT
Start: 2024-08-11 | End: 2024-08-12

## 2024-08-11 RX ORDER — HYDROMORPHONE HCL IN WATER/PF 6 MG/30 ML
0.1 PATIENT CONTROLLED ANALGESIA SYRINGE INTRAVENOUS
Status: DISCONTINUED | OUTPATIENT
Start: 2024-08-11 | End: 2024-08-12

## 2024-08-11 RX ORDER — ACETAMINOPHEN 325 MG/1
975 TABLET ORAL EVERY 8 HOURS PRN
Status: DISCONTINUED | OUTPATIENT
Start: 2024-08-11 | End: 2024-08-11

## 2024-08-11 RX ORDER — AMOXICILLIN 250 MG
1 CAPSULE ORAL 2 TIMES DAILY PRN
Status: DISCONTINUED | OUTPATIENT
Start: 2024-08-11 | End: 2024-08-15 | Stop reason: HOSPADM

## 2024-08-11 RX ORDER — PANTOPRAZOLE SODIUM 40 MG/1
40 TABLET, DELAYED RELEASE ORAL DAILY
Status: DISCONTINUED | OUTPATIENT
Start: 2024-08-11 | End: 2024-08-15 | Stop reason: HOSPADM

## 2024-08-11 RX ORDER — PREDNISOLONE ACETATE 10 MG/ML
1 SUSPENSION/ DROPS OPHTHALMIC 4 TIMES DAILY
COMMUNITY

## 2024-08-11 RX ORDER — NALOXONE HYDROCHLORIDE 0.4 MG/ML
0.2 INJECTION, SOLUTION INTRAMUSCULAR; INTRAVENOUS; SUBCUTANEOUS
Status: DISCONTINUED | OUTPATIENT
Start: 2024-08-11 | End: 2024-08-15 | Stop reason: HOSPADM

## 2024-08-11 RX ORDER — METHOCARBAMOL 500 MG/1
500 TABLET, FILM COATED ORAL 3 TIMES DAILY PRN
Status: DISCONTINUED | OUTPATIENT
Start: 2024-08-11 | End: 2024-08-15 | Stop reason: HOSPADM

## 2024-08-11 RX ORDER — LIDOCAINE 40 MG/G
CREAM TOPICAL
Status: DISCONTINUED | OUTPATIENT
Start: 2024-08-11 | End: 2024-08-15 | Stop reason: HOSPADM

## 2024-08-11 RX ORDER — ACETAMINOPHEN 325 MG/1
975 TABLET ORAL EVERY 8 HOURS
Status: DISCONTINUED | OUTPATIENT
Start: 2024-08-11 | End: 2024-08-12

## 2024-08-11 RX ORDER — PREDNISOLONE ACETATE 10 MG/ML
1 SUSPENSION/ DROPS OPHTHALMIC 4 TIMES DAILY
Status: DISCONTINUED | OUTPATIENT
Start: 2024-08-11 | End: 2024-08-15 | Stop reason: HOSPADM

## 2024-08-11 RX ORDER — HYDROMORPHONE HYDROCHLORIDE 2 MG/1
2 TABLET ORAL EVERY 4 HOURS PRN
Status: DISCONTINUED | OUTPATIENT
Start: 2024-08-11 | End: 2024-08-12 | Stop reason: ALTCHOICE

## 2024-08-11 RX ORDER — PROCHLORPERAZINE 25 MG
12.5 SUPPOSITORY, RECTAL RECTAL EVERY 12 HOURS PRN
Status: DISCONTINUED | OUTPATIENT
Start: 2024-08-11 | End: 2024-08-12

## 2024-08-11 RX ORDER — ONDANSETRON 2 MG/ML
4 INJECTION INTRAMUSCULAR; INTRAVENOUS EVERY 6 HOURS PRN
Status: DISCONTINUED | OUTPATIENT
Start: 2024-08-11 | End: 2024-08-11

## 2024-08-11 RX ORDER — BRIMONIDINE TARTRATE 2 MG/ML
1 SOLUTION/ DROPS OPHTHALMIC 2 TIMES DAILY
Status: DISCONTINUED | OUTPATIENT
Start: 2024-08-11 | End: 2024-08-15 | Stop reason: HOSPADM

## 2024-08-11 RX ORDER — BRIMONIDINE TARTRATE 2 MG/ML
1 SOLUTION/ DROPS OPHTHALMIC 2 TIMES DAILY
COMMUNITY

## 2024-08-11 RX ORDER — NALOXONE HYDROCHLORIDE 0.4 MG/ML
0.4 INJECTION, SOLUTION INTRAMUSCULAR; INTRAVENOUS; SUBCUTANEOUS
Status: DISCONTINUED | OUTPATIENT
Start: 2024-08-11 | End: 2024-08-15 | Stop reason: HOSPADM

## 2024-08-11 RX ORDER — ONDANSETRON 4 MG/1
4 TABLET, ORALLY DISINTEGRATING ORAL EVERY 6 HOURS PRN
Status: DISCONTINUED | OUTPATIENT
Start: 2024-08-11 | End: 2024-08-11

## 2024-08-11 RX ORDER — HYDROMORPHONE HCL IN WATER/PF 6 MG/30 ML
0.2 PATIENT CONTROLLED ANALGESIA SYRINGE INTRAVENOUS EVERY 30 MIN PRN
Status: DISCONTINUED | OUTPATIENT
Start: 2024-08-11 | End: 2024-08-11

## 2024-08-11 RX ORDER — PROCHLORPERAZINE MALEATE 5 MG
5 TABLET ORAL EVERY 6 HOURS PRN
Status: DISCONTINUED | OUTPATIENT
Start: 2024-08-11 | End: 2024-08-12

## 2024-08-11 RX ORDER — LOPERAMIDE HCL 2 MG
2 CAPSULE ORAL DAILY PRN
Status: DISCONTINUED | OUTPATIENT
Start: 2024-08-11 | End: 2024-08-15 | Stop reason: HOSPADM

## 2024-08-11 RX ORDER — ONDANSETRON 2 MG/ML
4 INJECTION INTRAMUSCULAR; INTRAVENOUS EVERY 6 HOURS PRN
Status: DISCONTINUED | OUTPATIENT
Start: 2024-08-11 | End: 2024-08-15 | Stop reason: HOSPADM

## 2024-08-11 RX ORDER — AMOXICILLIN 250 MG
2 CAPSULE ORAL 2 TIMES DAILY PRN
Status: DISCONTINUED | OUTPATIENT
Start: 2024-08-11 | End: 2024-08-15 | Stop reason: HOSPADM

## 2024-08-11 RX ORDER — ONDANSETRON 4 MG/1
4 TABLET, ORALLY DISINTEGRATING ORAL EVERY 6 HOURS PRN
Status: DISCONTINUED | OUTPATIENT
Start: 2024-08-11 | End: 2024-08-15 | Stop reason: HOSPADM

## 2024-08-11 RX ORDER — SODIUM CHLORIDE 9 MG/ML
INJECTION, SOLUTION INTRAVENOUS CONTINUOUS
Status: DISCONTINUED | OUTPATIENT
Start: 2024-08-12 | End: 2024-08-12 | Stop reason: ALTCHOICE

## 2024-08-11 RX ORDER — OXYCODONE HYDROCHLORIDE 5 MG/1
5 TABLET ORAL EVERY 4 HOURS PRN
Status: DISCONTINUED | OUTPATIENT
Start: 2024-08-11 | End: 2024-08-12

## 2024-08-11 RX ORDER — LOPERAMIDE HCL 2 MG
2 CAPSULE ORAL DAILY PRN
COMMUNITY

## 2024-08-11 RX ORDER — PROCHLORPERAZINE 25 MG
12.5 SUPPOSITORY, RECTAL RECTAL EVERY 12 HOURS PRN
Status: DISCONTINUED | OUTPATIENT
Start: 2024-08-11 | End: 2024-08-11

## 2024-08-11 RX ORDER — PROCHLORPERAZINE MALEATE 5 MG
5 TABLET ORAL EVERY 6 HOURS PRN
Status: DISCONTINUED | OUTPATIENT
Start: 2024-08-11 | End: 2024-08-11

## 2024-08-11 RX ADMIN — OXYCODONE HYDROCHLORIDE 2.5 MG: 5 TABLET ORAL at 18:12

## 2024-08-11 RX ADMIN — BRIMONIDINE TARTRATE 1 DROP: 2 SOLUTION/ DROPS OPHTHALMIC at 21:41

## 2024-08-11 RX ADMIN — PREDNISOLONE ACETATE 1 DROP: 10 SUSPENSION/ DROPS OPHTHALMIC at 21:41

## 2024-08-11 RX ADMIN — AMLODIPINE BESYLATE 5 MG: 5 TABLET ORAL at 19:33

## 2024-08-11 RX ADMIN — ACETAMINOPHEN 975 MG: 325 TABLET, FILM COATED ORAL at 21:41

## 2024-08-11 RX ADMIN — ACETAMINOPHEN 975 MG: 325 TABLET, FILM COATED ORAL at 14:51

## 2024-08-11 ASSESSMENT — ACTIVITIES OF DAILY LIVING (ADL)
ADLS_ACUITY_SCORE: 39
ADLS_ACUITY_SCORE: 43
ADLS_ACUITY_SCORE: 39

## 2024-08-11 ASSESSMENT — COLUMBIA-SUICIDE SEVERITY RATING SCALE - C-SSRS
2. HAVE YOU ACTUALLY HAD ANY THOUGHTS OF KILLING YOURSELF IN THE PAST MONTH?: NO
6. HAVE YOU EVER DONE ANYTHING, STARTED TO DO ANYTHING, OR PREPARED TO DO ANYTHING TO END YOUR LIFE?: NO
1. IN THE PAST MONTH, HAVE YOU WISHED YOU WERE DEAD OR WISHED YOU COULD GO TO SLEEP AND NOT WAKE UP?: NO

## 2024-08-11 NOTE — CONSULTS
[facility]    Orthopedic Consultation    Anna Marie Babb MRN# [id number]   Age: 84 year old YOB: 1940     Date of Admission:  8/11/2024    Reason for consult: Left femoral neck fracture       Requesting physician: Lilibeth Weston MD        Level of consult: Consult, follow and place orders           Assessment and Plan:   Assessment:   Acute, closed, left femoral neck fracture following fall at home      Plan:   The patient's history and clinical/diagnostic findings were reviewed with the on-call orthopedic trauma surgeon. The patient is a candidate for a hemiarthroplasty. This will be scheduled for Monday 8/12/24 pending OR availability at [facility] and medical optimization.Surgeon will discuss the risks, benefits, and outcomes of surgery while obtaining consent. I discussed general surgical plans with patient today.     Surgeon: Dr. Coker  NPO at midnight.  NWB/bedrest until postop.  Continue pain regimen.  Muscle relaxant available for PRN use.  Anticoagulation: hold  Vitamin D deficiency lab ordered  Type and screen ordered.    Please contact orthopedic trauma team if any questions or concerns arise.           Chief Complaint:   Left femoral neck fracture          History of Present Illness:   Anna Marie Babb is a 84 year old female with multiple medical problems including HTN, HLP, GERD, Hiatal hernia, MDD with anxiety, Neuropathy, Hypercalcemia, Known pulmonary nodule, Celiac disease who presents to the emergency department following a fall.       Patient lives at home with her son.  Notes indicate that she fell at home yesterday at 5 PM. XR imaging in the ER demonstrates left femoral neck fracture. She does not take anticoagulation. She has had prior right femoral neck fracture one year ago, 8/8/2023, repaired via percutaneous screw fixation. No other orthopedic or musculoskeletal complaints at this time. She does not use assistive device at home but will use  walker in public          Past Medical History:     Past Medical History:   Diagnosis Date    Atrophic vaginitis     Celiac disease     Cystocele     Disorder of bone and cartilage, unspecified     osteopenia    Endometriosis, site unspecified     Essential hypertension, benign     Gastro-oesophageal reflux disease     Hiatal hernia     3 cm sliding hiatal hernia    Hypercalcemia     Hyperlipidemia     HZV (herpes zoster virus) post herpetic neuralgia     Microscopic colitis     Nonspecific abnormal results of liver function study     Mildly elevated transaminases, liver biopsy WNL    Peripheral neuropathy     Pulmonary nodule     Zinc deficiency              Past Surgical History:     Past Surgical History:   Procedure Laterality Date    CLOSED REDUCTION, PERCUTANEOUS PINNING HIP Right 8/8/2023    Procedure: CLOSED REDUCTION, HIP, WITH PERCUTANEOUS PINNING;  Surgeon: Bo Duke MD;  Location:  OR    COLONOSCOPY  2011    ENDOSCOPIC ULTRASOUND UPPER GASTROINTESTINAL TRACT (GI) N/A 12/3/2023    Procedure: Endoscopic ultrasound upper gastrointestinal tract (GI);  Surgeon: Jean Ashley MD;  Location:  GI    ESOPHAGOSCOPY, GASTROSCOPY, DUODENOSCOPY (EGD), COMBINED N/A 12/3/2023    Procedure: Esophagoscopy, gastroscopy, duodenoscopy (EGD), combined;  Surgeon: Jean Ashley MD;  Location:  GI    HC DILATION/CURETTAGE DIAG/THER NON OB      miscarriages , 6 x    HC REMOVAL OF OVARIAN CYST(S)  1977    partial oopherectomy    PHACOEMULSIFICATION CLEAR CORNEA WITH STANDARD INTRAOCULAR LENS IMPLANT Left 8/13/2015    Procedure: PHACOEMULSIFICATION CLEAR CORNEA WITH STANDARD INTRAOCULAR LENS IMPLANT;  Surgeon: Tyler Isaacs MD;  Location:  EC    PHACOEMULSIFICATION CLEAR CORNEA WITH STANDARD INTRAOCULAR LENS IMPLANT Right 8/25/2015    Procedure: PHACOEMULSIFICATION CLEAR CORNEA WITH STANDARD INTRAOCULAR LENS IMPLANT;  Surgeon: Tyler Isaacs MD;  Location: [facility]  NONSPECIFIC PROCEDURE       rectocoele and enterocoele repair    ZZC NONSPECIFIC PROCEDURE      cystocoele,repair    ZZC TOTAL ABDOM HYSTERECTOMY  1982    endometriosis with adhesions             Social History:     Social History     Tobacco Use    Smoking status: Never     Passive exposure: Yes    Smokeless tobacco: Not on file    Tobacco comments:      smokes   Substance Use Topics    Alcohol use: Yes     Comment: 3-4 glasses per week             Family History:     Family History   Problem Relation Age of Onset    Cerebrovascular Disease Father          age 42    Cancer Mother         breast cancer,  age 63    Cancer Maternal Grandmother         breast cancer             Immunizations:     VACCINE/DOSE   Diptheria   DPT   DTAP   HBIG   Hepatitis A   Hepatitis B   HIB   Influenza   Measles   Meningococcal   MMR   Mumps   Pneumococcal   Polio   Rubella   Small Pox   TDAP   Varicella   Zoster             Allergies:     Allergies   Allergen Reactions    Amoxicillin Diarrhea    Amoxicillin-Pot Clavulanate     Ciprofloxacin     Gluten Meal      celiac             Medications:     Current Facility-Administered Medications   Medication Dose Route Frequency Provider Last Rate Last Admin    acetaminophen (TYLENOL) tablet 975 mg  975 mg Oral Q8H PRN Darlene Horne PA-C        HYDROmorphone (DILAUDID) injection 0.2 mg  0.2 mg Intravenous Q30 Min PRN Mahad Sanchez MD        methocarbamol (ROBAXIN) tablet 500 mg  500 mg Oral TID PRN Darlene Horne PA-C        ondansetron (ZOFRAN ODT) ODT tab 4 mg  4 mg Oral Q6H PRN Darlene Horne PA-C        Or    ondansetron (ZOFRAN) injection 4 mg  4 mg Intravenous Q6H PRN Darlene Horne PA-C        oxyCODONE IR (ROXICODONE) half-tab 2.5 mg  2.5 mg Oral Q4H PRN Darlene Horne PA-C        Or    oxyCODONE (ROXICODONE) tablet 5 mg  5 mg Oral Q4H PRN Darlene Horne PA-C        prochlorperazine (COMPAZINE) injection 5 mg  5 mg Intravenous  "Q6H PRN Darlene Horne PA-C        Or    prochlorperazine (COMPAZINE) tablet 5 mg  5 mg Oral Q6H PRN Darlene Horne PA-C        Or    prochlorperazine (COMPAZINE) suppository 12.5 mg  12.5 mg Rectal Q12H PRN Darlene Horne PA-C         Current Outpatient Medications   Medication Sig Dispense Refill    acetaminophen (TYLENOL) 325 MG tablet Take 2 tablets (650 mg) by mouth every 4 hours as needed for mild pain or other (and adjunct with moderate or severe pain or per patient request)      amLODIPine (NORVASC) 5 MG tablet Take 1 tablet (5 mg) by mouth every evening 90 tablet 3    loperamide (IMODIUM) 2 MG capsule Take 2 mg by mouth daily as needed for diarrhea      pantoprazole (PROTONIX) 40 MG EC tablet Take 1 tablet (40 mg) by mouth daily 90 tablet 1             Review of Systems:   CV: NEGATIVE for chest pain, palpitations or peripheral edema  C: NEGATIVE for fever, chills, change in weight  E/M: NEGATIVE for ear, mouth and throat problems  R: NEGATIVE for significant cough or SOB          Physical Exam:   All vitals have been reviewed  Patient Vitals for the past 24 hrs:   BP Temp Temp src Pulse Resp SpO2 Height Weight   08/11/24 1302 (!) 150/83 -- -- 82 20 99 % -- --   08/11/24 1027 (!) 161/102 97.9  F (36.6  C) Temporal 105 16 99 % 1.651 m (5' 5\") 48.5 kg (107 lb)     No intake or output data in the 24 hours ending 08/11/24 1418    Constitutional: Pleasant, alert, appropriate, following commands.  HEENT: Head atraumatic normocephalic. Pupils equal round and reactive.  Respiratory: Unlabored breathing no audible wheeze  Cardiovascular: Regular rate and rhythm per pulses.  GI: Abdomen is non-distended.  Lymph/Hematologic: No lymphadenopathy in areas examined.  Genitourinary:  No reagan  Skin: No rashes, no cyanosis, no edema.  Musculoskeletal: Skin intact.  No surrounding erythema, ecchymosis.  No open wounds.  Positive logroll left lower extremity.  No obvious shortening or rotation of the left lower " extremity   Minimal erythema of the surrounding skin.   Bilateral calves are soft, non-tender.  Bilateral lower extremity is NVI.  Sensation intact bilateral lower extremities  5/5 motor with resisted dorsi and plantar flexion bilaterally  2+Dp pulse              Data:   All laboratory data reviewed  Results for orders placed or performed during the hospital encounter of 08/11/24   Head CT w/o contrast     Status: None    Narrative    EXAM: CT HEAD W/O CONTRAST  LOCATION: [facility]  DATE: 8/11/2024    INDICATION: fall vs syncope no  memory of event, eval for head injury, headache  COMPARISON: CT head 8/7/2023  TECHNIQUE: Routine CT Head without IV contrast. Multiplanar reformats. Dose reduction techniques were used.    FINDINGS:  INTRACRANIAL CONTENTS: No intracranial hemorrhage, extraaxial collection, or mass effect.  No CT evidence of acute infarct. Mild presumed chronic small vessel ischemic changes. Unchanged mild generalized volume loss. No hydrocephalus.     VISUALIZED ORBITS/SINUSES/MASTOIDS: No intraorbital abnormality. No paranasal sinus mucosal disease. No middle ear or mastoid effusion.    BONES/SOFT TISSUES: No acute abnormality.      Impression    IMPRESSION:  1.  No acute intracranial process.   XR Pelvis w Hip Left 1 View     Status: None    Narrative    EXAM: XR PELVIS AND HIP LEFT 1 VIEW  LOCATION: [facility]  DATE: 8/11/2024    INDICATION: Fall, left hip pain.  COMPARISON: None.      Impression    IMPRESSION:   1. There is a left femoral neck fracture. There is about 1 cm of displacement and impaction. Mild apex medial angulation.  2. Degenerative changes in the spine.  3. Mild osteoarthrosis of the SI joints and pubic symphysis.   4. Postoperative changes in the proximal right femur without evidence of complication.  5. Diffuse bone demineralization.   XR Chest 1 View     Status: None    Narrative    EXAM: XR CHEST 1 VIEW  LOCATION: [facility]  DATE: 08/11/2024    INDICATION: Fall, weakness  COMPARISON: 11/29/2023, 01/02/2024.      Impression    IMPRESSION: Oval nonspecific density at the left base. Question a small hiatal hernia. Consider CT scan for further evaluation.     Comprehensive metabolic panel     Status: Abnormal   Result Value Ref Range    Sodium 131 (L) 135 - 145 mmol/L    Potassium 3.9 3.4 - 5.3 mmol/L    Carbon Dioxide (CO2) 24 22 - 29 mmol/L    Anion Gap 18 (H) 7 - 15 mmol/L    Urea Nitrogen 11.5 8.0 - 23.0 mg/dL    Creatinine 0.78 0.51 - 0.95 mg/dL    GFR Estimate 74 >60 mL/min/1.73m2    Calcium 9.6 8.8 - 10.4 mg/dL    Chloride 89 (L) 98 - 107 mmol/L    Glucose 131 (H) 70 - 99 mg/dL    Alkaline Phosphatase 237 (H) 40 - 150 U/L    AST 46 (H) 0 - 45 U/L    ALT 29 0 - 50 U/L    Protein Total 7.3 6.4 - 8.3 g/dL    Albumin 4.2 3.5 - 5.2 g/dL    Bilirubin Total 2.2 (H) <=1.2 mg/dL   CK total     Status: Abnormal   Result Value Ref Range     (H) 26 - 192 U/L   Asymptomatic Influenza A/B, RSV, & SARS-CoV2 PCR (COVID-19) Nose     Status: Normal    Specimen: Nose; Swab   Result Value Ref Range    Influenza A PCR Negative Negative    Influenza B PCR Negative Negative    RSV PCR Negative Negative    SARS CoV2 PCR Negative Negative    Narrative    Testing was performed using the Xpert Xpress CoV2/Flu/RSV Assay on the Cepheid GeneXpert Instrument. This test should be ordered for the detection of SARS-CoV-2, influenza, and RSV viruses in individuals who meet clinical and/or epidemiological criteria. Test performance is unknown in asymptomatic patients. This test is for in vitro diagnostic use under the FDA EUA for laboratories certified under CLIA to perform high or moderate complexity testing. This test has not been FDA cleared or approved. A negative result does not rule out the presence of PCR inhibitors in the specimen or target RNA in concentration below the limit of detection for the assay. If only one viral  target is positive but coinfection with multiple targets is suspected, the sample should be re-tested with another FDA cleared, approved, or authorized test, if coinfection would change clinical management. This test was validated by the [facility] [employer]. These laboratories are certified under the Clinical Laboratory Improvement Amendments of 1988 (CLIA-88) as qualified to perform high complexity laboratory testing.   Troponin T, High Sensitivity     Status: Abnormal   Result Value Ref Range    Troponin T, High Sensitivity 24 (H) <=14 ng/L   CBC with platelets and differential     Status: Abnormal   Result Value Ref Range    WBC Count 10.8 4.0 - 11.0 10e3/uL    RBC Count 4.10 3.80 - 5.20 10e6/uL    Hemoglobin 14.3 11.7 - 15.7 g/dL    Hematocrit 39.8 35.0 - 47.0 %    MCV 97 78 - 100 fL    MCH 34.9 (H) 26.5 - 33.0 pg    MCHC 35.9 31.5 - 36.5 g/dL    RDW 15.2 (H) 10.0 - 15.0 %    Platelet Count 257 150 - 450 10e3/uL    % Neutrophils 85 %    % Lymphocytes 7 %    % Monocytes 7 %    % Eosinophils 0 %    % Basophils 0 %    % Immature Granulocytes 1 %    NRBCs per 100 WBC 0 <1 /100    Absolute Neutrophils 9.2 (H) 1.6 - 8.3 10e3/uL    Absolute Lymphocytes 0.8 0.8 - 5.3 10e3/uL    Absolute Monocytes 0.7 0.0 - 1.3 10e3/uL    Absolute Eosinophils 0.0 0.0 - 0.7 10e3/uL    Absolute Basophils 0.0 0.0 - 0.2 10e3/uL    Absolute Immature Granulocytes 0.1 <=0.4 10e3/uL    Absolute NRBCs 0.0 10e3/uL   EKG 12-lead, tracing only     Status: None (Preliminary result)   Result Value Ref Range    Systolic Blood Pressure  mmHg    Diastolic Blood Pressure  mmHg    Ventricular Rate 85 BPM    Atrial Rate 85 BPM    NC Interval 158 ms    QRS Duration 126 ms     ms    QTc 497 ms    P Axis 87 degrees    R AXIS -22 degrees    T Axis 50 degrees    Interpretation ECG       Sinus rhythm with Premature atrial complexes  Right bundle branch block  Abnormal ECG  When compared with ECG of 02-Jan-2024 13:27,  Premature atrial complexes  are now Present  Right bundle branch block is now Present     CBC with platelets differential     Status: Abnormal    Narrative    The following orders were created for panel order CBC with platelets differential.  Procedure                               Abnormality         Status                     ---------                               -----------         ------                     CBC with platelets and d...[[id number]]  Abnormal            Final result                 Please view results for these tests on the individual orders.          Attestation:  I have reviewed today's vital signs, notes, medications, labs and imaging with Dr. Coker.  Amount of time performed on this consult: 50 minutes.    Darlene Horne PA-C  [facility]

## 2024-08-11 NOTE — PROGRESS NOTES
RECEIVING UNIT ED HANDOFF REVIEW    ED Nurse Handoff Report was reviewed by: Miguel Angel Corral RN on August 11, 2024 at 4:27 PM

## 2024-08-11 NOTE — ED NOTES
[facility]  ED Nurse Handoff Report    ED Chief complaint: Generalized Weakness and Hip Pain      ED Diagnosis:   Final diagnoses:   None       Code Status: Refer to hospitalist order    Allergies:   Allergies   Allergen Reactions    Amoxicillin Diarrhea    Amoxicillin-Pot Clavulanate     Ciprofloxacin     Gluten Meal      celiac       Patient Story: BIBA from home, fell yesterday at 5pm, was able to walk back to bed, took pt 45 min to walk from BR to bed today d/t weakness. Pt doesn't remember fall, but pt is normally forgetful. Right hip pain. Increasing weakness for one week. Hx of HTN. Son lives with pt.   Focused Assessment:  See chart  Results for orders placed or performed during the hospital encounter of 08/11/24   Head CT w/o contrast     Status: None    Narrative    EXAM: CT HEAD W/O CONTRAST  LOCATION: [facility]  DATE: 8/11/2024    INDICATION: fall vs syncope no  memory of event, eval for head injury, headache  COMPARISON: CT head 8/7/2023  TECHNIQUE: Routine CT Head without IV contrast. Multiplanar reformats. Dose reduction techniques were used.    FINDINGS:  INTRACRANIAL CONTENTS: No intracranial hemorrhage, extraaxial collection, or mass effect.  No CT evidence of acute infarct. Mild presumed chronic small vessel ischemic changes. Unchanged mild generalized volume loss. No hydrocephalus.     VISUALIZED ORBITS/SINUSES/MASTOIDS: No intraorbital abnormality. No paranasal sinus mucosal disease. No middle ear or mastoid effusion.    BONES/SOFT TISSUES: No acute abnormality.      Impression    IMPRESSION:  1.  No acute intracranial process.   XR Pelvis w Hip Left 1 View     Status: None    Narrative    EXAM: XR PELVIS AND HIP LEFT 1 VIEW  LOCATION: [facility]  DATE: 8/11/2024    INDICATION: Fall, left hip pain.  COMPARISON: None.      Impression    IMPRESSION:   1. There is a left femoral neck fracture. There is about 1 cm of displacement  and impaction. Mild apex medial angulation.  2. Degenerative changes in the spine.  3. Mild osteoarthrosis of the SI joints and pubic symphysis.   4. Postoperative changes in the proximal right femur without evidence of complication.  5. Diffuse bone demineralization.   XR Chest 1 View     Status: None    Narrative    EXAM: XR CHEST 1 VIEW  LOCATION: [facility]  DATE: 08/11/2024    INDICATION: Fall, weakness  COMPARISON: 11/29/2023, 01/02/2024.      Impression    IMPRESSION: Oval nonspecific density at the left base. Question a small hiatal hernia. Consider CT scan for further evaluation.     Comprehensive metabolic panel     Status: Abnormal   Result Value Ref Range    Sodium 131 (L) 135 - 145 mmol/L    Potassium 3.9 3.4 - 5.3 mmol/L    Carbon Dioxide (CO2) 24 22 - 29 mmol/L    Anion Gap 18 (H) 7 - 15 mmol/L    Urea Nitrogen 11.5 8.0 - 23.0 mg/dL    Creatinine 0.78 0.51 - 0.95 mg/dL    GFR Estimate 74 >60 mL/min/1.73m2    Calcium 9.6 8.8 - 10.4 mg/dL    Chloride 89 (L) 98 - 107 mmol/L    Glucose 131 (H) 70 - 99 mg/dL    Alkaline Phosphatase 237 (H) 40 - 150 U/L    AST 46 (H) 0 - 45 U/L    ALT 29 0 - 50 U/L    Protein Total 7.3 6.4 - 8.3 g/dL    Albumin 4.2 3.5 - 5.2 g/dL    Bilirubin Total 2.2 (H) <=1.2 mg/dL   CK total     Status: Abnormal   Result Value Ref Range     (H) 26 - 192 U/L   Asymptomatic Influenza A/B, RSV, & SARS-CoV2 PCR (COVID-19) Nose     Status: Normal    Specimen: Nose; Swab   Result Value Ref Range    Influenza A PCR Negative Negative    Influenza B PCR Negative Negative    RSV PCR Negative Negative    SARS CoV2 PCR Negative Negative    Narrative    Testing was performed using the Xpert Xpress CoV2/Flu/RSV Assay on the Cepheid GeneXpert Instrument. This test should be ordered for the detection of SARS-CoV-2, influenza, and RSV viruses in individuals who meet clinical and/or epidemiological criteria. Test performance is unknown in asymptomatic patients. This test  is for in vitro diagnostic use under the FDA EUA for laboratories certified under CLIA to perform high or moderate complexity testing. This test has not been FDA cleared or approved. A negative result does not rule out the presence of PCR inhibitors in the specimen or target RNA in concentration below the limit of detection for the assay. If only one viral target is positive but coinfection with multiple targets is suspected, the sample should be re-tested with another FDA cleared, approved, or authorized test, if coinfection would change clinical management. This test was validated by the [facility] [employer]. These laboratories are certified under the Clinical Laboratory Improvement Amendments of 1988 (CLIA-88) as qualified to perform high complexity laboratory testing.   Troponin T, High Sensitivity     Status: Abnormal   Result Value Ref Range    Troponin T, High Sensitivity 24 (H) <=14 ng/L   CBC with platelets and differential     Status: Abnormal   Result Value Ref Range    WBC Count 10.8 4.0 - 11.0 10e3/uL    RBC Count 4.10 3.80 - 5.20 10e6/uL    Hemoglobin 14.3 11.7 - 15.7 g/dL    Hematocrit 39.8 35.0 - 47.0 %    MCV 97 78 - 100 fL    MCH 34.9 (H) 26.5 - 33.0 pg    MCHC 35.9 31.5 - 36.5 g/dL    RDW 15.2 (H) 10.0 - 15.0 %    Platelet Count 257 150 - 450 10e3/uL    % Neutrophils 85 %    % Lymphocytes 7 %    % Monocytes 7 %    % Eosinophils 0 %    % Basophils 0 %    % Immature Granulocytes 1 %    NRBCs per 100 WBC 0 <1 /100    Absolute Neutrophils 9.2 (H) 1.6 - 8.3 10e3/uL    Absolute Lymphocytes 0.8 0.8 - 5.3 10e3/uL    Absolute Monocytes 0.7 0.0 - 1.3 10e3/uL    Absolute Eosinophils 0.0 0.0 - 0.7 10e3/uL    Absolute Basophils 0.0 0.0 - 0.2 10e3/uL    Absolute Immature Granulocytes 0.1 <=0.4 10e3/uL    Absolute NRBCs 0.0 10e3/uL   EKG 12-lead, tracing only     Status: None (Preliminary result)   Result Value Ref Range    Systolic Blood Pressure  mmHg    Diastolic Blood Pressure  mmHg    Ventricular  "Rate 85 BPM    Atrial Rate 85 BPM    NM Interval 158 ms    QRS Duration 126 ms     ms    QTc 497 ms    P Axis 87 degrees    R AXIS -22 degrees    T Axis 50 degrees    Interpretation ECG       Sinus rhythm with Premature atrial complexes  Right bundle branch block  Abnormal ECG  When compared with ECG of 02-Jan-2024 13:27,  Premature atrial complexes are now Present  Right bundle branch block is now Present     CBC with platelets differential     Status: Abnormal    Narrative    The following orders were created for panel order CBC with platelets differential.  Procedure                               Abnormality         Status                     ---------                               -----------         ------                     CBC with platelets and d...[[id number]]  Abnormal            Final result                 Please view results for these tests on the individual orders.         Treatments and/or interventions provided: See chart, labs, x-ray, CT  Patient's response to treatments and/or interventions: Remains stable at this time    To be done/followed up on inpatient unit:  Ortho consult    Does this patient have any cognitive concerns?:  Forgetful    Activity level - Baseline/Home:  Unknown  Activity Level - Current:   Unknown    Patient's Preferred language: English   Needed?: No    Isolation: None  Infection: Not Applicable  Patient tested for COVID 19 prior to admission: YES  Bariatric?: No    Vital Signs:   Vitals:    08/11/24 1027 08/11/24 1302   BP: (!) 161/102 (!) 150/83   Pulse: 105 82   Resp: 16 20   Temp: 97.9  F (36.6  C)    TempSrc: Temporal    SpO2: 99% 99%   Weight: 48.5 kg (107 lb)    Height: 1.651 m (5' 5\")        Cardiac Rhythm:     Was the PSS-3 completed:   Yes  What interventions are required if any?               Family Comments: None  OBS brochure/video discussed/provided to patient/family: N/A              Name of person given brochure if not patient: NA              " Relationship to patient: NA    For the majority of the shift this patient's behavior was Green.   Behavioral interventions performed were None.    ED NURSE PHONE NUMBER: *[id number]

## 2024-08-11 NOTE — ED PROVIDER NOTES
Emergency Department Note      History of Present Illness     Chief Complaint   Generalized Weakness and Hip Pain      HPI   Anna Marie Babb is a 84 year old female with a history of hypertension and hyperlipidemia amongst others who presents to the ED via EMS with weakness and hip pain. She is accompanied in the ED by her daughter who explains that last night the patient fell in the house onto the wooden floor. Patient and daughter are unsure of the mechanism of the fall though her daughter adds that the patients son found her on the floor. Patient unsure if she lost consciousness or hit her head. Since the fall she has been having ongoing left hip pain and ambulation has been difficult. Her daughter adds that trying to help the patient to the bathroom earlier today took 45 minutes. She denies chest pain, shortness of breath, abdominal pain, or pain in her arms or legs. No HA or neck/back pain. Her daughter adds that the patient has recently had some unplanned weight loss and decreased appetite. No bloody/black stools. Patient took one Tylenol earlier today around 0930.     Independent Historian   Daughter as detailed above.    Review of External Notes   7/15 office visit, complaining of muscle weakness.     Past Medical History     Medical History and Problem List   Atrophic vaginitis  Celiac disease  Cystocele  Disorder of bone and cartilage, unspecified  Endometriosis, site unspecified  Essential hypertension, benign  Gastro-oesophageal reflux disease  Hiatal hernia  Hypercalcemia  Hyperlipidemia  HZV (herpes zoster virus) post herpetic neuralgia  Microscopic colitis  Nonspecific abnormal results of liver function study  Peripheral neuropathy  Pulmonary nodule  Zinc deficiency  Acute blood loss anemia  Closed neck fracture  Collagenous colitis  Diaphragmatic hernia  Fatty liver  Osteoporosis  Depressive disorder  SIBO  Vaginitis  SIADH  Chronic pancreatitis    Medications  "  Tylenol  Norvasc  Protonix  Cholecalciferol  Zestril    Surgical History   Past Surgical History:   Procedure Laterality Date    CLOSED REDUCTION, PERCUTANEOUS PINNING HIP Right 8/8/2023    Procedure: CLOSED REDUCTION, HIP, WITH PERCUTANEOUS PINNING;  Surgeon: Bo Duke MD;  Location:  OR    COLONOSCOPY  2011    ENDOSCOPIC ULTRASOUND UPPER GASTROINTESTINAL TRACT (GI) N/A 12/3/2023    Procedure: Endoscopic ultrasound upper gastrointestinal tract (GI);  Surgeon: Jean Ashley MD;  Location:  GI    ESOPHAGOSCOPY, GASTROSCOPY, DUODENOSCOPY (EGD), COMBINED N/A 12/3/2023    Procedure: Esophagoscopy, gastroscopy, duodenoscopy (EGD), combined;  Surgeon: Jean Ashley MD;  Location:  GI    HC DILATION/CURETTAGE DIAG/THER NON OB      miscarriages , 6 x    HC REMOVAL OF OVARIAN CYST(S)  1977    partial oopherectomy    PHACOEMULSIFICATION CLEAR CORNEA WITH STANDARD INTRAOCULAR LENS IMPLANT Left 8/13/2015    Procedure: PHACOEMULSIFICATION CLEAR CORNEA WITH STANDARD INTRAOCULAR LENS IMPLANT;  Surgeon: Tyler Isaacs MD;  Location:  EC    PHACOEMULSIFICATION CLEAR CORNEA WITH STANDARD INTRAOCULAR LENS IMPLANT Right 8/25/2015    Procedure: PHACOEMULSIFICATION CLEAR CORNEA WITH STANDARD INTRAOCULAR LENS IMPLANT;  Surgeon: Tyler Isaacs MD;  Location:  EC    ZZC NONSPECIFIC PROCEDURE       rectocoele and enterocoele repair    ZZC NONSPECIFIC PROCEDURE      cystocoele,repair    ZZC TOTAL ABDOM HYSTERECTOMY  1982    endometriosis with adhesions       Physical Exam     Patient Vitals for the past 24 hrs:   BP Temp Temp src Pulse Resp SpO2 Height Weight   08/11/24 1600 (!) 162/102 -- -- 104 24 98 % -- --   08/11/24 1500 (!) 167/104 -- -- 92 12 97 % -- --   08/11/24 1400 (!) 158/97 -- -- 92 22 99 % -- --   08/11/24 1302 (!) 150/83 -- -- 82 20 99 % -- --   08/11/24 1300 -- -- -- 87 -- -- -- --   08/11/24 1027 (!) 161/102 97.9  F (36.6  C) Temporal 105 16 99 % 1.651 m (5' 5\") 48.5 " kg (107 lb)     Physical Exam  VS: Reviewed per above  HENT: Mucous membranes moist, no nuchal rigidity  EYES: sclera anicteric  CV: Rate as noted,  regular rhythm.   RESP: Effort normal. Breath sounds are normal bilaterally.  GI: no tenderness/rebound/guarding, not distended.  NEURO: GCS 15, cranial nerves II through XII are intact, 5 out of 5 strength in all 4 extremities aside from left lower extremity in which testing is limited due to pain in the left hip, sensation is intact light touch in all 4 extremities  MSK: No deformity of the extremities.  No chest wall tenderness.  No midline vertebral tenderness.  SKIN: Warm and dry      Diagnostics     Lab Results   Labs Ordered and Resulted from Time of ED Arrival to Time of ED Departure   COMPREHENSIVE METABOLIC PANEL - Abnormal       Result Value    Sodium 131 (*)     Potassium 3.9      Carbon Dioxide (CO2) 24      Anion Gap 18 (*)     Urea Nitrogen 11.5      Creatinine 0.78      GFR Estimate 74      Calcium 9.6      Chloride 89 (*)     Glucose 131 (*)     Alkaline Phosphatase 237 (*)     AST 46 (*)     ALT 29      Protein Total 7.3      Albumin 4.2      Bilirubin Total 2.2 (*)    CK TOTAL - Abnormal     (*)    TROPONIN T, HIGH SENSITIVITY - Abnormal    Troponin T, High Sensitivity 24 (*)    CBC WITH PLATELETS AND DIFFERENTIAL - Abnormal    WBC Count 10.8      RBC Count 4.10      Hemoglobin 14.3      Hematocrit 39.8      MCV 97      MCH 34.9 (*)     MCHC 35.9      RDW 15.2 (*)     Platelet Count 257      % Neutrophils 85      % Lymphocytes 7      % Monocytes 7      % Eosinophils 0      % Basophils 0      % Immature Granulocytes 1      NRBCs per 100 WBC 0      Absolute Neutrophils 9.2 (*)     Absolute Lymphocytes 0.8      Absolute Monocytes 0.7      Absolute Eosinophils 0.0      Absolute Basophils 0.0      Absolute Immature Granulocytes 0.1      Absolute NRBCs 0.0     TROPONIN T, HIGH SENSITIVITY - Abnormal    Troponin T, High Sensitivity 20 (*)    INFLUENZA  A/B, RSV, & SARS-COV2 PCR - Normal    Influenza A PCR Negative      Influenza B PCR Negative      RSV PCR Negative      SARS CoV2 PCR Negative     ROUTINE UA WITH MICROSCOPIC REFLEX TO CULTURE   VITAMIN D DEFICIENCY SCREENING     Imaging   US Abdomen Limited   Final Result   IMPRESSION:      1.  Normal gallbladder. No biliary ductal dilation.      2.  Geographic area of increased echogenicity within the right hepatic lobe is likely a focal area of fatty deposition.       Head CT w/o contrast   Final Result   IMPRESSION:   1.  No acute intracranial process.      XR Chest 1 View   Final Result   IMPRESSION: Oval nonspecific density at the left base. Question a small hiatal hernia. Consider CT scan for further evaluation.         XR Pelvis w Hip Left 1 View   Final Result   IMPRESSION:    1. There is a left femoral neck fracture. There is about 1 cm of displacement and impaction. Mild apex medial angulation.   2. Degenerative changes in the spine.   3. Mild osteoarthrosis of the SI joints and pubic symphysis.    4. Postoperative changes in the proximal right femur without evidence of complication.   5. Diffuse bone demineralization.          ED Course      Medications Administered   Medications   lidocaine 1 % 0.1-1 mL (has no administration in time range)   lidocaine (LMX4) cream (has no administration in time range)   sodium chloride (PF) 0.9% PF flush 3 mL (has no administration in time range)   sodium chloride (PF) 0.9% PF flush 3 mL (has no administration in time range)   senna-docusate (SENOKOT-S/PERICOLACE) 8.6-50 MG per tablet 1 tablet (has no administration in time range)     Or   senna-docusate (SENOKOT-S/PERICOLACE) 8.6-50 MG per tablet 2 tablet (has no administration in time range)   calcium carbonate (TUMS) chewable tablet 1,000 mg (has no administration in time range)   HOLD: ALL Anticoagulant medications until AFTER surgery (has no administration in time range)   ondansetron (ZOFRAN ODT) ODT tab 4 mg  (has no administration in time range)     Or   ondansetron (ZOFRAN) injection 4 mg (has no administration in time range)   prochlorperazine (COMPAZINE) injection 5 mg (has no administration in time range)     Or   prochlorperazine (COMPAZINE) tablet 5 mg (has no administration in time range)     Or   prochlorperazine (COMPAZINE) suppository 12.5 mg (has no administration in time range)   hydrALAZINE (APRESOLINE) tablet 10 mg (has no administration in time range)     Or   hydrALAZINE (APRESOLINE) injection 10 mg (has no administration in time range)   melatonin tablet 1 mg (has no administration in time range)   HYDROmorphone (DILAUDID) injection 0.1 mg (has no administration in time range)     Or   HYDROmorphone (DILAUDID) injection 0.2 mg (has no administration in time range)   acetaminophen (TYLENOL) tablet 975 mg (has no administration in time range)   acetaminophen (TYLENOL) tablet 650 mg (has no administration in time range)   HYDROmorphone (DILAUDID) half-tab 1 mg (has no administration in time range)     Or   HYDROmorphone (DILAUDID) tablet 2 mg (has no administration in time range)   senna-docusate (SENOKOT-S/PERICOLACE) 8.6-50 MG per tablet 1 tablet (has no administration in time range)   methocarbamol (ROBAXIN) tablet 500 mg (has no administration in time range)   oxyCODONE IR (ROXICODONE) half-tab 2.5 mg (has no administration in time range)     Or   oxyCODONE (ROXICODONE) tablet 5 mg (has no administration in time range)   naloxone (NARCAN) injection 0.2 mg (has no administration in time range)     Or   naloxone (NARCAN) injection 0.4 mg (has no administration in time range)     Or   naloxone (NARCAN) injection 0.2 mg (has no administration in time range)     Or   naloxone (NARCAN) injection 0.4 mg (has no administration in time range)     Procedures   Procedures     Discussion of Management   Admitting Hospitalist, Dr. Weston accepted care of the patient    ED Course   ED Course as of 08/11/24 9873    Sun Aug 11, 2024   1109 I obtained history and examined the patient as noted above.   1416 I consulted with Dr. Weston of the hospitalist service and discussed patient admission. They accepted care of the patient.     Additional Documentation  None    Medical Decision Making / Diagnosis     CMS Diagnoses: None    MIPS       None    MDM   Anna Marie Babb is a 84 year old female who presents to the ER for evaluation of left hip pain after fall yesterday.  On arrival she has low-grade hypertension.  On exam pain appears to be limited to the left hip region.  No other obvious signs of trauma on thorough exam.  Left hip films do suggest femoral neck fracture.  Chest x-ray and head CT are negative for acute traumatic process.  Basic labs show mild CK elevation, nonspecific LFT derangement.  Plan for right upper quadrant ultrasound and hospital admission for further orthopedic evaluation.    Disposition   The patient was admitted to the hospital.     Diagnosis     ICD-10-CM    1. Closed fracture of neck of left femur, initial encounter (H)  S72.002A Case Request: Left hip hemiarthroplasty for left femoral neck fracture     Case Request: Left hip hemiarthroplasty for left femoral neck fracture      2. Hip fracture, left, closed, initial encounter (H)  S72.002A       3. Abnormal LFTs  R79.89       4. Elevated CK  R74.8            Discharge Medications   Current Discharge Medication List        Scribe Disclosure:  I, LARY JONES, am serving as a scribe at 11:15 AM on 8/11/2024 to document services personally performed by Mahad Sanchez MD based on my observations and the provider's statements to me.      Mahad Sanchez MD  08/11/24 2991

## 2024-08-11 NOTE — ED NOTES
Bed: ED12  Expected date:   Expected time:   Means of arrival:   Comments:  Sveta 3 84F fall, hip pain ETA 1020

## 2024-08-11 NOTE — PHARMACY-ADMISSION MEDICATION HISTORY
Pharmacy Intern Admission Medication History    Admission medication history is complete. The information provided in this note is only as accurate as the sources available at the time of the update.    Information Source(s): Patient, Family member, [employer], and 7/15/24 office visit note  via in-person and phone    Pertinent Information:   - pt reports daughter (Liv) helps her manage her medications and sets up her pillbox   - contacted Liv and obtained medication list as seen below  - Liv mentions that pt struggles with adherence to her medication therapy   - of note, [facility] last fill dates for amlodipine and pantoprazole are from 2/24   - Liv reports pt fills at [facility] in [place] - yet [facility] has no fill history for her   - thus, cannot confirm where pt is getting prescription medications    Changes made to PTA medication list:  Added: loperamide  Deleted: vitamin D  Changed: none    Allergies reviewed with patient and updates made in EHR: yes    Medication History Completed By: Rosy Anand 8/11/2024 1:38 PM    PTA Med List   Medication Sig Last Dose    acetaminophen (TYLENOL) 325 MG tablet Take 2 tablets (650 mg) by mouth every 4 hours as needed for mild pain or other (and adjunct with moderate or severe pain or per patient request) PRN    amLODIPine (NORVASC) 5 MG tablet Take 1 tablet (5 mg) by mouth every evening 8/10/2024    loperamide (IMODIUM) 2 MG capsule Take 2 mg by mouth daily as needed for diarrhea PRN    pantoprazole (PROTONIX) 40 MG EC tablet Take 1 tablet (40 mg) by mouth daily 8/10/2024

## 2024-08-11 NOTE — H&P
[facility]    History and Physical - Hospitalist Service       Date of Admission:  8/11/2024    Assessment & Plan      Anna Marie Babb is a 84 year old female with multiple medical problems including HTN, HLP, GERD, Hiatal hernia, MDD with anxiety, Neuropathy, Hypercalcemia, Known pulmonary nodule, Celiac disease who presents to the emergency department following a fall.  X-ray shows left femoral neck fracture.    Principal Problem:    Closed fracture of neck of left femur (H)  Admit as inpatient  Bedrest  Orthopedic surgery consult requested  Anna Marie Hickey had in situ percutaneous screw fixation of a right valgus impacted femoral neck fracture on 8/8/2023 and tolerated the procedure and recovery well.  Her initial troponin value is very modestly elevated, she needs a follow-up value obtained.  She has chronic hyponatremia with current sodium value close to her baseline.  There are no additional medical problems which can be/should be optimized prior to proceeding with surgery, if offered    Active Problems:    Essential hypertension, benign  Continue PTA amlodipine 5 mg daily      Nonspecific abnormal results of liver function study  These results are of unclear clinical significance. She had similar LFT abnormalities during a hospitalization following a fall 1/2/2024, though today's total bilirubin is slightly higher (2.2-<1.4 milligrams per deciliter)  An abdominal ultrasound done then showed sludge in the gallbladder  Consider repeating liver panel in the future  Ordered APAP as needed since benefit > risk      Hyponatremia  Her hyponatremia has been attributed to SIADH in the past  Baseline sodium values range from 127-135 mmol/L  Follow BMP  IV fluids with normal saline while n.p.o.      Traumatic rhabdomyolysis, initial encounter (H24)  CK was similarly elevated during her hospital stay 1/3/2024  As above, treated with IV fluids while n.p.o.  Repeat CK      Lung nodule  A stable  "pulmonary nodule in the left lower lobe has been noted on chest CT going back to 10/1/2013  Follow-up with PCP      Elevated troponin  Check delta troponin value  Echo done 1/3/2024 showed normal LV size and function, EF 60 to 65% with no regional wall motion abnormalities and no significant valvular abnormalities  Today's EKG has no obvious ischemic changes  Modest elevation in troponin likely represents demand ischemia due to the physiologic stress of her fall and fracture; would not pursue additional workup unless she has symptoms suggesting angina      Gastroesophageal reflux disease without esophagitis  Continue PTA Protonix 40 mg daily       Diet:  Regular  DVT Prophylaxis: Pneumatic Compression Devices  Lombardi Catheter: Not present  Lines: None     Cardiac Monitoring: None  Code Status:  Full, discussed with patient    Clinically Significant Risk Factors Present on Admission                  # Hypertension: Noted on problem list         # Cachexia: Estimated body mass index is 17.81 kg/m  as calculated from the following:    Height as of this encounter: 1.651 m (5' 5\").    Weight as of this encounter: 48.5 kg (107 lb).       # Financial/Environmental Concerns:            Disposition Plan     Medically Ready for Discharge: Anticipated in 2-4 Days    Lilibeth Weston MD  Hospitalist Service  [facility]  Securely message with [employer] (more info)  Text page via [employer] Paging/Directory     ______________________________________________________________________    Chief Complaint   \"I fell again.\"      History of Present Illness   Anna Marie Babb is a 84 year old female with multiple medical problems including HTN, HLP, GERD, Hiatal hernia, MDD with anxiety, Neuropathy, Hypercalcemia, Known pulmonary nodule, Celiac disease who presents to the emergency department following a fall.      Patient lives at home with her son.  Notes indicate that she fell at home yesterday at 5 PM.  She has been " "described as \"forgetful,\" does not remember the fall.  She was able to walk back to bed.    Her daughter checked on her this morning and said that it took Anna Marie 45 minutes to walk from her bed to the bathroom.  She was brought in by ambulance.  Here, labs show sodium of 131, BUN 11, creatinine 0.78.  Alkaline phosphatase is elevated to 37, AST 46, bilirubin 2.2, CK4 186, troponin 24 ng/L CBC is within normal limits.  COVID PCR is negative.  Chest x-ray shows a nonspecific oval density at the left lung base.  Head CT has no acute changes.  EKG shows right bundle branch block.  X-ray of the pelvis and left hip shows left femoral neck fracture.  She is admitted for further evaluation and treatment.      Past Medical History    Past Medical History:   Diagnosis Date    Atrophic vaginitis     Celiac disease     Cystocele     Disorder of bone and cartilage, unspecified     osteopenia    Endometriosis, site unspecified     Essential hypertension, benign     Gastro-oesophageal reflux disease     Hiatal hernia     3 cm sliding hiatal hernia    Hypercalcemia     Hyperlipidemia     HZV (herpes zoster virus) post herpetic neuralgia     Microscopic colitis     Nonspecific abnormal results of liver function study     Mildly elevated transaminases, liver biopsy WNL    Peripheral neuropathy     Pulmonary nodule     Zinc deficiency        Past Surgical History   Past Surgical History:   Procedure Laterality Date    CLOSED REDUCTION, PERCUTANEOUS PINNING HIP Right 8/8/2023    Procedure: CLOSED REDUCTION, HIP, WITH PERCUTANEOUS PINNING;  Surgeon: Bo Duke MD;  Location:  OR    COLONOSCOPY  2011    ENDOSCOPIC ULTRASOUND UPPER GASTROINTESTINAL TRACT (GI) N/A 12/3/2023    Procedure: Endoscopic ultrasound upper gastrointestinal tract (GI);  Surgeon: Jean Ashley MD;  Location:  GI    ESOPHAGOSCOPY, GASTROSCOPY, DUODENOSCOPY (EGD), COMBINED N/A 12/3/2023    Procedure: Esophagoscopy, gastroscopy, duodenoscopy " (EGD), combined;  Surgeon: Jean Ashley MD;  Location:  GI    HC DILATION/CURETTAGE DIAG/THER NON OB      miscarriages , 6 x    HC REMOVAL OF OVARIAN CYST(S)  1977    partial oopherectomy    PHACOEMULSIFICATION CLEAR CORNEA WITH STANDARD INTRAOCULAR LENS IMPLANT Left 8/13/2015    Procedure: PHACOEMULSIFICATION CLEAR CORNEA WITH STANDARD INTRAOCULAR LENS IMPLANT;  Surgeon: Tyler Isaacs MD;  Location:  EC    PHACOEMULSIFICATION CLEAR CORNEA WITH STANDARD INTRAOCULAR LENS IMPLANT Right 8/25/2015    Procedure: PHACOEMULSIFICATION CLEAR CORNEA WITH STANDARD INTRAOCULAR LENS IMPLANT;  Surgeon: Tyler Isaacs MD;  Location:  EC    ZZC NONSPECIFIC PROCEDURE       rectocoele and enterocoele repair    ZZC NONSPECIFIC PROCEDURE      cystocoele,repair    ZZC TOTAL ABDOM HYSTERECTOMY  1982    endometriosis with adhesions       Prior to Admission Medications   Prior to Admission Medications   Prescriptions Last Dose Informant Patient Reported? Taking?   acetaminophen (TYLENOL) 325 MG tablet  Self No No   Sig: Take 2 tablets (650 mg) by mouth every 4 hours as needed for mild pain or other (and adjunct with moderate or severe pain or per patient request)   amLODIPine (NORVASC) 5 MG tablet  Self No No   Sig: Take 1 tablet (5 mg) by mouth every evening   pantoprazole (PROTONIX) 40 MG EC tablet   No No   Sig: Take 1 tablet (40 mg) by mouth daily   vitamin D3 (CHOLECALCIFEROL) 50 mcg (2000 units) tablet  Self No No   Sig: Take 1 tablet (50 mcg) by mouth daily      Facility-Administered Medications: None        Physical Exam   Vital Signs: Temp: 97.9  F (36.6  C) Temp src: Temporal BP: (!) 150/83 Pulse: 82   Resp: 20 SpO2: 99 %      Weight: 107 lbs 0 oz    Constitutional:  Frail lady, who is awake, alert, cooperative, no apparent distress  Respiratory: Clear to auscultation bilaterally, no crackles or wheezing  Cardiovascular: Regular rate and rhythm, normal S1 and S2, and no murmur noted  GI: Normal  bowel sounds, soft, non-distended, non-tender  Skin/Integumen: No rash on exposed skin.  No lower extremity edema  Other: Mood is very pleasant      Medical Decision Making       75 MINUTES SPENT BY ME on the date of service doing chart review, history, exam, documentation & further activities per the note.      Data     I have personally reviewed the following data over the past 24 hrs:    10.8  \   14.3   / 257     131 (L) 89 (L) 11.5 /  131 (H)   3.9 24 0.78 \     ALT: 29 AST: 46 (H) AP: 237 (H) TBILI: 2.2 (H)   ALB: 4.2 TOT PROTEIN: 7.3 LIPASE: N/A     Trop: 24 (H) BNP: N/A       Imaging results reviewed over the past 24 hrs:   Recent Results (from the past 24 hour(s))   XR Pelvis w Hip Left 1 View    Narrative    EXAM: XR PELVIS AND HIP LEFT 1 VIEW  LOCATION: [facility]  DATE: 8/11/2024    INDICATION: Fall, left hip pain.  COMPARISON: None.      Impression    IMPRESSION:   1. There is a left femoral neck fracture. There is about 1 cm of displacement and impaction. Mild apex medial angulation.  2. Degenerative changes in the spine.  3. Mild osteoarthrosis of the SI joints and pubic symphysis.   4. Postoperative changes in the proximal right femur without evidence of complication.  5. Diffuse bone demineralization.   XR Chest 1 View    Narrative    EXAM: XR CHEST 1 VIEW  LOCATION: [facility]  DATE: 08/11/2024    INDICATION: Fall, weakness  COMPARISON: 11/29/2023, 01/02/2024.      Impression    IMPRESSION: Oval nonspecific density at the left base. Question a small hiatal hernia. Consider CT scan for further evaluation.     Head CT w/o contrast    Narrative    EXAM: CT HEAD W/O CONTRAST  LOCATION: [facility]  DATE: 8/11/2024    INDICATION: fall vs syncope no  memory of event, eval for head injury, headache  COMPARISON: CT head 8/7/2023  TECHNIQUE: Routine CT Head without IV contrast. Multiplanar reformats. Dose reduction techniques were  used.    FINDINGS:  INTRACRANIAL CONTENTS: No intracranial hemorrhage, extraaxial collection, or mass effect.  No CT evidence of acute infarct. Mild presumed chronic small vessel ischemic changes. Unchanged mild generalized volume loss. No hydrocephalus.     VISUALIZED ORBITS/SINUSES/MASTOIDS: No intraorbital abnormality. No paranasal sinus mucosal disease. No middle ear or mastoid effusion.    BONES/SOFT TISSUES: No acute abnormality.      Impression    IMPRESSION:  1.  No acute intracranial process.

## 2024-08-11 NOTE — ED TRIAGE NOTES
BIBA from home, fell yesterday at 5pm, was able to walk back to bed, took pt 45 min to walk from BR to bed today d/t weakness. Pt doesn't remember fall, but pt is normally forgetful. Right hip pain. Increasing weakness for one week. Hx of HTN. Son lives with pt.

## 2024-08-12 ENCOUNTER — ANESTHESIA (OUTPATIENT)
Dept: SURGERY | Facility: CLINIC | Age: 84
DRG: 956 | End: 2024-08-12
Payer: MEDICARE

## 2024-08-12 ENCOUNTER — APPOINTMENT (OUTPATIENT)
Dept: GENERAL RADIOLOGY | Facility: CLINIC | Age: 84
DRG: 956 | End: 2024-08-12
Payer: MEDICARE

## 2024-08-12 ENCOUNTER — ANESTHESIA EVENT (OUTPATIENT)
Dept: SURGERY | Facility: CLINIC | Age: 84
DRG: 956 | End: 2024-08-12
Payer: MEDICARE

## 2024-08-12 LAB
ABO/RH(D): NORMAL
ALBUMIN UR-MCNC: NEGATIVE MG/DL
ANION GAP SERPL CALCULATED.3IONS-SCNC: 9 MMOL/L (ref 7–15)
ANTIBODY SCREEN: NEGATIVE
APPEARANCE UR: CLEAR
ATRIAL RATE - MUSE: 85 BPM
BILIRUB UR QL STRIP: NEGATIVE
BUN SERPL-MCNC: 9.4 MG/DL (ref 8–23)
CALCIUM SERPL-MCNC: 9.2 MG/DL (ref 8.8–10.4)
CHLORIDE SERPL-SCNC: 93 MMOL/L (ref 98–107)
CK SERPL-CCNC: 329 U/L (ref 26–192)
COLOR UR AUTO: ABNORMAL
CREAT SERPL-MCNC: 0.64 MG/DL (ref 0.51–0.95)
DIASTOLIC BLOOD PRESSURE - MUSE: NORMAL MMHG
EGFRCR SERPLBLD CKD-EPI 2021: 86 ML/MIN/1.73M2
ERYTHROCYTE [DISTWIDTH] IN BLOOD BY AUTOMATED COUNT: 15.5 % (ref 10–15)
GLUCOSE SERPL-MCNC: 118 MG/DL (ref 70–99)
GLUCOSE UR STRIP-MCNC: NEGATIVE MG/DL
HCO3 SERPL-SCNC: 26 MMOL/L (ref 22–29)
HCT VFR BLD AUTO: 35.9 % (ref 35–47)
HGB BLD-MCNC: 12.7 G/DL (ref 11.7–15.7)
HGB UR QL STRIP: NEGATIVE
INTERPRETATION ECG - MUSE: NORMAL
KETONES UR STRIP-MCNC: ABNORMAL MG/DL
LEUKOCYTE ESTERASE UR QL STRIP: NEGATIVE
MAGNESIUM SERPL-MCNC: 1.5 MG/DL (ref 1.7–2.3)
MAGNESIUM SERPL-MCNC: 1.6 MG/DL (ref 1.7–2.3)
MCH RBC QN AUTO: 34.8 PG (ref 26.5–33)
MCHC RBC AUTO-ENTMCNC: 35.4 G/DL (ref 31.5–36.5)
MCV RBC AUTO: 98 FL (ref 78–100)
NITRATE UR QL: NEGATIVE
P AXIS - MUSE: 87 DEGREES
PH UR STRIP: 7 [PH] (ref 5–7)
PLATELET # BLD AUTO: 210 10E3/UL (ref 150–450)
POTASSIUM SERPL-SCNC: 2.7 MMOL/L (ref 3.4–5.3)
POTASSIUM SERPL-SCNC: 3.3 MMOL/L (ref 3.4–5.3)
PR INTERVAL - MUSE: 158 MS
QRS DURATION - MUSE: 126 MS
QT - MUSE: 418 MS
QTC - MUSE: 497 MS
R AXIS - MUSE: -22 DEGREES
RBC # BLD AUTO: 3.65 10E6/UL (ref 3.8–5.2)
RBC URINE: 1 /HPF
SODIUM SERPL-SCNC: 128 MMOL/L (ref 135–145)
SP GR UR STRIP: 1.01 (ref 1–1.03)
SPECIMEN EXPIRATION DATE: NORMAL
SQUAMOUS EPITHELIAL: 1 /HPF
SYSTOLIC BLOOD PRESSURE - MUSE: NORMAL MMHG
T AXIS - MUSE: 50 DEGREES
UROBILINOGEN UR STRIP-MCNC: NORMAL MG/DL
VENTRICULAR RATE- MUSE: 85 BPM
WBC # BLD AUTO: 9.4 10E3/UL (ref 4–11)
WBC URINE: 4 /HPF

## 2024-08-12 PROCEDURE — 258N000003 HC RX IP 258 OP 636: Performed by: NURSE ANESTHETIST, CERTIFIED REGISTERED

## 2024-08-12 PROCEDURE — 80048 BASIC METABOLIC PNL TOTAL CA: CPT | Performed by: INTERNAL MEDICINE

## 2024-08-12 PROCEDURE — 258N000003 HC RX IP 258 OP 636: Performed by: INTERNAL MEDICINE

## 2024-08-12 PROCEDURE — 250N000009 HC RX 250: Performed by: NURSE ANESTHETIST, CERTIFIED REGISTERED

## 2024-08-12 PROCEDURE — C1713 ANCHOR/SCREW BN/BN,TIS/BN: HCPCS | Performed by: ORTHOPAEDIC SURGERY

## 2024-08-12 PROCEDURE — C1776 JOINT DEVICE (IMPLANTABLE): HCPCS | Performed by: ORTHOPAEDIC SURGERY

## 2024-08-12 PROCEDURE — 120N000001 HC R&B MED SURG/OB

## 2024-08-12 PROCEDURE — 85041 AUTOMATED RBC COUNT: CPT | Performed by: INTERNAL MEDICINE

## 2024-08-12 PROCEDURE — 370N000017 HC ANESTHESIA TECHNICAL FEE, PER MIN: Performed by: ORTHOPAEDIC SURGERY

## 2024-08-12 PROCEDURE — 272N000001 HC OR GENERAL SUPPLY STERILE: Performed by: ORTHOPAEDIC SURGERY

## 2024-08-12 PROCEDURE — 0SRS019 REPLACEMENT OF LEFT HIP JOINT, FEMORAL SURFACE WITH METAL SYNTHETIC SUBSTITUTE, CEMENTED, OPEN APPROACH: ICD-10-PCS | Performed by: ORTHOPAEDIC SURGERY

## 2024-08-12 PROCEDURE — 250N000009 HC RX 250: Performed by: STUDENT IN AN ORGANIZED HEALTH CARE EDUCATION/TRAINING PROGRAM

## 2024-08-12 PROCEDURE — 36415 COLL VENOUS BLD VENIPUNCTURE: CPT | Performed by: INTERNAL MEDICINE

## 2024-08-12 PROCEDURE — 250N000011 HC RX IP 250 OP 636: Performed by: NURSE ANESTHETIST, CERTIFIED REGISTERED

## 2024-08-12 PROCEDURE — 258N000001 HC RX 258: Performed by: ORTHOPAEDIC SURGERY

## 2024-08-12 PROCEDURE — 250N000011 HC RX IP 250 OP 636: Performed by: INTERNAL MEDICINE

## 2024-08-12 PROCEDURE — 27125 PARTIAL HIP REPLACEMENT: CPT | Performed by: ANESTHESIOLOGY

## 2024-08-12 PROCEDURE — 250N000009 HC RX 250: Performed by: ANESTHESIOLOGY

## 2024-08-12 PROCEDURE — 83735 ASSAY OF MAGNESIUM: CPT | Performed by: INTERNAL MEDICINE

## 2024-08-12 PROCEDURE — 250N000013 HC RX MED GY IP 250 OP 250 PS 637

## 2024-08-12 PROCEDURE — 250N000013 HC RX MED GY IP 250 OP 250 PS 637: Performed by: INTERNAL MEDICINE

## 2024-08-12 PROCEDURE — 999N000128 HC STATISTIC PERIPHERAL IV START W/O US GUIDANCE

## 2024-08-12 PROCEDURE — 81001 URINALYSIS AUTO W/SCOPE: CPT | Performed by: INTERNAL MEDICINE

## 2024-08-12 PROCEDURE — 99100 ANES PT EXTEME AGE<1 YR&>70: CPT | Performed by: NURSE ANESTHETIST, CERTIFIED REGISTERED

## 2024-08-12 PROCEDURE — 99232 SBSQ HOSP IP/OBS MODERATE 35: CPT | Performed by: NURSE PRACTITIONER

## 2024-08-12 PROCEDURE — 36415 COLL VENOUS BLD VENIPUNCTURE: CPT | Performed by: STUDENT IN AN ORGANIZED HEALTH CARE EDUCATION/TRAINING PROGRAM

## 2024-08-12 PROCEDURE — 82550 ASSAY OF CK (CPK): CPT | Performed by: INTERNAL MEDICINE

## 2024-08-12 PROCEDURE — 360N000077 HC SURGERY LEVEL 4, PER MIN: Performed by: ORTHOPAEDIC SURGERY

## 2024-08-12 PROCEDURE — 999N000141 HC STATISTIC PRE-PROCEDURE NURSING ASSESSMENT: Performed by: ORTHOPAEDIC SURGERY

## 2024-08-12 PROCEDURE — 86900 BLOOD TYPING SEROLOGIC ABO: CPT | Performed by: STUDENT IN AN ORGANIZED HEALTH CARE EDUCATION/TRAINING PROGRAM

## 2024-08-12 PROCEDURE — 84132 ASSAY OF SERUM POTASSIUM: CPT | Performed by: INTERNAL MEDICINE

## 2024-08-12 PROCEDURE — 272N000002 HC OR SUPPLY OTHER OPNP: Performed by: ORTHOPAEDIC SURGERY

## 2024-08-12 PROCEDURE — 250N000009 HC RX 250: Performed by: ORTHOPAEDIC SURGERY

## 2024-08-12 PROCEDURE — 250N000011 HC RX IP 250 OP 636

## 2024-08-12 PROCEDURE — 710N000009 HC RECOVERY PHASE 1, LEVEL 1, PER MIN: Performed by: ORTHOPAEDIC SURGERY

## 2024-08-12 PROCEDURE — 250N000025 HC SEVOFLURANE, PER MIN: Performed by: ORTHOPAEDIC SURGERY

## 2024-08-12 PROCEDURE — 250N000011 HC RX IP 250 OP 636: Performed by: STUDENT IN AN ORGANIZED HEALTH CARE EDUCATION/TRAINING PROGRAM

## 2024-08-12 PROCEDURE — 83735 ASSAY OF MAGNESIUM: CPT | Performed by: NURSE PRACTITIONER

## 2024-08-12 PROCEDURE — 999N000065 XR PELVIS AND HIP PORTABLE LEFT 1 VIEW

## 2024-08-12 PROCEDURE — 27125 PARTIAL HIP REPLACEMENT: CPT | Performed by: NURSE ANESTHETIST, CERTIFIED REGISTERED

## 2024-08-12 DEVICE — UNIVERSAL DISTAL CEMENT SPACER
Type: IMPLANTABLE DEVICE | Site: HIP | Status: FUNCTIONAL
Brand: OMNIFIT

## 2024-08-12 DEVICE — IMPLANTABLE DEVICE: Type: IMPLANTABLE DEVICE | Site: HIP | Status: FUNCTIONAL

## 2024-08-12 DEVICE — LFIT V40 FEMORAL HEAD
Type: IMPLANTABLE DEVICE | Site: HIP | Status: FUNCTIONAL
Brand: V40 HEAD

## 2024-08-12 DEVICE — FULL DOSE BONE CEMENT, 10 PACK CATALOG NUMBER IS 6191-1-010
Type: IMPLANTABLE DEVICE | Site: HIP | Status: FUNCTIONAL
Brand: SIMPLEX

## 2024-08-12 DEVICE — BIPOLAR COMPONENT
Type: IMPLANTABLE DEVICE | Site: HIP | Status: FUNCTIONAL
Brand: UHR

## 2024-08-12 DEVICE — 132 DEGREE CEMENTED HIP STEM
Type: IMPLANTABLE DEVICE | Site: HIP | Status: FUNCTIONAL
Brand: ACCOLADE

## 2024-08-12 RX ORDER — FENTANYL CITRATE 50 UG/ML
INJECTION, SOLUTION INTRAMUSCULAR; INTRAVENOUS PRN
Status: DISCONTINUED | OUTPATIENT
Start: 2024-08-12 | End: 2024-08-12

## 2024-08-12 RX ORDER — OXYCODONE HYDROCHLORIDE 5 MG/1
5 TABLET ORAL EVERY 4 HOURS PRN
Status: DISCONTINUED | OUTPATIENT
Start: 2024-08-12 | End: 2024-08-15 | Stop reason: HOSPADM

## 2024-08-12 RX ORDER — HYDROMORPHONE HCL IN WATER/PF 6 MG/30 ML
0.2 PATIENT CONTROLLED ANALGESIA SYRINGE INTRAVENOUS
Status: DISCONTINUED | OUTPATIENT
Start: 2024-08-12 | End: 2024-08-15 | Stop reason: HOSPADM

## 2024-08-12 RX ORDER — ASPIRIN 81 MG/1
81 TABLET ORAL 2 TIMES DAILY
Status: DISCONTINUED | OUTPATIENT
Start: 2024-08-12 | End: 2024-08-15 | Stop reason: HOSPADM

## 2024-08-12 RX ORDER — MAGNESIUM HYDROXIDE 1200 MG/15ML
LIQUID ORAL PRN
Status: DISCONTINUED | OUTPATIENT
Start: 2024-08-12 | End: 2024-08-12 | Stop reason: HOSPADM

## 2024-08-12 RX ORDER — PROCHLORPERAZINE MALEATE 5 MG
5 TABLET ORAL EVERY 6 HOURS PRN
Status: DISCONTINUED | OUTPATIENT
Start: 2024-08-12 | End: 2024-08-15 | Stop reason: HOSPADM

## 2024-08-12 RX ORDER — ACETAMINOPHEN 325 MG/1
650 TABLET ORAL EVERY 4 HOURS PRN
Status: DISCONTINUED | OUTPATIENT
Start: 2024-08-15 | End: 2024-08-15 | Stop reason: HOSPADM

## 2024-08-12 RX ORDER — POLYETHYLENE GLYCOL 3350 17 G/17G
17 POWDER, FOR SOLUTION ORAL DAILY
Status: DISCONTINUED | OUTPATIENT
Start: 2024-08-13 | End: 2024-08-15 | Stop reason: HOSPADM

## 2024-08-12 RX ORDER — ONDANSETRON 4 MG/1
4 TABLET, ORALLY DISINTEGRATING ORAL EVERY 30 MIN PRN
Status: DISCONTINUED | OUTPATIENT
Start: 2024-08-12 | End: 2024-08-12 | Stop reason: HOSPADM

## 2024-08-12 RX ORDER — SODIUM CHLORIDE, SODIUM LACTATE, POTASSIUM CHLORIDE, CALCIUM CHLORIDE 600; 310; 30; 20 MG/100ML; MG/100ML; MG/100ML; MG/100ML
INJECTION, SOLUTION INTRAVENOUS CONTINUOUS PRN
Status: DISCONTINUED | OUTPATIENT
Start: 2024-08-12 | End: 2024-08-12

## 2024-08-12 RX ORDER — POTASSIUM CHLORIDE 20MEQ/15ML
40 LIQUID (ML) ORAL ONCE
Status: COMPLETED | OUTPATIENT
Start: 2024-08-12 | End: 2024-08-12

## 2024-08-12 RX ORDER — NALOXONE HYDROCHLORIDE 0.4 MG/ML
0.1 INJECTION, SOLUTION INTRAMUSCULAR; INTRAVENOUS; SUBCUTANEOUS
Status: DISCONTINUED | OUTPATIENT
Start: 2024-08-12 | End: 2024-08-12 | Stop reason: HOSPADM

## 2024-08-12 RX ORDER — PROPOFOL 10 MG/ML
INJECTION, EMULSION INTRAVENOUS PRN
Status: DISCONTINUED | OUTPATIENT
Start: 2024-08-12 | End: 2024-08-12

## 2024-08-12 RX ORDER — AMOXICILLIN 250 MG
1 CAPSULE ORAL 2 TIMES DAILY
Status: DISCONTINUED | OUTPATIENT
Start: 2024-08-12 | End: 2024-08-15 | Stop reason: HOSPADM

## 2024-08-12 RX ORDER — ONDANSETRON 2 MG/ML
INJECTION INTRAMUSCULAR; INTRAVENOUS PRN
Status: DISCONTINUED | OUTPATIENT
Start: 2024-08-12 | End: 2024-08-12

## 2024-08-12 RX ORDER — BISACODYL 10 MG
10 SUPPOSITORY, RECTAL RECTAL DAILY PRN
Status: DISCONTINUED | OUTPATIENT
Start: 2024-08-15 | End: 2024-08-15 | Stop reason: HOSPADM

## 2024-08-12 RX ORDER — ACETAMINOPHEN 325 MG/1
975 TABLET ORAL EVERY 8 HOURS
Status: COMPLETED | OUTPATIENT
Start: 2024-08-12 | End: 2024-08-15

## 2024-08-12 RX ORDER — CEFAZOLIN SODIUM 1 G/3ML
1 INJECTION, POWDER, FOR SOLUTION INTRAMUSCULAR; INTRAVENOUS EVERY 8 HOURS
Status: COMPLETED | OUTPATIENT
Start: 2024-08-12 | End: 2024-08-13

## 2024-08-12 RX ORDER — LIDOCAINE HYDROCHLORIDE 20 MG/ML
INJECTION, SOLUTION INFILTRATION; PERINEURAL PRN
Status: DISCONTINUED | OUTPATIENT
Start: 2024-08-12 | End: 2024-08-12

## 2024-08-12 RX ORDER — FENTANYL CITRATE 0.05 MG/ML
25 INJECTION, SOLUTION INTRAMUSCULAR; INTRAVENOUS EVERY 5 MIN PRN
Status: DISCONTINUED | OUTPATIENT
Start: 2024-08-12 | End: 2024-08-12 | Stop reason: HOSPADM

## 2024-08-12 RX ORDER — TRANEXAMIC ACID 10 MG/ML
1 INJECTION, SOLUTION INTRAVENOUS ONCE
Status: COMPLETED | OUTPATIENT
Start: 2024-08-12 | End: 2024-08-12

## 2024-08-12 RX ORDER — MAGNESIUM SULFATE HEPTAHYDRATE 40 MG/ML
2 INJECTION, SOLUTION INTRAVENOUS ONCE
Status: COMPLETED | OUTPATIENT
Start: 2024-08-12 | End: 2024-08-13

## 2024-08-12 RX ORDER — CEFAZOLIN SODIUM/WATER 2 G/20 ML
2 SYRINGE (ML) INTRAVENOUS SEE ADMIN INSTRUCTIONS
Status: DISCONTINUED | OUTPATIENT
Start: 2024-08-12 | End: 2024-08-12 | Stop reason: HOSPADM

## 2024-08-12 RX ORDER — PROPOFOL 10 MG/ML
INJECTION, EMULSION INTRAVENOUS CONTINUOUS PRN
Status: DISCONTINUED | OUTPATIENT
Start: 2024-08-12 | End: 2024-08-12

## 2024-08-12 RX ORDER — CEFAZOLIN SODIUM/WATER 2 G/20 ML
2 SYRINGE (ML) INTRAVENOUS
Status: COMPLETED | OUTPATIENT
Start: 2024-08-12 | End: 2024-08-12

## 2024-08-12 RX ORDER — FENTANYL CITRATE 0.05 MG/ML
50 INJECTION, SOLUTION INTRAMUSCULAR; INTRAVENOUS EVERY 5 MIN PRN
Status: DISCONTINUED | OUTPATIENT
Start: 2024-08-12 | End: 2024-08-12 | Stop reason: HOSPADM

## 2024-08-12 RX ORDER — DEXAMETHASONE SODIUM PHOSPHATE 4 MG/ML
4 INJECTION, SOLUTION INTRA-ARTICULAR; INTRALESIONAL; INTRAMUSCULAR; INTRAVENOUS; SOFT TISSUE
Status: DISCONTINUED | OUTPATIENT
Start: 2024-08-12 | End: 2024-08-12 | Stop reason: HOSPADM

## 2024-08-12 RX ORDER — LIDOCAINE 40 MG/G
CREAM TOPICAL
Status: DISCONTINUED | OUTPATIENT
Start: 2024-08-12 | End: 2024-08-12

## 2024-08-12 RX ORDER — ONDANSETRON 2 MG/ML
4 INJECTION INTRAMUSCULAR; INTRAVENOUS EVERY 30 MIN PRN
Status: DISCONTINUED | OUTPATIENT
Start: 2024-08-12 | End: 2024-08-12 | Stop reason: HOSPADM

## 2024-08-12 RX ORDER — ONDANSETRON 2 MG/ML
4 INJECTION INTRAMUSCULAR; INTRAVENOUS EVERY 6 HOURS PRN
Status: DISCONTINUED | OUTPATIENT
Start: 2024-08-12 | End: 2024-08-12

## 2024-08-12 RX ORDER — HYDROMORPHONE HCL IN WATER/PF 6 MG/30 ML
0.4 PATIENT CONTROLLED ANALGESIA SYRINGE INTRAVENOUS EVERY 5 MIN PRN
Status: DISCONTINUED | OUTPATIENT
Start: 2024-08-12 | End: 2024-08-12 | Stop reason: HOSPADM

## 2024-08-12 RX ORDER — SODIUM CHLORIDE, SODIUM LACTATE, POTASSIUM CHLORIDE, CALCIUM CHLORIDE 600; 310; 30; 20 MG/100ML; MG/100ML; MG/100ML; MG/100ML
INJECTION, SOLUTION INTRAVENOUS CONTINUOUS
Status: DISCONTINUED | OUTPATIENT
Start: 2024-08-12 | End: 2024-08-12 | Stop reason: HOSPADM

## 2024-08-12 RX ORDER — HYDROMORPHONE HCL IN WATER/PF 6 MG/30 ML
0.1 PATIENT CONTROLLED ANALGESIA SYRINGE INTRAVENOUS
Status: DISCONTINUED | OUTPATIENT
Start: 2024-08-12 | End: 2024-08-15 | Stop reason: HOSPADM

## 2024-08-12 RX ORDER — DEXAMETHASONE SODIUM PHOSPHATE 4 MG/ML
INJECTION, SOLUTION INTRA-ARTICULAR; INTRALESIONAL; INTRAMUSCULAR; INTRAVENOUS; SOFT TISSUE PRN
Status: DISCONTINUED | OUTPATIENT
Start: 2024-08-12 | End: 2024-08-12

## 2024-08-12 RX ORDER — ONDANSETRON 4 MG/1
4 TABLET, ORALLY DISINTEGRATING ORAL EVERY 6 HOURS PRN
Status: DISCONTINUED | OUTPATIENT
Start: 2024-08-12 | End: 2024-08-12

## 2024-08-12 RX ORDER — HYDROMORPHONE HCL IN WATER/PF 6 MG/30 ML
0.2 PATIENT CONTROLLED ANALGESIA SYRINGE INTRAVENOUS EVERY 5 MIN PRN
Status: DISCONTINUED | OUTPATIENT
Start: 2024-08-12 | End: 2024-08-12 | Stop reason: HOSPADM

## 2024-08-12 RX ORDER — SODIUM CHLORIDE, SODIUM LACTATE, POTASSIUM CHLORIDE, CALCIUM CHLORIDE 600; 310; 30; 20 MG/100ML; MG/100ML; MG/100ML; MG/100ML
INJECTION, SOLUTION INTRAVENOUS CONTINUOUS
Status: DISCONTINUED | OUTPATIENT
Start: 2024-08-12 | End: 2024-08-13

## 2024-08-12 RX ADMIN — TRANEXAMIC ACID 1 G: 10 INJECTION, SOLUTION INTRAVENOUS at 12:46

## 2024-08-12 RX ADMIN — ACETAMINOPHEN 325MG 975 MG: 325 TABLET ORAL at 17:22

## 2024-08-12 RX ADMIN — Medication 25 ML: at 15:21

## 2024-08-12 RX ADMIN — PHENYLEPHRINE HYDROCHLORIDE 100 MCG: 10 INJECTION INTRAVENOUS at 12:01

## 2024-08-12 RX ADMIN — ASPIRIN 81 MG: 81 TABLET, COATED ORAL at 20:27

## 2024-08-12 RX ADMIN — PREDNISOLONE ACETATE 1 DROP: 10 SUSPENSION/ DROPS OPHTHALMIC at 17:26

## 2024-08-12 RX ADMIN — POTASSIUM CHLORIDE 40 MEQ: 20 SOLUTION ORAL at 22:54

## 2024-08-12 RX ADMIN — FENTANYL CITRATE 25 MCG: 50 INJECTION INTRAMUSCULAR; INTRAVENOUS at 11:45

## 2024-08-12 RX ADMIN — FENTANYL CITRATE 50 MCG: 50 INJECTION INTRAMUSCULAR; INTRAVENOUS at 11:57

## 2024-08-12 RX ADMIN — HYDROMORPHONE HYDROCHLORIDE 0.25 MG: 1 INJECTION, SOLUTION INTRAMUSCULAR; INTRAVENOUS; SUBCUTANEOUS at 12:09

## 2024-08-12 RX ADMIN — MAGNESIUM SULFATE HEPTAHYDRATE 2 G: 40 INJECTION, SOLUTION INTRAVENOUS at 23:31

## 2024-08-12 RX ADMIN — PROPOFOL 70 MG: 10 INJECTION, EMULSION INTRAVENOUS at 11:39

## 2024-08-12 RX ADMIN — LIDOCAINE HYDROCHLORIDE 60 MG: 20 INJECTION, SOLUTION INFILTRATION; PERINEURAL at 11:39

## 2024-08-12 RX ADMIN — CEFAZOLIN 1 G: 1 INJECTION, POWDER, FOR SOLUTION INTRAMUSCULAR; INTRAVENOUS at 20:27

## 2024-08-12 RX ADMIN — PREDNISOLONE ACETATE 1 DROP: 10 SUSPENSION/ DROPS OPHTHALMIC at 08:54

## 2024-08-12 RX ADMIN — ACETAMINOPHEN 975 MG: 325 TABLET, FILM COATED ORAL at 06:10

## 2024-08-12 RX ADMIN — PHENYLEPHRINE HYDROCHLORIDE 100 MCG: 10 INJECTION INTRAVENOUS at 11:58

## 2024-08-12 RX ADMIN — SODIUM CHLORIDE, POTASSIUM CHLORIDE, SODIUM LACTATE AND CALCIUM CHLORIDE: 600; 310; 30; 20 INJECTION, SOLUTION INTRAVENOUS at 12:57

## 2024-08-12 RX ADMIN — PHENYLEPHRINE HYDROCHLORIDE 100 MCG: 10 INJECTION INTRAVENOUS at 12:04

## 2024-08-12 RX ADMIN — PHENYLEPHRINE HYDROCHLORIDE 100 MCG: 10 INJECTION INTRAVENOUS at 12:21

## 2024-08-12 RX ADMIN — PROPOFOL 30 MCG/KG/MIN: 10 INJECTION, EMULSION INTRAVENOUS at 11:46

## 2024-08-12 RX ADMIN — HYDROMORPHONE HYDROCHLORIDE 0.25 MG: 1 INJECTION, SOLUTION INTRAMUSCULAR; INTRAVENOUS; SUBCUTANEOUS at 12:37

## 2024-08-12 RX ADMIN — Medication 2 G: at 11:39

## 2024-08-12 RX ADMIN — SENNOSIDES AND DOCUSATE SODIUM 1 TABLET: 50; 8.6 TABLET ORAL at 20:27

## 2024-08-12 RX ADMIN — DEXAMETHASONE SODIUM PHOSPHATE 4 MG: 4 INJECTION, SOLUTION INTRA-ARTICULAR; INTRALESIONAL; INTRAMUSCULAR; INTRAVENOUS; SOFT TISSUE at 12:08

## 2024-08-12 RX ADMIN — ONDANSETRON 4 MG: 2 INJECTION INTRAMUSCULAR; INTRAVENOUS at 12:57

## 2024-08-12 RX ADMIN — PROPOFOL 30 MG: 10 INJECTION, EMULSION INTRAVENOUS at 11:56

## 2024-08-12 RX ADMIN — AMLODIPINE BESYLATE 5 MG: 5 TABLET ORAL at 20:27

## 2024-08-12 RX ADMIN — FENTANYL CITRATE 25 MCG: 50 INJECTION INTRAMUSCULAR; INTRAVENOUS at 11:51

## 2024-08-12 RX ADMIN — BRIMONIDINE TARTRATE 1 DROP: 2 SOLUTION/ DROPS OPHTHALMIC at 08:54

## 2024-08-12 RX ADMIN — TRANEXAMIC ACID 1 G: 10 INJECTION, SOLUTION INTRAVENOUS at 11:46

## 2024-08-12 RX ADMIN — PREDNISOLONE ACETATE 1 DROP: 10 SUSPENSION/ DROPS OPHTHALMIC at 23:18

## 2024-08-12 RX ADMIN — SODIUM CHLORIDE, POTASSIUM CHLORIDE, SODIUM LACTATE AND CALCIUM CHLORIDE: 600; 310; 30; 20 INJECTION, SOLUTION INTRAVENOUS at 11:25

## 2024-08-12 RX ADMIN — ACETAMINOPHEN 325MG 975 MG: 325 TABLET ORAL at 22:53

## 2024-08-12 RX ADMIN — BRIMONIDINE TARTRATE 1 DROP: 2 SOLUTION/ DROPS OPHTHALMIC at 23:18

## 2024-08-12 ASSESSMENT — ACTIVITIES OF DAILY LIVING (ADL)
ADLS_ACUITY_SCORE: 37
ADLS_ACUITY_SCORE: 43
ADLS_ACUITY_SCORE: 41
ADLS_ACUITY_SCORE: 40
ADLS_ACUITY_SCORE: 36
ADLS_ACUITY_SCORE: 41
ADLS_ACUITY_SCORE: 43
ADLS_ACUITY_SCORE: 37
ADLS_ACUITY_SCORE: 43
ADLS_ACUITY_SCORE: 40
ADLS_ACUITY_SCORE: 42
ADLS_ACUITY_SCORE: 43
ADLS_ACUITY_SCORE: 37
ADLS_ACUITY_SCORE: 43
ADLS_ACUITY_SCORE: 41
ADLS_ACUITY_SCORE: 43
ADLS_ACUITY_SCORE: 36
ADLS_ACUITY_SCORE: 37
ADLS_ACUITY_SCORE: 36
ADLS_ACUITY_SCORE: 43
ADLS_ACUITY_SCORE: 43

## 2024-08-12 ASSESSMENT — COPD QUESTIONNAIRES: COPD: 0

## 2024-08-12 NOTE — PROVIDER NOTIFICATION
MD Notification    Notified Person: NP    Notified Person Name: Helen Simon    Notification Interaction: vocdigna    Purpose of Notification: Magnesium, Potassium, and Na are all low    Orders Received: Evan stated to notify pre-op and at least have the potassium replaced.    Comments: Patient in pre-op. Call placed to pre-op RN to notify her of above. She stated that the anesthesiologist was informed.

## 2024-08-12 NOTE — CONSULTS
SPIRITUAL HEALTH SERVICES Consult Note  [facility]  Ortho    Reason for visit or referral: [facility] consult request for emotional and Sabianist/ritual support.    Visited with pt prior to surgical procedure. Pt would like to see  for anointing and receive communion after surgery (glutton free  host required). Pt would also like a follow-up visit with .    Plan: Placed non-emergent request  for Sacrament of the Sick (anointing) by . [facility] to follow-up with pt after surgery.    [facility] Phone Line [phone]  Spiritual Health Pager [phone]    [facility] available 24/7 for emergent requests/referrals, either by paging the on-call  or by entering an ASAP/STAT consult in [facility], which will also page the on-call .

## 2024-08-12 NOTE — PLAN OF CARE
Pt A/Ox4, VSS on RA, tylenol and oxycodone for pain, CMS intact, bedrest, purewick in use, NPO since midnight, will continue to monitor

## 2024-08-12 NOTE — ANESTHESIA PREPROCEDURE EVALUATION
Anesthesia Pre-Procedure Evaluation    Patient: Luz Babb   MRN: [id number] : 1940        Procedure : Procedure(s):  Left hip hemiarthroplasty for left femoral neck fracture          Past Medical History:   Diagnosis Date    Atrophic vaginitis     Celiac disease     Cystocele     Disorder of bone and cartilage, unspecified     osteopenia    Endometriosis, site unspecified     Essential hypertension, benign     Gastro-oesophageal reflux disease     Hiatal hernia     3 cm sliding hiatal hernia    Hypercalcemia     Hyperlipidemia     HZV (herpes zoster virus) post herpetic neuralgia     Microscopic colitis     Nonspecific abnormal results of liver function study     Mildly elevated transaminases, liver biopsy WNL    Peripheral neuropathy     Pulmonary nodule     Zinc deficiency       Past Surgical History:   Procedure Laterality Date    CLOSED REDUCTION, PERCUTANEOUS PINNING HIP Right 2023    Procedure: CLOSED REDUCTION, HIP, WITH PERCUTANEOUS PINNING;  Surgeon: Bo Duke MD;  Location:  OR    COLONOSCOPY      ENDOSCOPIC ULTRASOUND UPPER GASTROINTESTINAL TRACT (GI) N/A 12/3/2023    Procedure: Endoscopic ultrasound upper gastrointestinal tract (GI);  Surgeon: Jean Ashley MD;  Location:  GI    ESOPHAGOSCOPY, GASTROSCOPY, DUODENOSCOPY (EGD), COMBINED N/A 12/3/2023    Procedure: Esophagoscopy, gastroscopy, duodenoscopy (EGD), combined;  Surgeon: Jean Ashley MD;  Location:  GI    HC DILATION/CURETTAGE DIAG/THER NON OB      miscarriages , 6 x    HC REMOVAL OF OVARIAN CYST(S)      partial oopherectomy    PHACOEMULSIFICATION CLEAR CORNEA WITH STANDARD INTRAOCULAR LENS IMPLANT Left 2015    Procedure: PHACOEMULSIFICATION CLEAR CORNEA WITH STANDARD INTRAOCULAR LENS IMPLANT;  Surgeon: Tyler Isaacs MD;  Location:  EC    PHACOEMULSIFICATION CLEAR CORNEA WITH STANDARD INTRAOCULAR LENS IMPLANT Right 2015    Procedure: PHACOEMULSIFICATION CLEAR  CORNEA WITH STANDARD INTRAOCULAR LENS IMPLANT;  Surgeon: Tyler Isaacs MD;  Location:  EC    ZZC NONSPECIFIC PROCEDURE       rectocoele and enterocoele repair    ZZC NONSPECIFIC PROCEDURE      cystocoele,repair    ZZC TOTAL ABDOM HYSTERECTOMY  1982    endometriosis with adhesions      Allergies   Allergen Reactions    Amoxicillin Diarrhea    Amoxicillin-Pot Clavulanate     Ciprofloxacin     Gluten Meal      celiac      Social History     Tobacco Use    Smoking status: Never     Passive exposure: Yes    Smokeless tobacco: Not on file    Tobacco comments:      smokes   Substance Use Topics    Alcohol use: Yes     Comment: 3-4 glasses per week      Wt Readings from Last 1 Encounters:   08/11/24 48.5 kg (107 lb)        Anesthesia Evaluation            ROS/MED HX  ENT/Pulmonary:    (-) asthma, COPD and sleep apnea   Neurologic:     (+)    peripheral neuropathy,                            Cardiovascular:     (+) Dyslipidemia hypertension- -   -  - -                                      METS/Exercise Tolerance:     Hematologic:       Musculoskeletal:       GI/Hepatic: Comment: celiac    (+) GERD,     hiatal hernia,       liver disease (fatty liver),       Renal/Genitourinary:       Endo: Comment: Hyponatremia      Psychiatric/Substance Use:     (+) psychiatric history depression and anxiety       Infectious Disease:       Malignancy:       Other:            Physical Exam    Airway        Mallampati: II   TM distance: > 3 FB   Neck ROM: full     Respiratory Devices and Support         Dental       (+) Minor Abnormalities - some fillings, tiny chips      Cardiovascular             Pulmonary                   OUTSIDE LABS:  CBC:   Lab Results   Component Value Date    WBC 9.4 08/12/2024    WBC 10.8 08/11/2024    HGB 12.7 08/12/2024    HGB 14.3 08/11/2024    HCT 35.9 08/12/2024    HCT 39.8 08/11/2024     08/12/2024     08/11/2024     BMP:   Lab Results   Component Value Date     (L)  "08/12/2024     (L) 08/11/2024    POTASSIUM 3.3 (L) 08/12/2024    POTASSIUM 3.9 08/11/2024    CHLORIDE 93 (L) 08/12/2024    CHLORIDE 89 (L) 08/11/2024    CO2 26 08/12/2024    CO2 24 08/11/2024    BUN 9.4 08/12/2024    BUN 11.5 08/11/2024    CR 0.64 08/12/2024    CR 0.78 08/11/2024     (H) 08/12/2024     (H) 08/11/2024     COAGS: No results found for: \"PTT\", \"INR\", \"FIBR\"  POC: No results found for: \"BGM\", \"HCG\", \"HCGS\"  HEPATIC:   Lab Results   Component Value Date    ALBUMIN 4.2 08/11/2024    PROTTOTAL 7.3 08/11/2024    ALT 29 08/11/2024    AST 46 (H) 08/11/2024     (H) 12/19/2003    ALKPHOS 237 (H) 08/11/2024    BILITOTAL 2.2 (H) 08/11/2024     OTHER:   Lab Results   Component Value Date    LACT 1.7 01/02/2024    KARTHIKEYAN 9.2 08/12/2024    PHOS 3.0 01/10/2024    MAG 1.5 (L) 01/10/2024    LIPASE 22 11/29/2023    TSH 2.83 08/12/2023    SED 39 (H) 12/19/2003       Anesthesia Plan    ASA Status:  2       Anesthesia Type: General.     - Airway: LMA              Consents    Anesthesia Plan(s) and associated risks, benefits, and realistic alternatives discussed. Questions answered and patient/representative(s) expressed understanding.     - Discussed:     - Discussed with:  Patient            Postoperative Care    Pain management: Peripheral nerve block (Single Shot).   PONV prophylaxis: Ondansetron (or other 5HT-3), Dexamethasone or Solumedrol, Background Propofol Infusion     Comments:               Griselda Laguna    I have reviewed the pertinent notes and labs in the chart from the past 30 days and (re)examined the patient.  Any updates or changes from those notes are reflected in this note.    # Hypokalemia: Lowest K = 3.3 mmol/L in last 2 days, will replace as needed  # Hyponatremia: Lowest Na = 128 mmol/L in last 2 days, will monitor as appropriate          # Cachexia: Estimated body mass index is 17.81 kg/m  as calculated from the following:    Height as of this encounter: 1.651 m (5' " "5\").    Weight as of this encounter: 48.5 kg (107 lb).      "

## 2024-08-12 NOTE — ANESTHESIA POSTPROCEDURE EVALUATION
Patient: Luz Babb    Procedure: Procedure(s):  Left hip hemiarthroplasty for left femoral neck fracture       Anesthesia Type:  General    Note:  Disposition: Inpatient   Postop Pain Control: Uneventful            Sign Out: Well controlled pain   PONV: No   Neuro/Psych: Uneventful            Sign Out: Acceptable/Baseline neuro status   Airway/Respiratory: Uneventful            Sign Out: Acceptable/Baseline resp. status   CV/Hemodynamics: Uneventful            Sign Out: Acceptable CV status   Other NRE:    DID A NON-ROUTINE EVENT OCCUR? No           Last vitals:  Vitals Value Taken Time   /89 08/12/24 1445   Temp 36.3  C (97.3  F) 08/12/24 1400   Pulse 80 08/12/24 1455   Resp 12 08/12/24 1455   SpO2 96 % 08/12/24 1455   Vitals shown include unfiled device data.    Electronically Signed By: Griselda Laguna  August 12, 2024  3:22 PM

## 2024-08-12 NOTE — PLAN OF CARE
9115-4523: Pt arrived from the ED ~ 1700. A&Ox4 but forgetful. Calm and cooperative. VSS except for elevated BP. On RA. CMS intact. L hip pain managed with oxycodone and ice pack. On bedrest. Purewick in place for urinary incontinence. Tolerating PO. Plan for surgery tomorrow for L hip repair. NPO after midnight. Will continue to monitor.

## 2024-08-12 NOTE — ANESTHESIA PROCEDURE NOTES
Fascia iliaca Procedure Note    Pre-Procedure   Staff -        Anesthesiologist:  Griselda Laguna       Performed By: anesthesiologist       Location: pre-op       Pre-Anesthestic Checklist: patient identified, IV checked, site marked, risks and benefits discussed, informed consent, monitors and equipment checked, pre-op evaluation, at physician/surgeon's request and post-op pain management  Timeout:       Correct Patient: Yes        Correct Procedure: Yes        Correct Site: Yes        Correct Position: Yes        Correct Laterality: Yes        Site Marked: Yes  Procedure Documentation  Procedure: Fascia iliaca       Laterality: left       Patient Position: supine       Skin prep: Chloraprep       Local skin infiltrated with 3 mL of 1% lidocaine.        Needle Type: insulated and short bevel       Needle Gauge: 21.        Needle Length (millimeters): 100        Ultrasound guided       1. Ultrasound was used to identify targeted nerve, plexus, vascular marker, or fascial plane and place a needle adjacent to it in real-time.       2. Ultrasound was used to visualize the spread of anesthetic in close proximity to the above referenced structure.       3. A permanent image is entered into the patient's record.       4. The visualized anatomic structures appeared normal.       5. There were no apparent abnormal pathologic findings.    Assessment/Narrative         The placement was negative for: blood aspirated, painful injection and site bleeding       Paresthesias: No.       Insertion/Infusion Method: Single Shot       Complications: none       Injection made incrementally with aspirations every 3 mL.    Medication(s) Administered   Bupivacaine 0.5% w/ 1:400K Epi (Injection) - Injection   25 mL - 8/12/2024 3:21:00 PM   Comments:  Needle tip visualized throughout.  No nerve swelling.     Patient tolerated procedure well.  No known complications.     The surgeon has given a verbal order transferring care of this  "patient to me for the performance of a regional analgesia block for post-op pain control. It is requested of me because I am uniquely trained and qualified to perform this block and the surgeon is neither trained nor qualified to perform this procedure.          FOR [facility] ([facility]) ONLY:   Pain Team Contact information: please page the Pain Team Via [facility] [employer] [facility]. Search \"Pain\". During daytime hours, please page the attending first. At night please page the resident first.      "

## 2024-08-12 NOTE — PROGRESS NOTES
"[facility]    Medicine Progress Note - Hospitalist Service    Date of Admission:  8/11/2024    Assessment & Plan      Anna Marie Babb is a 84 year old female with multiple medical problems including HTN, HLP, GERD, Hiatal hernia, MDD with anxiety, Neuropathy, Hypercalcemia, Known pulmonary nodule, Celiac disease who presents to the emergency department following a fall.  X-ray shows left femoral neck fracture.    Principal Problem:    Closed fracture of neck of left femur (H)  Admit as inpatient  Bedrest  Orthopedic surgery consult completed - plan for hemiarthroplasty 8/12  Anna Marie Hickey had in situ percutaneous screw fixation of a right valgus impacted femoral neck fracture on 8/8/2023 and tolerated the procedure and recovery well.  Her initial troponin value is very modestly elevated, she needs a follow-up value obtained.  She has chronic hyponatremia with current sodium value close to her baseline.  There are no additional medical problems which can be/should be optimized prior to proceeding with surgery, if offered    Hypokalemia  Hypomagnesemia   K 3.3; Mag 1.6  - replacement protocols ordered    Active Problems:    Essential hypertension, benign  Continue PTA amlodipine 5 mg daily  BP (!) 154/89 (BP Location: Left arm)   Pulse 88   Temp 97.9  F (36.6  C) (Oral)   Resp 18   Ht 1.651 m (5' 5\")   Wt 48.5 kg (107 lb)   LMP  (LMP Unknown)   SpO2 99%   BMI 17.81 kg/m          Nonspecific abnormal results of liver function study  These results are of unclear clinical significance. She had similar LFT abnormalities during a hospitalization following a fall 1/2/2024, though today's total bilirubin is slightly higher (2.2-<1.4 milligrams per deciliter)  An abdominal ultrasound done then showed sludge in the gallbladder  Consider repeating liver panel in the future  Ordered APAP as needed since benefit > risk  Repeat abd ultrasound shows normal gallbladder, no biliary ductal dilatation; " "right hepatic lobe has a focal area of fatty deposition. (Discussed with patient 8/12/24)      Hyponatremia  Her hyponatremia has been attributed to SIADH in the past  Baseline sodium values range from 127-135 mmol/L  Follow BMP  IV fluids with normal saline while n.p.o.      Traumatic rhabdomyolysis, initial encounter (H24)  CK was similarly elevated during her hospital stay 1/3/2024  As above, treated with IV fluids while n.p.o.  Repeat CK trending own 480 > 329      Lung nodule  A stable pulmonary nodule in the left lower lobe has been noted on chest CT going back to 10/1/2013  Follow-up with PCP      Elevated troponin 24 > 20  Echo done 1/3/2024 showed normal LV size and function, EF 60 to 65% with no regional wall motion abnormalities and no significant valvular abnormalities  Admission EKG has no obvious ischemic changes  Modest elevation in troponin likely represents demand ischemia due to the physiologic stress of her fall and fracture; would not pursue additional workup unless she has symptoms suggesting angina      Gastroesophageal reflux disease without esophagitis  Continue PTA Protonix 40 mg daily          Diet: NPO per Anesthesia Guidelines for Procedure/Surgery Except for: Meds    DVT Prophylaxis: Warfarin  Lombardi Catheter: Not present  Lines: None     Cardiac Monitoring: None  Code Status: Full Code      Clinically Significant Risk Factors Present on Admission                  # Hypertension: Noted on problem list         # Cachexia: Estimated body mass index is 17.81 kg/m  as calculated from the following:    Height as of this encounter: 1.651 m (5' 5\").    Weight as of this encounter: 48.5 kg (107 lb).       # Financial/Environmental Concerns:                 Disposition Plan     Medically Ready for Discharge: Anticipated in 2-4 Days           The patient's care was discussed with the Attending Physician, Dr. Mario, Bedside Nurse, and Patient.    CHINMAY [facility]  Hospitalist Service  [facility]  Securely message with [employer] (more info)  Text page via [employer] Paging/Directory   ______________________________________________________________________    Interval History   Pain is tolerable as long as pt doesn't have to move much. Able to urinate without issue using pure wick. Denies any chest pain, pressure, dyspnea.     Physical Exam   Vital Signs: Temp: 98.5  F (36.9  C) Temp src: Oral BP: (!) 137/90 Pulse: 80   Resp: 17 SpO2: 98 % O2 Device: None (Room air)    Weight: 107 lbs 0 oz    Physical Exam  Vitals and nursing note reviewed.   Constitutional:       General: She is not in acute distress.     Appearance: She is not ill-appearing.   HENT:      Mouth/Throat:      Mouth: Mucous membranes are moist.   Eyes:      Pupils: Pupils are equal, round, and reactive to light.   Cardiovascular:      Rate and Rhythm: Normal rate and regular rhythm.      Heart sounds: No murmur heard.  Pulmonary:      Effort: Pulmonary effort is normal. No respiratory distress.      Breath sounds: Normal breath sounds. No wheezing.   Abdominal:      General: Abdomen is flat. There is no distension.      Palpations: Abdomen is soft.      Tenderness: There is no abdominal tenderness.   Musculoskeletal:      Left upper leg: Swelling, deformity and tenderness present.   Skin:     General: Skin is warm and dry.      Capillary Refill: Capillary refill takes less than 2 seconds.   Neurological:      General: No focal deficit present.      Mental Status: She is alert. Mental status is at baseline.   Psychiatric:         Thought Content: Thought content normal.           Medical Decision Making       44 MINUTES SPENT BY ME on the date of service doing chart review, history, exam, documentation & further activities per the note.      Data     I have personally reviewed the following data over the past 24 hrs:    9.4  \   12.7   / 210     128 (L) 93 (L) 9.4 /  118 (H)   3.3 (L) 26 0.64 \     ALT: 29 AST: 46 (H)  AP: 237 (H) TBILI: 2.2 (H)   ALB: 4.2 TOT PROTEIN: 7.3 LIPASE: N/A     Trop: 20 (H) BNP: N/A       Imaging results reviewed over the past 24 hrs:   Recent Results (from the past 24 hour(s))   Head CT w/o contrast    Narrative    EXAM: CT HEAD W/O CONTRAST  LOCATION: [facility]  DATE: 8/11/2024    INDICATION: fall vs syncope no  memory of event, eval for head injury, headache  COMPARISON: CT head 8/7/2023  TECHNIQUE: Routine CT Head without IV contrast. Multiplanar reformats. Dose reduction techniques were used.    FINDINGS:  INTRACRANIAL CONTENTS: No intracranial hemorrhage, extraaxial collection, or mass effect.  No CT evidence of acute infarct. Mild presumed chronic small vessel ischemic changes. Unchanged mild generalized volume loss. No hydrocephalus.     VISUALIZED ORBITS/SINUSES/MASTOIDS: No intraorbital abnormality. No paranasal sinus mucosal disease. No middle ear or mastoid effusion.    BONES/SOFT TISSUES: No acute abnormality.      Impression    IMPRESSION:  1.  No acute intracranial process.   US Abdomen Limited    Narrative    EXAM: US ABDOMEN LIMITED  LOCATION: [facility]  DATE: 8/11/2024    INDICATION: Abnormal liver enzymes.   COMPARISON: Ultrasound 1/2/2024. MRCP 11/30/2023.   TECHNIQUE: Limited abdominal ultrasound.    FINDINGS:    GALLBLADDER: Normal. No gallstones, wall thickening, or pericholecystic fluid. Negative sonographic Poole's sign.    BILE DUCTS: No biliary dilatation. The common duct measures 4 mm.    LIVER: Normal parenchyma with smooth contour. Geographic area of increased echogenicity within the right hepatic lobe measuring approximately 2.1 x 2.4 x 2.0 cm is most likely a focal area of fatty infiltration. No suspicious lesions.     RIGHT KIDNEY: No hydronephrosis.    PANCREAS: The pancreas is largely obscured by overlying gas.    No ascites.      Impression    IMPRESSION:    1.  Normal gallbladder. No biliary ductal  dilation.    2.  Geographic area of increased echogenicity within the right hepatic lobe is likely a focal area of fatty deposition.

## 2024-08-12 NOTE — ANESTHESIA CARE TRANSFER NOTE
Patient: Luz Babb    Procedure: Procedure(s):  Left hip hemiarthroplasty for left femoral neck fracture       Diagnosis: Closed fracture of neck of left femur, initial encounter (H) [S72.002A]  Diagnosis Additional Information: No value filed.    Anesthesia Type:   General     Note:    Oropharynx: oropharynx clear of all foreign objects  Level of Consciousness: awake  Oxygen Supplementation: face mask  Level of Supplemental Oxygen (L/min / FiO2): 6  Independent Airway: airway patency satisfactory and stable  Dentition: dentition unchanged  Vital Signs Stable: post-procedure vital signs reviewed and stable  Report to RN Given: handoff report given  Patient transferred to: PACU    Handoff Report: Identifed the Patient, Identified the Reponsible Provider, Reviewed the pertinent medical history, Discussed the surgical course, Reviewed Intra-OP anesthesia mangement and issues during anesthesia, Set expectations for post-procedure period and Allowed opportunity for questions and acknowledgement of understanding      Vitals:  Vitals Value Taken Time   /62    Temp     Pulse 64    Resp     SpO2 100        Electronically Signed By: CHINMAY Nugent CRNA  August 12, 2024  1:21 PM

## 2024-08-12 NOTE — PROGRESS NOTES
CLINICAL NUTRITION SERVICES  -  ASSESSMENT NOTE    Recommendations Ordered by Registered Dietitian (RD):   Encouraged po intake, emphasizing the importance of protein for recovery and preservation of lean muscle mass   [employer] at breakfast and Gel+ at lunch    Current wt ordered on 8/12 as suspect self reported wt upon admission   Malnutrition:   MALNUTRITION:   % Weight Loss:  Weight loss does not meet criteria for malnutrition - 7% wt loss in 6 months; suspect notable wt loss has occurred and question if current wt is self reported, which risks inaccuracies.    % Intake:  No decreased intake noted  Subcutaneous Fat Loss:  Orbital region mild depletion and Upper arm region mild depletion  Muscle Loss:  Clavicle bone region moderate depletion, Upper arm region moderate depletion, and Dorsal hand region moderate depletion  Fluid Retention:  None noted    Malnutrition Diagnosis: Moderate malnutrition  In Context of:  Acute illness or injury  Chronic illness or disease     REASON FOR ASSESSMENT  Anna Marie Babb is a 84 year old female seen by Registered Dietitian for Admission Nutrition Risk Screen for positive Malnutrition Score: 2 (08/11/24 1800) for unsure wt loss.     PMH of: multiple medical problems including HTN, HLP, GERD, Hiatal hernia, MDD with anxiety, Neuropathy, Hypercalcemia, Known pulmonary nodule, Celiac disease who presents to the emergency department following a fall.  X-ray shows left femoral neck fracture.     Principal Problem:   Closed fracture of neck of left femur (H)  Hypokalemia  Hypomagnesemia   Active Problems:    Essential hypertension, benign    Nonspecific abnormal results of liver function study    Hyponatremia    Traumatic rhabdomyolysis, initial encounter (H24)    Lung nodule    Elevated troponin     Gastroesophageal reflux disease without esophagitis    NUTRITION HISTORY  - Information obtained from chart review and pt at bedside, who endorsed being unsure of wt loss.   - Pt is on  "a Gluten free diet at home as she has Celiac disease,  - Pt explained she was eating normally PTA; question if baseline intake is sub-optimal  - Pt reported a 10-13 lb wt loss, however she was unsure of her current wt and a stable wt before any losses; question if current wt self reported and a new weight was ordered yesterday but hasn't been entered at this time.   - Use of oral supplements: Pt didn't like the oral nutrition supplements she tried in the past but was open to trying a [employer] and Gel+      CURRENT NUTRITION ORDERS  Diet Order:   NPO vs Gluten Free    Current Intake/Tolerance: Pt had just received her lunch tray but hadn't started eating it yet.  - Flowsheets show a good appetite while eating dinner two nights ago and no other intakes recorded.     NUTRITION FOCUSED PHYSICAL ASSESSMENT FOR DIAGNOSING MALNUTRITION)  Yes           Observed:    Muscle wasting (refer to documentation in Malnutrition section) and Subcutaneous fat loss (refer to documentation in Malnutrition section)    ANTHROPOMETRICS  Height: 5' 5\"  Weight: 107 lbs 0 oz   Body mass index is 17.81 kg/m .  Weight Status:  Underweight BMI <18.5  IBW: 57 kg  % IBW: 85%  Weight History: 7% wt loss in 6 months; suspect notable wt loss has occurred and question if current wt is self reported, which risks inaccuracies.   Wt Readings from Last 15 Encounters:   08/11/24 48.5 kg (107 lb)   07/15/24 48.9 kg (107 lb 11.2 oz)   03/14/24 53.1 kg (117 lb)   02/08/24 53.5 kg (118 lb)   02/01/24 52.4 kg (115 lb 8 oz)   01/22/24 51.3 kg (113 lb)   01/17/24 51.3 kg (113 lb)   01/11/24 49.4 kg (109 lb)   01/08/24 50.6 kg (111 lb 9.6 oz)   01/03/24 51.4 kg (113 lb 5.1 oz)   12/11/23 50.5 kg (111 lb 6.4 oz)   12/06/23 53.8 kg (118 lb 9.6 oz)   11/30/23 52.1 kg (114 lb 13.8 oz)   09/07/23 53.5 kg (118 lb)   08/31/23 53.8 kg (118 lb 9.6 oz)     LABS  Labs reviewed - Na 127 (L), elevated LFTs,    MEDICATIONS  Medications reviewed - LR @ 50 ml/hr    ASSESSED " NUTRITION NEEDS PER APPROVED PRACTICE GUIDELINES:  Dosing Weight 48.5 kg (actual)  Estimated Energy Needs: 2065-5242 kcals (30-35 Kcal/Kg)  Justification: underweight  Estimated Protein Needs: 58-73+ grams protein (1.2-1.5+ g pro/Kg)  Justification: preservation of lean body mass + repletion  Estimated Fluid Needs: 9515-3323  mL (1 mL/Kcal)  Justification: maintenance    MALNUTRITION:   % Weight Loss:  Weight loss does not meet criteria for malnutrition - 7% wt loss in 6 months; suspect notable wt loss has occurred and question if current wt is self reported, which risks inaccuracies.    % Intake:  No decreased intake noted  Subcutaneous Fat Loss:  Orbital region mild depletion and Upper arm region mild depletion  Muscle Loss:  Clavicle bone region moderate depletion, Upper arm region moderate depletion, and Dorsal hand region moderate depletion  Fluid Retention:  None noted    Malnutrition Diagnosis: Moderate malnutrition  In Context of:  Acute illness or injury  Chronic illness or disease    NUTRITION DIAGNOSIS:  Inadequate oral intake related to multiple co-morbidities as evidenced by mild fat losses and moderate lean muscle wasting    NUTRITION INTERVENTIONS  Recommendations / Nutrition Prescription  Encouraged po intake, emphasizing the importance of protein for recovery and preservation of lean muscle mass   [employer] at breakfast and Gel+ at lunch    Current wt ordered on 8/12 as suspect self reported wt upon admission    Implementation  Nutrition education: Per Provider order if indicated   General/healthful diet and Medical Food Supplement    Nutrition Goals  PO intake - >75% of meal trays, and/or the equivalent in supplements, TID    MONITORING AND EVALUATION:  Progress towards goals will be monitored and evaluated per protocol and Practice Guidelines    Parker Delaney RD, MARS

## 2024-08-12 NOTE — OP NOTE
Orthopaedic Surgery Operative Note     Patient: Luz Babb  : 1940  Date of Service: 2024 12:59 PM    Pre-operative Diagnosis:    Left femoral neck fracture    Post-operative Diagnosis:    same    Procedure(s):    Left hip hemiarthroplasty    Surgeon:  Michael Coker MD   Assistant(s):  Miquel SINHA, assistance was required for patient positioning, retraction, and wound closure    Anesthesia: General and Local  Estimated Blood Loss:  100 mL  Findings:  see operative note  Complications:  none  Implants:    Implant Name Type Inv. Item Serial No.  Lot No. LRB No. Used Action   BONE CEMENT SIMPLEX FULL DOSE 6191-1-001 - [id number] Cement, Bone BONE CEMENT SIMPLEX FULL DOSE 6191-1-001  [employer] [id number] Left 2 Implanted   IMP STEM FEMORAL STRK ACCOLADE-C ISAIAH 132DEG #5 6058-0537D - [id number] Total Joint Component/Insert IMP STEM FEMORAL STRK ACCOLADE-C ISAIAH 132DEG #5 6058-0537D  [employer] [id number] Left 1 Implanted   IMP SPACER OSTEONICS DISTAL CEMENT 10MM 7533-1943 - [id number] Metallic Hardware/[place] IMP SPACER OSTEONICS DISTAL CEMENT 10MM 6634-0870  [employer] [id number] Left 1 Implanted   IMP HEAD STRK FEMORAL UHR BIPOLAR [id number] UH1-47-28 - [id number] Total Joint Component/Insert IMP HEAD STRK FEMORAL UHR BIPOLAR [id number] UH1-47-28  [employer] [id number] Left 1 Implanted   IMP HEAD FEMORAL STRK LFIT V40 28MM -4 - [id number] Total Joint Component/Insert IMP HEAD FEMORAL STRK LFIT V40 28MM -4  [employer] [id number] Left 1 Implanted   medium cement restrictor Cement, Bone   [employer]NE [id number] Left 1 Implanted     Condition:  Stable    Indications:  Luz Babb is a pleasant 84 year old female with a history of fall. The risks, benefits and alternatives to surgery were reviewed with the patient and all questions were answered to their understanding and satisfaction. Risks of surgery include, but are not limited to infection, bleeding, continued  pain, neurovascular injury, fracture, loss of motion, instability, need for future procedure.  Also reviewed were possibility of DVT, PE, cardiac arrest, loss of life or limb. Written consent was obtained from the patient by the surgeon.    Description of Procedure:    The patient was identified in preoperative holding and the correct operative extremity was marked by the surgical staff. The patient was then transferred to the operative suite. Anesthesia was administered by the anesthesia department. The patient was prepped and draped in the standard sterile fashion. Antibiotics were infused prior to surgical incision. A multidisciplinary time-out was performed, identifying the correct patient and operative extremity.     Patient was positioned lateral with the Guillermo hip positioners.  An axillary roll was used.  A incision at the anterior third junction of the shaft of the femur centered over the greater trochanter was made.  Hemostasis was obtained.  The IT band was identified and cut in line with the skin incision.  An East-West retractor was placed.  At this time the bursa was reflected off the gluteus medius muscle and the separation between the muscle and the capsule was performed bluntly with a tonsil, identifying and taking approximately one quarter of the anterior muscle belly.  The tendon was then released through this interval down to the level of the greater trochanter.  A inverted T capsulotomy was performed while internally rotating the hip and delivering the femoral neck.  The superior and inferior leaves of the anterior capsule were tagged with #5 ethibond suture.  Similarly the abductor muscle belly was tagged for later repair.  The capsule was retracted and retractors were placed along the femoral neck.  A recut of the neck was performed with a reciprocating saw approximately 1 cm above the lesser trochanter.  A corkscrew was used to remove the femoral head. The femoral head was sized and trialed.  There was excellent fit and suction stability.   The  was then used to prepare the lateral femoral canal.  A series of broaches was used until there was good stability.  A calcar planer was used to fine tune the neck cut.   Trial implants revealed excellent stability.  The canal was then copiously irrigated.  A canal centralizer was trialed and then sized.  A stopper was placed into the canal.  Cement was prepared and the final implant was held in place until the cement had fully cured excess cement was removed.  The head was impacted for a bipolar hemiarthroplasty the joint was reduced and there was excellent stability through full arc of motion without subluxation.  Anterior capsule was repaired.  Local anesthetic was injected.  The muscle belly was repaired to the greater trochanter with #5 suture.  IT band was closed with strata fix deep subcutaneous tissue was closed as well as skin with running 3-0 Monocryl suture and Dermabond.      A sterile dressings were applied. All counts were correct. The patient was awoken from anesthesia and transferred to the PACU in stable condition.    Post op Plan:    WBAT operative extremity  Wound check in 2 weeks    Shamir Coker MD  Orthopedic Surgery  [facility]

## 2024-08-13 LAB
ALBUMIN SERPL BCG-MCNC: 3.4 G/DL (ref 3.5–5.2)
ALP SERPL-CCNC: 182 U/L (ref 40–150)
ALT SERPL W P-5'-P-CCNC: 22 U/L (ref 0–50)
ANION GAP SERPL CALCULATED.3IONS-SCNC: 8 MMOL/L (ref 7–15)
AST SERPL W P-5'-P-CCNC: 43 U/L (ref 0–45)
BILIRUB SERPL-MCNC: 1.4 MG/DL
BUN SERPL-MCNC: 10.2 MG/DL (ref 8–23)
CALCIUM SERPL-MCNC: 8.8 MG/DL (ref 8.8–10.4)
CHLORIDE SERPL-SCNC: 91 MMOL/L (ref 98–107)
CREAT SERPL-MCNC: 0.55 MG/DL (ref 0.51–0.95)
EGFRCR SERPLBLD CKD-EPI 2021: 89 ML/MIN/1.73M2
ERYTHROCYTE [DISTWIDTH] IN BLOOD BY AUTOMATED COUNT: 15.4 % (ref 10–15)
GLUCOSE SERPL-MCNC: 97 MG/DL (ref 70–99)
GLUCOSE SERPL-MCNC: 97 MG/DL (ref 70–99)
HCO3 SERPL-SCNC: 28 MMOL/L (ref 22–29)
HCT VFR BLD AUTO: 35.2 % (ref 35–47)
HGB BLD-MCNC: 12.5 G/DL (ref 11.7–15.7)
MAGNESIUM SERPL-MCNC: 2.3 MG/DL (ref 1.7–2.3)
MCH RBC QN AUTO: 36.1 PG (ref 26.5–33)
MCHC RBC AUTO-ENTMCNC: 35.5 G/DL (ref 31.5–36.5)
MCV RBC AUTO: 102 FL (ref 78–100)
PLATELET # BLD AUTO: 215 10E3/UL (ref 150–450)
POTASSIUM SERPL-SCNC: 3.4 MMOL/L (ref 3.4–5.3)
POTASSIUM SERPL-SCNC: 3.4 MMOL/L (ref 3.4–5.3)
PROT SERPL-MCNC: 5.9 G/DL (ref 6.4–8.3)
RBC # BLD AUTO: 3.46 10E6/UL (ref 3.8–5.2)
SODIUM SERPL-SCNC: 127 MMOL/L (ref 135–145)
WBC # BLD AUTO: 9.5 10E3/UL (ref 4–11)

## 2024-08-13 PROCEDURE — 99232 SBSQ HOSP IP/OBS MODERATE 35: CPT | Performed by: NURSE PRACTITIONER

## 2024-08-13 PROCEDURE — 83735 ASSAY OF MAGNESIUM: CPT | Performed by: INTERNAL MEDICINE

## 2024-08-13 PROCEDURE — 80053 COMPREHEN METABOLIC PANEL: CPT | Performed by: NURSE PRACTITIONER

## 2024-08-13 PROCEDURE — 85027 COMPLETE CBC AUTOMATED: CPT | Performed by: NURSE PRACTITIONER

## 2024-08-13 PROCEDURE — 120N000001 HC R&B MED SURG/OB

## 2024-08-13 PROCEDURE — 250N000013 HC RX MED GY IP 250 OP 250 PS 637: Performed by: INTERNAL MEDICINE

## 2024-08-13 PROCEDURE — 250N000011 HC RX IP 250 OP 636

## 2024-08-13 PROCEDURE — 250N000013 HC RX MED GY IP 250 OP 250 PS 637

## 2024-08-13 PROCEDURE — 36415 COLL VENOUS BLD VENIPUNCTURE: CPT | Performed by: NURSE PRACTITIONER

## 2024-08-13 RX ORDER — METHOCARBAMOL 500 MG/1
500 TABLET, FILM COATED ORAL 3 TIMES DAILY PRN
DISCHARGE
Start: 2024-08-13

## 2024-08-13 RX ORDER — AMOXICILLIN 250 MG
1 CAPSULE ORAL 2 TIMES DAILY PRN
DISCHARGE
Start: 2024-08-13

## 2024-08-13 RX ORDER — ASPIRIN 81 MG/1
81 TABLET ORAL 2 TIMES DAILY
DISCHARGE
Start: 2024-08-13 | End: 2024-09-24

## 2024-08-13 RX ORDER — ACETAMINOPHEN 325 MG/1
975 TABLET ORAL EVERY 8 HOURS PRN
DISCHARGE
Start: 2024-08-13

## 2024-08-13 RX ORDER — OXYCODONE HYDROCHLORIDE 5 MG/1
2.5-5 TABLET ORAL EVERY 4 HOURS PRN
Qty: 20 TABLET | Refills: 0 | Status: SHIPPED | OUTPATIENT
Start: 2024-08-13

## 2024-08-13 RX ORDER — POLYETHYLENE GLYCOL 3350 17 G/17G
17 POWDER, FOR SOLUTION ORAL DAILY
DISCHARGE
Start: 2024-08-13

## 2024-08-13 RX ADMIN — PREDNISOLONE ACETATE 1 DROP: 10 SUSPENSION/ DROPS OPHTHALMIC at 22:22

## 2024-08-13 RX ADMIN — AMLODIPINE BESYLATE 5 MG: 5 TABLET ORAL at 20:12

## 2024-08-13 RX ADMIN — PREDNISOLONE ACETATE 1 DROP: 10 SUSPENSION/ DROPS OPHTHALMIC at 08:39

## 2024-08-13 RX ADMIN — ASPIRIN 81 MG: 81 TABLET, COATED ORAL at 08:35

## 2024-08-13 RX ADMIN — BRIMONIDINE TARTRATE 1 DROP: 2 SOLUTION/ DROPS OPHTHALMIC at 08:39

## 2024-08-13 RX ADMIN — BRIMONIDINE TARTRATE 1 DROP: 2 SOLUTION/ DROPS OPHTHALMIC at 20:13

## 2024-08-13 RX ADMIN — ACETAMINOPHEN 325MG 975 MG: 325 TABLET ORAL at 08:35

## 2024-08-13 RX ADMIN — PANTOPRAZOLE SODIUM 40 MG: 40 TABLET, DELAYED RELEASE ORAL at 08:35

## 2024-08-13 RX ADMIN — PREDNISOLONE ACETATE 1 DROP: 10 SUSPENSION/ DROPS OPHTHALMIC at 15:40

## 2024-08-13 RX ADMIN — OXYCODONE HYDROCHLORIDE 2.5 MG: 5 TABLET ORAL at 15:37

## 2024-08-13 RX ADMIN — PREDNISOLONE ACETATE 1 DROP: 10 SUSPENSION/ DROPS OPHTHALMIC at 19:00

## 2024-08-13 RX ADMIN — ACETAMINOPHEN 325MG 975 MG: 325 TABLET ORAL at 15:37

## 2024-08-13 RX ADMIN — ASPIRIN 81 MG: 81 TABLET, COATED ORAL at 20:12

## 2024-08-13 RX ADMIN — CEFAZOLIN 1 G: 1 INJECTION, POWDER, FOR SOLUTION INTRAMUSCULAR; INTRAVENOUS at 05:06

## 2024-08-13 ASSESSMENT — ACTIVITIES OF DAILY LIVING (ADL)
ADLS_ACUITY_SCORE: 43
ADLS_ACUITY_SCORE: 47
ADLS_ACUITY_SCORE: 43
ADLS_ACUITY_SCORE: 37
ADLS_ACUITY_SCORE: 43
ADLS_ACUITY_SCORE: 43
ADLS_ACUITY_SCORE: 37
ADLS_ACUITY_SCORE: 43
ADLS_ACUITY_SCORE: 43
ADLS_ACUITY_SCORE: 47
ADLS_ACUITY_SCORE: 43
ADLS_ACUITY_SCORE: 43
ADLS_ACUITY_SCORE: 39
ADLS_ACUITY_SCORE: 37
ADLS_ACUITY_SCORE: 47
ADLS_ACUITY_SCORE: 39
ADLS_ACUITY_SCORE: 37
ADLS_ACUITY_SCORE: 43
ADLS_ACUITY_SCORE: 43
ADLS_ACUITY_SCORE: 37
ADLS_ACUITY_SCORE: 48

## 2024-08-13 NOTE — PROGRESS NOTES
Pt's daughter called and said pt should be on gluten free diet due to celiac disease. Diet updated.

## 2024-08-13 NOTE — PLAN OF CARE
Goal Outcome Evaluation:  Trauma/Ortho/Medical (Choose one)  trauma  Diagnosis: left femur fracture - left hip hemiarthroplasty   POD#: DOS  Mental Status: A/Ox2-3, disoriented to time and place at times  Activity/dangle bedrest this shift  Diet: regular - gluten free  Pain: denies - scheduled Tylenol given  Lombardi/Voiding: purewick with output  02/LDA: 1L O2/IVF's  D/C Date: pending  Other Info: Magnesium and Potassium protocols. Will recheck both this evening and replace as needed.

## 2024-08-13 NOTE — PROGRESS NOTES
Orthopedic Surgery  Luz Payneberg  08/13/2024     Admit Date:  8/11/2024    POD: 1 Day Post-Op   Procedure(s):  Left hip hemiarthroplasty for left femoral neck fracture    Patient resting comfortably in bed.   Left hip pain present, but manageable. Described as a deep ache.   Denies numbness, tingling, and spasms to the LLE.  Tolerating oral intake.    Denies nausea or vomiting.  Denies chest pain or shortness of breath.  No acute events overnight.    Temp:  [97.3  F (36.3  C)-98  F (36.7  C)] 98  F (36.7  C)  Pulse:  [] 79  Resp:  [10-17] 16  BP: (103-158)/(62-96) 150/96  SpO2:  [94 %-100 %] 98 %    Alert and oriented. NAD.   Left hip Aquacel dressing is clean, dry, and intact.   Minimal erythema of the surrounding skin.   Mild swelling of the left hip.   Thigh compartments remain soft and compressible.  Bilateral calves are soft, non-tender.  Left lower extremity is NVI.  Patient able to resist ankle dorsiflexion and plantar flexion bilaterally.  Able to flex and extend toes.  DP pulse palpable.  Sensation intact bilateral lower extremities.    Labs/Imaging:  Recent Labs   Lab Test 08/13/24  0443 08/12/24  0733 08/11/24  1158   WBC 9.5 9.4 10.8   HGB 12.5 12.7 14.3    210 257     A/P    1. S/p left hip hemiarthroplasty for management of a femoral neck fracture (DOS: 8/12/24)  -Continue ASA 81 mg BID x 6 weeks for DVT prophylaxis.    -Hgb WNL. Continue to monitor.  -Periop Ancef completed.  -Mobilize with PT/OT.  -WBAT LLE with walker.  -No left hip precautions.    -Continue current pain regimen.  -Dressings: Keep intact. Okay for RN to change if >60% saturated or peeling/falling off.   -Follow-up: 2 weeks post-op with Dr. Michael Coker    2. Disposition  -Anticipate d/c likely to TCU when medically cleared and progressing in PT.    Cortney Boudreaux PA-C  [facility]

## 2024-08-13 NOTE — PLAN OF CARE
Date & Time: 8/12-13/24, 9930-4043  Summary: POD 1 L hip hemiarthroplasty for L femoral neck fracture   Behavior & Aggression: green  Fall Risk: yes  Orientation: AxOx3, disoriented to time  Neuro/CMS: intact, cool feet, sensation present and denies numbness and tingling  ABNL VS/O2: WNL, on RA   ABNL Labs: see chart, replaced potassium and mg on floors, rechecks WNL   Pain Management: scheduled tylenol  Bowel/Bladder: can be incontinent   Drains: PIV infusing, intermittent abx   Diet: gluten free  Activity Level: A2, T/R   Anticipated  DC Date: TBD pending improvement  Significant Information:   patient slept for most of the night and enjoys ice water and warm blankets

## 2024-08-13 NOTE — PROGRESS NOTES
"[facility]    Medicine Progress Note - Hospitalist Service    Date of Admission:  8/11/2024    Assessment & Plan   Anna Marie Babb is a 84 year old female with multiple medical problems including HTN, HLP, GERD, Hiatal hernia, MDD with anxiety, Neuropathy, Hypercalcemia, Known pulmonary nodule, Celiac disease who presents to the emergency department following a fall.  X-ray shows left femoral neck fracture.    Closed fracture of neck of left femur s/p Left Hip Arthroplasty, 8/12/24  Underwent successful hemiarthroplasty by Dr. Coker under general anesthesia. EBL 100cc. No complications noted.   Of note, Anna Marie Hickey had in situ percutaneous screw fixation of a right valgus impacted femoral neck fracture on 8/8/2023 and tolerated the procedure and recovery well.   - Admit as inpatient  - PT consult 8/13  - SW consult for possible therapies vs placement     Hypokalemia, resolved  Hypomagnesemia , resolved  - replacement protocols ordered    Essential hypertension, benign  - Continue PTA amlodipine 5 mg daily    BP (!) 150/96   Pulse 79   Temp 98  F (36.7  C) (Oral)   Resp 16   Ht 1.651 m (5' 5\")   Wt 48.5 kg (107 lb)   LMP  (LMP Unknown)   SpO2 98%   BMI 17.81 kg/m      Nonspecific abnormal results of liver function study  She had similar LFT abnormalities during a hospitalization following a fall 1/2/2024, though total bilirubin on admission is slightly higher (2.2-<1.4 milligrams per deciliter). These results are of unclear clinical significance.   *An abdominal ultrasound done in Jan 2024 showed sludge in the gallbladder  - Ordered APAP as needed since benefit > risk  - Repeat abd ultrasound shows normal gallbladder, no biliary ductal dilatation; right hepatic lobe has a focal area of fatty deposition. 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 Baseline ~ 127-135 mmol/L. Since admisison, Na 131 > 128 > 127.  - Follow " "BMP  - Stop IVF given improved oral intake.     Traumatic rhabdomyolysis, initial encounter (H24)  CK was similarly elevated during her hospital stay 1/3/2024. Repeat CK trending own 480 > 329  - Received IVF on admission.      Lung nodule  A stable pulmonary nodule in the left lower lobe has been noted on chest CT going back to 10/1/2013  Follow-up with PCP    Elevated troponin 24 > 20  Echo done 1/3/2024 showed normal LV size and function, EF 60 to 65% with no regional wall motion abnormalities and no significant valvular abnormalities  Admission EKG has no obvious ischemic changes  Modest elevation in troponin likely represents demand ischemia due to the physiologic stress of her fall and fracture; would not pursue additional workup unless she has symptoms suggesting angina      Gastroesophageal reflux disease without esophagitis  Continue PTA Protonix 40 mg daily     Diet: NPO per Anesthesia Guidelines for Procedure/Surgery Except for: Meds    DVT Prophylaxis: Warfarin  Lombardi Catheter: Not present  Lines: None     Cardiac Monitoring: None  Code Status: Full Code            Diet: Room Service  Gluten Free Diet    DVT Prophylaxis: Pneumatic Compression Devices + ASA  Lombardi Catheter: Not present  Lines: None     Cardiac Monitoring: None  Code Status: Full Code      Clinically Significant Risk Factors        # Hypokalemia: Lowest K = 2.7 mmol/L in last 2 days, will replace as needed  # Hyponatremia: Lowest Na = 127 mmol/L in last 2 days, will monitor as appropriate    # Hypomagnesemia: Lowest Mg = 1.5 mg/dL in last 2 days, will replace as needed   # Hypoalbuminemia: Lowest albumin = 3.4 g/dL at 8/13/2024  4:43 AM, will monitor as appropriate     # Hypertension: Noted on problem list           # Cachexia: Estimated body mass index is 17.81 kg/m  as calculated from the following:    Height as of this encounter: 1.651 m (5' 5\").    Weight as of this encounter: 48.5 kg (107 lb)., PRESENT ON ADMISSION     # " Financial/Environmental Concerns:                 Disposition Plan     Medically Ready for Discharge: Anticipated in 2-4 Days           The patient's care was discussed with the Attending Physician, Dr. Mario, Bedside Nurse, and Patient.    CHINMAY [facility]  Hospitalist Service  [facility]  Securely message with [employer] (more info)  Text page via [facility] Paging/Directory   ______________________________________________________________________    Interval History   Pain well controlled with tylenol, sitting on the side of bed without dizziness, lightheadedness or intractable pain. No PONV, eating well, urine output adequate.     Physical Exam   Vital Signs: Temp: 97.6  F (36.4  C) Temp src: Oral BP: (!) 146/91 Pulse: 88   Resp: 16 SpO2: 99 % O2 Device: None (Room air) Oxygen Delivery: 1 LPM  Weight: 107 lbs 0 oz    Physical Exam  Vitals and nursing note reviewed.   Constitutional:       General: She is not in acute distress.     Appearance: She is not ill-appearing.   HENT:      Mouth/Throat:      Mouth: Mucous membranes are dry.   Cardiovascular:      Rate and Rhythm: Normal rate and regular rhythm.      Heart sounds: Normal heart sounds. No murmur heard.  Pulmonary:      Effort: Pulmonary effort is normal. No respiratory distress.      Breath sounds: Normal breath sounds. No wheezing.   Abdominal:      General: Abdomen is flat. There is no distension.      Palpations: Abdomen is soft.      Tenderness: There is no abdominal tenderness.   Musculoskeletal:      Left hip: Tenderness present.      Comments: DP/PT pulses 2+   Skin:     General: Skin is warm and dry.      Comments: Incision covered   Neurological:      Mental Status: She is alert. Mental status is at baseline.   Psychiatric:         Behavior: Behavior normal. Behavior is cooperative.       Medical Decision Making       48 MINUTES SPENT BY ME on the date of service doing chart review, history, exam, documentation & further  activities per the note.      Data     I have personally reviewed the following data over the past 24 hrs:    9.5  \   12.5   / 215     127 (L) 91 (L) 10.2 /  97; 97   3.4; 3.4 28 0.55 \     ALT: 22 AST: 43 AP: 182 (H) TBILI: 1.4 (H)   ALB: 3.4 (L) TOT PROTEIN: 5.9 (L) LIPASE: N/A       Imaging results reviewed over the past 24 hrs:   Recent Results (from the past 24 hour(s))   XR Pelvis w Hip Port Left  1 View    Narrative    PELVIS AND HIP PORTABLE LEFT ONE VIEW August 12, 2024 2:54 PM     HISTORY: Status post Hip surgery.    COMPARISON: 8/11/2024.       Impression    IMPRESSION: Left hip implant for treatment of a left femoral neck  fracture. Hardware appears in place without evidence of an acute disc  space fracture. No dislocation.     Bone demineralization and postoperative changes right proximal femur  again seen.    MILA PATEL MD         SYSTEM ID:  [id number]

## 2024-08-14 ENCOUNTER — APPOINTMENT (OUTPATIENT)
Dept: PHYSICAL THERAPY | Facility: CLINIC | Age: 84
DRG: 956 | End: 2024-08-14
Payer: MEDICARE

## 2024-08-14 LAB
ANION GAP SERPL CALCULATED.3IONS-SCNC: 7 MMOL/L (ref 7–15)
BUN SERPL-MCNC: 9.2 MG/DL (ref 8–23)
CALCIUM SERPL-MCNC: 8.8 MG/DL (ref 8.8–10.4)
CHLORIDE SERPL-SCNC: 94 MMOL/L (ref 98–107)
CREAT SERPL-MCNC: 0.53 MG/DL (ref 0.51–0.95)
EGFRCR SERPLBLD CKD-EPI 2021: 90 ML/MIN/1.73M2
GLUCOSE SERPL-MCNC: 91 MG/DL (ref 70–99)
HCO3 SERPL-SCNC: 28 MMOL/L (ref 22–29)
HGB BLD-MCNC: 11.9 G/DL (ref 11.7–15.7)
MAGNESIUM SERPL-MCNC: 1.7 MG/DL (ref 1.7–2.3)
POTASSIUM SERPL-SCNC: 3.2 MMOL/L (ref 3.4–5.3)
POTASSIUM SERPL-SCNC: 3.2 MMOL/L (ref 3.4–5.3)
POTASSIUM SERPL-SCNC: 3.9 MMOL/L (ref 3.4–5.3)
SODIUM SERPL-SCNC: 129 MMOL/L (ref 135–145)

## 2024-08-14 PROCEDURE — 36415 COLL VENOUS BLD VENIPUNCTURE: CPT | Performed by: INTERNAL MEDICINE

## 2024-08-14 PROCEDURE — 97161 PT EVAL LOW COMPLEX 20 MIN: CPT | Mod: GP | Performed by: PHYSICAL THERAPIST

## 2024-08-14 PROCEDURE — 83735 ASSAY OF MAGNESIUM: CPT | Performed by: INTERNAL MEDICINE

## 2024-08-14 PROCEDURE — 97116 GAIT TRAINING THERAPY: CPT | Mod: GP | Performed by: PHYSICAL THERAPIST

## 2024-08-14 PROCEDURE — 85018 HEMOGLOBIN: CPT

## 2024-08-14 PROCEDURE — 97530 THERAPEUTIC ACTIVITIES: CPT | Mod: GP | Performed by: PHYSICAL THERAPIST

## 2024-08-14 PROCEDURE — 250N000013 HC RX MED GY IP 250 OP 250 PS 637: Performed by: INTERNAL MEDICINE

## 2024-08-14 PROCEDURE — 80048 BASIC METABOLIC PNL TOTAL CA: CPT | Performed by: INTERNAL MEDICINE

## 2024-08-14 PROCEDURE — 120N000001 HC R&B MED SURG/OB

## 2024-08-14 PROCEDURE — 99232 SBSQ HOSP IP/OBS MODERATE 35: CPT | Performed by: INTERNAL MEDICINE

## 2024-08-14 PROCEDURE — 250N000013 HC RX MED GY IP 250 OP 250 PS 637

## 2024-08-14 PROCEDURE — 84132 ASSAY OF SERUM POTASSIUM: CPT | Performed by: INTERNAL MEDICINE

## 2024-08-14 RX ORDER — MAGNESIUM OXIDE 400 MG/1
400 TABLET ORAL EVERY 4 HOURS
Status: COMPLETED | OUTPATIENT
Start: 2024-08-14 | End: 2024-08-14

## 2024-08-14 RX ORDER — POTASSIUM CHLORIDE 1500 MG/1
20 TABLET, EXTENDED RELEASE ORAL ONCE
Status: COMPLETED | OUTPATIENT
Start: 2024-08-14 | End: 2024-08-14

## 2024-08-14 RX ADMIN — ACETAMINOPHEN 325MG 975 MG: 325 TABLET ORAL at 08:39

## 2024-08-14 RX ADMIN — AMLODIPINE BESYLATE 5 MG: 5 TABLET ORAL at 20:41

## 2024-08-14 RX ADMIN — PANTOPRAZOLE SODIUM 40 MG: 40 TABLET, DELAYED RELEASE ORAL at 08:39

## 2024-08-14 RX ADMIN — BRIMONIDINE TARTRATE 1 DROP: 2 SOLUTION/ DROPS OPHTHALMIC at 08:39

## 2024-08-14 RX ADMIN — PREDNISOLONE ACETATE 1 DROP: 10 SUSPENSION/ DROPS OPHTHALMIC at 18:03

## 2024-08-14 RX ADMIN — ASPIRIN 81 MG: 81 TABLET, COATED ORAL at 20:41

## 2024-08-14 RX ADMIN — PREDNISOLONE ACETATE 1 DROP: 10 SUSPENSION/ DROPS OPHTHALMIC at 08:40

## 2024-08-14 RX ADMIN — PREDNISOLONE ACETATE 1 DROP: 10 SUSPENSION/ DROPS OPHTHALMIC at 15:12

## 2024-08-14 RX ADMIN — POTASSIUM CHLORIDE 20 MEQ: 1500 TABLET, EXTENDED RELEASE ORAL at 11:46

## 2024-08-14 RX ADMIN — ACETAMINOPHEN 325MG 975 MG: 325 TABLET ORAL at 15:11

## 2024-08-14 RX ADMIN — BRIMONIDINE TARTRATE 1 DROP: 2 SOLUTION/ DROPS OPHTHALMIC at 20:48

## 2024-08-14 RX ADMIN — Medication 400 MG: at 11:46

## 2024-08-14 RX ADMIN — Medication 400 MG: at 15:12

## 2024-08-14 RX ADMIN — PREDNISOLONE ACETATE 1 DROP: 10 SUSPENSION/ DROPS OPHTHALMIC at 22:21

## 2024-08-14 RX ADMIN — ASPIRIN 81 MG: 81 TABLET, COATED ORAL at 08:39

## 2024-08-14 RX ADMIN — ACETAMINOPHEN 325MG 975 MG: 325 TABLET ORAL at 00:01

## 2024-08-14 ASSESSMENT — ACTIVITIES OF DAILY LIVING (ADL)
ADLS_ACUITY_SCORE: 47
ADLS_ACUITY_SCORE: 43
ADLS_ACUITY_SCORE: 39
ADLS_ACUITY_SCORE: 47
ADLS_ACUITY_SCORE: 48
ADLS_ACUITY_SCORE: 47
ADLS_ACUITY_SCORE: 48
ADLS_ACUITY_SCORE: 43
ADLS_ACUITY_SCORE: 44
ADLS_ACUITY_SCORE: 43
ADLS_ACUITY_SCORE: 48
ADLS_ACUITY_SCORE: 43
ADLS_ACUITY_SCORE: 48
ADLS_ACUITY_SCORE: 48
ADLS_ACUITY_SCORE: 44
ADLS_ACUITY_SCORE: 47
ADLS_ACUITY_SCORE: 47
ADLS_ACUITY_SCORE: 39
ADLS_ACUITY_SCORE: 44
ADLS_ACUITY_SCORE: 47
ADLS_ACUITY_SCORE: 43

## 2024-08-14 NOTE — PLAN OF CARE
Goal Outcome Evaluation: Progressing slowly    8/11/24, POD 1, Left hip hemiarthroplasty for left femoral neck fracture   8/13/24, 7 a to 7 p    Orientation: Oriented x 4, forgetful, intermittent confusion    Vitals/Tele: VSS on RA, Tylenol and low dose Oxy for pain    IV Access/drains: RANJANA SL    Diet: Regular tolerating    Mobility: Sitting at the side of the bed for meals, refused PT     GI/: Multiple loose stools, purewick in uses    Wound/Skin: Left hip incision, dressing CDI, red/pink coccyx d/t diarrhea SHAYLA    Consults: PT , Nutrition following    Discharge Plan: TBD      See Flow sheets for assessment

## 2024-08-14 NOTE — PROGRESS NOTES
08/14/24 1050   Appointment Info   Signing Clinician's Name / Credentials (PT) Shayy Chung DPT   Living Environment   People in Home child(domi), adult  (Son-Darius)   Current Living Arrangements condominium   Home Accessibility stairs to enter home   Number of Stairs, Main Entrance 3   Stair Railings, Main Entrance railing on left side (ascending)   Transportation Anticipated family or friend will provide   Living Environment Comments Pt receives assist for meals.   Self-Care   Usual Activity Tolerance moderate   Current Activity Tolerance fair   Equipment Currently Used at Home walker, rolling  (4WW outside of the house, pt doesn't use an AD inside the house)   Fall history within last six months yes   Number of times patient has fallen within last six months 1   Activity/Exercise/Self-Care Comment Pt owns 4WW.   General Information   Onset of Illness/Injury or Date of Surgery 08/11/24   Referring Physician Lilibeth Weston MD   Pertinent History of Current Problem (include personal factors and/or comorbidities that impact the POC) 85 y/o female POD # 2 L hip hemiarthroplasty. PMH including HTN, HLP, GERD, Hiatal hernia, MDD with anxiety, Neuropathy, Hypercalcemia, Known pulmonary nodule, Celiac disease.   Existing Precautions/Restrictions fall   Weight-Bearing Status - LLE weight-bearing as tolerated   General Observations Pt in supine upon arrival of therapist.   Cognition   Affect/Mental Status (Cognition) WFL   Orientation Status (Cognition) oriented x 3   Follows Commands (Cognition) WFL   Pain Assessment   Patient Currently in Pain   (L hip pain at rest: 6/10)   Integumentary/Edema   Integumentary/Edema Comments L hip incision covered with dressing.   Posture    Posture Comments Noted forward head and shoulder posture sitting EOB and standing at Coco Stedy/FWW.   Range of Motion (ROM)   ROM Comment Limited L hip ROM d/t pain and stiffness, otherwise B LEs WFL.   Strength (Manual Muscle  Testing)   Strength Comments Not formally assessed, noted B LE weakness with functional mobility. Pt demonstrates at least 3/5 grossly in LEs with mobility.   Bed Mobility   Comment, (Bed Mobility) Supine-sit with modA of 2.   Transfers   Comment, (Transfers) Sit <> stand with Coco Stedy and Florentino of 2.   Gait/Stairs (Locomotion)   Comment, (Gait/Stairs) Pt amb a couple steps with FWW and min-modA.   Balance   Balance Comments Noted good seated balance and fair standing/dynamic balance.   Sensory Examination   Sensory Perception Comments Pt denies numbness/tingling in B LEs.   Clinical Impression   Criteria for Skilled Therapeutic Intervention Yes, treatment indicated   PT Diagnosis (PT) Difficulty with functional mobility.   Influenced by the following impairments Pain, Impaired hip ROM, Decreased strength, Decreased activity tolerance   Functional limitations due to impairments Limited functional mobility requiring AD and assist of 2.   Clinical Presentation (PT Evaluation Complexity) stable   Clinical Presentation Rationale Based on PMH, current presentation, and social support.   Clinical Decision Making (Complexity) low complexity   Planned Therapy Interventions (PT) balance training;bed mobility training;cryotherapy;gait training;strengthening;transfer training   Risk & Benefits of therapy have been explained patient   PT Total Evaluation Time   PT Eval, Low Complexity Minutes (67489) 10   Physical Therapy Goals   PT Frequency 5x/week   PT Predicted Duration/Target Date for Goal Attainment 08/17/24   PT Goals Bed Mobility;Transfers;Gait   PT: Bed Mobility Minimal assist;Supine to/from sit   PT: Transfers Supervision/stand-by assist;Sit to/from stand;Assistive device   PT: Gait Minimal assist;Rolling walker;50 feet   Interventions   Interventions Quick Adds Gait Training;Therapeutic Activity;Therapeutic Procedure   Therapeutic Procedure/Exercise   Treatment Detail/Skilled Intervention Encouraged pt to complete  "ankle pumps to promote circulation, pt demonstrated understanding.   Therapeutic Activity   Therapeutic Activities: dynamic activities to improve functional performance Minutes (90531) 14   Symptoms Noted During/After Treatment Fatigue;Increased pain   Treatment Detail/Skilled Intervention PT: Pt required max encouragement to participate in session d/t pain. Pt performed supine-sit with modA of 2, use of reposition sheet to scoot hips towards EOB. Noted fair sitting balance EOB. Sit <> stand with Coco Stedy and Florentino of 2, cues provided for technique and upright posture upon standing. Pt completed sit <> stand x 2 reps with FWW and Florentino of 2, cues provided for hand placement and upright posture upon standing. Pt sitting up in chair at end of session, chair alarm on and needs within reach.   Gait Training   Gait Training Minutes (08757) 8   Symptoms Noted During/After Treatment (Gait Training) fatigue;increased pain   Treatment Detail/Skilled Intervention PT: Gait training of approx 5' with FWW and min-modA, pt required continuous 1-step cues for sequencing and encouragement as pt frequently stating, \"I can't\" Pt had difficulty advancing FWW, therapist assist to advance FWW with modA prior to encouraging pt to lead with L LE. Pt declined further amb. Close follow of recliner chair for safety.   Distance in Feet 5'   PT Discharge Planning   PT Plan Repeated sit <> stand with FWW vs Coco Stedy, gait as able with close wheelchair follow   PT Discharge Recommendation (DC Rec) Transitional Care Facility   PT Rationale for DC Rec Pt is below baseline and will benefit from continued skilled PT intervention at TCU in order to progress independence and safety with mobility.   PT Brief overview of current status Assist of 2 with use of Coco Stedy   Total Session Time   Timed Code Treatment Minutes 22   Total Session Time (sum of timed and untimed services) 32     "

## 2024-08-14 NOTE — CONSULTS
Care Management Initial Consult    General Information  Assessment completed with: Patient, VM-chart review, Anna Marie Hickey  Type of CM/SW Visit: CM Role Introduction    Primary Care Provider verified and updated as needed: Yes   Readmission within the last 30 days:        Reason for Consult: discharge planning  Advance Care Planning:            Communication Assessment  Patient's communication style: spoken language (English or Bilingual)    Hearing Difficulty or Deaf: no   Wear Glasses or Blind: no    Cognitive  Cognitive/Neuro/Behavioral: .WDL except  Level of Consciousness: intermittent confusion  Arousal Level: opens eyes spontaneously  Orientation: disoriented to, time  Mood/Behavior: calm, cooperative  Best Language: 0 - No aphasia  Speech: clear    Living Environment:   People in home: child(domi), adult  Darius, son  Current living Arrangements: house      Able to return to prior arrangements: yes       Family/Social Support:  Care provided by: self, child(domi)  Provides care for: no one  Marital Status:   Children          Description of Support System: Supportive, Involved         Current Resources:   Patient receiving home care services:       Community Resources:    Equipment currently used at home: walker, rolling (4WW outside of the house, pt doesn't use an AD inside the house)  Supplies currently used at home:      Employment/Financial:  Employment Status: retired        Financial Concerns:             Does the patient's insurance plan have a 3 day qualifying hospital stay waiver?  No    Lifestyle & Psychosocial Needs:  Social Determinants of Health     Food Insecurity: Low Risk  (3/13/2024)    Food Insecurity     Within the past 12 months, did you worry that your food would run out before you got money to buy more?: No     Within the past 12 months, did the food you bought just not last and you didn t have money to get more?: No   Depression: Not at risk (3/14/2024)    PHQ-2     PHQ-2 Score: 0    Housing Stability: Low Risk  (3/13/2024)    Housing Stability     Do you have housing? : Yes     Are you worried about losing your housing?: No   Tobacco Use: Medium Risk (8/12/2024)    Patient History     Smoking Tobacco Use: Never     Smokeless Tobacco Use: Unknown     Passive Exposure: Yes   Financial Resource Strain: Low Risk  (3/13/2024)    Financial Resource Strain     Within the past 12 months, have you or your family members you live with been unable to get utilities (heat, electricity) when it was really needed?: No   Alcohol Use: Not At Risk (4/15/2022)    Received from [facility], [facility]    AUDIT-C     Frequency of Alcohol Consumption: 2-3 times a week     Average Number of Drinks: 1 or 2     Frequency of Binge Drinking: Never   Transportation Needs: Low Risk  (3/13/2024)    Transportation Needs     Within the past 12 months, has lack of transportation kept you from medical appointments, getting your medicines, non-medical meetings or appointments, work, or from getting things that you need?: No   Physical Activity: Inactive (3/13/2024)    Exercise Vital Sign     Days of Exercise per Week: 0 days     Minutes of Exercise per Session: 0 min   Interpersonal Safety: Low Risk  (3/13/2024)    Interpersonal Safety     Do you feel physically and emotionally safe where you currently live?: Yes     Within the past 12 months, have you been hit, slapped, kicked or otherwise physically hurt by someone?: No     Within the past 12 months, have you been humiliated or emotionally abused in other ways by your partner or ex-partner?: No   Stress: No Stress Concern Present (3/13/2024)    [employer] [place] [employer] - Occupational Stress Questionnaire     Feeling of Stress : Not at all   Social Connections: Unknown (3/13/2024)    Social Connection and Isolation Panel [NHANES]     Frequency of Communication with Friends and Family: Not on file     Frequency of Social Gatherings with Friends and Family:  More than three times a week     Attends Confucianism Services: Not on file     Active Member of Clubs or Organizations: Not on file     Attends Club or Organization Meetings: Not on file     Marital Status: Not on file   Health Literacy: Not on file       Functional Status:  Prior to admission patient needed assistance:              Mental Health Status:          Chemical Dependency Status:                Values/Beliefs:  Spiritual, Cultural Beliefs, Confucianism Practices, Values that affect care:                 Additional Information:  Consult for discharge planning. Patient admitted for fall with femur fracture on 8/11/24 with tentative discharge date of 8/15/24. Writer reviewed chart and TC recommendations at discharge.     Writer met with patient to complete consult bedsdie and introduced self and role. Patient is aware that recommendations are TCU and are in agreement. After discussing options for TCU and provided list, patient would like referrals sent to [facility] and [facility]. At baseline patient lives with her adult son and his dog. She does not have any assistance other than what her son helps with and doesn't believe that she is on a waiver. Writer confirmed patient's primary doctor is Dr. Fairbanks.    Writer discussed private room and cost associated vs shared room and patient would like to see what is available.      Writer discussed transportation options and possible out of pocket costs of both wheelchair and stretcher with patient. Patient would like to transport with either her son or daughter at discharge.       RAYSA Levin

## 2024-08-14 NOTE — CONSULTS
SPIRITUAL HEALTH SERVICES Consult Note  [facility]  Ortho    Referral Source/Reason for Visit: [facility] consult request for Taoist/ritual and emotional support. Pt requested communion after her surgery.    Summary and Recommendations -  Gave pt a communion blessing as a glutton free host was not available.  Pt indicates that she recently moved in with her son and is not yet affiliated with a [facility] in the [facility] area.  Pt has been anointed while in hospital (08/13/24).    Plan: Advised pt/family of the availability of SH services by request. SH to follow while pt is in hospital.     Piter Mai  Associate   [facility] Spiritual Health Phone Line [phone]  Spiritual Health Pager [phone]    [facility] available 24/7 for emergent requests/referrals, either by paging the on-call  or by entering an ASAP/STAT consult in [facility], which will also page the on-call .    Assessment    Saw pt Luz Babb per [facility] consult request.    Patient/Family Understanding of Illness and Goals of Care - Luz indicates that her surgery went well and that she feels ready to discharge from hospital to a rehab facility. She expressed a preference for [facility] because of its proximity to her home and its good reputation.    Distress and Loss - No specific areas of Taoist, spiritual, or existential distress or loss reported by pt/family or observed by .     Strengths, Coping, and Resources - Luz indicates that she is living with her son and expects to return there after rehab.    Meaning, Beliefs, and Spirituality - Luz is devoutly Oriental orthodox and currently in search of a [facility]aritan in [facility]. She was anointed and has received communion per her request.

## 2024-08-14 NOTE — PROGRESS NOTES
[facility]    Medicine Progress Note - Hospitalist Service    Date of Admission:  8/11/2024    Assessment & Plan   Anna Marie Babb is a 84 year old female with multiple medical problems including HTN, HLP, GERD, Hiatal hernia, MDD with anxiety, Neuropathy, Hypercalcemia, Known pulmonary nodule, Celiac disease who presents to the emergency department following a fall.  X-ray shows left femoral neck fracture.     Closed fracture of neck of left femur s/p Left Hip Arthroplasty, 8/12/24  Underwent successful hemiarthroplasty by Dr. Coker under general anesthesia. EBL 100cc. No complications noted.   - routine post op care per orthopedics  - ASA 81mg BID x 6 weks for dvt prophy  - WBAT LLE with walker  - PT/OT  - appreciate ortho help  - care management consult for discharge planning     Hypokalemia  Hypomagnesemia , resolved  - replace per protocol     Essential hypertension, benign  - Continue PTA amlodipine 5 mg daily     Nonspecific abnormal results of liver function study  She had similar LFT abnormalities during a hospitalization following a fall 1/2/2024, though total bilirubin on admission is slightly higher (2.2-<1.4 milligrams per deciliter). These results are of unclear clinical significance.   *An abdominal ultrasound done in Jan 2024 showed sludge in the gallbladder  - Ordered APAP as needed since benefit > risk  - Repeat abd ultrasound shows normal gallbladder, no biliary ductal dilatation; right hepatic lobe has a focal area of fatty deposition. 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 Baseline ~ 127-135 mmol/L. Since admisison, Na 131 > 128 > 127>129 on 8/14/2024   - off IVF as of 8/13  - monitor prn since near baseline     Traumatic rhabdomyolysis, initial encounter  CK was similarly elevated during her hospital stay 1/3/2024. Repeat CK trending own 480 > 329  - Received IVF on admission.     Lung nodule  A  "stable pulmonary nodule in the left lower lobe has been noted on chest CT going back to 10/1/2013  - Follow-up with PCP     Elevated troponin 24 > 20  Echo done 1/3/2024 showed normal LV size and function, EF 60 to 65% with no regional wall motion abnormalities and no significant valvular abnormalities  *Admission EKG has no obvious ischemic changes  -Modest elevation in troponin likely represents demand ischemia due to the physiologic stress of her fall and fracture; would not pursue additional workup unless she has symptoms suggesting angina     Gastroesophageal reflux disease without esophagitis  - Continue PTA Protonix 40 mg daily      Cachexia: Estimated body mass index is 17.81 kg/m  as calculated from the following:    Height as of this encounter: 1.651 m (5' 5\").    Weight as of this encounter: 48.5 kg (107 lb).  Moderate Malnutrition: based on nutrition assessment  - supplements per dietitian          Diet: Room Service  Gluten Free Diet  Snacks/Supplements Adult: Gelatein Plus; With Meals  Snacks/Supplements Adult: Ruth MetroLinked Standard Oral Supplement; With Meals    DVT Prophylaxis: Pneumatic Compression Devices  Lombardi Catheter: Not present  Lines: None     Cardiac Monitoring: None  Code Status: Full Code      Clinically Significant Risk Factors        # Hypokalemia: Lowest K = 2.7 mmol/L in last 2 days, will replace as needed  # Hyponatremia: Lowest Na = 127 mmol/L in last 2 days, will monitor as appropriate    # Hypomagnesemia: Lowest Mg = 1.5 mg/dL in last 2 days, will replace as needed   # Hypoalbuminemia: Lowest albumin = 3.4 g/dL at 8/13/2024  4:43 AM, will monitor as appropriate     # Hypertension: Noted on problem list           # Cachexia: Estimated body mass index is 17.81 kg/m  as calculated from the following:    Height as of this encounter: 1.651 m (5' 5\").    Weight as of this encounter: 48.5 kg (107 lb)., PRESENT ON ADMISSION  # Moderate Malnutrition: based on nutrition assessment, PRESENT ON " ADMISSION   # Financial/Environmental Concerns:                 Disposition Plan     Medically Ready for Discharge: Anticipated in 2-4 Days             Denae Garcia MD  Hospitalist Service  [facility]  Securely message with [employer] (more info)  Text page via [facility] Paging/Directory   ______________________________________________________________________    Interval History   Pain controlled.   Patient worked with therapy today.   Denies n/v or abdominal pain. Eating good.     Physical Exam   Vital Signs: Temp: 98.1  F (36.7  C) Temp src: Oral BP: 118/67 Pulse: 84   Resp: 16 SpO2: 97 % O2 Device: None (Room air)    Weight: 107 lbs 0 oz    General Appearance: Alert, awake and no apparent distress  Respiratory: clear to auscultation bilaterally, no wheezing  Cardiovascular: regular rate and rhythm  GI: soft and non-tender  Skin: warm and dry      Medical Decision Making       MANAGEMENT DISCUSSED with the following over the past 24 hours: patient and RN   NOTE(S)/MEDICAL RECORDS REVIEWED over the past 24 hours: nursing note, orthopedics note, mAR  Tests ORDERED & REVIEWED in the past 24 hours:  - BMP  - CBC      Data     I have personally reviewed the following data over the past 24 hrs:    N/A  \   11.9   / N/A     129 (L) 94 (L) 9.2 /  91   3.2 (L); 3.2 (L) 28 0.53 \       Imaging results reviewed over the past 24 hrs:   No results found for this or any previous visit (from the past 24 hour(s)).

## 2024-08-14 NOTE — PLAN OF CARE
Date/Time: 08/14: 6528-6536    Trauma/Ortho/Medical (Choose one): Trauma    Diagnosis: (L) Hip Hemiarthroplasty  POD#:2  Mental Status: Oriented x 4 but can be forgetful  Activity/dangle: Up with 2 assist with a alondra-steady  Diet: Gluten free  Pain: Managed with tylenol  Lombardi/Voiding: Voiding in a bathroom but can be incontinent at times  Tele/Restraints/Iso: None  02/LDA: KAILA LOPEZ  D/C Date: TCU pending  Other Info: Aquacel dressing on (L) Hip is CDI. Pt's coccyx area is reddened, applied barrier cream and mepilex. Potassium and magnesium replaced.

## 2024-08-14 NOTE — PLAN OF CARE
Occupational Therapy: Orders received. Chart reviewed and discussed with care team.? Occupational Therapy not indicated due to per PT, pt will require TCU. Anticipate TCU discharge soon. Will defer OT to next level of care.? Defer discharge recommendations to care team.? Will complete orders.

## 2024-08-14 NOTE — PLAN OF CARE
Goal Outcome Evaluation:    Trauma/Ortho/Medical (Choose one):      Diagnosis: L hip maite arthroplasty  POD#: 2  Mental Status: A/O x3 with intermittent confusion.   Activity/dangle : 2 GB/ walker  Diet: Gluten free  Pain: scheduled tylenol  Lombardi/Voiding: purewick  Tele/Restraints/Iso: NA  02/LDA: RA/ PIV sl  D/C Date: Pending  Other Info: K, Mg protocol. Aquacel dressing CDI.

## 2024-08-14 NOTE — PLAN OF CARE
Date & Time: 8/13/24, 0410-6549  Summary: POD 1 L hip hemiarthroplasty for L femoral neck fracture   Behavior & Aggression: green  Fall Risk: yes  Orientation: AxOx3, disoriented to time  Neuro/CMS: intact, warm feet, sensation present and denies numbness and tingling  ABNL VS/O2: WNL, on RA   ABNL Labs: see chart, replaced potassium and mg on floors, rechecks WNL   Pain Management: scheduled tylenol  Bowel/Bladder: can be incontinent   Drains: PIV iSL  Diet: gluten free  Activity Level: A2/GB/W   Anticipated  DC Date: TBD pending improvement  Significant Information:   Patient out of bed this shift and pivoted to commode

## 2024-08-14 NOTE — PROGRESS NOTES
Orthopedic Surgery  Luz Payneberg  08/14/2024     Admit Date:  8/11/2024  POD: 2 Days Post-Op   Procedure(s):  Left hip hemiarthroplasty for left femoral neck fracture    Patient resting comfortably in bed.   Left hip pain present, but manageable  Denies numbness, tingling, and spasms to the LLE.  Tolerating oral intake.    Denies nausea or vomiting.  Denies chest pain or shortness of breath.  No acute events overnight.    Temp:  [97.6  F (36.4  C)-98.2  F (36.8  C)] 98.1  F (36.7  C)  Pulse:  [83-91] 84  Resp:  [16] 16  BP: (114-146)/(67-76) 118/67  SpO2:  [97 %] 97 %    Alert and oriented. NAD.   Left hip Aquacel dressing is clean, dry, and intact.   Minimal erythema of the surrounding skin.   Mild swelling of the left hip.   Thigh compartments remain soft and compressible.  Bilateral calves are soft, non-tender.  Left lower extremity is NVI.  Patient able to resist ankle dorsiflexion and plantar flexion bilaterally.  Able to flex and extend toes.  DP pulse palpable.  Sensation intact bilateral lower extremities.    Labs/Imaging:  Recent Labs   Lab Test 08/14/24  0721 08/13/24  0443 08/12/24  0733 08/11/24  1158   WBC  --  9.5 9.4 10.8   HGB 11.9 12.5 12.7 14.3   PLT  --  215 210 257     A/P    1. S/p left hip hemiarthroplasty for management of a femoral neck fracture (DOS: 8/12/24)  -Continue ASA 81 mg BID x 6 weeks for DVT prophylaxis.    -Hgb WNL. Continue to monitor.  -Mobilize with PT/OT.  -WBAT LLE with walker.  -No left hip precautions.    -Continue current pain regimen.  -Dressings: Keep intact. Okay for RN to change if >60% saturated or peeling/falling off.   -Follow-up: 2 weeks post-op with Dr. Michael Coker    2. Disposition  -Anticipate d/c to TCU when medically cleared and progressing in PT.    [facility]

## 2024-08-15 ENCOUNTER — LAB REQUISITION (OUTPATIENT)
Dept: LAB | Facility: CLINIC | Age: 84
End: 2024-08-15
Payer: MEDICARE

## 2024-08-15 VITALS
HEIGHT: 65 IN | RESPIRATION RATE: 16 BRPM | DIASTOLIC BLOOD PRESSURE: 73 MMHG | SYSTOLIC BLOOD PRESSURE: 133 MMHG | OXYGEN SATURATION: 95 % | TEMPERATURE: 98.1 F | HEART RATE: 74 BPM | WEIGHT: 107 LBS | BODY MASS INDEX: 17.83 KG/M2

## 2024-08-15 DIAGNOSIS — Z11.1 ENCOUNTER FOR SCREENING FOR RESPIRATORY TUBERCULOSIS: ICD-10-CM

## 2024-08-15 LAB
MAGNESIUM SERPL-MCNC: 1.6 MG/DL (ref 1.7–2.3)
POTASSIUM SERPL-SCNC: 3.9 MMOL/L (ref 3.4–5.3)

## 2024-08-15 PROCEDURE — 99239 HOSP IP/OBS DSCHRG MGMT >30: CPT | Performed by: INTERNAL MEDICINE

## 2024-08-15 PROCEDURE — 83735 ASSAY OF MAGNESIUM: CPT | Performed by: INTERNAL MEDICINE

## 2024-08-15 PROCEDURE — 250N000013 HC RX MED GY IP 250 OP 250 PS 637: Performed by: INTERNAL MEDICINE

## 2024-08-15 PROCEDURE — 84132 ASSAY OF SERUM POTASSIUM: CPT | Performed by: INTERNAL MEDICINE

## 2024-08-15 PROCEDURE — 250N000013 HC RX MED GY IP 250 OP 250 PS 637

## 2024-08-15 PROCEDURE — 36415 COLL VENOUS BLD VENIPUNCTURE: CPT | Performed by: INTERNAL MEDICINE

## 2024-08-15 RX ORDER — MAGNESIUM OXIDE 400 MG/1
400 TABLET ORAL EVERY 4 HOURS
Status: COMPLETED | OUTPATIENT
Start: 2024-08-15 | End: 2024-08-15

## 2024-08-15 RX ORDER — MAGNESIUM OXIDE 400 MG/1
400 TABLET ORAL 2 TIMES DAILY
DISCHARGE
Start: 2024-08-15 | End: 2024-08-16

## 2024-08-15 RX ADMIN — BRIMONIDINE TARTRATE 1 DROP: 2 SOLUTION/ DROPS OPHTHALMIC at 08:31

## 2024-08-15 RX ADMIN — METHOCARBAMOL 500 MG: 500 TABLET ORAL at 10:51

## 2024-08-15 RX ADMIN — Medication 400 MG: at 14:10

## 2024-08-15 RX ADMIN — PREDNISOLONE ACETATE 1 DROP: 10 SUSPENSION/ DROPS OPHTHALMIC at 08:30

## 2024-08-15 RX ADMIN — ASPIRIN 81 MG: 81 TABLET, COATED ORAL at 08:30

## 2024-08-15 RX ADMIN — ACETAMINOPHEN 325MG 975 MG: 325 TABLET ORAL at 00:41

## 2024-08-15 RX ADMIN — ACETAMINOPHEN 325MG 975 MG: 325 TABLET ORAL at 08:30

## 2024-08-15 RX ADMIN — Medication 400 MG: at 10:51

## 2024-08-15 RX ADMIN — PANTOPRAZOLE SODIUM 40 MG: 40 TABLET, DELAYED RELEASE ORAL at 08:30

## 2024-08-15 RX ADMIN — ACETAMINOPHEN 325MG 650 MG: 325 TABLET ORAL at 13:04

## 2024-08-15 RX ADMIN — PREDNISOLONE ACETATE 1 DROP: 10 SUSPENSION/ DROPS OPHTHALMIC at 14:11

## 2024-08-15 ASSESSMENT — ACTIVITIES OF DAILY LIVING (ADL)
ADLS_ACUITY_SCORE: 43
ADLS_ACUITY_SCORE: 44
ADLS_ACUITY_SCORE: 43
ADLS_ACUITY_SCORE: 47
ADLS_ACUITY_SCORE: 44
ADLS_ACUITY_SCORE: 47
ADLS_ACUITY_SCORE: 44
ADLS_ACUITY_SCORE: 47
ADLS_ACUITY_SCORE: 44
ADLS_ACUITY_SCORE: 47
ADLS_ACUITY_SCORE: 43
ADLS_ACUITY_SCORE: 44
ADLS_ACUITY_SCORE: 43

## 2024-08-15 NOTE — PLAN OF CARE
Goal Outcome Evaluation:    Date/Time: 08/14-8/15/24: 3374-1455   Trauma/Ortho/Medical (Choose one): Trauma     Diagnosis: (L) Hip Hemiarthroplasty  POD#:3  Mental Status: Oriented x 4  forgetful at times  Activity/dangle: Up with Ast x 2 assist with a alondra-steady  Diet: Gluten free  Pain: Managed with tylenol  Lombardi/Voiding: Voids in the bathroom but  incontinent at times  Tele/Restraints/Iso: None  02/LDA: KAILA ASHRAF SL  D/C Date: TCU pending  Other Info: Mag and K protocal recheck in the AM. L hip dressing C/D/I, redness on Pt's coccyx area, applied barrier cream and mepilex, plan of care ongoing.

## 2024-08-15 NOTE — CONSULTS
SPIRITUAL HEALTH SERVICES Consult Note  [facility]  Ortho    Reason for visit or referral: [facility] consult request by pt for follow-up visit.    Visited with Luz per her request for a follow-up visit for conversation and prayer. She reiterated that she is looking forward to discharging from hospital and hopes to transfer to [facility]. Provided prayer and a communion blessing per her request.    Plan: SH to follow while pt remains in hospital.    Piter Mai  Associate   [facility] Spiritual Health Phone Line [phone]  Spiritual Health Pager [phone]    [facility] available 24/7 for emergent requests/referrals, either by paging the on-call  or by entering an ASAP/STAT consult in [facility], which will also page the on-call .

## 2024-08-15 NOTE — PROGRESS NOTES
"Orthopedic Surgery  Luz Payneberg  08/15/2024     Admit Date:  8/11/2024  POD: 3 Days Post-Op   Procedure(s):  Left hip hemiarthroplasty for left femoral neck fracture    Patient resting comfortably in bed.   Left hip is \"sore as heck\" but overall slowly improving since surgery.  Denies numbness, tingling, and spasms to the LLE.  Tolerating oral intake.    Denies nausea or vomiting.  Denies chest pain or shortness of breath.  No acute events overnight.    Temp:  [97.6  F (36.4  C)-98.2  F (36.8  C)] 98.1  F (36.7  C)  Pulse:  [74-84] 74  Resp:  [14-18] 16  BP: (115-138)/(62-79) 133/73  SpO2:  [95 %-97 %] 95 %    Alert and oriented. NAD.   Prefers to lay in bed with both knees and hips flexed this morning.  Left hip Aquacel dressing is clean, dry, and intact.   Minimal erythema of the surrounding skin.   Mild swelling of the left hip.   Thigh compartments remain soft and compressible.  Bilateral calves are soft, non-tender.  Left lower extremity is NVI.  Patient able to resist ankle dorsiflexion and plantar flexion bilaterally.  Able to flex and extend toes.  DP pulse palpable.  Sensation intact bilateral lower extremities.    Labs/Imaging:  Recent Labs   Lab Test 08/14/24  0721 08/13/24  0443 08/12/24  0733 08/11/24  1158   WBC  --  9.5 9.4 10.8   HGB 11.9 12.5 12.7 14.3   PLT  --  215 210 257     A/P    1. S/p left hip hemiarthroplasty for management of a femoral neck fracture (DOS: 8/12/24)  -Continue ASA 81 mg BID x 6 weeks for DVT prophylaxis.    -Hgb WNL.  -Mobilize with PT/OT.  -WBAT LLE with walker.  -No left hip precautions.    -Continue current pain regimen.  -Dressings: Keep intact. Okay for RN to change if >60% saturated or peeling/falling off.   -Follow-up: 2 weeks post-op with Dr. Michael Coker    2. Disposition  -Anticipate d/c to TCU when medically cleared and progressing in PT. Ortho stable.    Cortney Boudreaux PA-C  [facility]  "

## 2024-08-15 NOTE — PLAN OF CARE
Date/Time: 08/15: [id number]    Trauma/Ortho/Medical (Choose one): Trauma    Diagnosis: (L) Hip Hemiarthroplasty  POD#: 3  Mental Status: Oriented x 4 but can be forgetful  Activity/dangle: Up with 2/alnodra steady  Diet: Regular  Pain: Managed with tylenol and robaxin. Refused oxycodone when rated pain 7/10  Lombardi/Voiding: Bathroom/incontinent/purewick  Tele/Restraints/Iso: None  02/LDA: KAILA LOPEZ  D/C Date: 08/15 to TCU  Other Info: Aquacel dressing on (L) hip CDI. Magnesium replaced

## 2024-08-15 NOTE — PROGRESS NOTES
Care Management Discharge Note    Discharge Date: 08/15/2024       Discharge Disposition: Skilled Nursing Facility, Transitional Care    Discharge Services: None    Discharge DME: None    Discharge Transportation: family or friend will provide    Private pay costs discussed: private room/amenity fees and transportation costs    Does the patient's insurance plan have a 3 day qualifying hospital stay waiver?  No    PAS Confirmation Code: [id number]  Patient/family educated on Medicare website which has current facility and service quality ratings: yes    Education Provided on the Discharge Plan:    Persons Notified of Discharge Plans: yes  Patient/Family in Agreement with the Plan: yes    Handoff Referral Completed: No    Additional Information:  [facility] accepted to [facility]. Call from daughter Liv who said that she would prefer patient goes to [facility]. Writer spoke to patient who said that she would agree to that and also agreed that a ride is needed. Discussed possible cost and she understands. Updated [facility] that she is not going to discharge there and confirmed bed with [facility] in a semi private room. Orders received and sent to [facility]. Call to [facility] and wheelchair ride scheduled for today between [phone]. Updated patient and daughter Liv. PAS completed.     PAS-RR    D: Per DHS regulation, ROSS completed and submitted PAS-RR to [place] [employer] Direct Connect via the Senior LinkAge Line.  PAS-RR confirmation # is : [id number]    I: SW spoke with patient and they are aware a PAS-RR has been submitted.  ROSS reviewed with patient that they may be contacted for a follow up appointment within 10 days of hospital discharge if their SNF stay is < 30 days.  Contact information for Senior LinkAge Line was also provided.    A: patient verbalized understanding.    P: Further questions may be directed to [facility] LinkAge Line at #[phone], option #4 for PAS-RR staff.      Payton Shukla,  LSW

## 2024-08-15 NOTE — DISCHARGE SUMMARY
"[facility]  Hospitalist Discharge Summary      Date of Admission:  8/11/2024  Date of Discharge:  8/15/2024  Discharging Provider: Denae Garcia MD  Discharge Service: Hospitalist Service    Discharge Diagnoses   See below    Clinically Significant Risk Factors     # Cachexia: Estimated body mass index is 17.81 kg/m  as calculated from the following:    Height as of this encounter: 1.651 m (5' 5\").    Weight as of this encounter: 48.5 kg (107 lb).  # Moderate Malnutrition: based on nutrition assessment      Follow-ups Needed After Discharge   Follow-up Appointments     Follow Up and recommended labs and tests      Please call as soon as possible to make an appointment to be seen in Dr. Michael Coker's clinic at 2 weeks postop for a recheck of your surgical   site, possible repeat x-rays, and wound care. Please contact Dr. Coker's   team at [phone] to schedule an appointment.       Dr. Coker sees patients at 2 clinic locations:  [facility]  [address] [place] [address]  [facility]   [address], [place] [address]      Please call the on-call phone number [phone] during evenings, nights   and weekends for any urgent needs.        Follow Up and recommended labs and tests      Follow up with half-way physician.  The following labs/tests are   recommended: basic metabolic panel, magnesium level in 1 week.            Unresulted Labs Ordered in the Past 30 Days of this Admission       No orders found from 7/12/2024 to 8/12/2024.            Discharge Disposition   Discharged to short-term care facility  Condition at discharge: Stable    Hospital Course   Anna Marie Babb is a 84 year old female with multiple medical problems including HTN, HLP, GERD, Hiatal hernia, MDD with anxiety, Neuropathy, Hypercalcemia, Known pulmonary nodule, Celiac disease who presents to the emergency department " following a fall.  X-ray shows left femoral neck fracture.     Closed fracture of neck of left femur s/p Left Hip Arthroplasty, 8/12/24  Underwent successful hemiarthroplasty by Dr. Coker under general anesthesia. EBL 100cc. No complications noted.   *post op cares managed by orthopedics  - ASA 81mg BID x 6 weks for dvt prophy  - WBAT LLE with walker  - continue PT/OT at TCU   - care management  following, discharging to TCU today     Hypokalemia-resolved  Hypomagnesemia  - K has been replaced and 3.9 on day of discharge  - Magnesium oxide 400mg BIDx 2 doses after discharge  - recommend f/up magnesium level in 1 week     Essential hypertension, benign  - Continue PTA amlodipine 5 mg daily     Nonspecific abnormal results of liver function study  She had similar LFT abnormalities during a hospitalization following a fall 1/2/2024, though total bilirubin on admission is slightly higher (2.2-<1.4 milligrams per deciliter). These results are of unclear clinical significance.   *An abdominal ultrasound done in Jan 2024 showed sludge in the gallbladder  - Ordered APAP as needed since benefit > risk  - Repeat abd ultrasound shows normal gallbladder, no biliary ductal dilatation; right hepatic lobe has a focal area of fatty deposition. 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 Baseline ~ 127-135 mmol/L. Since admisison, Na 131 > 128 > 127>129 on 8/14/2024   - off IVF as of 8/13  - recommend follow up BMP in 1 week at TCU     Traumatic rhabdomyolysis, initial encounter  CK was similarly elevated during her hospital stay 1/3/2024. Repeat CK trending own 480 > 329  - Received IVF on admission.     Lung nodule  A stable pulmonary nodule in the left lower lobe has been noted on chest CT going back to 10/1/2013  - Follow-up with PCP     Elevated troponin 24 > 20  Echo done 1/3/2024 showed normal LV size and function, EF 60 to 65% with no regional wall motion  "abnormalities and no significant valvular abnormalities  *Admission EKG has no obvious ischemic changes  -Modest elevation in troponin likely represents demand ischemia due to the physiologic stress of her fall and fracture; would not pursue additional workup unless she has symptoms suggesting angina     Gastroesophageal reflux disease without esophagitis  - Continue PTA Protonix 40 mg daily      Cachexia: Estimated body mass index is 17.81 kg/m  as calculated from the following:    Height as of this encounter: 1.651 m (5' 5\").    Weight as of this encounter: 48.5 kg (107 lb).  Moderate Malnutrition: based on nutrition assessment  - supplements per dietitian    Consultations This Hospital Stay   ORTHOPEDIC SURGERY IP CONSULT  SPIRITUAL HEALTH SERVICES IP CONSULT  PHYSICAL THERAPY ADULT IP CONSULT  OCCUPATIONAL THERAPY ADULT IP CONSULT  VASCULAR ACCESS ADULT IP CONSULT  CARE MANAGEMENT / SOCIAL WORK IP CONSULT  PHYSICAL THERAPY ADULT IP CONSULT  OCCUPATIONAL THERAPY ADULT IP CONSULT  SPIRITUAL HEALTH SERVICES IP CONSULT  SPIRITUAL HEALTH SERVICES IP CONSULT    Code Status   Full Code    Time Spent on this Encounter   I, Denae Garcia MD, personally saw the patient today and spent 38 minutes discharging this patient.       Denae Garcia MD  [facility]  [address] [place] [id number]  Phone: [phone]  Fax: [phone]  ______________________________________________________________________    Physical Exam   Vital Signs: Temp: 98.1  F (36.7  C) Temp src: Oral BP: 133/73 Pulse: 74   Resp: 16 SpO2: 95 % O2 Device: None (Room air)    Weight: 107 lbs 0 oz  General Appearance: Alert, awake and no apparent distress  Respiratory: clear to auscultation bilaterally  Cardiovascular: regular rate and rhythm  GI: soft and non-tender  Skin: warm and dry         Primary Care Physician   Melissa Fairbanks    Discharge Orders      Primary Care - Care Coordination Referral    "   General info for SNF    Length of Stay Estimate: Short Term Care: Estimated # of Days <30  Condition at Discharge: Stable  Level of care:skilled   Rehabilitation Potential: Fair  Admission H&P remains valid and up-to-date: Yes  Recent Chemotherapy: N/A  Use Nursing Home Standing Orders: Yes     Mantoux instructions    Give two-step Mantoux (PPD) Per Facility Policy Yes     Follow Up and recommended labs and tests    Please call as soon as possible to make an appointment to be seen in Dr. Michael Coker's clinic at 2 weeks postop for a recheck of your surgical site, possible repeat x-rays, and wound care. Please contact Dr. Coker's team at [phone] to schedule an appointment.       Dr. Coker sees patients at 2 clinic locations:  [facility]  [address] [place] [address]  [facility]   [address], [address], [place] [address]      Please call the on-call phone number [phone] during evenings, nights and weekends for any urgent needs.     Reason for your hospital stay    S/p left hip hemiarthroplasty for management of a femoral neck fracture (DOS: 8/12/24) with Dr. Shamir Coker     Wound care    Site:  Left hip  Instructions:  Please leave dressing intact for 2 weeks postoperatively. Okay to change if saturated >60% or peeling. If an Aquacel or gauze/tegaderm is in place over your surgical sites, it is okay to shower without covering the dressings. If you have a soft dressing, you must securely cover your dressing while showering or sponge bathe. Absolutely no soaking or submersion. Please contact your orthopedic surgical team if persistent drainage or redness develops around the surgical site.     Additional Discharge Instructions    Pain after surgery is normal and expected.  You will have some amount of pain and swelling for several weeks after surgery.  These symptoms will improve with time.  There are several things you can do to help  reduce your pain including: rest, cold compresses, elevation, and using pain medications as needed. Contact your Surgeon Team if you have pain that persists or worsens after surgery despite rest, ice, elevation, and taking your medication(s) as prescribed. Contact your Surgeon Team if you have new numbness, tingling, or weakness in your operative extremity.    Swelling and/or bruising of the surgical extremity is common and may persist for several months after surgery. In addition to frequent icing and elevation, gentle compressive support with an ACE wrap or tubigrip may help with swelling. Apply compression regularly, removing at least twice daily to perform skin checks. Contact your Surgeon Team if your swelling increases and is NOT associated with an increase in your activity level, or if your swelling increases and is associated with redness and pain.    Ice can be used to control swelling and discomfort after surgery. Place a thin towel over your operative site and apply the ice pack overtop. Leave ice pack in place for 20 minutes, then remove for 20 minutes. Repeat this 20 minutes on/20 minutes off routine as often as tolerated.    Please contact your surgical team with any concerns for infection including increasing redness around your surgical site, pus-like drainage, fever, chills, or flu-like symptoms.     Activity - Up with assistive device     Activity - Up with nursing assistance     Weight bearing status    WBAT LLE with walker     Follow Up and recommended labs and tests    Follow up with half-way physician.  The following labs/tests are recommended: basic metabolic panel, magnesium level in 1 week.     Physical Therapy Adult Consult    Evaluate and treat as clinically indicated.    Reason: S/p left hip hemiarthroplasty for management of a femoral neck fracture (DOS: 8/12/24) with Dr. Shamir Coker     Occupational Therapy Adult Consult    Evaluate and treat as clinically indicated.    Reason: S/p  left hip hemiarthroplasty for management of a femoral neck fracture (DOS: 8/12/24) with Dr. Shamir Coker     Fall precautions     Hip precautions    No left hip precautions     Crutches DME    DME Documentation: Describe the reason for need to support medical necessity: Impaired gait status post hip surgery. I, the undersigned, certify that the above prescribed supplies are medically necessary for this patient and is both reasonable and necessary in reference to accepted standards of medical practice in the treatment of this patient's condition and is not prescribed as a convenience.     Cane DME    DME Documentation: Describe the reason for need to support medical necessity: Impaired gait status post hip surgery. I, the undersigned, certify that the above prescribed supplies are medically necessary for this patient and is both reasonable and necessary in reference to accepted standards of medical practice in the treatment of this patient's condition and is not prescribed as a convenience.     Walker DME    DME Documentation: Describe the reason for need to support medical necessity: Impaired gait status post hip surgery. I, the undersigned, certify that the above prescribed supplies are medically necessary for this patient and is both reasonable and necessary in reference to accepted standards of medical practice in the treatment of this patient's condition and is not prescribed as a convenience.     Diet    Follow this diet upon discharge: Orders Placed This Encounter      Gluten Free Diet       Significant Results and Procedures   Most Recent 3 CBC's:  Recent Labs   Lab Test 08/14/24  0721 08/13/24  0443 08/12/24  0733 08/11/24  1158   WBC  --  9.5 9.4 10.8   HGB 11.9 12.5 12.7 14.3   MCV  --  102* 98 97   PLT  --  215 210 257     Most Recent 3 BMP's:  Recent Labs   Lab Test 08/15/24  0639 08/14/24  1741 08/14/24  0721 08/13/24  0443 08/12/24  2036 08/12/24  0733   NA  --   --  129* 127*  --  128*   POTASSIUM 3.9  3.9 3.2*  3.2* 3.4  3.4   < > 3.3*   CHLORIDE  --   --  94* 91*  --  93*   CO2  --   --  28 28  --  26   BUN  --   --  9.2 10.2  --  9.4   CR  --   --  0.53 0.55  --  0.64   ANIONGAP  --   --  7 8  --  9   KARTHIKEYAN  --   --  8.8 8.8  --  9.2   GLC  --   --  91 97  97  --  118*    < > = values in this interval not displayed.   ,   Results for orders placed or performed during the hospital encounter of 08/11/24   Head CT w/o contrast    Narrative    EXAM: CT HEAD W/O CONTRAST  LOCATION: [facility]  DATE: 8/11/2024    INDICATION: fall vs syncope no  memory of event, eval for head injury, headache  COMPARISON: CT head 8/7/2023  TECHNIQUE: Routine CT Head without IV contrast. Multiplanar reformats. Dose reduction techniques were used.    FINDINGS:  INTRACRANIAL CONTENTS: No intracranial hemorrhage, extraaxial collection, or mass effect.  No CT evidence of acute infarct. Mild presumed chronic small vessel ischemic changes. Unchanged mild generalized volume loss. No hydrocephalus.     VISUALIZED ORBITS/SINUSES/MASTOIDS: No intraorbital abnormality. No paranasal sinus mucosal disease. No middle ear or mastoid effusion.    BONES/SOFT TISSUES: No acute abnormality.      Impression    IMPRESSION:  1.  No acute intracranial process.   XR Pelvis w Hip Left 1 View    Narrative    EXAM: XR PELVIS AND HIP LEFT 1 VIEW  LOCATION: [facility]  DATE: 8/11/2024    INDICATION: Fall, left hip pain.  COMPARISON: None.      Impression    IMPRESSION:   1. There is a left femoral neck fracture. There is about 1 cm of displacement and impaction. Mild apex medial angulation.  2. Degenerative changes in the spine.  3. Mild osteoarthrosis of the SI joints and pubic symphysis.   4. Postoperative changes in the proximal right femur without evidence of complication.  5. Diffuse bone demineralization.   XR Chest 1 View    Narrative    EXAM: XR CHEST 1 VIEW  LOCATION: [facility]  DATE: 08/11/2024    INDICATION: Fall, weakness  COMPARISON: 11/29/2023, 01/02/2024.      Impression    IMPRESSION: Oval nonspecific density at the left base. Question a small hiatal hernia. Consider CT scan for further evaluation.     US Abdomen Limited    Narrative    EXAM: US ABDOMEN LIMITED  LOCATION: [facility]  DATE: 8/11/2024    INDICATION: Abnormal liver enzymes.   COMPARISON: Ultrasound 1/2/2024. MRCP 11/30/2023.   TECHNIQUE: Limited abdominal ultrasound.    FINDINGS:    GALLBLADDER: Normal. No gallstones, wall thickening, or pericholecystic fluid. Negative sonographic Poole's sign.    BILE DUCTS: No biliary dilatation. The common duct measures 4 mm.    LIVER: Normal parenchyma with smooth contour. Geographic area of increased echogenicity within the right hepatic lobe measuring approximately 2.1 x 2.4 x 2.0 cm is most likely a focal area of fatty infiltration. No suspicious lesions.     RIGHT KIDNEY: No hydronephrosis.    PANCREAS: The pancreas is largely obscured by overlying gas.    No ascites.      Impression    IMPRESSION:    1.  Normal gallbladder. No biliary ductal dilation.    2.  Geographic area of increased echogenicity within the right hepatic lobe is likely a focal area of fatty deposition.    XR Pelvis w Hip Port Left  1 View    Narrative    PELVIS AND HIP PORTABLE LEFT ONE VIEW August 12, 2024 2:54 PM     HISTORY: Status post Hip surgery.    COMPARISON: 8/11/2024.       Impression    IMPRESSION: Left hip implant for treatment of a left femoral neck  fracture. Hardware appears in place without evidence of an acute disc  space fracture. No dislocation.     Bone demineralization and postoperative changes right proximal femur  again seen.    MILA PATEL MD         SYSTEM ID:  [id number]       Discharge Medications   Current Discharge Medication List        START taking these medications    Details   aspirin 81 MG EC tablet Take 1 tablet (81 mg) by mouth 2  times daily for 42 days    Associated Diagnoses: Closed fracture of neck of left femur, initial encounter (H)      magnesium oxide (MAG-OX) 400 MG tablet Take 1 tablet (400 mg) by mouth 2 times daily for 2 doses    Associated Diagnoses: Hypomagnesemia      methocarbamol (ROBAXIN) 500 MG tablet Take 1 tablet (500 mg) by mouth 3 times daily as needed for muscle spasms or other (pain)    Associated Diagnoses: Closed fracture of neck of left femur, initial encounter (H)      oxyCODONE (ROXICODONE) 5 MG tablet Take 0.5-1 tablets (2.5-5 mg) by mouth every 4 hours as needed for moderate to severe pain (Take 2.5 mg (1/2 tab) for pain 4-6/10, take 5 mg (1 tab) for pain 7-10/10)  Qty: 20 tablet, Refills: 0    Associated Diagnoses: Closed fracture of neck of left femur, initial encounter (H)      polyethylene glycol (MIRALAX) 17 GM/Dose powder Take 17 g by mouth daily    Associated Diagnoses: Closed fracture of neck of left femur, initial encounter (H)      senna-docusate (SENOKOT-S/PERICOLACE) 8.6-50 MG tablet Take 1 tablet by mouth 2 times daily as needed for constipation    Associated Diagnoses: Closed fracture of neck of left femur, initial encounter (H)           CONTINUE these medications which have CHANGED    Details   acetaminophen (TYLENOL) 325 MG tablet Take 3 tablets (975 mg) by mouth every 8 hours as needed for mild pain    Associated Diagnoses: Closed fracture of neck of left femur, initial encounter (H)           CONTINUE these medications which have NOT CHANGED    Details   amLODIPine (NORVASC) 5 MG tablet Take 1 tablet (5 mg) by mouth every evening  Qty: 90 tablet, Refills: 3    Associated Diagnoses: Essential hypertension      brimonidine (ALPHAGAN) 0.2 % ophthalmic solution Place 1 drop Into the left eye 2 times daily      loperamide (IMODIUM) 2 MG capsule Take 2 mg by mouth daily as needed for diarrhea      pantoprazole (PROTONIX) 40 MG EC tablet Take 1 tablet (40 mg) by mouth daily  Qty: 90 tablet, Refills:  1    Associated Diagnoses: Gastritis without bleeding, unspecified chronicity, unspecified gastritis type      prednisoLONE acetate (PRED FORTE) 1 % ophthalmic suspension Place 1 drop Into the left eye 4 times daily           Allergies   Allergies   Allergen Reactions    Amoxicillin Diarrhea    Amoxicillin-Pot Clavulanate     Ciprofloxacin     Gluten Meal      celiac

## 2024-08-16 ENCOUNTER — DOCUMENTATION ONLY (OUTPATIENT)
Dept: GERIATRICS | Facility: CLINIC | Age: 84
End: 2024-08-16
Payer: COMMERCIAL

## 2024-08-16 ENCOUNTER — PATIENT OUTREACH (OUTPATIENT)
Dept: CARE COORDINATION | Facility: CLINIC | Age: 84
End: 2024-08-16
Payer: COMMERCIAL

## 2024-08-16 ENCOUNTER — MEDICAL CORRESPONDENCE (OUTPATIENT)
Dept: HEALTH INFORMATION MANAGEMENT | Facility: CLINIC | Age: 84
End: 2024-08-16
Payer: COMMERCIAL

## 2024-08-16 PROCEDURE — 36415 COLL VENOUS BLD VENIPUNCTURE: CPT | Performed by: NURSE PRACTITIONER

## 2024-08-16 PROCEDURE — P9604 ONE-WAY ALLOW PRORATED TRIP: HCPCS | Performed by: NURSE PRACTITIONER

## 2024-08-16 PROCEDURE — 86481 TB AG RESPONSE T-CELL SUSP: CPT | Performed by: NURSE PRACTITIONER

## 2024-08-16 NOTE — LETTER
[facility]   To:             Please give to facility    From:   Krystin Clark  RN  Care Coordinator   [facility]   P: [phone]  [email or web address]   Patient Name:  Luz Babb YOB: 1940   Admit date: 8/15/24      *Information Needed:  Please contact me when the patient will discharge (or if they will move to long term care)- include the discharge date, disposition, and main diagnosis   If the patient is discharged with home care services, please provide the name of the agency    Also- Please inform me if a care conference is being held.   Phone or Email with information

## 2024-08-16 NOTE — PROGRESS NOTES
Clinic Care Coordination Contact  Care Coordination Transition Communication    Clinical Data: Patient was hospitalized at [facility] from 8/11/24 to 8/15/24 with diagnosis of Closed fracture of neck of left femur s/p Left Hip Arthroplasty, 8/12/24.    Assessment: Patient has transitioned to TCU/ARU for short term rehabilitation:    Facility Name: [facility]  Transition Communication:  Notified facility of Primary Care- Care Coordination support via Epic fax.    Plan: Care Coordinator will await notification from facility staff informing of patient's discharge plans/needs. Care Coordinator will review chart and outreach to facility staff every 4 weeks and as needed.     Krystin Clark, RN Clinic Care Coordinator  [facility] Clinics: [place], [place] (on-site Wednesdays), [place] (on-site Thursdays) & [facility].  [email or web address]  Phone: [phone]

## 2024-08-17 LAB
QUANTIFERON MITOGEN: 0.29 IU/ML
QUANTIFERON NIL TUBE: 0 IU/ML
QUANTIFERON TB1 TUBE: 0 IU/ML
QUANTIFERON TB2 TUBE: 0

## 2024-08-18 LAB
GAMMA INTERFERON BACKGROUND BLD IA-ACNC: 0 IU/ML
M TB IFN-G BLD-IMP: ABNORMAL
M TB IFN-G CD4+ BCKGRND COR BLD-ACNC: 0.29 IU/ML
MITOGEN IGNF BCKGRD COR BLD-ACNC: 0 IU/ML
MITOGEN IGNF BCKGRD COR BLD-ACNC: 0 IU/ML

## 2024-08-20 ENCOUNTER — LAB REQUISITION (OUTPATIENT)
Dept: LAB | Facility: CLINIC | Age: 84
End: 2024-08-20
Payer: MEDICARE

## 2024-08-20 ENCOUNTER — TRANSITIONAL CARE UNIT VISIT (OUTPATIENT)
Dept: GERIATRICS | Facility: CLINIC | Age: 84
End: 2024-08-20
Payer: COMMERCIAL

## 2024-08-20 VITALS
BODY MASS INDEX: 17.81 KG/M2 | DIASTOLIC BLOOD PRESSURE: 65 MMHG | TEMPERATURE: 97.6 F | WEIGHT: 110.8 LBS | HEIGHT: 66 IN | RESPIRATION RATE: 18 BRPM | SYSTOLIC BLOOD PRESSURE: 102 MMHG | HEART RATE: 87 BPM | OXYGEN SATURATION: 97 %

## 2024-08-20 DIAGNOSIS — E87.1 HYPONATREMIA: ICD-10-CM

## 2024-08-20 DIAGNOSIS — D64.9 ANEMIA, UNSPECIFIED: ICD-10-CM

## 2024-08-20 DIAGNOSIS — I10 ESSENTIAL HYPERTENSION, BENIGN: ICD-10-CM

## 2024-08-20 DIAGNOSIS — S72.002D CLOSED FRACTURE OF NECK OF LEFT FEMUR WITH ROUTINE HEALING, SUBSEQUENT ENCOUNTER: Primary | ICD-10-CM

## 2024-08-20 PROBLEM — M62.81 MUSCLE WEAKNESS (GENERALIZED): Status: ACTIVE | Noted: 2024-01-05

## 2024-08-20 PROBLEM — F34.1 DYSTHYMIC DISORDER: Status: ACTIVE | Noted: 2024-01-05

## 2024-08-20 PROBLEM — F03.90 UNSPECIFIED DEMENTIA, UNSPECIFIED SEVERITY, WITHOUT BEHAVIORAL DISTURBANCE, PSYCHOTIC DISTURBANCE, MOOD DISTURBANCE, AND ANXIETY (H): Status: ACTIVE | Noted: 2024-01-05

## 2024-08-20 PROBLEM — R10.12 LEFT UPPER QUADRANT PAIN: Status: ACTIVE | Noted: 2023-11-29

## 2024-08-20 PROBLEM — E78.5 HYPERLIPIDEMIA, UNSPECIFIED: Status: ACTIVE | Noted: 2019-09-15

## 2024-08-20 PROBLEM — K76.0 FATTY (CHANGE OF) LIVER, NOT ELSEWHERE CLASSIFIED: Status: ACTIVE | Noted: 2019-09-15

## 2024-08-20 PROBLEM — K86.1 OTHER CHRONIC PANCREATITIS (H): Status: ACTIVE | Noted: 2024-01-05

## 2024-08-20 PROBLEM — R79.89 OTHER SPECIFIED ABNORMAL FINDINGS OF BLOOD CHEMISTRY: Status: ACTIVE | Noted: 2024-01-02

## 2024-08-20 PROBLEM — K86.89 OTHER SPECIFIED DISEASES OF PANCREAS: Status: ACTIVE | Noted: 2023-11-29

## 2024-08-20 PROBLEM — R26.9 UNSPECIFIED ABNORMALITIES OF GAIT AND MOBILITY: Status: ACTIVE | Noted: 2024-01-05

## 2024-08-20 PROBLEM — T79.6XXD: Status: ACTIVE | Noted: 2024-01-05

## 2024-08-20 PROBLEM — Z79.01 LONG TERM (CURRENT) USE OF ANTICOAGULANTS: Status: ACTIVE | Noted: 2023-08-17

## 2024-08-20 PROBLEM — M81.0 AGE-RELATED OSTEOPOROSIS WITHOUT CURRENT PATHOLOGICAL FRACTURE: Status: ACTIVE | Noted: 2019-09-15

## 2024-08-20 PROBLEM — K21.9 GASTRO-ESOPHAGEAL REFLUX DISEASE WITHOUT ESOPHAGITIS: Status: ACTIVE | Noted: 2024-01-05

## 2024-08-20 PROBLEM — E22.2 SYNDROME OF INAPPROPRIATE SECRETION OF ANTIDIURETIC HORMONE (H): Status: ACTIVE | Noted: 2024-01-05

## 2024-08-20 PROBLEM — M25.552 PAIN IN LEFT HIP: Status: ACTIVE | Noted: 2024-01-02

## 2024-08-20 PROBLEM — R91.1 SOLITARY PULMONARY NODULE: Status: ACTIVE | Noted: 2024-01-05

## 2024-08-20 PROBLEM — S22.42XD MULTIPLE FRACTURES OF RIBS, LEFT SIDE, SUBSEQUENT ENCOUNTER FOR FRACTURE WITH ROUTINE HEALING: Status: ACTIVE | Noted: 2024-01-05

## 2024-08-20 PROBLEM — F41.9 ANXIETY DISORDER, UNSPECIFIED: Status: ACTIVE | Noted: 2024-01-05

## 2024-08-20 PROBLEM — R52 PAIN, UNSPECIFIED: Status: ACTIVE | Noted: 2023-08-17

## 2024-08-20 PROCEDURE — 99305 1ST NF CARE MODERATE MDM 35: CPT | Performed by: INTERNAL MEDICINE

## 2024-08-20 NOTE — PROGRESS NOTES
[facility]    PRIMARY CARE PROVIDER AND CLINIC:  Melissa Fairbanks MD, [address] [facility] [address] / [place] [address]  Chief Complaint   Patient presents with    Hospital /U      [place] Medical Record Number:  [id number]  Place of Service where encounter took place:  [facility] (U)     Luz Babb  is a 84 year old  (1940), admitted to the above facility from  [facility]. Hospital stay 8/11/2024 through 8/15/2024..       Hospital course was reviewed by me, is as per the hospital discharge summary.    Patient is a past medical history of hypertension, GERD, depression, neuropathy, hypercalcemia  .  She was hospitalized for the management of a left femoral neck fracture following a fall.  She underwent a hemiarthroplasty by Dr. Coker on 8/12/2024.  There were no significant postoperative complications.  Patient was discharged on aspirin for 6 weeks for DVT prophylaxis.  She is weightbearing as tolerated left lower extremity.    Medical issues addressed during hospitalization included mild hypokalemia and hypomagnesia, stable after replacement, hyponatremia and patient with a history of hyponatremia secondary to SIADH.  Baseline sodium 127 135.  Sodium was in the upper 120s during hospitalization.  Bilirubin was mildly elevated during hospitalization with stable transaminases.  Abdominal ultrasound revealed normal liver with exception of area of focal fatty deposition.  Troponin was mildly elevated without EKG changes, suggestive of demand ischemia.  CPK was mildly elevated, likely representing traumatic rhabdomyolysis, mild  History of stable pulmonary nodule left lower lobe per chart with follow-up per outpatient primary provider    Patient notes moderate hip pain with therapy.  She is using Tylenol only to manage pain.  She denies cough, chest pain, shortness of breath, nausea, vomiting.  She denies bowel or bladder difficulties      CODE  STATUS/ADVANCE DIRECTIVES DISCUSSION:  Prior  CPR/Full code   ALLERGIES:   Allergies   Allergen Reactions    Amoxicillin Diarrhea    Amoxicillin-Pot Clavulanate     Ciprofloxacin     Gluten Meal      celiac      PAST MEDICAL HISTORY:   Past Medical History:   Diagnosis Date    Atrophic vaginitis     Celiac disease     Cystocele     Disorder of bone and cartilage, unspecified     osteopenia    Endometriosis, site unspecified     Essential hypertension, benign     Gastro-oesophageal reflux disease     Hiatal hernia     3 cm sliding hiatal hernia    Hypercalcemia     Hyperlipidemia     HZV (herpes zoster virus) post herpetic neuralgia     Microscopic colitis     Nonspecific abnormal results of liver function study     Mildly elevated transaminases, liver biopsy WNL    Peripheral neuropathy     Pulmonary nodule     Zinc deficiency       PAST SURGICAL HISTORY:   has a past surgical history that includes TOTAL ABDOM HYSTERECTOMY (1982); REMOVAL OF OVARIAN CYST(S) (1977); DILATION/CURETTAGE DIAG/THER NON OB; NONSPECIFIC PROCEDURE; NONSPECIFIC PROCEDURE; Phacoemulsification clear cornea with standard intraocular lens implant (Left, 8/13/2015); colonoscopy (2011); Phacoemulsification clear cornea with standard intraocular lens implant (Right, 8/25/2015); Closed reduction, percutaneous pinning hip (Right, 8/8/2023); Esophagoscopy, gastroscopy, duodenoscopy (EGD), combined (N/A, 12/3/2023); Endoscopic ultrasound upper gastrointestinal tract (GI) (N/A, 12/3/2023); and Open reduction internal fixation hip bipolar (Left, 8/12/2024).  FAMILY HISTORY: family history includes Cancer in her maternal grandmother and mother; Cerebrovascular Disease in her father.  SOCIAL HISTORY:   reports that she has never smoked. She has been exposed to tobacco smoke. She does not have any smokeless tobacco history on file. She reports current alcohol use. She reports that she does not use drugs.  Patient's living condition: Lives independently in  [place]    Current medications reviewed by me today    Current Outpatient Medications   Medication Sig Dispense Refill    acetaminophen (TYLENOL) 325 MG tablet Take 3 tablets (975 mg) by mouth every 8 hours as needed for mild pain      amLODIPine (NORVASC) 5 MG tablet Take 1 tablet (5 mg) by mouth every evening 90 tablet 3    aspirin 81 MG EC tablet Take 1 tablet (81 mg) by mouth 2 times daily for 42 days      brimonidine (ALPHAGAN) 0.2 % ophthalmic solution Place 1 drop Into the left eye 2 times daily      loperamide (IMODIUM) 2 MG capsule Take 2 mg by mouth daily as needed for diarrhea      methocarbamol (ROBAXIN) 500 MG tablet Take 1 tablet (500 mg) by mouth 3 times daily as needed for muscle spasms or other (pain)      oxyCODONE (ROXICODONE) 5 MG tablet Take 0.5-1 tablets (2.5-5 mg) by mouth every 4 hours as needed for moderate to severe pain (Take 2.5 mg (1/2 tab) for pain 4-6/10, take 5 mg (1 tab) for pain 7-10/10) 20 tablet 0    pantoprazole (PROTONIX) 40 MG EC tablet Take 1 tablet (40 mg) by mouth daily 90 tablet 1    polyethylene glycol (MIRALAX) 17 GM/Dose powder Take 17 g by mouth daily      prednisoLONE acetate (PRED FORTE) 1 % ophthalmic suspension Place 1 drop Into the left eye 4 times daily      senna-docusate (SENOKOT-S/PERICOLACE) 8.6-50 MG tablet Take 1 tablet by mouth 2 times daily as needed for constipation       No current facility-administered medications for this visit.       ROS:  10 point ROS of systems including Constitutional, Eyes, Respiratory, Cardiovascular, Gastroenterology, Genitourinary, Integumentary, Musculoskeletal, Psychiatric were all negative except for pertinent positives noted in my HPI.    Vitals:  /65   Pulse 87   Temp 97.6  F (36.4  C)   Resp 18   Ht 1.676 m (5' 6\")   Wt 50.3 kg (110 lb 12.8 oz)   LMP  (LMP Unknown)   SpO2 97%   BMI 17.88 kg/m    Exam:  Very pleasant, thin appearing female, sitting in a chair, preparing to eat breakfast  She appears " comfortable  She is alert and fully oriented  Lungs clear  CV regular rhythm  Abdomen soft  Extremities without edema.  Left hip incision was not examined by me today.  Neuro: Cranial nerves intact.  No tremors  Gait was not assessed by me today    Lab/Diagnostic data:  Most Recent 3 CBC's:  Recent Labs   Lab Test 08/14/24  0721 08/13/24 0443 08/12/24 0733 08/11/24  1158   WBC  --  9.5 9.4 10.8   HGB 11.9 12.5 12.7 14.3   MCV  --  102* 98 97   PLT  --  215 210 257     Most Recent 3 BMP's:  Recent Labs   Lab Test 08/15/24  0639 08/14/24  1741 08/14/24 0721 08/13/24 0443 08/12/24 2036 08/12/24 0733   NA  --   --  129* 127*  --  128*   POTASSIUM 3.9 3.9 3.2*  3.2* 3.4  3.4   < > 3.3*   CHLORIDE  --   --  94* 91*  --  93*   CO2  --   --  28 28  --  26   BUN  --   --  9.2 10.2  --  9.4   CR  --   --  0.53 0.55  --  0.64   ANIONGAP  --   --  7 8  --  9   KARTHIKEYAN  --   --  8.8 8.8  --  9.2   GLC  --   --  91 97  97  --  118*    < > = values in this interval not displayed.     Most Recent 2 LFT's:  Recent Labs   Lab Test 08/13/24 0443 08/11/24  1158   AST 43 46*   ALT 22 29   ALKPHOS 182* 237*   BILITOTAL 1.4* 2.2*       ASSESSMENT/PLAN:      Left femur fracture following a mechanical fall, now status post left hip hemiarthroplasty  No significant postop complications  Pain control adequate with Tylenol  Plan: Weightbearing as tolerated, therapies, aspirin for DVT prophylaxis  Orthopedics follow-up    Hypertension  Stable on amlodipine  Plan: Monitor vital signs, avoid hypotension    Hyponatremia acute on chronic  Suspected to be related to SIADH in the past  Plan: Monitor BMP.  Currently is not on fluid restriction    Mild elevation of bilirubin, alk phos  Abdominal ultrasound revealing focal area of fatty deposition, no other abnormality  Plan: Outpatient follow-up    Hypomagnesemia and hypokalemia noted on admission to hospital  Status post short course of magnesium and potassium replacement  Plan: Routine lab  monitoring    Cachexia with estimated body mass index is 17.8  Moderate malnutrition based on nutrition assessment during hospitalization  Plan: Monitor nutritional status    GERD  Stable on PPI    Stable left lower lobe pulmonary nodule, noted on chest CT going back to October 2013  Plan: Outpatient follow-up      Nehemiah Aguilera MD

## 2024-08-20 NOTE — LETTER
8/20/2024      Luz Babb  [address]  [place] [address]        [facility]    PRIMARY CARE PROVIDER AND CLINIC:  Melissa Fairbanks MD, [address] / [place] [address]  Chief Complaint   Patient presents with     Hospital F/U      [place] Medical Record Number:  [id number]  Place of Service where encounter took place:  [facility] (U)     Luz Babb  is a 84 year old  (1940), admitted to the above facility from  [facility]. Hospital stay 8/11/2024 through 8/15/2024..       Hospital course was reviewed by me, is as per the hospital discharge summary.    Patient is a past medical history of hypertension, GERD, depression, neuropathy, hypercalcemia  .  She was hospitalized for the management of a left femoral neck fracture following a fall.  She underwent a hemiarthroplasty by Dr. Coker on 8/12/2024.  There were no significant postoperative complications.  Patient was discharged on aspirin for 6 weeks for DVT prophylaxis.  She is weightbearing as tolerated left lower extremity.    Medical issues addressed during hospitalization included mild hypokalemia and hypomagnesia, stable after replacement, hyponatremia and patient with a history of hyponatremia secondary to SIADH.  Baseline sodium 127 135.  Sodium was in the upper 120s during hospitalization.  Bilirubin was mildly elevated during hospitalization with stable transaminases.  Abdominal ultrasound revealed normal liver with exception of area of focal fatty deposition.  Troponin was mildly elevated without EKG changes, suggestive of demand ischemia.  CPK was mildly elevated, likely representing traumatic rhabdomyolysis, mild  History of stable pulmonary nodule left lower lobe per chart with follow-up per outpatient primary provider    Patient notes moderate hip pain with therapy.  She is using Tylenol only to manage pain.  She denies cough, chest pain, shortness of breath,  nausea, vomiting.  She denies bowel or bladder difficulties      CODE STATUS/ADVANCE DIRECTIVES DISCUSSION:  Prior  CPR/Full code   ALLERGIES:   Allergies   Allergen Reactions     Amoxicillin Diarrhea     Amoxicillin-Pot Clavulanate      Ciprofloxacin      Gluten Meal      celiac      PAST MEDICAL HISTORY:   Past Medical History:   Diagnosis Date     Atrophic vaginitis      Celiac disease      Cystocele      Disorder of bone and cartilage, unspecified     osteopenia     Endometriosis, site unspecified      Essential hypertension, benign      Gastro-oesophageal reflux disease      Hiatal hernia     3 cm sliding hiatal hernia     Hypercalcemia      Hyperlipidemia      HZV (herpes zoster virus) post herpetic neuralgia      Microscopic colitis      Nonspecific abnormal results of liver function study     Mildly elevated transaminases, liver biopsy WNL     Peripheral neuropathy      Pulmonary nodule      Zinc deficiency       PAST SURGICAL HISTORY:   has a past surgical history that includes TOTAL ABDOM HYSTERECTOMY (1982); REMOVAL OF OVARIAN CYST(S) (1977); DILATION/CURETTAGE DIAG/THER NON OB; NONSPECIFIC PROCEDURE; NONSPECIFIC PROCEDURE; Phacoemulsification clear cornea with standard intraocular lens implant (Left, 8/13/2015); colonoscopy (2011); Phacoemulsification clear cornea with standard intraocular lens implant (Right, 8/25/2015); Closed reduction, percutaneous pinning hip (Right, 8/8/2023); Esophagoscopy, gastroscopy, duodenoscopy (EGD), combined (N/A, 12/3/2023); Endoscopic ultrasound upper gastrointestinal tract (GI) (N/A, 12/3/2023); and Open reduction internal fixation hip bipolar (Left, 8/12/2024).  FAMILY HISTORY: family history includes Cancer in her maternal grandmother and mother; Cerebrovascular Disease in her father.  SOCIAL HISTORY:   reports that she has never smoked. She has been exposed to tobacco smoke. She does not have any smokeless tobacco history on file. She reports current alcohol use. She  "reports that she does not use drugs.  Patient's living condition: Lives independently in [place]    Current medications reviewed by me today    Current Outpatient Medications   Medication Sig Dispense Refill     acetaminophen (TYLENOL) 325 MG tablet Take 3 tablets (975 mg) by mouth every 8 hours as needed for mild pain       amLODIPine (NORVASC) 5 MG tablet Take 1 tablet (5 mg) by mouth every evening 90 tablet 3     aspirin 81 MG EC tablet Take 1 tablet (81 mg) by mouth 2 times daily for 42 days       brimonidine (ALPHAGAN) 0.2 % ophthalmic solution Place 1 drop Into the left eye 2 times daily       loperamide (IMODIUM) 2 MG capsule Take 2 mg by mouth daily as needed for diarrhea       methocarbamol (ROBAXIN) 500 MG tablet Take 1 tablet (500 mg) by mouth 3 times daily as needed for muscle spasms or other (pain)       oxyCODONE (ROXICODONE) 5 MG tablet Take 0.5-1 tablets (2.5-5 mg) by mouth every 4 hours as needed for moderate to severe pain (Take 2.5 mg (1/2 tab) for pain 4-6/10, take 5 mg (1 tab) for pain 7-10/10) 20 tablet 0     pantoprazole (PROTONIX) 40 MG EC tablet Take 1 tablet (40 mg) by mouth daily 90 tablet 1     polyethylene glycol (MIRALAX) 17 GM/Dose powder Take 17 g by mouth daily       prednisoLONE acetate (PRED FORTE) 1 % ophthalmic suspension Place 1 drop Into the left eye 4 times daily       senna-docusate (SENOKOT-S/PERICOLACE) 8.6-50 MG tablet Take 1 tablet by mouth 2 times daily as needed for constipation       No current facility-administered medications for this visit.       ROS:  10 point ROS of systems including Constitutional, Eyes, Respiratory, Cardiovascular, Gastroenterology, Genitourinary, Integumentary, Musculoskeletal, Psychiatric were all negative except for pertinent positives noted in my HPI.    Vitals:  /65   Pulse 87   Temp 97.6  F (36.4  C)   Resp 18   Ht 1.676 m (5' 6\")   Wt 50.3 kg (110 lb 12.8 oz)   LMP  (LMP Unknown)   SpO2 97%   BMI 17.88 kg/m    Exam:  Very " pleasant, thin appearing female, sitting in a chair, preparing to eat breakfast  She appears comfortable  She is alert and fully oriented  Lungs clear  CV regular rhythm  Abdomen soft  Extremities without edema.  Left hip incision was not examined by me today.  Neuro: Cranial nerves intact.  No tremors  Gait was not assessed by me today    Lab/Diagnostic data:  Most Recent 3 CBC's:  Recent Labs   Lab Test 08/14/24  0721 08/13/24 0443 08/12/24 0733 08/11/24  1158   WBC  --  9.5 9.4 10.8   HGB 11.9 12.5 12.7 14.3   MCV  --  102* 98 97   PLT  --  215 210 257     Most Recent 3 BMP's:  Recent Labs   Lab Test 08/15/24  0639 08/14/24  1741 08/14/24 0721 08/13/24 0443 08/12/24 2036 08/12/24 0733   NA  --   --  129* 127*  --  128*   POTASSIUM 3.9 3.9 3.2*  3.2* 3.4  3.4   < > 3.3*   CHLORIDE  --   --  94* 91*  --  93*   CO2  --   --  28 28  --  26   BUN  --   --  9.2 10.2  --  9.4   CR  --   --  0.53 0.55  --  0.64   ANIONGAP  --   --  7 8  --  9   KARTHIKEYAN  --   --  8.8 8.8  --  9.2   GLC  --   --  91 97  97  --  118*    < > = values in this interval not displayed.     Most Recent 2 LFT's:  Recent Labs   Lab Test 08/13/24 0443 08/11/24  1158   AST 43 46*   ALT 22 29   ALKPHOS 182* 237*   BILITOTAL 1.4* 2.2*       ASSESSMENT/PLAN:      Left femur fracture following a mechanical fall, now status post left hip hemiarthroplasty  No significant postop complications  Pain control adequate with Tylenol  Plan: Weightbearing as tolerated, therapies, aspirin for DVT prophylaxis  Orthopedics follow-up    Hypertension  Stable on amlodipine  Plan: Monitor vital signs, avoid hypotension    Hyponatremia acute on chronic  Suspected to be related to SIADH in the past  Plan: Monitor BMP.  Currently is not on fluid restriction    Mild elevation of bilirubin, alk phos  Abdominal ultrasound revealing focal area of fatty deposition, no other abnormality  Plan: Outpatient follow-up    Hypomagnesemia and hypokalemia noted on admission to  hospital  Status post short course of magnesium and potassium replacement  Plan: Routine lab monitoring    Cachexia with estimated body mass index is 17.8  Moderate malnutrition based on nutrition assessment during hospitalization  Plan: Monitor nutritional status    GERD  Stable on PPI    Stable left lower lobe pulmonary nodule, noted on chest CT going back to October 2013  Plan: Outpatient follow-up      Nehemiah Aguilera MD          Sincerely,        Nehemiah Aguilera MD

## 2024-08-21 LAB
ANION GAP SERPL CALCULATED.3IONS-SCNC: 10 MMOL/L (ref 7–15)
BASOPHILS # BLD AUTO: 0.1 10E3/UL (ref 0–0.2)
BASOPHILS NFR BLD AUTO: 1 %
BUN SERPL-MCNC: 9 MG/DL (ref 8–23)
CALCIUM SERPL-MCNC: 9.3 MG/DL (ref 8.8–10.4)
CHLORIDE SERPL-SCNC: 97 MMOL/L (ref 98–107)
CREAT SERPL-MCNC: 0.52 MG/DL (ref 0.51–0.95)
EGFRCR SERPLBLD CKD-EPI 2021: >90 ML/MIN/1.73M2
EOSINOPHIL # BLD AUTO: 0.4 10E3/UL (ref 0–0.7)
EOSINOPHIL NFR BLD AUTO: 5 %
ERYTHROCYTE [DISTWIDTH] IN BLOOD BY AUTOMATED COUNT: 15.4 % (ref 10–15)
GLUCOSE SERPL-MCNC: 83 MG/DL (ref 70–99)
HCO3 SERPL-SCNC: 27 MMOL/L (ref 22–29)
HCT VFR BLD AUTO: 31.8 % (ref 35–47)
HGB BLD-MCNC: 11.1 G/DL (ref 11.7–15.7)
IMM GRANULOCYTES # BLD: 0 10E3/UL
IMM GRANULOCYTES NFR BLD: 0 %
LYMPHOCYTES # BLD AUTO: 1.7 10E3/UL (ref 0.8–5.3)
LYMPHOCYTES NFR BLD AUTO: 24 %
MCH RBC QN AUTO: 35.4 PG (ref 26.5–33)
MCHC RBC AUTO-ENTMCNC: 34.9 G/DL (ref 31.5–36.5)
MCV RBC AUTO: 101 FL (ref 78–100)
MONOCYTES # BLD AUTO: 0.6 10E3/UL (ref 0–1.3)
MONOCYTES NFR BLD AUTO: 9 %
NEUTROPHILS # BLD AUTO: 4.2 10E3/UL (ref 1.6–8.3)
NEUTROPHILS NFR BLD AUTO: 61 %
NRBC # BLD AUTO: 0 10E3/UL
NRBC BLD AUTO-RTO: 0 /100
PLATELET # BLD AUTO: 473 10E3/UL (ref 150–450)
POTASSIUM SERPL-SCNC: 3.4 MMOL/L (ref 3.4–5.3)
RBC # BLD AUTO: 3.14 10E6/UL (ref 3.8–5.2)
SODIUM SERPL-SCNC: 134 MMOL/L (ref 135–145)
WBC # BLD AUTO: 7 10E3/UL (ref 4–11)

## 2024-08-21 PROCEDURE — 80048 BASIC METABOLIC PNL TOTAL CA: CPT | Mod: ORL | Performed by: INTERNAL MEDICINE

## 2024-08-21 PROCEDURE — 85025 COMPLETE CBC W/AUTO DIFF WBC: CPT | Mod: ORL | Performed by: INTERNAL MEDICINE

## 2024-08-21 PROCEDURE — P9604 ONE-WAY ALLOW PRORATED TRIP: HCPCS | Mod: ORL | Performed by: INTERNAL MEDICINE

## 2024-08-21 PROCEDURE — 36415 COLL VENOUS BLD VENIPUNCTURE: CPT | Mod: ORL | Performed by: INTERNAL MEDICINE

## 2024-08-23 ENCOUNTER — TRANSITIONAL CARE UNIT VISIT (OUTPATIENT)
Dept: GERIATRICS | Facility: CLINIC | Age: 84
End: 2024-08-23
Payer: COMMERCIAL

## 2024-08-23 ENCOUNTER — DOCUMENTATION ONLY (OUTPATIENT)
Dept: OTHER | Facility: CLINIC | Age: 84
End: 2024-08-23
Payer: COMMERCIAL

## 2024-08-23 VITALS
WEIGHT: 110 LBS | TEMPERATURE: 97.6 F | BODY MASS INDEX: 17.68 KG/M2 | DIASTOLIC BLOOD PRESSURE: 79 MMHG | RESPIRATION RATE: 18 BRPM | HEIGHT: 66 IN | HEART RATE: 86 BPM | OXYGEN SATURATION: 95 % | SYSTOLIC BLOOD PRESSURE: 150 MMHG

## 2024-08-23 DIAGNOSIS — E22.2 SIADH (SYNDROME OF INAPPROPRIATE ADH PRODUCTION) (H): ICD-10-CM

## 2024-08-23 DIAGNOSIS — G47.01 INSOMNIA DUE TO MEDICAL CONDITION: ICD-10-CM

## 2024-08-23 DIAGNOSIS — K90.0 CELIAC DISEASE: ICD-10-CM

## 2024-08-23 DIAGNOSIS — R64 CACHEXIA (H): ICD-10-CM

## 2024-08-23 DIAGNOSIS — M25.552 PAIN IN LEFT HIP: ICD-10-CM

## 2024-08-23 DIAGNOSIS — R79.89 ELEVATED TROPONIN: ICD-10-CM

## 2024-08-23 DIAGNOSIS — S72.002D CLOSED FRACTURE OF NECK OF LEFT FEMUR WITH ROUTINE HEALING, SUBSEQUENT ENCOUNTER: Primary | ICD-10-CM

## 2024-08-23 DIAGNOSIS — I10 ESSENTIAL HYPERTENSION, BENIGN: ICD-10-CM

## 2024-08-23 DIAGNOSIS — K21.9 GASTROESOPHAGEAL REFLUX DISEASE WITHOUT ESOPHAGITIS: ICD-10-CM

## 2024-08-23 DIAGNOSIS — T79.6XXS TRAUMATIC RHABDOMYOLYSIS, SEQUELA: ICD-10-CM

## 2024-08-23 PROCEDURE — 99309 SBSQ NF CARE MODERATE MDM 30: CPT | Performed by: NURSE PRACTITIONER

## 2024-08-23 NOTE — LETTER
2024      Luz Babb  [address]  [place] [address]          [facility]    Name:   Luz Babb  :   1940  MRN:    [id number]     Facility:   [facility] ([facility]) [[id number]]   Room: [place] [address]  Code Status: FULL CODE -     DOS:  2024    PCP:  Melissa Fairbanks MD     CHIEF COMPLAINT / REASON FOR VISIT:  Chief Complaint   Patient presents with     Clinic Care Coordination - Follow-up     Closed fracture of left femoral neck, s/p left hip arthroplasty          HPI: Luz is an 84 year old female with a PMHx significant for essential hypertension, SIADH, left lower lobe lung nodule (10/01/2013), GERD, MDD with anxiety, celiac disease, hiatal hernia, and cachexia, who presented to the ED on 2024 following a fall.  X-rays showed a closed left femoral neck fracture, and she underwent a successful hemiarthroplasty under general anesthesia without complication.      She did have hypokalemia (resolved, 3.9 at discharge) and hypomagnesemia (MgO 400 mg twice daily x 2 doses after discharge with recommended F/U magnesium level in 1 week.    She also had hyponatremia attributed to SIADH.  Baseline Na+ ~127-135.  Recommendation for follow-up BMP in 1 week at [facility].    CK was similarly elevated during her earlier hospitalization (traumatic rhabdomyolysis).  Repeat CK was trending down.    She also had nonspecific abnormal LFTs, similar to those following a fall in 2024.  Total bilirubin was slightly elevated, though results are of unclear clinical significance.  An abnormal ultrasound done in 2024 showed sludge in the gallbladder.  APAP was ordered as needed, since benefits outweigh risks.  A repeat abdominal ultrasound showed a normal gallbladder, no biliary ductal dilatation, focal area of fatty deposition.    Finally, a stable pulmonary nodule in the LLL had originally been noted on a chest CT going back to 10/01/2013.      CURRENT/RECENT [facility]  "ISSUES    Disposition  -- Reports feeling \"very tired.\"  Also says she had to wait a long time to get her acetaminophen.  I told her we could schedule 1000 mg 3 times daily, and she was in agreement.    Left femoral neck fracture  Traumatic rhabdomyolysis  -- Does have orders for oxycodone but prefers acetaminophen.  -- Discharged on aspirin for 6 weeks for DVT prophylaxis.  -- She is WBAT.    Elevated troponin  -- Likely due to demand ischemia.  -- Admission EKG showed no obvious ischemic changes.    SIADH  Hyponatremia  -- Most recent BMP on 08/21/2024 shows a Na+ level of 134.    Cachexia  Moderate malnutrition  Celiac disease  -- Estimated BMI 17.81 kg/m .  -- Receiving supplements per dietitian.    Essential hypertension  -- BPs look good.  Continue current medication regimen (amlodipine 5 mg daily).    Left hip pain  Insomnia  -- As noted above, scheduled acetaminophen is being ordered.    GERD  -- No current complaints.  -- Continue pantoprazole.    Insomnia  -- Says she can't sleep at night because of pain.  As noted above, I suggested we schedule her acetaminophen.  She does have orders for oxycodone but does not want to take that.      ROS: 10 point ROS of systems including Constitutional, Eyes, Respiratory, Cardiovascular, Gastroenterology, Genitourinary, Integumentary, Musculoskeletal, and Psychiatric were all negative except for pertinent positives noted in my HPI.      Past Medical History:   Diagnosis Date     Atrophic vaginitis      Celiac disease      Cystocele      Disorder of bone and cartilage, unspecified     osteopenia     Endometriosis, site unspecified      Essential hypertension, benign      Gastro-oesophageal reflux disease      Hiatal hernia     3 cm sliding hiatal hernia     Hypercalcemia      Hyperlipidemia      HZV (herpes zoster virus) post herpetic neuralgia      Microscopic colitis      Nonspecific abnormal results of liver function study     Mildly elevated transaminases, liver " biopsy WNL     Peripheral neuropathy      Pulmonary nodule      Zinc deficiency               Family History   Problem Relation Age of Onset     Cerebrovascular Disease Father          age 42     Cancer Mother         breast cancer,  age 63     Cancer Maternal Grandmother         breast cancer     Social History     Socioeconomic History     Marital status:      Spouse name: None     Number of children: 2     Years of education: None     Highest education level: None   Occupational History     Employer: [employer]   Tobacco Use     Smoking status: Never     Passive exposure: Yes     Tobacco comments:      smokes   Vaping Use     Vaping status: Never Used   Substance and Sexual Activity     Alcohol use: Yes     Comment: 3-4 glasses per week     Drug use: Never     Sexual activity: Yes     Partners: Male     Social Determinants of Health     Financial Resource Strain: Low Risk  (3/13/2024)    Financial Resource Strain      Within the past 12 months, have you or your family members you live with been unable to get utilities (heat, electricity) when it was really needed?: No   Food Insecurity: Low Risk  (3/13/2024)    Food Insecurity      Within the past 12 months, did you worry that your food would run out before you got money to buy more?: No      Within the past 12 months, did the food you bought just not last and you didn t have money to get more?: No   Transportation Needs: Low Risk  (3/13/2024)    Transportation Needs      Within the past 12 months, has lack of transportation kept you from medical appointments, getting your medicines, non-medical meetings or appointments, work, or from getting things that you need?: No   Physical Activity: Inactive (3/13/2024)    Exercise Vital Sign      Days of Exercise per Week: 0 days      Minutes of Exercise per Session: 0 min   Stress: No Stress Concern Present (3/13/2024)    [employer] - Occupational Stress  Questionnaire      Feeling of Stress : Not at all   Social Connections: Unknown (3/13/2024)    Social Connection and Isolation Panel [NHANES]      Frequency of Social Gatherings with Friends and Family: More than three times a week   Interpersonal Safety: Low Risk  (3/13/2024)    Interpersonal Safety      Do you feel physically and emotionally safe where you currently live?: Yes      Within the past 12 months, have you been hit, slapped, kicked or otherwise physically hurt by someone?: No      Within the past 12 months, have you been humiliated or emotionally abused in other ways by your partner or ex-partner?: No   Housing Stability: Low Risk  (3/13/2024)    Housing Stability      Do you have housing? : Yes      Are you worried about losing your housing?: No       MEDICATIONS: Reviewed from the MAR, physician orders, and/or earlier progress notes.  Post Medication Reconciliation Status: Medication reconciliation previously completed during another office visit.      Current Outpatient Medications   Medication Sig Dispense Refill     acetaminophen (TYLENOL) 325 MG tablet Take 3 tablets (975 mg) by mouth every 8 hours as needed for mild pain       amLODIPine (NORVASC) 5 MG tablet Take 1 tablet (5 mg) by mouth every evening 90 tablet 3     aspirin 81 MG EC tablet Take 1 tablet (81 mg) by mouth 2 times daily for 42 days       brimonidine (ALPHAGAN) 0.2 % ophthalmic solution Place 1 drop Into the left eye 2 times daily       loperamide (IMODIUM) 2 MG capsule Take 2 mg by mouth daily as needed for diarrhea       methocarbamol (ROBAXIN) 500 MG tablet Take 1 tablet (500 mg) by mouth 3 times daily as needed for muscle spasms or other (pain)       oxyCODONE (ROXICODONE) 5 MG tablet Take 0.5-1 tablets (2.5-5 mg) by mouth every 4 hours as needed for moderate to severe pain (Take 2.5 mg (1/2 tab) for pain 4-6/10, take 5 mg (1 tab) for pain 7-10/10) 20 tablet 0     pantoprazole (PROTONIX) 40 MG EC tablet Take 1 tablet (40 mg) by  "mouth daily 90 tablet 1     polyethylene glycol (MIRALAX) 17 GM/Dose powder Take 17 g by mouth daily       prednisoLONE acetate (PRED FORTE) 1 % ophthalmic suspension Place 1 drop Into the left eye 4 times daily       senna-docusate (SENOKOT-S/PERICOLACE) 8.6-50 MG tablet Take 1 tablet by mouth 2 times daily as needed for constipation       No current facility-administered medications for this visit.     ALLERGIES:   Allergies   Allergen Reactions     Amoxicillin Diarrhea     Amoxicillin-Pot Clavulanate      Ciprofloxacin      Gluten Meal      celiac       DIET: Regular, regular texture, thin liquids.    Vitals:    08/23/24 1436   BP: (!) 150/79   Pulse: 86   Resp: 18   Temp: 97.6  F (36.4  C)   SpO2: 95%   Weight: 49.9 kg (110 lb)   Height: 1.676 m (5' 6\")     Wt Readings from Last 4 Encounters:   08/23/24 49.9 kg (110 lb)   08/20/24 50.3 kg (110 lb 12.8 oz)   08/11/24 48.5 kg (107 lb)   07/15/24 48.9 kg (107 lb 11.2 oz)     Body mass index is 17.75 kg/m .  Body surface area is 1.52 meters squared.    EXAMINATION:   General: Pleasant, frail-appearing elderly female, sitting in a wheelchair, in no apparent distress.  Head: Normocephalic and atraumatic.   Eyes: PERRLA, sclerae clear.   ENT: Moist oral mucosa.  She has her own teeth, but several are missing.  No nasal discharge.  Hearing is adequate for conversation in a quiet room.  Cardiovascular: Regular rate and rhythm with a split S1 and no appreciable murmur.  No peripheral edema.  Respiratory: Lungs clear to auscultation bilaterally.   Abdomen: Nondistended.   Musculoskeletal/Extremities: Age-related degenerative joint disease.   Integument: No rashes, clinically significant lesions, or skin breakdown.  Surgical dressing intact on rear left buttock.  Cognitive/Psychiatric: Alert and oriented with euthymic affect.    DIAGNOSTICS:   Recent Results (from the past 240 hour(s))   Quantiferon TB Gold Plus Grey Tube    Collection Time: 08/16/24  5:02 AM    Specimen: " Peripheral Blood   Result Value Ref Range    Quantiferon Nil Tube 0.00 IU/mL   Quantiferon TB Gold Plus Green Tube    Collection Time: 08/16/24  5:02 AM    Specimen: Peripheral Blood   Result Value Ref Range    Quantiferon TB1 Tube 0.00 IU/mL   Quantiferon TB Gold Plus Yellow Tube    Collection Time: 08/16/24  5:02 AM    Specimen: Peripheral Blood   Result Value Ref Range    Quantiferon TB2 Tube 0.00    Quantiferon TB Gold Plus Purple Tube    Collection Time: 08/16/24  5:02 AM    Specimen: Peripheral Blood   Result Value Ref Range    Quantiferon Mitogen 0.29 IU/mL   Quantiferon TB Gold Plus    Collection Time: 08/16/24  5:02 AM    Specimen: Peripheral Blood   Result Value Ref Range    Quantiferon-TB Gold Plus Indeterminate (A) Negative    TB1 Ag minus Nil Value 0.00 IU/mL    TB2 Ag minus Nil Value 0.00 IU/mL    Mitogen minus Nil Result 0.29 IU/mL    Nil Result 0.00 IU/mL   Glucose (OUTREACH)    Collection Time: 08/21/24  6:03 AM   Result Value Ref Range    Glucose 83 70 - 99 mg/dL   Basic Metabolic Panel No Glucose (OUTREACH)    Collection Time: 08/21/24  6:03 AM   Result Value Ref Range    Sodium 134 (L) 135 - 145 mmol/L    Potassium 3.4 3.4 - 5.3 mmol/L    Chloride 97 (L) 98 - 107 mmol/L    Carbon Dioxide (CO2) 27 22 - 29 mmol/L    Anion Gap 10 7 - 15 mmol/L    Urea Nitrogen 9.0 8.0 - 23.0 mg/dL    Creatinine 0.52 0.51 - 0.95 mg/dL    GFR Estimate >90 >60 mL/min/1.73m2    Calcium 9.3 8.8 - 10.4 mg/dL   CBC with platelets and differential    Collection Time: 08/21/24  6:03 AM   Result Value Ref Range    WBC Count 7.0 4.0 - 11.0 10e3/uL    RBC Count 3.14 (L) 3.80 - 5.20 10e6/uL    Hemoglobin 11.1 (L) 11.7 - 15.7 g/dL    Hematocrit 31.8 (L) 35.0 - 47.0 %     (H) 78 - 100 fL    MCH 35.4 (H) 26.5 - 33.0 pg    MCHC 34.9 31.5 - 36.5 g/dL    RDW 15.4 (H) 10.0 - 15.0 %    Platelet Count 473 (H) 150 - 450 10e3/uL    % Neutrophils 61 %    % Lymphocytes 24 %    % Monocytes 9 %    % Eosinophils 5 %    % Basophils 1 %     % Immature Granulocytes 0 %    NRBCs per 100 WBC 0 <1 /100    Absolute Neutrophils 4.2 1.6 - 8.3 10e3/uL    Absolute Lymphocytes 1.7 0.8 - 5.3 10e3/uL    Absolute Monocytes 0.6 0.0 - 1.3 10e3/uL    Absolute Eosinophils 0.4 0.0 - 0.7 10e3/uL    Absolute Basophils 0.1 0.0 - 0.2 10e3/uL    Absolute Immature Granulocytes 0.0 <=0.4 10e3/uL    Absolute NRBCs 0.0 10e3/uL       ASSESSMENT/Plan:      ICD-10-CM    1. Closed fracture of neck of left femur with routine healing, subsequent encounter  S72.002D       2. Traumatic rhabdomyolysis, sequela  T79.6XXS       3. SIADH (syndrome of inappropriate ADH production) (H24)  E22.2       4. Elevated troponin  R79.89       5. Gastroesophageal reflux disease without esophagitis  K21.9       6. Essential hypertension, benign  I10       7. Cachexia (H24)  R64       8. Celiac disease  K90.0       9. Pain in left hip  M25.552       10. Insomnia due to medical condition  G47.01           CHANGES:    Discontinue as needed acetaminophen.  Start scheduled acetaminophen 1000 mg every 8 hours (8 AM, 2 PM, 8 PM).    CARE PLAN:    The care plan, medications, vital signs, orders, and nursing notes have been reviewed, and all orders signed. Changes to care plan, if any, as noted. Otherwise, continue current plan of care. Total time for visit was 40 minutes including, but not limited to, non-face-to-face time spent reviewing records, counseling, and coordination of care.    The above has been created using voice recognition software. Please be aware that this may unintentionally  produce inaccuracies and/or nonsensical sentences.      Electronically signed by: CHINMAY Rodriguez CNP      Sincerely,        CHINMAY Rodriguez CNP

## 2024-08-25 PROBLEM — T79.6XXS TRAUMATIC RHABDOMYOLYSIS, SEQUELA: Status: ACTIVE | Noted: 2024-01-02

## 2024-08-25 NOTE — PROGRESS NOTES
"[facility]    Name:   Luz Babb  :   1940  MRN:    [id number]     Facility:   [facility] ([facility]) [[id number]]   Room: -2  Code Status: FULL CODE -     DOS:  2024    PCP:  Melissa Fairbanks MD     CHIEF COMPLAINT / REASON FOR VISIT:  Chief Complaint   Patient presents with    Clinic Care Coordination - Follow-up     Closed fracture of left femoral neck, s/p left hip arthroplasty          HPI: Luz is an 84 year old female with a PMHx significant for essential hypertension, SIADH, left lower lobe lung nodule (10/01/2013), GERD, MDD with anxiety, celiac disease, hiatal hernia, and cachexia, who presented to the ED on 2024 following a fall.  X-rays showed a closed left femoral neck fracture, and she underwent a successful hemiarthroplasty under general anesthesia without complication.      She did have hypokalemia (resolved, 3.9 at discharge) and hypomagnesemia (MgO 400 mg twice daily x 2 doses after discharge with recommended F/U magnesium level in 1 week.    She also had hyponatremia attributed to SIADH.  Baseline Na+ ~127-135.  Recommendation for follow-up BMP in 1 week at [facility].    CK was similarly elevated during her earlier hospitalization (traumatic rhabdomyolysis).  Repeat CK was trending down.    She also had nonspecific abnormal LFTs, similar to those following a fall in 2024.  Total bilirubin was slightly elevated, though results are of unclear clinical significance.  An abnormal ultrasound done in 2024 showed sludge in the gallbladder.  APAP was ordered as needed, since benefits outweigh risks.  A repeat abdominal ultrasound showed a normal gallbladder, no biliary ductal dilatation, focal area of fatty deposition.    Finally, a stable pulmonary nodule in the LLL had originally been noted on a chest CT going back to 10/01/2013.      CURRENT/RECENT TCU ISSUES    Disposition  -- Reports feeling \"very tired.\"  Also says she had to wait " a long time to get her acetaminophen.  I told her we could schedule 1000 mg 3 times daily, and she was in agreement.    Left femoral neck fracture  Traumatic rhabdomyolysis  -- Does have orders for oxycodone but prefers acetaminophen.  -- Discharged on aspirin for 6 weeks for DVT prophylaxis.  -- She is WBAT.    Elevated troponin  -- Likely due to demand ischemia.  -- Admission EKG showed no obvious ischemic changes.    SIADH  Hyponatremia  -- Most recent BMP on 08/21/2024 shows a Na+ level of 134.    Cachexia  Moderate malnutrition  Celiac disease  -- Estimated BMI 17.81 kg/m .  -- Receiving supplements per dietitian.    Essential hypertension  -- BPs look good.  Continue current medication regimen (amlodipine 5 mg daily).    Left hip pain  Insomnia  -- As noted above, scheduled acetaminophen is being ordered.    GERD  -- No current complaints.  -- Continue pantoprazole.    Insomnia  -- Says she can't sleep at night because of pain.  As noted above, I suggested we schedule her acetaminophen.  She does have orders for oxycodone but does not want to take that.      ROS: 10 point ROS of systems including Constitutional, Eyes, Respiratory, Cardiovascular, Gastroenterology, Genitourinary, Integumentary, Musculoskeletal, and Psychiatric were all negative except for pertinent positives noted in my HPI.      Past Medical History:   Diagnosis Date    Atrophic vaginitis     Celiac disease     Cystocele     Disorder of bone and cartilage, unspecified     osteopenia    Endometriosis, site unspecified     Essential hypertension, benign     Gastro-oesophageal reflux disease     Hiatal hernia     3 cm sliding hiatal hernia    Hypercalcemia     Hyperlipidemia     HZV (herpes zoster virus) post herpetic neuralgia     Microscopic colitis     Nonspecific abnormal results of liver function study     Mildly elevated transaminases, liver biopsy WNL    Peripheral neuropathy     Pulmonary nodule     Zinc deficiency               Family  History   Problem Relation Age of Onset    Cerebrovascular Disease Father          age 42    Cancer Mother         breast cancer,  age 63    Cancer Maternal Grandmother         breast cancer     Social History     Socioeconomic History    Marital status:      Spouse name: None    Number of children: 2    Years of education: None    Highest education level: None   Occupational History     Employer: [employer]   Tobacco Use    Smoking status: Never     Passive exposure: Yes    Tobacco comments:      smokes   Vaping Use    Vaping status: Never Used   Substance and Sexual Activity    Alcohol use: Yes     Comment: 3-4 glasses per week    Drug use: Never    Sexual activity: Yes     Partners: Male     Social Determinants of Health     Financial Resource Strain: Low Risk  (3/13/2024)    Financial Resource Strain     Within the past 12 months, have you or your family members you live with been unable to get utilities (heat, electricity) when it was really needed?: No   Food Insecurity: Low Risk  (3/13/2024)    Food Insecurity     Within the past 12 months, did you worry that your food would run out before you got money to buy more?: No     Within the past 12 months, did the food you bought just not last and you didn t have money to get more?: No   Transportation Needs: Low Risk  (3/13/2024)    Transportation Needs     Within the past 12 months, has lack of transportation kept you from medical appointments, getting your medicines, non-medical meetings or appointments, work, or from getting things that you need?: No   Physical Activity: Inactive (3/13/2024)    Exercise Vital Sign     Days of Exercise per Week: 0 days     Minutes of Exercise per Session: 0 min   Stress: No Stress Concern Present (3/13/2024)    [employer] [place] [employer] - Occupational Stress Questionnaire     Feeling of Stress : Not at all   Social Connections: Unknown (3/13/2024)    Social Connection and Isolation  Panel [NHANES]     Frequency of Social Gatherings with Friends and Family: More than three times a week   Interpersonal Safety: Low Risk  (3/13/2024)    Interpersonal Safety     Do you feel physically and emotionally safe where you currently live?: Yes     Within the past 12 months, have you been hit, slapped, kicked or otherwise physically hurt by someone?: No     Within the past 12 months, have you been humiliated or emotionally abused in other ways by your partner or ex-partner?: No   Housing Stability: Low Risk  (3/13/2024)    Housing Stability     Do you have housing? : Yes     Are you worried about losing your housing?: No       MEDICATIONS: Reviewed from the MAR, physician orders, and/or earlier progress notes.  Post Medication Reconciliation Status: Medication reconciliation previously completed during another office visit.      Current Outpatient Medications   Medication Sig Dispense Refill    acetaminophen (TYLENOL) 325 MG tablet Take 3 tablets (975 mg) by mouth every 8 hours as needed for mild pain      amLODIPine (NORVASC) 5 MG tablet Take 1 tablet (5 mg) by mouth every evening 90 tablet 3    aspirin 81 MG EC tablet Take 1 tablet (81 mg) by mouth 2 times daily for 42 days      brimonidine (ALPHAGAN) 0.2 % ophthalmic solution Place 1 drop Into the left eye 2 times daily      loperamide (IMODIUM) 2 MG capsule Take 2 mg by mouth daily as needed for diarrhea      methocarbamol (ROBAXIN) 500 MG tablet Take 1 tablet (500 mg) by mouth 3 times daily as needed for muscle spasms or other (pain)      oxyCODONE (ROXICODONE) 5 MG tablet Take 0.5-1 tablets (2.5-5 mg) by mouth every 4 hours as needed for moderate to severe pain (Take 2.5 mg (1/2 tab) for pain 4-6/10, take 5 mg (1 tab) for pain 7-10/10) 20 tablet 0    pantoprazole (PROTONIX) 40 MG EC tablet Take 1 tablet (40 mg) by mouth daily 90 tablet 1    polyethylene glycol (MIRALAX) 17 GM/Dose powder Take 17 g by mouth daily      prednisoLONE acetate (PRED FORTE) 1  "% ophthalmic suspension Place 1 drop Into the left eye 4 times daily      senna-docusate (SENOKOT-S/PERICOLACE) 8.6-50 MG tablet Take 1 tablet by mouth 2 times daily as needed for constipation       No current facility-administered medications for this visit.     ALLERGIES:   Allergies   Allergen Reactions    Amoxicillin Diarrhea    Amoxicillin-Pot Clavulanate     Ciprofloxacin     Gluten Meal      celiac       DIET: Regular, regular texture, thin liquids.    Vitals:    08/23/24 1436   BP: (!) 150/79   Pulse: 86   Resp: 18   Temp: 97.6  F (36.4  C)   SpO2: 95%   Weight: 49.9 kg (110 lb)   Height: 1.676 m (5' 6\")     Wt Readings from Last 4 Encounters:   08/23/24 49.9 kg (110 lb)   08/20/24 50.3 kg (110 lb 12.8 oz)   08/11/24 48.5 kg (107 lb)   07/15/24 48.9 kg (107 lb 11.2 oz)     Body mass index is 17.75 kg/m .  Body surface area is 1.52 meters squared.    EXAMINATION:   General: Pleasant, frail-appearing elderly female, sitting in a wheelchair, in no apparent distress.  Head: Normocephalic and atraumatic.   Eyes: PERRLA, sclerae clear.   ENT: Moist oral mucosa.  She has her own teeth, but several are missing.  No nasal discharge.  Hearing is adequate for conversation in a quiet room.  Cardiovascular: Regular rate and rhythm with a split S1 and no appreciable murmur.  No peripheral edema.  Respiratory: Lungs clear to auscultation bilaterally.   Abdomen: Nondistended.   Musculoskeletal/Extremities: Age-related degenerative joint disease.   Integument: No rashes, clinically significant lesions, or skin breakdown.  Surgical dressing intact on rear left buttock.  Cognitive/Psychiatric: Alert and oriented with euthymic affect.    DIAGNOSTICS:   Recent Results (from the past 240 hour(s))   Quantiferon TB Gold Plus Grey Tube    Collection Time: 08/16/24  5:02 AM    Specimen: Peripheral Blood   Result Value Ref Range    Quantiferon Nil Tube 0.00 IU/mL   Quantiferon TB Gold Plus Green Tube    Collection Time: 08/16/24  5:02 " AM    Specimen: Peripheral Blood   Result Value Ref Range    Quantiferon TB1 Tube 0.00 IU/mL   Quantiferon TB Gold Plus Yellow Tube    Collection Time: 08/16/24  5:02 AM    Specimen: Peripheral Blood   Result Value Ref Range    Quantiferon TB2 Tube 0.00    Quantiferon TB Gold Plus Purple Tube    Collection Time: 08/16/24  5:02 AM    Specimen: Peripheral Blood   Result Value Ref Range    Quantiferon Mitogen 0.29 IU/mL   Quantiferon TB Gold Plus    Collection Time: 08/16/24  5:02 AM    Specimen: Peripheral Blood   Result Value Ref Range    Quantiferon-TB Gold Plus Indeterminate (A) Negative    TB1 Ag minus Nil Value 0.00 IU/mL    TB2 Ag minus Nil Value 0.00 IU/mL    Mitogen minus Nil Result 0.29 IU/mL    Nil Result 0.00 IU/mL   Glucose (OUTREACH)    Collection Time: 08/21/24  6:03 AM   Result Value Ref Range    Glucose 83 70 - 99 mg/dL   Basic Metabolic Panel No Glucose (OUTREACH)    Collection Time: 08/21/24  6:03 AM   Result Value Ref Range    Sodium 134 (L) 135 - 145 mmol/L    Potassium 3.4 3.4 - 5.3 mmol/L    Chloride 97 (L) 98 - 107 mmol/L    Carbon Dioxide (CO2) 27 22 - 29 mmol/L    Anion Gap 10 7 - 15 mmol/L    Urea Nitrogen 9.0 8.0 - 23.0 mg/dL    Creatinine 0.52 0.51 - 0.95 mg/dL    GFR Estimate >90 >60 mL/min/1.73m2    Calcium 9.3 8.8 - 10.4 mg/dL   CBC with platelets and differential    Collection Time: 08/21/24  6:03 AM   Result Value Ref Range    WBC Count 7.0 4.0 - 11.0 10e3/uL    RBC Count 3.14 (L) 3.80 - 5.20 10e6/uL    Hemoglobin 11.1 (L) 11.7 - 15.7 g/dL    Hematocrit 31.8 (L) 35.0 - 47.0 %     (H) 78 - 100 fL    MCH 35.4 (H) 26.5 - 33.0 pg    MCHC 34.9 31.5 - 36.5 g/dL    RDW 15.4 (H) 10.0 - 15.0 %    Platelet Count 473 (H) 150 - 450 10e3/uL    % Neutrophils 61 %    % Lymphocytes 24 %    % Monocytes 9 %    % Eosinophils 5 %    % Basophils 1 %    % Immature Granulocytes 0 %    NRBCs per 100 WBC 0 <1 /100    Absolute Neutrophils 4.2 1.6 - 8.3 10e3/uL    Absolute Lymphocytes 1.7 0.8 - 5.3  10e3/uL    Absolute Monocytes 0.6 0.0 - 1.3 10e3/uL    Absolute Eosinophils 0.4 0.0 - 0.7 10e3/uL    Absolute Basophils 0.1 0.0 - 0.2 10e3/uL    Absolute Immature Granulocytes 0.0 <=0.4 10e3/uL    Absolute NRBCs 0.0 10e3/uL       ASSESSMENT/Plan:      ICD-10-CM    1. Closed fracture of neck of left femur with routine healing, subsequent encounter  S72.002D       2. Traumatic rhabdomyolysis, sequela  T79.6XXS       3. SIADH (syndrome of inappropriate ADH production) (H24)  E22.2       4. Elevated troponin  R79.89       5. Gastroesophageal reflux disease without esophagitis  K21.9       6. Essential hypertension, benign  I10       7. Cachexia (H24)  R64       8. Celiac disease  K90.0       9. Pain in left hip  M25.552       10. Insomnia due to medical condition  G47.01           CHANGES:    Discontinue as needed acetaminophen.  Start scheduled acetaminophen 1000 mg every 8 hours (8 AM, 2 PM, 8 PM).    CARE PLAN:    The care plan, medications, vital signs, orders, and nursing notes have been reviewed, and all orders signed. Changes to care plan, if any, as noted. Otherwise, continue current plan of care. Total time for visit was 40 minutes including, but not limited to, non-face-to-face time spent reviewing records, counseling, and coordination of care.    The above has been created using voice recognition software. Please be aware that this may unintentionally  produce inaccuracies and/or nonsensical sentences.      Electronically signed by: CHINMAY Rodriguez CNP

## 2024-08-27 ENCOUNTER — TRANSITIONAL CARE UNIT VISIT (OUTPATIENT)
Dept: GERIATRICS | Facility: CLINIC | Age: 84
End: 2024-08-27
Payer: COMMERCIAL

## 2024-08-27 VITALS
WEIGHT: 114 LBS | SYSTOLIC BLOOD PRESSURE: 122 MMHG | HEART RATE: 75 BPM | DIASTOLIC BLOOD PRESSURE: 70 MMHG | BODY MASS INDEX: 18.32 KG/M2 | OXYGEN SATURATION: 95 % | HEIGHT: 66 IN | TEMPERATURE: 97.6 F | RESPIRATION RATE: 18 BRPM

## 2024-08-27 DIAGNOSIS — Z47.89 AFTERCARE FOLLOWING SURGERY OF THE MUSCULOSKELETAL SYSTEM: Primary | ICD-10-CM

## 2024-08-27 DIAGNOSIS — F03.B0 MODERATE DEMENTIA WITHOUT BEHAVIORAL DISTURBANCE, PSYCHOTIC DISTURBANCE, MOOD DISTURBANCE, OR ANXIETY, UNSPECIFIED DEMENTIA TYPE (H): ICD-10-CM

## 2024-08-27 DIAGNOSIS — I10 ESSENTIAL HYPERTENSION, BENIGN: ICD-10-CM

## 2024-08-27 DIAGNOSIS — S72.25XD CLOSED NONDISPLACED SUBTROCHANTERIC FRACTURE OF LEFT FEMUR WITH ROUTINE HEALING, SUBSEQUENT ENCOUNTER: ICD-10-CM

## 2024-08-27 DIAGNOSIS — R53.81 PHYSICAL DECONDITIONING: ICD-10-CM

## 2024-08-27 DIAGNOSIS — E22.2 SIADH (SYNDROME OF INAPPROPRIATE ADH PRODUCTION) (H): ICD-10-CM

## 2024-08-27 PROCEDURE — 99309 SBSQ NF CARE MODERATE MDM 30: CPT | Performed by: NURSE PRACTITIONER

## 2024-08-27 NOTE — LETTER
" 8/27/2024      Luz Babb  [address]  [place] [address]        [facility]    Chief Complaint   Patient presents with     RECHECK     HPI:  Luz Babb is a 84 year old  (1940), who is being seen today for an episodic care visit at: Psychiatric ([facility]) [[id number]]. Today's concern is:     Kailey was visited today while in her room sitting up in the wheelchair.  She shares that her pain is fairly well-controlled.  She has been sleeping well.  No complaints of chest pain or discomfort. No SOB. Appetite has been good.     Allergies, and PMH/PSH reviewed in EPIC today.  REVIEW OF SYSTEMS:  4 point ROS including Respiratory, CV, GI and , other than that noted in the HPI,  is negative    Objective:   /70   Pulse 75   Temp 97.6  F (36.4  C)   Resp 18   Ht 1.676 m (5' 6\")   Wt 51.7 kg (114 lb)   LMP  (LMP Unknown)   SpO2 95%   BMI 18.40 kg/m    GENERAL APPEARANCE:  Alert, in no distress, pleasant, cooperative  EYES: no discharge or mattering on lids or lashes noted  ENT:  moist mucous membranes, hearing acuity intact  NECK: supple, symmetrical  RESP: no respiratory distress, Lung sounds clear, patient is on room air  CV:  rate and rhythm regular, no murmur. Edema none in bilateral lower extremities. VASCULAR: warm extremities without open areas.  ABDOMEN: normal bowel sounds, soft, nontender.  M/S:   Gait and station: currently in recliner, will await therapy eval to determine status, no tenderness or swelling of the joints; able to move all extremities   SKIN:  Inspection and palpation of skin and subcutaneous tissue: skin warm, dry and intact without rashes  NEURO: no facial asymmetry, no speech deficits and able to follow directions, moves all extremities symmetrically  PSYCH:  insight, judgement, and memory impaired affect and mood normal    CBC RESULTS:   Recent Labs   Lab Test 08/21/24  0603 08/14/24  0721 08/13/24  0443   WBC 7.0  --  9.5   RBC 3.14* "  --  3.46*   HGB 11.1* 11.9 12.5   HCT 31.8*  --  35.2   *  --  102*   MCH 35.4*  --  36.1*   MCHC 34.9  --  35.5   RDW 15.4*  --  15.4*   *  --  215       Last Basic Metabolic Panel:  Recent Labs   Lab Test 08/21/24  0603 08/15/24  0639 08/14/24  1741 08/14/24  0721   *  --   --  129*   POTASSIUM 3.4 3.9   < > 3.2*  3.2*   CHLORIDE 97*  --   --  94*   KARTHIKEYAN 9.3  --   --  8.8   CO2 27  --   --  28   BUN 9.0  --   --  9.2   CR 0.52  --   --  0.53   GLC 83  --   --  91    < > = values in this interval not displayed.       Liver Function Studies -   Recent Labs   Lab Test 08/13/24  0443 08/11/24  1158   PROTTOTAL 5.9* 7.3   ALBUMIN 3.4* 4.2   BILITOTAL 1.4* 2.2*   ALKPHOS 182* 237*   AST 43 46*   ALT 22 29       TSH   Date Value Ref Range Status   08/12/2023 2.83 0.30 - 4.20 uIU/mL Final   11/02/2004 1.86 0.4 - 5.0 mU/L Final       Assessment/Plan:  Left femur fracture   following a mechanical fall, now status post left hip hemiarthroplasty with no significant postop complications. Pain control adequate with Tylenol  --Weightbearing as tolerated, therapies, aspirin for DVT prophylaxis  -- Orthopedics follow-up (will see if onsite Ortho can see her here)    Hyponatremia, chronic  Sodium values ranging 133-135.    --follow clinically.      Hypertension  Chronic and stable with blood pressures SBPs 120-130s  -Continues on amlodipine 5 mg daily  - Continue to monitor blood pressure and heart rate, adjust medications as needed.    Pulmonary nodule  CT chest revealed pulmonary nodule in the left lower lobe, previously noted on imaging going back to at least 2012.     Dementia  Slums 15/30 while at [facility] September 2023. And 19/30 here at [facility]. CPT 5.4 back in February 2024.   -- Will likely be moving to [facility] in the future but currently living with family.      Physical deconditioning  Secondary to recent hospitalization and underlying medical conditions.   --ongoing PT/OT for strengthening.      MED REC REQUIRED  Post Medication Reconciliation Status: medication reconcilation previously completed during another office visit      Electronically signed by: CHINMAY Drew CNP         Sincerely,        CHINMAY Drew CNP

## 2024-08-27 NOTE — PROGRESS NOTES
"[facility] GERIATR[facility]    Chief Complaint   Patient presents with    RECHECK     HPI:  Luz Babb is a 84 year old  (1940), who is being seen today for an episodic care visit at: [facility] ([facility]) [[id number]]. Today's concern is:     Kailey was visited today while in her room sitting up in the wheelchair.  She shares that her pain is fairly well-controlled.  She has been sleeping well.  No complaints of chest pain or discomfort. No SOB. Appetite has been good.     Allergies, and PMH/PSH reviewed in [facility] today.  REVIEW OF SYSTEMS:  4 point ROS including Respiratory, CV, GI and , other than that noted in the HPI,  is negative    Objective:   /70   Pulse 75   Temp 97.6  F (36.4  C)   Resp 18   Ht 1.676 m (5' 6\")   Wt 51.7 kg (114 lb)   LMP  (LMP Unknown)   SpO2 95%   BMI 18.40 kg/m    GENERAL APPEARANCE:  Alert, in no distress, pleasant, cooperative  EYES: no discharge or mattering on lids or lashes noted  ENT:  moist mucous membranes, hearing acuity intact  NECK: supple, symmetrical  RESP: no respiratory distress, Lung sounds clear, patient is on room air  CV:  rate and rhythm regular, no murmur. Edema none in bilateral lower extremities. VASCULAR: warm extremities without open areas.  ABDOMEN: normal bowel sounds, soft, nontender.  M/S:   Gait and station: currently in recliner, will await therapy eval to determine status, no tenderness or swelling of the joints; able to move all extremities   SKIN:  Inspection and palpation of skin and subcutaneous tissue: skin warm, dry and intact without rashes  NEURO: no facial asymmetry, no speech deficits and able to follow directions, moves all extremities symmetrically  PSYCH:  insight, judgement, and memory impaired affect and mood normal    CBC RESULTS:   Recent Labs   Lab Test 08/21/24  0603 08/14/24  0721 08/13/24  0443   WBC 7.0  --  9.5   RBC 3.14*  --  3.46*   HGB 11.1* 11.9 12.5   HCT 31.8*  --  35.2   *  --  102* "   MCH 35.4*  --  36.1*   MCHC 34.9  --  35.5   RDW 15.4*  --  15.4*   *  --  215       Last Basic Metabolic Panel:  Recent Labs   Lab Test 08/21/24  0603 08/15/24  0639 08/14/24  1741 08/14/24  0721   *  --   --  129*   POTASSIUM 3.4 3.9   < > 3.2*  3.2*   CHLORIDE 97*  --   --  94*   KARTHIKEYAN 9.3  --   --  8.8   CO2 27  --   --  28   BUN 9.0  --   --  9.2   CR 0.52  --   --  0.53   GLC 83  --   --  91    < > = values in this interval not displayed.       Liver Function Studies -   Recent Labs   Lab Test 08/13/24  0443 08/11/24  1158   PROTTOTAL 5.9* 7.3   ALBUMIN 3.4* 4.2   BILITOTAL 1.4* 2.2*   ALKPHOS 182* 237*   AST 43 46*   ALT 22 29       TSH   Date Value Ref Range Status   08/12/2023 2.83 0.30 - 4.20 uIU/mL Final   11/02/2004 1.86 0.4 - 5.0 mU/L Final       Assessment/Plan:  Left femur fracture   following a mechanical fall, now status post left hip hemiarthroplasty with no significant postop complications. Pain control adequate with Tylenol  --Weightbearing as tolerated, therapies, aspirin for DVT prophylaxis  -- Orthopedics follow-up (will see if onsite Ortho can see her here)    Hyponatremia, chronic  Sodium values ranging 133-135.    --follow clinically.      Hypertension  Chronic and stable with blood pressures SBPs 120-130s  -Continues on amlodipine 5 mg daily  - Continue to monitor blood pressure and heart rate, adjust medications as needed.    Pulmonary nodule  CT chest revealed pulmonary nodule in the left lower lobe, previously noted on imaging going back to at least 2012.     Dementia  Slums 15/30 while at [facility] September 2023. And 19/30 here at [facility]. CPT 5.4 back in February 2024.   -- Will likely be moving to [place] in the future but currently living with family.      Physical deconditioning  Secondary to recent hospitalization and underlying medical conditions.   --ongoing PT/OT for strengthening.     MED REC REQUIRED  Post Medication Reconciliation Status: medication  reconcilation previously completed during another office visit      Electronically signed by: CHINMAY Drew CNP

## 2024-08-30 ENCOUNTER — TRANSITIONAL CARE UNIT VISIT (OUTPATIENT)
Dept: GERIATRICS | Facility: CLINIC | Age: 84
End: 2024-08-30
Payer: COMMERCIAL

## 2024-08-30 VITALS
DIASTOLIC BLOOD PRESSURE: 85 MMHG | HEIGHT: 66 IN | RESPIRATION RATE: 18 BRPM | WEIGHT: 114 LBS | BODY MASS INDEX: 18.32 KG/M2 | SYSTOLIC BLOOD PRESSURE: 138 MMHG | HEART RATE: 94 BPM | OXYGEN SATURATION: 97 % | TEMPERATURE: 97.5 F

## 2024-08-30 DIAGNOSIS — Z47.89 AFTERCARE FOLLOWING SURGERY OF THE MUSCULOSKELETAL SYSTEM: Primary | ICD-10-CM

## 2024-08-30 DIAGNOSIS — F03.B0 MODERATE DEMENTIA WITHOUT BEHAVIORAL DISTURBANCE, PSYCHOTIC DISTURBANCE, MOOD DISTURBANCE, OR ANXIETY, UNSPECIFIED DEMENTIA TYPE (H): ICD-10-CM

## 2024-08-30 DIAGNOSIS — I10 ESSENTIAL HYPERTENSION, BENIGN: ICD-10-CM

## 2024-08-30 DIAGNOSIS — S72.25XD CLOSED NONDISPLACED SUBTROCHANTERIC FRACTURE OF LEFT FEMUR WITH ROUTINE HEALING, SUBSEQUENT ENCOUNTER: ICD-10-CM

## 2024-08-30 DIAGNOSIS — E22.2 SIADH (SYNDROME OF INAPPROPRIATE ADH PRODUCTION) (H): ICD-10-CM

## 2024-08-30 DIAGNOSIS — R53.81 PHYSICAL DECONDITIONING: ICD-10-CM

## 2024-08-30 PROCEDURE — 99309 SBSQ NF CARE MODERATE MDM 30: CPT | Performed by: NURSE PRACTITIONER

## 2024-08-30 NOTE — LETTER
" 8/30/2024      Luz Babb  [address]  [place] [address]        M [facility] GERIATRICS    Chief Complaint   Patient presents with     RECHECK     HPI:  Luz Babb is a 84 year old  (1940), who is being seen today for an episodic care visit at: [facility] ([facility]) [[id number]]. Today's concern is:     Kailey was visited today while in her room sitting up in the wheelchair.  She shares that her hip hurts today. She has been sleeping well.  No complaints of chest pain. No SOB. Appetite has been good. Having regular bowel movements.     Allergies, and PMH/PSH reviewed in EPIC today.  REVIEW OF SYSTEMS:  4 point ROS including Respiratory, CV, GI and , other than that noted in the HPI,  is negative    Objective:   /85   Pulse 94   Temp 97.5  F (36.4  C)   Resp 18   Ht 1.676 m (5' 6\")   Wt 51.7 kg (114 lb)   LMP  (LMP Unknown)   SpO2 97%   BMI 18.40 kg/m    GENERAL APPEARANCE:  Alert, in no distress, pleasant, cooperative  EYES: no discharge or mattering on lids or lashes noted  ENT:  moist mucous membranes, hearing acuity intact  NECK: supple, symmetrical  RESP: no respiratory distress, Lung sounds clear, patient is on room air  CV:  rate and rhythm regular, no murmur. Edema none in bilateral lower extremities. VASCULAR: warm extremities without open areas.  ABDOMEN: normal bowel sounds, soft, nontender.  M/S:   Gait and station: currently in recliner, will await therapy eval to determine status, no tenderness or swelling of the joints; able to move all extremities   SKIN:  Inspection and palpation of skin and subcutaneous tissue: skin warm, dry and intact without rashes  NEURO: no facial asymmetry, no speech deficits and able to follow directions, moves all extremities symmetrically  PSYCH:  insight, judgement, and memory impaired affect and mood normal    CBC RESULTS:   Recent Labs   Lab Test 08/21/24  0603 08/14/24  0721 08/13/24  0443   WBC 7.0  --  9.5   RBC " 3.14*  --  3.46*   HGB 11.1* 11.9 12.5   HCT 31.8*  --  35.2   *  --  102*   MCH 35.4*  --  36.1*   MCHC 34.9  --  35.5   RDW 15.4*  --  15.4*   *  --  215       Last Basic Metabolic Panel:  Recent Labs   Lab Test 08/21/24  0603 08/15/24  0639 08/14/24  1741 08/14/24  0721   *  --   --  129*   POTASSIUM 3.4 3.9   < > 3.2*  3.2*   CHLORIDE 97*  --   --  94*   KARTHIKEYAN 9.3  --   --  8.8   CO2 27  --   --  28   BUN 9.0  --   --  9.2   CR 0.52  --   --  0.53   GLC 83  --   --  91    < > = values in this interval not displayed.       Liver Function Studies -   Recent Labs   Lab Test 08/13/24  0443 08/11/24  1158   PROTTOTAL 5.9* 7.3   ALBUMIN 3.4* 4.2   BILITOTAL 1.4* 2.2*   ALKPHOS 182* 237*   AST 43 46*   ALT 22 29       TSH   Date Value Ref Range Status   08/12/2023 2.83 0.30 - 4.20 uIU/mL Final   11/02/2004 1.86 0.4 - 5.0 mU/L Final       Assessment/Plan:  Left femur fracture   following a mechanical fall, now status post left hip hemiarthroplasty with no significant postop complications. Pain control adequate with Tylenol  --tylenol scheduled TID, oxy PRN  --Weightbearing as tolerated, therapies, aspirin for DVT prophylaxis  -- Orthopedics follow-up here onsite next week.     Hyponatremia, chronic  Sodium values ranging 133-135.    --follow clinically.      Hypertension  Chronic and stable with blood pressures SBPs 120-130s  -Continues on amlodipine 5 mg daily  - Continue to monitor blood pressure and heart rate, adjust medications as needed.    Pulmonary nodule  CT chest revealed pulmonary nodule in the left lower lobe, previously noted on imaging going back to at least 2012.     Dementia  Slums 15/30 while at [facility] September 2023. And 19/30 here at [facility]. CPT 5.4 back in February 2024.   -- Will likely be moving to [place] in the future but currently living with family.      Physical deconditioning  Secondary to recent hospitalization and underlying medical conditions.   --ongoing PT/OT  for strengthening.     Updated daughter today.     MED REC REQUIRED  Post Medication Reconciliation Status: medication reconcilation previously completed during another office visit      Electronically signed by: CHINMAY Drew CNP         Sincerely,        CHINMAY Drew CNP

## 2024-08-30 NOTE — PROGRESS NOTES
"[facility] GERIATRICS    Chief Complaint   Patient presents with    RECHECK     HPI:  Luz Babb is a 84 year old  (1940), who is being seen today for an episodic care visit at: [facility] ([facility]) [[id number]]. Today's concern is:     Kailey was visited today while in her room sitting up in the wheelchair.  She shares that her hip hurts today. She has been sleeping well.  No complaints of chest pain. No SOB. Appetite has been good. Having regular bowel movements.     Allergies, and PMH/PSH reviewed in [facility] today.  REVIEW OF SYSTEMS:  4 point ROS including Respiratory, CV, GI and , other than that noted in the HPI,  is negative    Objective:   /85   Pulse 94   Temp 97.5  F (36.4  C)   Resp 18   Ht 1.676 m (5' 6\")   Wt 51.7 kg (114 lb)   LMP  (LMP Unknown)   SpO2 97%   BMI 18.40 kg/m    GENERAL APPEARANCE:  Alert, in no distress, pleasant, cooperative  EYES: no discharge or mattering on lids or lashes noted  ENT:  moist mucous membranes, hearing acuity intact  NECK: supple, symmetrical  RESP: no respiratory distress, Lung sounds clear, patient is on room air  CV:  rate and rhythm regular, no murmur. Edema none in bilateral lower extremities. VASCULAR: warm extremities without open areas.  ABDOMEN: normal bowel sounds, soft, nontender.  M/S:   Gait and station: currently in recliner, will await therapy eval to determine status, no tenderness or swelling of the joints; able to move all extremities   SKIN:  Inspection and palpation of skin and subcutaneous tissue: skin warm, dry and intact without rashes  NEURO: no facial asymmetry, no speech deficits and able to follow directions, moves all extremities symmetrically  PSYCH:  insight, judgement, and memory impaired affect and mood normal    CBC RESULTS:   Recent Labs   Lab Test 08/21/24  0603 08/14/24  0721 08/13/24  0443   WBC 7.0  --  9.5   RBC 3.14*  --  3.46*   HGB 11.1* 11.9 12.5   HCT 31.8*  --  35.2   *  --  102* "   MCH 35.4*  --  36.1*   MCHC 34.9  --  35.5   RDW 15.4*  --  15.4*   *  --  215       Last Basic Metabolic Panel:  Recent Labs   Lab Test 08/21/24  0603 08/15/24  0639 08/14/24  1741 08/14/24  0721   *  --   --  129*   POTASSIUM 3.4 3.9   < > 3.2*  3.2*   CHLORIDE 97*  --   --  94*   KARTHIKEYAN 9.3  --   --  8.8   CO2 27  --   --  28   BUN 9.0  --   --  9.2   CR 0.52  --   --  0.53   GLC 83  --   --  91    < > = values in this interval not displayed.       Liver Function Studies -   Recent Labs   Lab Test 08/13/24  0443 08/11/24  1158   PROTTOTAL 5.9* 7.3   ALBUMIN 3.4* 4.2   BILITOTAL 1.4* 2.2*   ALKPHOS 182* 237*   AST 43 46*   ALT 22 29       TSH   Date Value Ref Range Status   08/12/2023 2.83 0.30 - 4.20 uIU/mL Final   11/02/2004 1.86 0.4 - 5.0 mU/L Final       Assessment/Plan:  Left femur fracture   following a mechanical fall, now status post left hip hemiarthroplasty with no significant postop complications. Pain control adequate with Tylenol  --tylenol scheduled TID, oxy PRN  --Weightbearing as tolerated, therapies, aspirin for DVT prophylaxis  -- Orthopedics follow-up here onsite next week.     Hyponatremia, chronic  Sodium values ranging 133-135.    --follow clinically.      Hypertension  Chronic and stable with blood pressures SBPs 120-130s  -Continues on amlodipine 5 mg daily  - Continue to monitor blood pressure and heart rate, adjust medications as needed.    Pulmonary nodule  CT chest revealed pulmonary nodule in the left lower lobe, previously noted on imaging going back to at least 2012.     Dementia  Slums 15/30 while at [facility] September 2023. And 19/30 here at [facility]. CPT 5.4 back in February 2024.   -- Will likely be moving to [facility] in the future but currently living with family.      Physical deconditioning  Secondary to recent hospitalization and underlying medical conditions.   --ongoing PT/OT for strengthening.     Updated daughter today.     MED REC REQUIRED  Post  Medication Reconciliation Status: medication reconcilation previously completed during another office visit      Electronically signed by: CHINMAY Drew CNP

## 2024-09-05 ENCOUNTER — TRANSITIONAL CARE UNIT VISIT (OUTPATIENT)
Dept: GERIATRICS | Facility: CLINIC | Age: 84
End: 2024-09-05
Payer: COMMERCIAL

## 2024-09-05 VITALS
RESPIRATION RATE: 18 BRPM | TEMPERATURE: 97.5 F | DIASTOLIC BLOOD PRESSURE: 81 MMHG | SYSTOLIC BLOOD PRESSURE: 144 MMHG | HEART RATE: 69 BPM | OXYGEN SATURATION: 97 %

## 2024-09-05 VITALS
WEIGHT: 114 LBS | RESPIRATION RATE: 18 BRPM | SYSTOLIC BLOOD PRESSURE: 144 MMHG | BODY MASS INDEX: 18.32 KG/M2 | HEIGHT: 66 IN | OXYGEN SATURATION: 97 % | TEMPERATURE: 97.5 F | HEART RATE: 69 BPM | DIASTOLIC BLOOD PRESSURE: 81 MMHG

## 2024-09-05 DIAGNOSIS — Z47.89 AFTERCARE FOLLOWING SURGERY OF THE MUSCULOSKELETAL SYSTEM: Primary | ICD-10-CM

## 2024-09-05 DIAGNOSIS — Z47.89 AFTERCARE FOLLOWING SURGERY OF THE MUSCULOSKELETAL SYSTEM: ICD-10-CM

## 2024-09-05 DIAGNOSIS — F03.B0 MODERATE DEMENTIA WITHOUT BEHAVIORAL DISTURBANCE, PSYCHOTIC DISTURBANCE, MOOD DISTURBANCE, OR ANXIETY, UNSPECIFIED DEMENTIA TYPE (H): ICD-10-CM

## 2024-09-05 DIAGNOSIS — S72.25XD CLOSED NONDISPLACED SUBTROCHANTERIC FRACTURE OF LEFT FEMUR WITH ROUTINE HEALING, SUBSEQUENT ENCOUNTER: ICD-10-CM

## 2024-09-05 DIAGNOSIS — S72.25XD CLOSED NONDISPLACED SUBTROCHANTERIC FRACTURE OF LEFT FEMUR WITH ROUTINE HEALING, SUBSEQUENT ENCOUNTER: Primary | ICD-10-CM

## 2024-09-05 DIAGNOSIS — I10 ESSENTIAL HYPERTENSION, BENIGN: ICD-10-CM

## 2024-09-05 DIAGNOSIS — R53.81 PHYSICAL DECONDITIONING: ICD-10-CM

## 2024-09-05 DIAGNOSIS — E22.2 SIADH (SYNDROME OF INAPPROPRIATE ADH PRODUCTION) (H): ICD-10-CM

## 2024-09-05 PROCEDURE — 99308 SBSQ NF CARE LOW MDM 20: CPT | Performed by: NURSE PRACTITIONER

## 2024-09-05 PROCEDURE — 99309 SBSQ NF CARE MODERATE MDM 30: CPT | Performed by: NURSE PRACTITIONER

## 2024-09-05 NOTE — PROGRESS NOTES
"[facility] GERIATRICS    Chief Complaint   Patient presents with    RECHECK     HPI:  Luz Babb is a 84 year old  (1940), who is being seen today for an episodic care visit at: [facility] ([facility]) [[id number]]. Today's concern is:     Kailey was visited today while in her room sitting up in the wheelchair.  Feels hip pain has been managed with tylenol. She has been sleeping well.  No complaints of chest pain. No SOB. Appetite has been good. Having regular bowel movements.     Allergies, and PMH/PSH reviewed in [facility] today.  REVIEW OF SYSTEMS:  4 point ROS including Respiratory, CV, GI and , other than that noted in the HPI,  is negative    Objective:   BP (!) 144/81   Pulse 69   Temp 97.5  F (36.4  C)   Resp 18   Ht 1.676 m (5' 6\")   Wt 51.7 kg (114 lb)   LMP  (LMP Unknown)   SpO2 97%   BMI 18.40 kg/m    GENERAL APPEARANCE:  Alert, in no distress, pleasant, cooperative  EYES: no discharge or mattering on lids or lashes noted  ENT:  moist mucous membranes, hearing acuity intact  NECK: supple, symmetrical  RESP: no respiratory distress, Lung sounds clear, patient is on room air  CV:  rate and rhythm regular, no murmur. Edema none in bilateral lower extremities. VASCULAR: warm extremities without open areas.  ABDOMEN: normal bowel sounds, soft, nontender.  M/S:   Gait and station: currently in recliner, will await therapy eval to determine status, no tenderness or swelling of the joints; able to move all extremities   SKIN:  Inspection and palpation of skin and subcutaneous tissue: skin warm, dry and intact without rashes  NEURO: no facial asymmetry, no speech deficits and able to follow directions, moves all extremities symmetrically  PSYCH:  insight, judgement, and memory impaired affect and mood normal    CBC RESULTS:   Recent Labs   Lab Test 08/21/24  0603 08/14/24  0721 08/13/24  0443   WBC 7.0  --  9.5   RBC 3.14*  --  3.46*   HGB 11.1* 11.9 12.5   HCT 31.8*  --  35.2   MCV " 101*  --  102*   MCH 35.4*  --  36.1*   MCHC 34.9  --  35.5   RDW 15.4*  --  15.4*   *  --  215       Last Basic Metabolic Panel:  Recent Labs   Lab Test 08/21/24  0603 08/15/24  0639 08/14/24  1741 08/14/24  0721   *  --   --  129*   POTASSIUM 3.4 3.9   < > 3.2*  3.2*   CHLORIDE 97*  --   --  94*   KARTHIKEYAN 9.3  --   --  8.8   CO2 27  --   --  28   BUN 9.0  --   --  9.2   CR 0.52  --   --  0.53   GLC 83  --   --  91    < > = values in this interval not displayed.       Liver Function Studies -   Recent Labs   Lab Test 08/13/24  0443 08/11/24  1158   PROTTOTAL 5.9* 7.3   ALBUMIN 3.4* 4.2   BILITOTAL 1.4* 2.2*   ALKPHOS 182* 237*   AST 43 46*   ALT 22 29       TSH   Date Value Ref Range Status   08/12/2023 2.83 0.30 - 4.20 uIU/mL Final   11/02/2004 1.86 0.4 - 5.0 mU/L Final       Assessment/Plan:  Left femur fracture   following a mechanical fall, now status post left hip hemiarthroplasty with no significant postop complications. Pain control adequate with Tylenol. HGB stable at 11.1  --tylenol scheduled TID, oxy PRN  --Weightbearing as tolerated, therapies, aspirin for DVT prophylaxis  -- Orthopedics follow-up here onsite this week.    Hyponatremia, chronic  Sodium values ranging 133-135.    --follow clinically.      Hypertension  Chronic and stable with blood pressures SBPs 105-140  -Continues on amlodipine 5 mg daily  - Continue to monitor blood pressure and heart rate, adjust medications as needed.    Pulmonary nodule  CT chest revealed pulmonary nodule in the left lower lobe, previously noted on imaging going back to at least 2012.     Dementia  Slums 15/30 while at [facility] September 2023. And 19/30 here at [facility]. CPT 5.4 back in February 2024.   -- Will likely be moving to [place] in the future but currently living with family.      Physical deconditioning  Secondary to recent hospitalization and underlying medical conditions.   --ongoing PT/OT for strengthening.   -- [place]       MED REC REQUIRED  Post Medication Reconciliation Status: medication reconcilation previously completed during another office visit      Electronically signed by: CHINMAY Drew CNP

## 2024-09-05 NOTE — LETTER
" 9/5/2024      Luz Babb  [address]  [place] [address]        M [facility] GERIATRICS    Chief Complaint   Patient presents with     RECHECK     HPI:  Luz Babb is a 84 year old  (1940), who is being seen today for an episodic care visit at: [facility] ([facility]) [[id number]]. Today's concern is:     Kailey was visited today while in her room sitting up in the wheelchair.  Feels hip pain has been managed with tylenol. She has been sleeping well.  No complaints of chest pain. No SOB. Appetite has been good. Having regular bowel movements.     Allergies, and PMH/PSH reviewed in [facility] today.  REVIEW OF SYSTEMS:  4 point ROS including Respiratory, CV, GI and , other than that noted in the HPI,  is negative    Objective:   BP (!) 144/81   Pulse 69   Temp 97.5  F (36.4  C)   Resp 18   Ht 1.676 m (5' 6\")   Wt 51.7 kg (114 lb)   LMP  (LMP Unknown)   SpO2 97%   BMI 18.40 kg/m    GENERAL APPEARANCE:  Alert, in no distress, pleasant, cooperative  EYES: no discharge or mattering on lids or lashes noted  ENT:  moist mucous membranes, hearing acuity intact  NECK: supple, symmetrical  RESP: no respiratory distress, Lung sounds clear, patient is on room air  CV:  rate and rhythm regular, no murmur. Edema none in bilateral lower extremities. VASCULAR: warm extremities without open areas.  ABDOMEN: normal bowel sounds, soft, nontender.  M/S:   Gait and station: currently in recliner, will await therapy eval to determine status, no tenderness or swelling of the joints; able to move all extremities   SKIN:  Inspection and palpation of skin and subcutaneous tissue: skin warm, dry and intact without rashes  NEURO: no facial asymmetry, no speech deficits and able to follow directions, moves all extremities symmetrically  PSYCH:  insight, judgement, and memory impaired affect and mood normal    CBC RESULTS:   Recent Labs   Lab Test 08/21/24  0603 08/14/24  0721 08/13/24  0443   WBC 7.0  --  " 9.5   RBC 3.14*  --  3.46*   HGB 11.1* 11.9 12.5   HCT 31.8*  --  35.2   *  --  102*   MCH 35.4*  --  36.1*   MCHC 34.9  --  35.5   RDW 15.4*  --  15.4*   *  --  215       Last Basic Metabolic Panel:  Recent Labs   Lab Test 08/21/24  0603 08/15/24  0639 08/14/24  1741 08/14/24  0721   *  --   --  129*   POTASSIUM 3.4 3.9   < > 3.2*  3.2*   CHLORIDE 97*  --   --  94*   KARTHIKEYAN 9.3  --   --  8.8   CO2 27  --   --  28   BUN 9.0  --   --  9.2   CR 0.52  --   --  0.53   GLC 83  --   --  91    < > = values in this interval not displayed.       Liver Function Studies -   Recent Labs   Lab Test 08/13/24  0443 08/11/24  1158   PROTTOTAL 5.9* 7.3   ALBUMIN 3.4* 4.2   BILITOTAL 1.4* 2.2*   ALKPHOS 182* 237*   AST 43 46*   ALT 22 29       TSH   Date Value Ref Range Status   08/12/2023 2.83 0.30 - 4.20 uIU/mL Final   11/02/2004 1.86 0.4 - 5.0 mU/L Final       Assessment/Plan:  Left femur fracture   following a mechanical fall, now status post left hip hemiarthroplasty with no significant postop complications. Pain control adequate with Tylenol. HGB stable at 11.1  --tylenol scheduled TID, oxy PRN  --Weightbearing as tolerated, therapies, aspirin for DVT prophylaxis  -- Orthopedics follow-up here onsite this week.    Hyponatremia, chronic  Sodium values ranging 133-135.    --follow clinically.      Hypertension  Chronic and stable with blood pressures SBPs 105-140  -Continues on amlodipine 5 mg daily  - Continue to monitor blood pressure and heart rate, adjust medications as needed.    Pulmonary nodule  CT chest revealed pulmonary nodule in the left lower lobe, previously noted on imaging going back to at least 2012.     Dementia  Slums 15/30 while at [facility] September 2023. And 19/30 here at [facility]. CPT 5.4 back in February 2024.   -- [place] living with family.      Physical deconditioning  Secondary to recent hospitalization and underlying medical  conditions.   --ongoing PT/OT for strengthening.   -- [place]      MED REC REQUIRED  Post Medication Reconciliation Status: medication reconcilation previously completed during another office visit      Electronically signed by: CHINMAY Drew CNP              Sincerely,        CHINMAY Drew CNP

## 2024-09-05 NOTE — LETTER
9/5/2024      Luz Babb  [address]  [place] [address]        [facility] GERIATRICS    Chief Complaint   Patient presents with     RECHECK     Ortho     HPI:  Luz Babb is a 84 year old  (1940), who is being seen today for an episodic care visit at: [facility] ([facility]) [[id number]].     Today's Visit:  Post-op visit  Surgery: L hip hemiarthroplasty for femoral neck fracture  DOS: 8/12  Surgeon: Dr Coker   Current WB status: WBAT w/ assistive device  Anticoagulation: ASA 81mg BID x 42 days  Pain management: acetaminophen 975mg TID, methocarbamol 500mg TID PRN, oxycodone 2.5mg q4h PRN    Anna Marie is seen in her U room, having lunch.  Has some dementia but understands that she fractured her hip.  Reports her recovery is going well- occasional pain with weight bearing but otherwise minimal pain at rest.  Feels current pain regimen is adequate.  Participating in therapies, making progress but no discharge date has been established.  Therapy notes show she is assist x1 for transfers, bed mobility, toileting and dressing.  Sometimes will perform self transfers to wheelchair and to toilet and back.  No falls.  Denies numbness/tingling in LLE.  Denies fever/chills.    Allergies, and PMH/PSH reviewed in [facility] today.  REVIEW OF SYSTEMS:  As noted in HPI    Objective:   BP (!) 144/81   Pulse 69   Temp 97.5  F (36.4  C)   Resp 18   LMP  (LMP Unknown)   SpO2 97%   GENERAL APPEARANCE:  Alert, in no distress, thin, cooperative  M/S:   LLE- intact sensation, able to perform small SLR.  Pedal pulse 2+.  Calf nontender.  Intact dorsiflexion/plantarflexion.  Incision well-approximated with dermabond, no signs infection.  PSYCH:  oriented to person, place, memory impaired     X-rays reviewed from PPX:  3 views L hip performed on 8/31 at [facility]  Reviewed by me personally.  Demonstrates intact hemiarthroplasty of L hip without evidence of hardware migration or failure.  Anatomical alignment.       Assessment/Plan:  (S72.25XD) Closed nondisplaced subtrochanteric fracture of left femur with routine healing, subsequent encounter  (primary encounter diagnosis)  (Z47.89) Aftercare following surgery of the musculoskeletal system  2 weeks postop.  Doing well, progressing in therapies, pain well-managed.  No signs infection.  She has no concerns or questions.  Discussed her x-ray results and plan for recheck with surgeon in 4 weeks.    Plan:   -OK to leave incision open to air and get wet, do not scrub, soak, or submerge until >6 weeks postop  -Continue pain management regimen  -PT/OT  -WBAT  -Follow-up with Dr. Coker at [facility] in 4 weeks for recheck and x-rays; HUC to help schedule for patient      Electronically signed by: CHINMAY Nicole CNP         Sincerely,        CHINMAY Nicole CNP

## 2024-09-07 NOTE — PROGRESS NOTES
[facility] GERIATRICS    Chief Complaint   Patient presents with    RECHECK     Ortho     HPI:  Luz Babb is a 84 year old  (1940), who is being seen today for an episodic care visit at: [facility] ([facility]) [[id number]].     Today's Visit:  Post-op visit  Surgery: L hip hemiarthroplasty for femoral neck fracture  DOS: 8/12  Surgeon: Dr Coker   Current WB status: WBAT w/ assistive device  Anticoagulation: ASA 81mg BID x 42 days  Pain management: acetaminophen 975mg TID, methocarbamol 500mg TID PRN, oxycodone 2.5mg q4h PRN    Anna Marie is seen in her TCU room, having lunch.  Has some dementia but understands that she fractured her hip.  Reports her recovery is going well- occasional pain with weight bearing but otherwise minimal pain at rest.  Feels current pain regimen is adequate.  Participating in therapies, making progress but no discharge date has been established.  Therapy notes show she is assist x1 for transfers, bed mobility, toileting and dressing.  Sometimes will perform self transfers to wheelchair and to toilet and back.  No falls.  Denies numbness/tingling in LLE.  Denies fever/chills.    Allergies, and PMH/PSH reviewed in [facility] today.  REVIEW OF SYSTEMS:  As noted in HPI    Objective:   BP (!) 144/81   Pulse 69   Temp 97.5  F (36.4  C)   Resp 18   LMP  (LMP Unknown)   SpO2 97%   GENERAL APPEARANCE:  Alert, in no distress, thin, cooperative  M/S:   LLE- intact sensation, able to perform small SLR.  Pedal pulse 2+.  Calf nontender.  Intact dorsiflexion/plantarflexion.  Incision well-approximated with dermabond, no signs infection.  PSYCH:  oriented to person, place, memory impaired     X-rays reviewed from PPX:  3 views L hip performed on 8/31 at [facility]  Reviewed by me personally.  Demonstrates intact hemiarthroplasty of L hip without evidence of hardware migration or failure.  Anatomical alignment.      Assessment/Plan:  (S72.25XD) Closed nondisplaced subtrochanteric fracture  of left femur with routine healing, subsequent encounter  (primary encounter diagnosis)  (Z47.89) Aftercare following surgery of the musculoskeletal system  2 weeks postop.  Doing well, progressing in therapies, pain well-managed.  No signs infection.  She has no concerns or questions.  Discussed her x-ray results and plan for recheck with surgeon in 4 weeks.    Plan:   -OK to leave incision open to air and get wet, do not scrub, soak, or submerge until >6 weeks postop  -Continue pain management regimen  -PT/OT  -WBAT  -Follow-up with Dr. Coker at [facility] in 4 weeks for recheck and x-rays; HUC to help schedule for patient      Electronically signed by: CHINMAY Nicole CNP

## 2024-09-12 ENCOUNTER — TRANSITIONAL CARE UNIT VISIT (OUTPATIENT)
Dept: GERIATRICS | Facility: CLINIC | Age: 84
End: 2024-09-12
Payer: COMMERCIAL

## 2024-09-12 ENCOUNTER — PATIENT OUTREACH (OUTPATIENT)
Dept: CARE COORDINATION | Facility: CLINIC | Age: 84
End: 2024-09-12
Payer: COMMERCIAL

## 2024-09-12 VITALS
WEIGHT: 122.3 LBS | TEMPERATURE: 97.5 F | DIASTOLIC BLOOD PRESSURE: 81 MMHG | RESPIRATION RATE: 18 BRPM | OXYGEN SATURATION: 99 % | HEART RATE: 80 BPM | SYSTOLIC BLOOD PRESSURE: 145 MMHG | BODY MASS INDEX: 19.74 KG/M2

## 2024-09-12 DIAGNOSIS — I10 ESSENTIAL HYPERTENSION, BENIGN: ICD-10-CM

## 2024-09-12 DIAGNOSIS — S72.25XD CLOSED NONDISPLACED SUBTROCHANTERIC FRACTURE OF LEFT FEMUR WITH ROUTINE HEALING, SUBSEQUENT ENCOUNTER: ICD-10-CM

## 2024-09-12 DIAGNOSIS — Z47.89 AFTERCARE FOLLOWING SURGERY OF THE MUSCULOSKELETAL SYSTEM: Primary | ICD-10-CM

## 2024-09-12 DIAGNOSIS — R53.81 PHYSICAL DECONDITIONING: ICD-10-CM

## 2024-09-12 DIAGNOSIS — F03.B0 MODERATE DEMENTIA WITHOUT BEHAVIORAL DISTURBANCE, PSYCHOTIC DISTURBANCE, MOOD DISTURBANCE, OR ANXIETY, UNSPECIFIED DEMENTIA TYPE (H): ICD-10-CM

## 2024-09-12 DIAGNOSIS — E22.2 SIADH (SYNDROME OF INAPPROPRIATE ADH PRODUCTION) (H): ICD-10-CM

## 2024-09-12 PROCEDURE — 99309 SBSQ NF CARE MODERATE MDM 30: CPT | Performed by: NURSE PRACTITIONER

## 2024-09-12 NOTE — PROGRESS NOTES
[facility] GERIATRICS    Chief Complaint   Patient presents with    RECHECK     HPI:  Luz Babb is a 84 year old  (1940), who is being seen today for an episodic care visit at: [facility] ([facility]) [[id number]]. Today's concern is:     Kailey was visited today while in her room sitting up in the wheelchair. She was dozing off to sleep, feels tired but sleeping well at night.  Feels hip pain has been managed with tylenol.  No complaints of chest pain. No SOB. Appetite has been good. Having regular bowel movements.     Allergies, and PMH/PSH reviewed in EPIC today.  REVIEW OF SYSTEMS:  4 point ROS including Respiratory, CV, GI and , other than that noted in the HPI,  is negative    Objective:   BP (!) 145/81   Pulse 80   Temp 97.5  F (36.4  C)   Resp 18   Wt 55.5 kg (122 lb 4.8 oz)   LMP  (LMP Unknown)   SpO2 99%   BMI 19.74 kg/m    GENERAL APPEARANCE:  Alert, in no distress, pleasant, cooperative  EYES: no discharge or mattering on lids or lashes noted  ENT:  moist mucous membranes, hearing acuity intact  NECK: supple, symmetrical  RESP: no respiratory distress, Lung sounds clear, patient is on room air  CV:  rate and rhythm regular, no murmur. Edema none in bilateral lower extremities. VASCULAR: warm extremities without open areas.  ABDOMEN: normal bowel sounds, soft, nontender.  M/S:   Gait and station: currently in recliner, will await therapy eval to determine status, no tenderness or swelling of the joints; able to move all extremities   SKIN:  Inspection and palpation of skin and subcutaneous tissue: skin warm, dry and intact without rashes  NEURO: no facial asymmetry, no speech deficits and able to follow directions, moves all extremities symmetrically  PSYCH:  insight, judgement, and memory impaired affect and mood normal    CBC RESULTS:   Recent Labs   Lab Test 08/21/24  0603 08/14/24  0721 08/13/24  0443   WBC 7.0  --  9.5   RBC 3.14*  --  3.46*   HGB 11.1* 11.9 12.5   HCT  31.8*  --  35.2   *  --  102*   MCH 35.4*  --  36.1*   MCHC 34.9  --  35.5   RDW 15.4*  --  15.4*   *  --  215       Last Basic Metabolic Panel:  Recent Labs   Lab Test 08/21/24  0603 08/15/24  0639 08/14/24  1741 08/14/24  0721   *  --   --  129*   POTASSIUM 3.4 3.9   < > 3.2*  3.2*   CHLORIDE 97*  --   --  94*   KARTHIKEYAN 9.3  --   --  8.8   CO2 27  --   --  28   BUN 9.0  --   --  9.2   CR 0.52  --   --  0.53   GLC 83  --   --  91    < > = values in this interval not displayed.       Liver Function Studies -   Recent Labs   Lab Test 08/13/24  0443 08/11/24  1158   PROTTOTAL 5.9* 7.3   ALBUMIN 3.4* 4.2   BILITOTAL 1.4* 2.2*   ALKPHOS 182* 237*   AST 43 46*   ALT 22 29       TSH   Date Value Ref Range Status   08/12/2023 2.83 0.30 - 4.20 uIU/mL Final   11/02/2004 1.86 0.4 - 5.0 mU/L Final       Assessment/Plan:  Left femur fracture   following a mechanical fall, now status post left hip hemiarthroplasty with no significant postop complications. Pain control adequate with Tylenol. HGB stable at 11.1  Plan:   -OK to leave incision open to air and get wet, do not scrub, soak, or submerge until >6 weeks postop  -Continue pain management regimen  -PT/OT  -WBAT  -Follow-up with Dr. Coker at [facility] in 4 weeks for recheck and x-rays; [facility] to help schedule for patient    Hyponatremia, chronic  Sodium values ranging 133-135.    --follow clinically.      Hypertension  Chronic and stable with blood pressures SBPs 105-140 but typically 1302  -Continues on amlodipine 5 mg daily  - Continue to monitor blood pressure and heart rate, adjust medications as needed.    Pulmonary nodule  CT chest revealed pulmonary nodule in the left lower lobe, previously noted on imaging going back to at least 2012.     Dementia  Slums 15/30 while at [facility] September 2023. And 19/30 here at [facility]. CPT 5.4 back in February 2024.   -- Will likely be moving to [place] from the TCU.      Physical deconditioning  Secondary to recent  hospitalization and underlying medical conditions.   --ongoing PT/OT for strengthening.   -- will likely be going to [place]      MED REC REQUIRED  Post Medication Reconciliation Status: medication reconcilation previously completed during another office visit      Electronically signed by: CHINMAY Drew CNP

## 2024-09-12 NOTE — PROGRESS NOTES
Clinic Care Coordination Contact    Situation: Patient chart reviewed by care coordinator.    Background: Patient was hospitalized at [facility] from 8/11/24 to 8/15/24 with diagnosis of Closed fracture of neck of left femur s/p Left Hip Arthroplasty, 8/12/24.     Admitted to [facility] TCU on 8/15/2024    Assessment: Patient currently still admitted to TCU.     Plan/Recommendations: Care Coordination will continue to monitor chart monthly and as needed.     Krystin Clark RN Clinic Care Coordinator  [facility] Clinics: [place], [place] (on-site Wednesdays), [place] (on-site Thursdays) & [facility].  [email or web address]  Phone: [phone]

## 2024-09-12 NOTE — LETTER
9/12/2024      Luz Babb  [address]  [place] [address]        [facility] GERIATRICS    Chief Complaint   Patient presents with     RECHECK     HPI:  Luz Babb is a 84 year old  (1940), who is being seen today for an episodic care visit at: [facility] ([facility]) [[id number]]. Today's concern is:     Kailey was visited today while in her room sitting up in the wheelchair. She was dozing off to sleep, feels tired but sleeping well at night.  Feels hip pain has been managed with tylenol.  No complaints of chest pain. No SOB. Appetite has been good. Having regular bowel movements.     Allergies, and PMH/PSH reviewed in EPIC today.  REVIEW OF SYSTEMS:  4 point ROS including Respiratory, CV, GI and , other than that noted in the HPI,  is negative    Objective:   BP (!) 145/81   Pulse 80   Temp 97.5  F (36.4  C)   Resp 18   Wt 55.5 kg (122 lb 4.8 oz)   LMP  (LMP Unknown)   SpO2 99%   BMI 19.74 kg/m    GENERAL APPEARANCE:  Alert, in no distress, pleasant, cooperative  EYES: no discharge or mattering on lids or lashes noted  ENT:  moist mucous membranes, hearing acuity intact  NECK: supple, symmetrical  RESP: no respiratory distress, Lung sounds clear, patient is on room air  CV:  rate and rhythm regular, no murmur. Edema none in bilateral lower extremities. VASCULAR: warm extremities without open areas.  ABDOMEN: normal bowel sounds, soft, nontender.  M/S:   Gait and station: currently in recliner, will await therapy eval to determine status, no tenderness or swelling of the joints; able to move all extremities   SKIN:  Inspection and palpation of skin and subcutaneous tissue: skin warm, dry and intact without rashes  NEURO: no facial asymmetry, no speech deficits and able to follow directions, moves all extremities symmetrically  PSYCH:  insight, judgement, and memory impaired affect and mood normal    CBC RESULTS:   Recent Labs   Lab Test 08/21/24  0603 08/14/24  0721  08/13/24  0443   WBC 7.0  --  9.5   RBC 3.14*  --  3.46*   HGB 11.1* 11.9 12.5   HCT 31.8*  --  35.2   *  --  102*   MCH 35.4*  --  36.1*   MCHC 34.9  --  35.5   RDW 15.4*  --  15.4*   *  --  215       Last Basic Metabolic Panel:  Recent Labs   Lab Test 08/21/24  0603 08/15/24  0639 08/14/24  1741 08/14/24  0721   *  --   --  129*   POTASSIUM 3.4 3.9   < > 3.2*  3.2*   CHLORIDE 97*  --   --  94*   KARTHIKEYAN 9.3  --   --  8.8   CO2 27  --   --  28   BUN 9.0  --   --  9.2   CR 0.52  --   --  0.53   GLC 83  --   --  91    < > = values in this interval not displayed.       Liver Function Studies -   Recent Labs   Lab Test 08/13/24  0443 08/11/24  1158   PROTTOTAL 5.9* 7.3   ALBUMIN 3.4* 4.2   BILITOTAL 1.4* 2.2*   ALKPHOS 182* 237*   AST 43 46*   ALT 22 29       TSH   Date Value Ref Range Status   08/12/2023 2.83 0.30 - 4.20 uIU/mL Final   11/02/2004 1.86 0.4 - 5.0 mU/L Final       Assessment/Plan:  Left femur fracture   following a mechanical fall, now status post left hip hemiarthroplasty with no significant postop complications. Pain control adequate with Tylenol. HGB stable at 11.1  Plan:   -OK to leave incision open to air and get wet, do not scrub, soak, or submerge until >6 weeks postop  -Continue pain management regimen  -PT/OT  -WBAT  -Follow-up with Dr. Coker at [facility] in 4 weeks for recheck and x-rays; [facility] to help schedule for patient    Hyponatremia, chronic  Sodium values ranging 133-135.    --follow clinically.      Hypertension  Chronic and stable with blood pressures SBPs 105-140 but typically 1302  -Continues on amlodipine 5 mg daily  - Continue to monitor blood pressure and heart rate, adjust medications as needed.    Pulmonary nodule  CT chest revealed pulmonary nodule in the left lower lobe, previously noted on imaging going back to at least 2012.     Dementia  Slums 15/30 while at [facility] September 2023. And 19/30 here at [facility]. CPT 5.4 back in February 2024.   -- Will  likely be moving to [facility] from the TCU.      Physical deconditioning  Secondary to recent hospitalization and underlying medical conditions.   --ongoing PT/OT for strengthening.   -- will likely be going to [place]      MED REC REQUIRED  Post Medication Reconciliation Status: medication reconcilation previously completed during another office visit      Electronically signed by: CHINMAY Drew CNP              Sincerely,        CHINMAY Drew CNP

## 2024-09-19 ENCOUNTER — MYC MEDICAL ADVICE (OUTPATIENT)
Dept: FAMILY MEDICINE | Facility: CLINIC | Age: 84
End: 2024-09-19

## 2024-09-19 ENCOUNTER — MEDICAL CORRESPONDENCE (OUTPATIENT)
Dept: HEALTH INFORMATION MANAGEMENT | Facility: CLINIC | Age: 84
End: 2024-09-19

## 2024-09-19 ENCOUNTER — VIRTUAL VISIT (OUTPATIENT)
Dept: FAMILY MEDICINE | Facility: CLINIC | Age: 84
End: 2024-09-19
Payer: COMMERCIAL

## 2024-09-19 ENCOUNTER — TELEPHONE (OUTPATIENT)
Dept: FAMILY MEDICINE | Facility: CLINIC | Age: 84
End: 2024-09-19

## 2024-09-19 DIAGNOSIS — I10 ESSENTIAL HYPERTENSION: ICD-10-CM

## 2024-09-19 DIAGNOSIS — R29.6 FALLS FREQUENTLY: ICD-10-CM

## 2024-09-19 DIAGNOSIS — E78.5 HYPERLIPIDEMIA, UNSPECIFIED HYPERLIPIDEMIA TYPE: ICD-10-CM

## 2024-09-19 DIAGNOSIS — Z71.89 ADVANCED CARE PLANNING/COUNSELING DISCUSSION: Primary | ICD-10-CM

## 2024-09-19 DIAGNOSIS — S72.002D CLOSED FRACTURE OF NECK OF LEFT FEMUR WITH ROUTINE HEALING, SUBSEQUENT ENCOUNTER: ICD-10-CM

## 2024-09-19 DIAGNOSIS — M80.00XD AGE-RELATED OSTEOPOROSIS WITH CURRENT PATHOLOGICAL FRACTURE WITH ROUTINE HEALING, SUBSEQUENT ENCOUNTER: ICD-10-CM

## 2024-09-19 PROCEDURE — 99443 PR PHYSICIAN TELEPHONE EVALUATION 21-30 MIN: CPT | Mod: 93 | Performed by: INTERNAL MEDICINE

## 2024-09-19 ASSESSMENT — PATIENT HEALTH QUESTIONNAIRE - PHQ9
10. IF YOU CHECKED OFF ANY PROBLEMS, HOW DIFFICULT HAVE THESE PROBLEMS MADE IT FOR YOU TO DO YOUR WORK, TAKE CARE OF THINGS AT HOME, OR GET ALONG WITH OTHER PEOPLE: NOT DIFFICULT AT ALL
SUM OF ALL RESPONSES TO PHQ QUESTIONS 1-9: 0
SUM OF ALL RESPONSES TO PHQ QUESTIONS 1-9: 0

## 2024-09-19 NOTE — TELEPHONE ENCOUNTER
Forms/Letter Request    Type of form/letter: Nursing Home/Assisted Living Orders      Do we have the form/letter: Yes:     Who is the form from? Home care    Where did/will the form come from? Patient or family brought in       When is form/letter needed by: 5-7 Business days    How would you like the form/letter returned: /faxed    Patient Notified form requests are processed in 5-7 business days:Yes    Could we send this information to you in [facility] or would you prefer to receive a phone call?:   Patient would prefer a phone call   Okay to leave a detailed message?: Yes at Other phone number:  [phone]

## 2024-09-19 NOTE — TELEPHONE ENCOUNTER
I Called Liv and informed her that completed forms are ready to  at the , Liv is aware that POLST needs to be signed by Pt.  Liv will have Pt sign and bring back to us so we can copy onto Gold paper for Pt and scan into Pt chart. Per Liv we did not need to fax to [facility].  She will drop it off.  [facility] and placed in Blue pod accordion file

## 2024-09-19 NOTE — PROGRESS NOTES
Luz is a 84 year old who is being evaluated via a billable telephone visit.    What phone number would you like to be contacted at? [phone]   How would you like to obtain your AVS? [place]  Originating Location (pt. Location): [facility]    Distant Location (provider location):  On-site      Subjective   Luz is a 84 year old, presenting with her daughter Liv for the following health issues:  Forms    History of Present Illness       Reason for visit:  FORMS She is missing 3 dose(s) of medications per week.  She is not taking prescribed medications regularly due to remembering to take.     Current Medications:     Current Outpatient Medications   Medication Sig Dispense Refill    acetaminophen (TYLENOL) 325 MG tablet Take 3 tablets (975 mg) by mouth every 8 hours as needed for mild pain      amLODIPine (NORVASC) 5 MG tablet Take 1 tablet (5 mg) by mouth every evening 90 tablet 3    aspirin 81 MG EC tablet Take 1 tablet (81 mg) by mouth 2 times daily for 42 days      brimonidine (ALPHAGAN) 0.2 % ophthalmic solution Place 1 drop Into the left eye 2 times daily      loperamide (IMODIUM) 2 MG capsule Take 2 mg by mouth daily as needed for diarrhea      methocarbamol (ROBAXIN) 500 MG tablet Take 1 tablet (500 mg) by mouth 3 times daily as needed for muscle spasms or other (pain)      oxyCODONE (ROXICODONE) 5 MG tablet Take 0.5-1 tablets (2.5-5 mg) by mouth every 4 hours as needed for moderate to severe pain (Take 2.5 mg (1/2 tab) for pain 4-6/10, take 5 mg (1 tab) for pain 7-10/10) 20 tablet 0    pantoprazole (PROTONIX) 40 MG EC tablet Take 1 tablet (40 mg) by mouth daily 90 tablet 1    polyethylene glycol (MIRALAX) 17 GM/Dose powder Take 17 g by mouth daily      prednisoLONE acetate (PRED FORTE) 1 % ophthalmic suspension Place 1 drop Into the left eye 4 times daily      senna-docusate (SENOKOT-S/PERICOLACE) 8.6-50 MG tablet Take 1 tablet by mouth 2 times daily as needed for constipation           Allergies:       Allergies   Allergen Reactions    Gluten Meal      celiac            Past Medical History:     Past Medical History:   Diagnosis Date    Atrophic vaginitis     Celiac disease     Cystocele     Disorder of bone and cartilage, unspecified     osteopenia    Endometriosis, site unspecified     Essential hypertension, benign     Gastro-oesophageal reflux disease     Hiatal hernia     3 cm sliding hiatal hernia    Hypercalcemia     Hyperlipidemia     HZV (herpes zoster virus) post herpetic neuralgia     Microscopic colitis     Nonspecific abnormal results of liver function study     Mildly elevated transaminases, liver biopsy WNL    Peripheral neuropathy     Pulmonary nodule     Zinc deficiency          Past Surgical History:     Past Surgical History:   Procedure Laterality Date    CLOSED REDUCTION, PERCUTANEOUS PINNING HIP Right 8/8/2023    Procedure: CLOSED REDUCTION, HIP, WITH PERCUTANEOUS PINNING;  Surgeon: Bo Duke MD;  Location:  OR    COLONOSCOPY  2011    ENDOSCOPIC ULTRASOUND UPPER GASTROINTESTINAL TRACT (GI) N/A 12/3/2023    Procedure: Endoscopic ultrasound upper gastrointestinal tract (GI);  Surgeon: Jean Ashley MD;  Location:  GI    ESOPHAGOSCOPY, GASTROSCOPY, DUODENOSCOPY (EGD), COMBINED N/A 12/3/2023    Procedure: Esophagoscopy, gastroscopy, duodenoscopy (EGD), combined;  Surgeon: Jean Ashley MD;  Location:  GI    HC DILATION/CURETTAGE DIAG/THER NON OB      miscarriages , 6 x    HC REMOVAL OF OVARIAN CYST(S)  1977    partial oopherectomy    OPEN REDUCTION INTERNAL FIXATION HIP BIPOLAR Left 8/12/2024    Procedure: Left hip hemiarthroplasty for left femoral neck fracture;  Surgeon: Michael Coker MD;  Location:  OR    PHACOEMULSIFICATION CLEAR CORNEA WITH STANDARD INTRAOCULAR LENS IMPLANT Left 8/13/2015    Procedure: PHACOEMULSIFICATION CLEAR CORNEA WITH STANDARD INTRAOCULAR LENS IMPLANT;  Surgeon: Tyler Isaacs MD;  Location:  EC     PHACOEMULSIFICATION CLEAR CORNEA WITH STANDARD INTRAOCULAR LENS IMPLANT Right 2015    Procedure: PHACOEMULSIFICATION CLEAR CORNEA WITH STANDARD INTRAOCULAR LENS IMPLANT;  Surgeon: Tyler Isaacs MD;  Location: [facility]    ZZC NONSPECIFIC PROCEDURE       rectocoele and enterocoele repair    ZZC NONSPECIFIC PROCEDURE      cystocoele,repair    ZZC TOTAL ABDOM HYSTERECTOMY  1982    endometriosis with adhesions         Family Medical History:     Family History   Problem Relation Age of Onset    Cerebrovascular Disease Father          age 42    Cancer Mother         breast cancer,  age 63    Cancer Maternal Grandmother         breast cancer         Social History:     Social History     Socioeconomic History    Marital status:      Spouse name: Not on file    Number of children: 2    Years of education: Not on file    Highest education level: Not on file   Occupational History     Employer: [employer]   Tobacco Use    Smoking status: Never     Passive exposure: Yes    Smokeless tobacco: Not on file    Tobacco comments:      smokes   Vaping Use    Vaping status: Never Used   Substance and Sexual Activity    Alcohol use: Yes     Comment: 3-4 glasses per week    Drug use: Never    Sexual activity: Yes     Partners: Male   Other Topics Concern    Not on file   Social History Narrative    Not on file     Social Determinants of Health     Financial Resource Strain: Low Risk  (3/13/2024)    Financial Resource Strain     Within the past 12 months, have you or your family members you live with been unable to get utilities (heat, electricity) when it was really needed?: No   Food Insecurity: Low Risk  (3/13/2024)    Food Insecurity     Within the past 12 months, did you worry that your food would run out before you got money to buy more?: No     Within the past 12 months, did the food you bought just not last and you didn t have money to get more?: No   Transportation Needs: Low Risk   (3/13/2024)    Transportation Needs     Within the past 12 months, has lack of transportation kept you from medical appointments, getting your medicines, non-medical meetings or appointments, work, or from getting things that you need?: No   Physical Activity: Inactive (3/13/2024)    Exercise Vital Sign     Days of Exercise per Week: 0 days     Minutes of Exercise per Session: 0 min   Stress: No Stress Concern Present (3/13/2024)    [place] [employer] - Occupational Stress Questionnaire     Feeling of Stress : Not at all   Social Connections: Unknown (3/13/2024)    Social Connection and Isolation Panel [NHANES]     Frequency of Communication with Friends and Family: Not on file     Frequency of Social Gatherings with Friends and Family: More than three times a week     Attends Denominational Services: Not on file     Active Member of Clubs or Organizations: Not on file     Attends Club or Organization Meetings: Not on file     Marital Status: Not on file   Interpersonal Safety: Low Risk  (9/19/2024)    Interpersonal Safety     Do you feel physically and emotionally safe where you currently live?: Yes     Within the past 12 months, have you been hit, slapped, kicked or otherwise physically hurt by someone?: No     Within the past 12 months, have you been humiliated or emotionally abused in other ways by your partner or ex-partner?: No   Housing Stability: Low Risk  (3/13/2024)    Housing Stability     Do you have housing? : Yes     Are you worried about losing your housing?: No           Review of System:     Constitutional: Negative for fever or chills  Skin: Negative for rashes  Ears/Nose/Throat: Negative for nasal congestion, sore throat  Respiratory: No shortness of breath, dyspnea on exertion, cough, or hemoptysis  Cardiovascular: Negative for chest pain  Gastrointestinal: Negative for nausea, vomiting  Genitourinary: Negative for dysuria, hematuria  Musculoskeletal: positive for mechanical left  femur fracture due to fall  Neurologic: Negative for headaches  Psychiatric: positive for depression, anxiety  Hematologic/Lymphatic/Immunologic: Negative  Endocrine: Negative  Behavioral: Negative for tobacco use       Physical Exam:   LMP  (LMP Unknown)   Breastfeeding No     RESP: no cough present   NEURO: Alert & Oriented x 3.   PSYCH: mentation appears normal, affect normal        Diagnostic Test Results:     Diagnostic Test Results:  Labs reviewed in Epic    ASSESSMENT/PLAN:       Luz was seen today for forms.    Diagnoses and all orders for this visit:    Advanced care planning/counseling discussion  - patient wishes to be DNR/DNI  - POLST form signed     Frequent falls  - patient is moving into assisted living facility  - admissions paperwork completed     Age-related osteoporosis with current pathological fracture with routine healing, subsequent encounter  Closed fracture of neck of left femur with routine healing, subsequent encounter  - stable, continue current therapy    Essential hypertension  - stable, continue current therapy    Hyperlipidemia, unspecified hyperlipidemia type  - stable, continue current therapy        Follow Up Plan:     Patient is instructed to return to Internal Medicine clinic for follow-up visit in 1 month.        Melissa Fairbanks MD  Internal Medicine  [facility]      telephone visit duration including chart review medication management: 25 minutes  Signed Electronically by: Melissa Fairbanks MD

## 2024-09-20 ENCOUNTER — DISCHARGE SUMMARY NURSING HOME (OUTPATIENT)
Dept: GERIATRICS | Facility: CLINIC | Age: 84
End: 2024-09-20
Payer: COMMERCIAL

## 2024-09-20 VITALS
WEIGHT: 122 LBS | BODY MASS INDEX: 19.69 KG/M2 | RESPIRATION RATE: 18 BRPM | DIASTOLIC BLOOD PRESSURE: 79 MMHG | HEART RATE: 85 BPM | OXYGEN SATURATION: 90 % | TEMPERATURE: 97.5 F | SYSTOLIC BLOOD PRESSURE: 125 MMHG

## 2024-09-20 DIAGNOSIS — E22.2 SIADH (SYNDROME OF INAPPROPRIATE ADH PRODUCTION) (H): ICD-10-CM

## 2024-09-20 DIAGNOSIS — I10 ESSENTIAL HYPERTENSION, BENIGN: ICD-10-CM

## 2024-09-20 DIAGNOSIS — F03.B0 MODERATE DEMENTIA WITHOUT BEHAVIORAL DISTURBANCE, PSYCHOTIC DISTURBANCE, MOOD DISTURBANCE, OR ANXIETY, UNSPECIFIED DEMENTIA TYPE (H): ICD-10-CM

## 2024-09-20 DIAGNOSIS — R53.81 PHYSICAL DECONDITIONING: ICD-10-CM

## 2024-09-20 DIAGNOSIS — S72.25XD CLOSED NONDISPLACED SUBTROCHANTERIC FRACTURE OF LEFT FEMUR WITH ROUTINE HEALING, SUBSEQUENT ENCOUNTER: ICD-10-CM

## 2024-09-20 DIAGNOSIS — Z47.89 AFTERCARE FOLLOWING SURGERY OF THE MUSCULOSKELETAL SYSTEM: Primary | ICD-10-CM

## 2024-09-20 PROCEDURE — 99316 NF DSCHRG MGMT 30 MIN+: CPT | Performed by: NURSE PRACTITIONER

## 2024-09-20 NOTE — PROGRESS NOTES
[facility] GERIATRICS DISCHARGE SUMMARY  PATIENT'S NAME: Luz Babb  YOB: 1940  MEDICAL RECORD NUMBER:  [id number]  Place of Service where encounter took place:  [facility] ([facility]) [[id number]]    PRIMARY CARE PROVIDER AND CLINIC RESPONSIBLE AFTER TRANSFER:   Melissa Fairbanks MD, [address] / [place] [address]    Non-FMG Provider     Transferring providers: CHINMAY Drew CNP, Nehemiah Aguilera MD  Recent Hospitalization/ED:  [facility] Hospital stay 8/11/2024 to 8/15/2024.  Date of SNF Admission: August 15, 2024  Date of SNF (anticipated) Discharge:  9/26/24  Discharged to: new assisted living for patient   Cognitive Scores: SLUMS: 20/30  Physical Function:    Pt amb with 2ww x 125', x 200' with SBA with vc's to increase step length and CHARLES with slight adjustment   NuSTep @ L4 with L/UE's to increase strength and activity tolerance x 15', pt ave 42 spm  LB Dressing = Supervised (A)  Toileting Toileting = Supervised (A); Toilet / Commode Transfers = Supervised (A); Personal Hygiene = MI  DME: Wheelchair    CODE STATUS/ADVANCE DIRECTIVES DISCUSSION:  Prior   ALLERGIES: [facility] COURSE   Patient is a past medical history of hypertension, GERD, depression, neuropathy, hypercalcemia  .  She was hospitalized for the management of a left femoral neck fracture following a fall.  She underwent a hemiarthroplasty by Dr. Coker on 8/12/2024.  There were no significant postoperative complications.  Patient was discharged on aspirin for 6 weeks for DVT prophylaxis.  She is weightbearing as tolerated left lower extremity.     Medical issues addressed during hospitalization included mild hypokalemia and hypomagnesia, stable after replacement, hyponatremia and patient with a history of hyponatremia secondary to SIADH.  Baseline sodium 127 135.  Sodium was in the upper 120s during hospitalization. Bilirubin was mildly elevated  during hospitalization with stable transaminases.  Abdominal ultrasound revealed normal liver with exception of area of focal fatty deposition. Troponin was mildly elevated without EKG changes, suggestive of demand ischemia. CPK was mildly elevated, likely representing traumatic rhabdomyolysis. History of stable pulmonary nodule left lower lobe per chart with follow-up per outpatient primary provider    Left femur fracture   following a mechanical fall, now status post left hip hemiarthroplasty with no significant postop complications. Pain control adequate with Tylenol. HGB stable at 11.1  Plan:   -OK to leave incision open to air and get wet, do not scrub, soak, or submerge until >6 weeks postop  -Continue pain management regimen  -PT/OT  -WBAT  -Follow-up with Dr. Coker at [facility] in 4 weeks for recheck and x-rays; [facility] to help schedule for patient     Hyponatremia, chronic  Sodium values ranging 133-135.    --follow clinically.      Hypertension  Chronic and stable with blood pressures SBPs 105-140 but typically 130's  -Continues on amlodipine 5 mg daily  - Continue to monitor blood pressure and heart rate, adjust medications as needed.     Pulmonary nodule  CT chest revealed pulmonary nodule in the left lower lobe, previously noted on imaging going back to at least 2012.  --PCP to follow     Dementia  Slums 15/30 while at [facility] September 2023. And 20/ 30 here at [facility]. CPT 5.4 back in February 2024.   -- Will likely be moving to [facility] from the TCU.      Physical deconditioning  Secondary to recent hospitalization and underlying medical conditions.   --ongoing PT/OT for strengthening.   -- [place] new [place]    Discharge Medications:  MED REC REQUIRED  Post Medication Reconciliation Status: discharge medications reconciled, continue medications without change       Current Outpatient Medications   Medication Sig Dispense Refill    acetaminophen (TYLENOL) 325 MG tablet Take 3 tablets (975 mg) by  mouth every 8 hours as needed for mild pain      amLODIPine (NORVASC) 5 MG tablet Take 1 tablet (5 mg) by mouth every evening 90 tablet 3    aspirin 81 MG EC tablet Take 1 tablet (81 mg) by mouth 2 times daily for 42 days      brimonidine (ALPHAGAN) 0.2 % ophthalmic solution Place 1 drop Into the left eye 2 times daily      loperamide (IMODIUM) 2 MG capsule Take 2 mg by mouth daily as needed for diarrhea      methocarbamol (ROBAXIN) 500 MG tablet Take 1 tablet (500 mg) by mouth 3 times daily as needed for muscle spasms or other (pain)      oxyCODONE (ROXICODONE) 5 MG tablet Take 0.5-1 tablets (2.5-5 mg) by mouth every 4 hours as needed for moderate to severe pain (Take 2.5 mg (1/2 tab) for pain 4-6/10, take 5 mg (1 tab) for pain 7-10/10) 20 tablet 0    pantoprazole (PROTONIX) 40 MG EC tablet Take 1 tablet (40 mg) by mouth daily 90 tablet 1    polyethylene glycol (MIRALAX) 17 GM/Dose powder Take 17 g by mouth daily      prednisoLONE acetate (PRED FORTE) 1 % ophthalmic suspension Place 1 drop Into the left eye 4 times daily      senna-docusate (SENOKOT-S/PERICOLACE) 8.6-50 MG tablet Take 1 tablet by mouth 2 times daily as needed for constipation          Controlled medications:   Will be sent with current facility staff show medications     Past Medical History:   Past Medical History:   Diagnosis Date    Atrophic vaginitis     Celiac disease     Cystocele     Disorder of bone and cartilage, unspecified     osteopenia    Endometriosis, site unspecified     Essential hypertension, benign     Gastro-oesophageal reflux disease     Hiatal hernia     3 cm sliding hiatal hernia    Hypercalcemia     Hyperlipidemia     HZV (herpes zoster virus) post herpetic neuralgia     Microscopic colitis     Nonspecific abnormal results of liver function study     Mildly elevated transaminases, liver biopsy WNL    Peripheral neuropathy     Pulmonary nodule     Zinc deficiency      Physical Exam:   Vitals: /79   Pulse 85   Temp  97.5  F (36.4  C)   Resp 18   Wt 55.3 kg (122 lb)   LMP  (LMP Unknown)   SpO2 90%   BMI 19.69 kg/m    BMI: Body mass index is 19.69 kg/m .  GENERAL APPEARANCE:  Alert, in no distress, pleasant, cooperative  EYES: no discharge or mattering on lids or lashes noted  ENT:  moist mucous membranes, hearing acuity intact  NECK: supple, symmetrical  RESP: no respiratory distress, Lung sounds clear, patient is on room air  CV:  rate and rhythm regular, no murmur. Edema none in bilateral lower extremities. VASCULAR: warm extremities without open areas.  ABDOMEN: normal bowel sounds, soft, nontender.  M/S:   Gait and station: currently in recliner, will await therapy eval to determine status, no tenderness or swelling of the joints; able to move all extremities   SKIN:  Inspection and palpation of skin and subcutaneous tissue: skin warm, dry and intact without rashes  NEURO: no facial asymmetry, no speech deficits and able to follow directions, moves all extremities symmetrically  PSYCH:  insight, judgement, and memory impaired affect and mood normal    SNF labs:   CBC RESULTS:   Recent Labs   Lab Test 08/21/24  0603 08/14/24  0721 08/13/24  0443   WBC 7.0  --  9.5   RBC 3.14*  --  3.46*   HGB 11.1* 11.9 12.5   HCT 31.8*  --  35.2   *  --  102*   MCH 35.4*  --  36.1*   MCHC 34.9  --  35.5   RDW 15.4*  --  15.4*   *  --  215       Last Basic Metabolic Panel:  Recent Labs   Lab Test 08/21/24  0603 08/15/24  0639 08/14/24  1741 08/14/24  0721   *  --   --  129*   POTASSIUM 3.4 3.9   < > 3.2*  3.2*   CHLORIDE 97*  --   --  94*   KARTHIKEYAN 9.3  --   --  8.8   CO2 27  --   --  28   BUN 9.0  --   --  9.2   CR 0.52  --   --  0.53   GLC 83  --   --  91    < > = values in this interval not displayed.       Liver Function Studies -   Recent Labs   Lab Test 08/13/24  0443 08/11/24  1158   PROTTOTAL 5.9* 7.3   ALBUMIN 3.4* 4.2   BILITOTAL 1.4* 2.2*   ALKPHOS 182* 237*   AST 43 46*   ALT 22 29       TSH   Date Value Ref  Range Status   08/12/2023 2.83 0.30 - 4.20 uIU/mL Final   11/02/2004 1.86 0.4 - 5.0 mU/L Final       DISCHARGE PLAN:  Follow up labs: No labs orders/due  Medical Follow Up:      Follow up with primary care provider in 1-2 weeks  [facility] scheduled appointments:  Appointments in Next Year      Sep 20, 2024 10:30 AM  Discharge Summary with CHINMAY Barfield CNP  [facility] ([facility] ) [phone]     Mar 20, 2025 10:30 AM  (Arrive by 10:10 AM)  Annual Wellness Visit with Melissa Fairbanks MD  [facility] ([facility] ) [phone]           Discharge Services: Home Care:  Occupational Therapy and Physical Therapy  Discharge Instructions Verbalized to Patient at Discharge:   None    TOTAL DISCHARGE TIME:   Greater than 30 minutes  Electronically signed by:  CHINMAY Drew CNP         Documentation of Face-to-Face and Certification for Home Health Services     Patient: Luz Babb   YOB: 1940  MR Number: [id number]  Today's Date: 9/21/2024    I certify that patient: Luz Babb is under my care and that I, or a nurse practitioner or physician's assistant working with me, had a face-to-face encounter that meets the physician face-to-face encounter requirements with this patient on: 9/20/2024.    This encounter with the patient was in whole, or in part, for the following medical condition, which is the primary reason for home health care: Left femur fracture, dementia.    I certify that, based on my findings, the following services are medically necessary home health services: Occupational Therapy and Physical Therapy.    My clinical findings support the need for the above services because: Occupational Therapy Services are needed to assess and treat cognitive ability and address ADL safety due to impairment in upper body strength.  Assess new assisted living for DME needs.  and Physical Therapy Services are needed to assess and treat the following functional impairments: Ongoing lower body strengthening.  Transfer training..    Further, I certify that my clinical findings support that this patient is homebound (i.e. absences from home require considerable and taxing effort and are for medical reasons or Spiritism services or infrequently or of short duration when for other reasons) because: Requires assistance of another person or specialized equipment to access medical services because patient: Is prone to wander/get lost without assistance., Range of motion limitations prevents ability to exit home safely., and Requires supervision of another for safe transfer...    Based on the above findings. I certify that this patient is confined to the home and needs intermittent skilled nursing care, physical therapy and/or speech therapy.  The patient is under my care, and I have initiated the establishment of the plan of care.  This patient will be followed by a physician who will periodically review the plan of care.  Physician/Provider to provide follow up care: Melissa Fairbanks    Responsible Medicare certified PECOS Physician: Dr. Nehemiah Aguilera  Physician Signature: See electronic signature associated with these discharge orders.  Date: 9/21/2024

## 2024-09-20 NOTE — LETTER
9/20/2024      Luz Babb  [address]  [place] [address]        [facility] GERIATRICS DISCHARGE SUMMARY  PATIENT'S NAME: Luz Babb  YOB: 1940  MEDICAL RECORD NUMBER:  [id number]  Place of Service where encounter took place:  [facility] ([facility]) [[id number]]    PRIMARY CARE PROVIDER AND CLINIC RESPONSIBLE AFTER TRANSFER:   Melissa Fairbanks MD, [address] / [place] [address]    Non-FMG Provider     Transferring providers: CHINMAY Drew CNP, Nehemiah Aguilera MD  Recent Hospitalization/ED:  [facility] Hospital stay 8/11/2024 to 8/15/2024.  Date of SNF Admission: August 15, 2024  Date of SNF (anticipated) Discharge:  9/26/24  Discharged to: new assisted living for patient   Cognitive Scores: SLUMS: 20/30  Physical Function:    Pt amb with 2ww x 125', x 200' with SBA with vc's to increase step length and CHARLES with slight adjustment   NuSTep @ L4 with L/UE's to increase strength and activity tolerance x 15', pt ave 42 spm  LB Dressing = Supervised (A)  Toileting Toileting = Supervised (A); Toilet / Commode Transfers = Supervised (A); Personal Hygiene = MI  DME: Wheelchair    CODE STATUS/ADVANCE DIRECTIVES DISCUSSION:  Prior   ALLERGIES: [facility] COURSE   Patient is a past medical history of hypertension, GERD, depression, neuropathy, hypercalcemia  .  She was hospitalized for the management of a left femoral neck fracture following a fall.  She underwent a hemiarthroplasty by Dr. Coker on 8/12/2024.  There were no significant postoperative complications.  Patient was discharged on aspirin for 6 weeks for DVT prophylaxis.  She is weightbearing as tolerated left lower extremity.     Medical issues addressed during hospitalization included mild hypokalemia and hypomagnesia, stable after replacement, hyponatremia and patient with a history of hyponatremia secondary to SIADH.  Baseline sodium 127 135.  Sodium  was in the upper 120s during hospitalization. Bilirubin was mildly elevated during hospitalization with stable transaminases.  Abdominal ultrasound revealed normal liver with exception of area of focal fatty deposition. Troponin was mildly elevated without EKG changes, suggestive of demand ischemia. CPK was mildly elevated, likely representing traumatic rhabdomyolysis. History of stable pulmonary nodule left lower lobe per chart with follow-up per outpatient primary provider    Left femur fracture   following a mechanical fall, now status post left hip hemiarthroplasty with no significant postop complications. Pain control adequate with Tylenol. HGB stable at 11.1  Plan:   -OK to leave incision open to air and get wet, do not scrub, soak, or submerge until >6 weeks postop  -Continue pain management regimen  -PT/OT  -WBAT  -Follow-up with Dr. Coker at [facility] in 4 weeks for recheck and x-rays; [facility] to help schedule for patient     Hyponatremia, chronic  Sodium values ranging 133-135.    --follow clinically.      Hypertension  Chronic and stable with blood pressures SBPs 105-140 but typically 130's  -Continues on amlodipine 5 mg daily  - Continue to monitor blood pressure and heart rate, adjust medications as needed.     Pulmonary nodule  CT chest revealed pulmonary nodule in the left lower lobe, previously noted on imaging going back to at least 2012.  --PCP to follow     Dementia  Slums 15/30 while at [facility] September 2023. And 20/ 30 here at [facility]. CPT 5.4 back in February 2024.   -- Will likely be moving to [facility] from the TCU.      Physical deconditioning  Secondary to recent hospitalization and underlying medical conditions.   --ongoing PT/OT for strengthening.   -- [place]    Discharge Medications:  MED REC REQUIRED  Post Medication Reconciliation Status: discharge medications reconciled, continue medications without change       Current Outpatient Medications   Medication Sig Dispense  Refill     acetaminophen (TYLENOL) 325 MG tablet Take 3 tablets (975 mg) by mouth every 8 hours as needed for mild pain       amLODIPine (NORVASC) 5 MG tablet Take 1 tablet (5 mg) by mouth every evening 90 tablet 3     aspirin 81 MG EC tablet Take 1 tablet (81 mg) by mouth 2 times daily for 42 days       brimonidine (ALPHAGAN) 0.2 % ophthalmic solution Place 1 drop Into the left eye 2 times daily       loperamide (IMODIUM) 2 MG capsule Take 2 mg by mouth daily as needed for diarrhea       methocarbamol (ROBAXIN) 500 MG tablet Take 1 tablet (500 mg) by mouth 3 times daily as needed for muscle spasms or other (pain)       oxyCODONE (ROXICODONE) 5 MG tablet Take 0.5-1 tablets (2.5-5 mg) by mouth every 4 hours as needed for moderate to severe pain (Take 2.5 mg (1/2 tab) for pain 4-6/10, take 5 mg (1 tab) for pain 7-10/10) 20 tablet 0     pantoprazole (PROTONIX) 40 MG EC tablet Take 1 tablet (40 mg) by mouth daily 90 tablet 1     polyethylene glycol (MIRALAX) 17 GM/Dose powder Take 17 g by mouth daily       prednisoLONE acetate (PRED FORTE) 1 % ophthalmic suspension Place 1 drop Into the left eye 4 times daily       senna-docusate (SENOKOT-S/PERICOLACE) 8.6-50 MG tablet Take 1 tablet by mouth 2 times daily as needed for constipation          Controlled medications:   Will be sent with current facility staff show medications     Past Medical History:   Past Medical History:   Diagnosis Date     Atrophic vaginitis      Celiac disease      Cystocele      Disorder of bone and cartilage, unspecified     osteopenia     Endometriosis, site unspecified      Essential hypertension, benign      Gastro-oesophageal reflux disease      Hiatal hernia     3 cm sliding hiatal hernia     Hypercalcemia      Hyperlipidemia      HZV (herpes zoster virus) post herpetic neuralgia      Microscopic colitis      Nonspecific abnormal results of liver function study     Mildly elevated transaminases, liver biopsy WNL     Peripheral neuropathy       Pulmonary nodule      Zinc deficiency      Physical Exam:   Vitals: /79   Pulse 85   Temp 97.5  F (36.4  C)   Resp 18   Wt 55.3 kg (122 lb)   LMP  (LMP Unknown)   SpO2 90%   BMI 19.69 kg/m    BMI: Body mass index is 19.69 kg/m .  GENERAL APPEARANCE:  Alert, in no distress, pleasant, cooperative  EYES: no discharge or mattering on lids or lashes noted  ENT:  moist mucous membranes, hearing acuity intact  NECK: supple, symmetrical  RESP: no respiratory distress, Lung sounds clear, patient is on room air  CV:  rate and rhythm regular, no murmur. Edema none in bilateral lower extremities. VASCULAR: warm extremities without open areas.  ABDOMEN: normal bowel sounds, soft, nontender.  M/S:   Gait and station: currently in recliner, will await therapy eval to determine status, no tenderness or swelling of the joints; able to move all extremities   SKIN:  Inspection and palpation of skin and subcutaneous tissue: skin warm, dry and intact without rashes  NEURO: no facial asymmetry, no speech deficits and able to follow directions, moves all extremities symmetrically  PSYCH:  insight, judgement, and memory impaired affect and mood normal    SNF labs:   CBC RESULTS:   Recent Labs   Lab Test 08/21/24  0603 08/14/24  0721 08/13/24  0443   WBC 7.0  --  9.5   RBC 3.14*  --  3.46*   HGB 11.1* 11.9 12.5   HCT 31.8*  --  35.2   *  --  102*   MCH 35.4*  --  36.1*   MCHC 34.9  --  35.5   RDW 15.4*  --  15.4*   *  --  215       Last Basic Metabolic Panel:  Recent Labs   Lab Test 08/21/24  0603 08/15/24  0639 08/14/24  1741 08/14/24  0721   *  --   --  129*   POTASSIUM 3.4 3.9   < > 3.2*  3.2*   CHLORIDE 97*  --   --  94*   KARTHIKEYAN 9.3  --   --  8.8   CO2 27  --   --  28   BUN 9.0  --   --  9.2   CR 0.52  --   --  0.53   GLC 83  --   --  91    < > = values in this interval not displayed.       Liver Function Studies -   Recent Labs   Lab Test 08/13/24  0443 08/11/24  1158   PROTTOTAL 5.9* 7.3   ALBUMIN 3.4*  4.2   BILITOTAL 1.4* 2.2*   ALKPHOS 182* 237*   AST 43 46*   ALT 22 29       TSH   Date Value Ref Range Status   08/12/2023 2.83 0.30 - 4.20 uIU/mL Final   11/02/2004 1.86 0.4 - 5.0 mU/L Final       DISCHARGE PLAN:  Follow up labs: No labs orders/due  Medical Follow Up:      Follow up with primary care provider in 1-2 weeks  [facility] scheduled appointments:  Appointments in Next Year      Sep 20, 2024 10:30 AM  Discharge Summary with CHINMAY Barfield CNP  [facility] ([facility] ) [phone]     Mar 20, 2025 10:30 AM  (Arrive by 10:10 AM)  Annual Wellness Visit with Melissa Fairbanks MD  [facility] ([facility] ) [phone]           Discharge Services: Home Care:  Occupational Therapy and Physical Therapy  Discharge Instructions Verbalized to Patient at Discharge:   None    TOTAL DISCHARGE TIME:   Greater than 30 minutes  Electronically signed by:  CHINMAY Drew CNP         Documentation of Face-to-Face and Certification for Home Health Services     Patient: Luz Babb   YOB: 1940  MR Number: [id number]  Today's Date: 9/21/2024    I certify that patient: Luz Babb is under my care and that I, or a nurse practitioner or physician's assistant working with me, had a face-to-face encounter that meets the physician face-to-face encounter requirements with this patient on: 9/20/2024.    This encounter with the patient was in whole, or in part, for the following medical condition, which is the primary reason for home health care: Left femur fracture, dementia.    I certify that, based on my findings, the following services are medically necessary home health services: Occupational Therapy and Physical Therapy.    My clinical findings support the need for the above services because: Occupational Therapy Services are needed to assess and treat cognitive ability and  address ADL safety due to impairment in upper body strength.  Assess new assisted living for DME needs. and Physical Therapy Services are needed to assess and treat the following functional impairments: Ongoing lower body strengthening.  Transfer training..    Further, I certify that my clinical findings support that this patient is homebound (i.e. absences from home require considerable and taxing effort and are for medical reasons or Scientology services or infrequently or of short duration when for other reasons) because: Requires assistance of another person or specialized equipment to access medical services because patient: Is prone to wander/get lost without assistance., Range of motion limitations prevents ability to exit home safely., and Requires supervision of another for safe transfer...    Based on the above findings. I certify that this patient is confined to the home and needs intermittent skilled nursing care, physical therapy and/or speech therapy.  The patient is under my care, and I have initiated the establishment of the plan of care.  This patient will be followed by a physician who will periodically review the plan of care.  Physician/Provider to provide follow up care: Melissa Fairbanks    Responsible Medicare certified PECOS Physician: Dr. Nehemiah Aguilera  Physician Signature: See electronic signature associated with these discharge orders.  Date: 9/21/2024                Sincerely,        CHINMAY Drew CNP

## 2024-09-26 ENCOUNTER — DOCUMENTATION ONLY (OUTPATIENT)
Dept: OTHER | Facility: CLINIC | Age: 84
End: 2024-09-26

## 2024-10-02 ENCOUNTER — DOCUMENTATION ONLY (OUTPATIENT)
Dept: OTHER | Facility: CLINIC | Age: 84
End: 2024-10-02
Payer: COMMERCIAL

## 2024-10-07 ENCOUNTER — TRANSFERRED RECORDS (OUTPATIENT)
Dept: HEALTH INFORMATION MANAGEMENT | Facility: CLINIC | Age: 84
End: 2024-10-07
Payer: COMMERCIAL

## 2024-10-08 ENCOUNTER — DOCUMENTATION ONLY (OUTPATIENT)
Dept: GERIATRICS | Facility: CLINIC | Age: 84
End: 2024-10-08
Payer: COMMERCIAL

## 2024-10-15 ENCOUNTER — PATIENT OUTREACH (OUTPATIENT)
Dept: CARE COORDINATION | Facility: CLINIC | Age: 84
End: 2024-10-15
Payer: COMMERCIAL

## 2024-10-15 NOTE — PROGRESS NOTES
Clinic Care Coordination Contact  Care Coordination Clinician Chart Review    Situation: Patient chart reviewed by care coordinator.    Background: [facility], and RN/SW CC has been monitoring for TCU discharge to identify any outstanding Clinic Care Coordination needs/concerns. Refer to initial patient outreach encounter by this writer dated 8/16/24 & 9/12/24 for additional details.    Assessment: Upon chart review, patient is not a candidate for Primary Care Clinic Care Coordination enrollment due to reason stated below:  Patient transitioned to Long Term Care.    Plan/Recommendations: Clinic Care Coordination Referral/order cancelled. RN/SW CC will perform no further monitoring/outreaches at this time and will remain available as needed. If new needs arise, a new Care Coordination Referral may be placed.    Krystin Clark RN Clinic Care Coordinator  [facility]: [place], [place] (on-site Wednesdays), [facility]) & [facility].  [email or web address]  Phone: [phone]

## 2024-10-20 ENCOUNTER — HEALTH MAINTENANCE LETTER (OUTPATIENT)
Age: 84
End: 2024-10-20

## 2024-11-04 ENCOUNTER — TELEPHONE (OUTPATIENT)
Dept: FAMILY MEDICINE | Facility: CLINIC | Age: 84
End: 2024-11-04
Payer: COMMERCIAL

## 2024-11-04 NOTE — TELEPHONE ENCOUNTER
Left a message to home care PT informing them of the provider's approval of home care orders.     Reji Fu RN  [facility]

## 2024-11-04 NOTE — TELEPHONE ENCOUNTER
Home Care is calling regarding an established patient with [facility].     Requesting orders from: No primary care provider on file.  Provider is following patient: Yes  Is this a 60-day recertification request?  Yes    Orders Requested    Physical Therapy  Request for recertification   Frequency:  1x/wk for 1 wks  then 2x/wk for 2 wks  1x/wk for 1 wks  then 2x/wk for 4 wks  Occupational Therapy  Request for recertification   Frequency:  1x/wk for 1 wks    Information was gathered and will be sent to provider for review.  RN will contact Home Care with information after provider review.  Confirmed ok to leave a detailed message with call back.  Contact information confirmed and updated as needed.    Christy MCGRATH Do, RN

## 2024-11-06 DIAGNOSIS — Z53.9 DIAGNOSIS NOT YET DEFINED: Primary | ICD-10-CM

## 2024-11-06 PROCEDURE — G0180 MD CERTIFICATION HHA PATIENT: HCPCS | Performed by: INTERNAL MEDICINE

## 2025-01-17 ENCOUNTER — LAB REQUISITION (OUTPATIENT)
Dept: LAB | Facility: CLINIC | Age: 85
End: 2025-01-17
Payer: COMMERCIAL

## 2025-01-17 DIAGNOSIS — M81.0 AGE-RELATED OSTEOPOROSIS WITHOUT CURRENT PATHOLOGICAL FRACTURE: ICD-10-CM

## 2025-01-17 DIAGNOSIS — E83.42 HYPOMAGNESEMIA: ICD-10-CM

## 2025-01-17 DIAGNOSIS — D64.9 ANEMIA, UNSPECIFIED: ICD-10-CM

## 2025-01-17 DIAGNOSIS — E87.1 HYPO-OSMOLALITY AND HYPONATREMIA: ICD-10-CM

## 2025-01-21 LAB
BASOPHILS # BLD AUTO: 0.1 10E3/UL (ref 0–0.2)
BASOPHILS NFR BLD AUTO: 1 %
EOSINOPHIL # BLD AUTO: 0.2 10E3/UL (ref 0–0.7)
EOSINOPHIL NFR BLD AUTO: 3 %
ERYTHROCYTE [DISTWIDTH] IN BLOOD BY AUTOMATED COUNT: 13.2 % (ref 10–15)
HCT VFR BLD AUTO: 36.6 % (ref 35–47)
HGB BLD-MCNC: 12 G/DL (ref 11.7–15.7)
IMM GRANULOCYTES # BLD: 0 10E3/UL
IMM GRANULOCYTES NFR BLD: 0 %
LYMPHOCYTES # BLD AUTO: 2.1 10E3/UL (ref 0.8–5.3)
LYMPHOCYTES NFR BLD AUTO: 36 %
MCH RBC QN AUTO: 31.3 PG (ref 26.5–33)
MCHC RBC AUTO-ENTMCNC: 32.8 G/DL (ref 31.5–36.5)
MCV RBC AUTO: 96 FL (ref 78–100)
MONOCYTES # BLD AUTO: 0.7 10E3/UL (ref 0–1.3)
MONOCYTES NFR BLD AUTO: 12 %
NEUTROPHILS # BLD AUTO: 2.8 10E3/UL (ref 1.6–8.3)
NEUTROPHILS NFR BLD AUTO: 48 %
NRBC # BLD AUTO: 0 10E3/UL
NRBC BLD AUTO-RTO: 0 /100
PLATELET # BLD AUTO: 288 10E3/UL (ref 150–450)
RBC # BLD AUTO: 3.83 10E6/UL (ref 3.8–5.2)
WBC # BLD AUTO: 5.7 10E3/UL (ref 4–11)

## 2025-01-22 LAB
ALBUMIN SERPL BCG-MCNC: 4.1 G/DL (ref 3.5–5.2)
ALP SERPL-CCNC: 139 U/L (ref 40–150)
ALT SERPL W P-5'-P-CCNC: 16 U/L (ref 0–50)
ANION GAP SERPL CALCULATED.3IONS-SCNC: 11 MMOL/L (ref 7–15)
AST SERPL W P-5'-P-CCNC: 25 U/L (ref 0–45)
BILIRUB SERPL-MCNC: 0.5 MG/DL
BUN SERPL-MCNC: 9.8 MG/DL (ref 8–23)
CALCIUM SERPL-MCNC: 9.8 MG/DL (ref 8.8–10.4)
CHLORIDE SERPL-SCNC: 99 MMOL/L (ref 98–107)
CREAT SERPL-MCNC: 0.77 MG/DL (ref 0.51–0.95)
EGFRCR SERPLBLD CKD-EPI 2021: 75 ML/MIN/1.73M2
GLUCOSE SERPL-MCNC: 121 MG/DL (ref 70–99)
HCO3 SERPL-SCNC: 26 MMOL/L (ref 22–29)
MAGNESIUM SERPL-MCNC: 1.8 MG/DL (ref 1.7–2.3)
POTASSIUM SERPL-SCNC: 4.5 MMOL/L (ref 3.4–5.3)
PROT SERPL-MCNC: 6.9 G/DL (ref 6.4–8.3)
PTH-INTACT SERPL-MCNC: 51 PG/ML (ref 15–65)
SODIUM SERPL-SCNC: 136 MMOL/L (ref 135–145)
VIT D+METAB SERPL-MCNC: 37 NG/ML (ref 20–50)

## 2025-04-07 DIAGNOSIS — S72.002A CLOSED FRACTURE OF NECK OF LEFT FEMUR, INITIAL ENCOUNTER (H): ICD-10-CM

## 2025-04-08 NOTE — TELEPHONE ENCOUNTER
Clinic RN: Please investigate patient's chart or contact patient if the information cannot be found because the medication is listed as historical or discontinued. Confirm patient is taking this medication. Document findings and route refill encounter to provider for approval or denial.    Olga Lidia PARDO RN  [facility] Triage Team

## 2025-04-11 NOTE — TELEPHONE ENCOUNTER
This is on the active medication list from 8/13/24, prescribed in the hospital. Please review refill request and order if appropriate.     Deepika Kaur RN

## 2025-04-15 RX ORDER — POLYETHYLENE GLYCOL 3350 17 G/17G
POWDER, FOR SOLUTION ORAL
Qty: 510 G | Refills: 1 | Status: SHIPPED | OUTPATIENT
Start: 2025-04-15

## 2025-05-04 ENCOUNTER — HEALTH MAINTENANCE LETTER (OUTPATIENT)
Age: 85
End: 2025-05-04

## 2025-08-11 ENCOUNTER — APPOINTMENT (OUTPATIENT)
Dept: CT IMAGING | Facility: CLINIC | Age: 85
End: 2025-08-11
Attending: EMERGENCY MEDICINE
Payer: MEDICARE

## 2025-08-11 ENCOUNTER — HOSPITAL ENCOUNTER (EMERGENCY)
Facility: CLINIC | Age: 85
Discharge: NURSING, REHAB OR LONG-TERM CARE | End: 2025-08-11
Attending: EMERGENCY MEDICINE
Payer: MEDICARE

## 2025-08-11 VITALS
SYSTOLIC BLOOD PRESSURE: 156 MMHG | TEMPERATURE: 97 F | DIASTOLIC BLOOD PRESSURE: 93 MMHG | RESPIRATION RATE: 16 BRPM | OXYGEN SATURATION: 97 % | HEART RATE: 101 BPM

## 2025-08-11 DIAGNOSIS — S32.010A CLOSED WEDGE COMPRESSION FRACTURE OF L1 VERTEBRA, INITIAL ENCOUNTER (H): Primary | ICD-10-CM

## 2025-08-11 PROCEDURE — 99284 EMERGENCY DEPT VISIT MOD MDM: CPT | Mod: 25 | Performed by: EMERGENCY MEDICINE

## 2025-08-11 PROCEDURE — 72131 CT LUMBAR SPINE W/O DYE: CPT

## 2025-08-11 PROCEDURE — 93005 ELECTROCARDIOGRAM TRACING: CPT

## 2025-08-11 RX ORDER — HYDROCODONE BITARTRATE AND ACETAMINOPHEN 5; 325 MG/1; MG/1
0.5 TABLET ORAL EVERY 6 HOURS PRN
Qty: 10 TABLET | Refills: 0 | Status: SHIPPED | OUTPATIENT
Start: 2025-08-11

## 2025-08-11 ASSESSMENT — ACTIVITIES OF DAILY LIVING (ADL)
ADLS_ACUITY_SCORE: 59

## 2025-08-12 ENCOUNTER — TELEPHONE (OUTPATIENT)
Dept: NEUROSURGERY | Facility: CLINIC | Age: 85
End: 2025-08-12
Payer: COMMERCIAL

## 2025-08-12 LAB
ATRIAL RATE - MUSE: 108 BPM
DIASTOLIC BLOOD PRESSURE - MUSE: NORMAL MMHG
INTERPRETATION ECG - MUSE: NORMAL
P AXIS - MUSE: 71 DEGREES
PR INTERVAL - MUSE: 170 MS
QRS DURATION - MUSE: 130 MS
QT - MUSE: 372 MS
QTC - MUSE: 496 MS
R AXIS - MUSE: -34 DEGREES
SYSTOLIC BLOOD PRESSURE - MUSE: NORMAL MMHG
T AXIS - MUSE: 12 DEGREES
VENTRICULAR RATE- MUSE: 107 BPM

## (undated) DEVICE — Device

## (undated) DEVICE — WIRE GUIDE 2.8X300MM THRD TROCAR POINT 292.68

## (undated) DEVICE — DRAPE IOBAN ISOLATION VERTICAL 6619

## (undated) DEVICE — DRAPE CONVERTORS U-DRAPE 60X72" 8476

## (undated) DEVICE — SU VICRYL 2-0 CT-1 27" UND J259H

## (undated) DEVICE — PACK HIP NAILING SOP15HNSB

## (undated) DEVICE — GLOVE BIOGEL PI MICRO INDICATOR UNDERGLOVE SZ 7.5 48975

## (undated) DEVICE — BLADE SAW SAGITTAL STRK 25X79.5X1.24MM 4/2000 2108-318-000

## (undated) DEVICE — DRAPE U SPLIT 74X120" 29440

## (undated) DEVICE — SU ETHIBOND 5 V-37 4X30" MB66G

## (undated) DEVICE — MANIFOLD NEPTUNE 4 PORT 700-20

## (undated) DEVICE — PAD FOAM MCGUIRE KIT 0814-0150

## (undated) DEVICE — DECANTER BAG 2002S

## (undated) DEVICE — LINEN TOWEL PACK X5 5464

## (undated) DEVICE — GLOVE BIOGEL PI ORTHOPRO SZ 7.5 47675

## (undated) DEVICE — DRAPE STERI U 1015

## (undated) DEVICE — PACK TOTAL HIP W/POUCH SOP15HPFSM

## (undated) DEVICE — DRAPE C-ARMOR 5 SIDED 5523

## (undated) DEVICE — SOL WATER IRRIG 1000ML BOTTLE 2F7114

## (undated) DEVICE — DRSG AQUACEL AG HYDROFIBER  3.5X10" 422605

## (undated) DEVICE — SOL NACL 0.9% INJ 1000ML BAG 2B1324X

## (undated) DEVICE — BONE CLEANING TIP INTERPULSE  0210-010-000

## (undated) DEVICE — DRILL BIT QUICK COUPLING CAN 5.0X300MM 310.63

## (undated) DEVICE — ESU GROUND PAD UNIVERSAL W/O CORD

## (undated) DEVICE — NDL 19GA 1.5"

## (undated) DEVICE — GOWN IMPERVIOUS SPECIALTY XL/XLONG 39049

## (undated) DEVICE — DRSG TEGADERM 6X8" 1628

## (undated) DEVICE — KIT PATIENT CARE HANA TABLE PROFX SUPINE 6855

## (undated) DEVICE — SU VICRYL 0 CT-1 27" J340H

## (undated) DEVICE — BONE CEMENT BIOPREP FEMORAL PREP PLUG INSERTER 0206-710-000

## (undated) DEVICE — SPONGE LAP 18X18" X8435

## (undated) DEVICE — SOL NACL 0.9% IRRIG 1000ML BOTTLE 2F7124

## (undated) DEVICE — GLOVE BIOGEL PI SZ 7.5 40875

## (undated) DEVICE — SU MONOCRYL 2-0 CT-1 36" UND Y945H

## (undated) DEVICE — SU DERMABOND ADVANCED .7ML DNX12

## (undated) DEVICE — BONE CLEANING TIP INTERPULSE FEMORAL CANAL 0210-008-000

## (undated) DEVICE — HOOD SURG T7PLUS PEEL AWAY FACE SHIELD STRL LF 0416-801-100

## (undated) DEVICE — GLOVE BIOGEL PI ULTRATOUCH SZ 7.5 41175

## (undated) DEVICE — SU MONOCRYL 3-0 PS-2 27" Y427H

## (undated) DEVICE — PREP CHLORAPREP 26ML TINTED HI-LITE ORANGE 930815

## (undated) DEVICE — SUCTION IRR SYSTEM W/O TIP INTERPULSE HANDPIECE 0210-100-000

## (undated) DEVICE — DEVICE RETRIEVER HEWSON 71111579

## (undated) DEVICE — SU STRATAFIX PDS PLUS 1 SPIRAL CTX 70CM SXPP1B101

## (undated) DEVICE — SU ETHICON STRATAFIX SPR PS 3-0 30X30CM SXMP2B412

## (undated) RX ORDER — FENTANYL CITRATE 50 UG/ML
INJECTION, SOLUTION INTRAMUSCULAR; INTRAVENOUS
Status: DISPENSED
Start: 2023-12-03

## (undated) RX ORDER — PROPOFOL 10 MG/ML
INJECTION, EMULSION INTRAVENOUS
Status: DISPENSED
Start: 2023-08-08

## (undated) RX ORDER — CEFAZOLIN SODIUM/WATER 2 G/20 ML
SYRINGE (ML) INTRAVENOUS
Status: DISPENSED
Start: 2024-08-12

## (undated) RX ORDER — ONDANSETRON 2 MG/ML
INJECTION INTRAMUSCULAR; INTRAVENOUS
Status: DISPENSED
Start: 2024-08-12

## (undated) RX ORDER — PROPOFOL 10 MG/ML
INJECTION, EMULSION INTRAVENOUS
Status: DISPENSED
Start: 2024-08-12

## (undated) RX ORDER — EPINEPHRINE 1 MG/ML
INJECTION, SOLUTION INTRAMUSCULAR; SUBCUTANEOUS
Status: DISPENSED
Start: 2024-08-12

## (undated) RX ORDER — HYDROMORPHONE HYDROCHLORIDE 1 MG/ML
INJECTION, SOLUTION INTRAMUSCULAR; INTRAVENOUS; SUBCUTANEOUS
Status: DISPENSED
Start: 2024-08-12

## (undated) RX ORDER — TRANEXAMIC ACID 10 MG/ML
INJECTION, SOLUTION INTRAVENOUS
Status: DISPENSED
Start: 2024-08-12

## (undated) RX ORDER — FENTANYL CITRATE 50 UG/ML
INJECTION, SOLUTION INTRAMUSCULAR; INTRAVENOUS
Status: DISPENSED
Start: 2023-08-08

## (undated) RX ORDER — FENTANYL CITRATE 50 UG/ML
INJECTION, SOLUTION INTRAMUSCULAR; INTRAVENOUS
Status: DISPENSED
Start: 2024-08-12